# Patient Record
Sex: FEMALE | Race: BLACK OR AFRICAN AMERICAN | NOT HISPANIC OR LATINO | URBAN - METROPOLITAN AREA
[De-identification: names, ages, dates, MRNs, and addresses within clinical notes are randomized per-mention and may not be internally consistent; named-entity substitution may affect disease eponyms.]

---

## 2017-01-10 ENCOUNTER — EMERGENCY (EMERGENCY)
Facility: HOSPITAL | Age: 27
LOS: 1 days | Discharge: HOME | End: 2017-01-10
Attending: EMERGENCY MEDICINE | Admitting: EMERGENCY MEDICINE
Payer: SELF-PAY

## 2017-01-10 VITALS
DIASTOLIC BLOOD PRESSURE: 85 MMHG | RESPIRATION RATE: 18 BRPM | HEART RATE: 95 BPM | OXYGEN SATURATION: 98 % | SYSTOLIC BLOOD PRESSURE: 156 MMHG | TEMPERATURE: 98 F

## 2017-01-10 VITALS
OXYGEN SATURATION: 98 % | TEMPERATURE: 99 F | SYSTOLIC BLOOD PRESSURE: 147 MMHG | HEART RATE: 94 BPM | WEIGHT: 199.96 LBS | DIASTOLIC BLOOD PRESSURE: 80 MMHG | RESPIRATION RATE: 18 BRPM | HEIGHT: 70 IN

## 2017-01-10 PROCEDURE — 99283 EMERGENCY DEPT VISIT LOW MDM: CPT | Mod: 25

## 2017-01-10 PROCEDURE — 99284 EMERGENCY DEPT VISIT MOD MDM: CPT

## 2017-01-10 PROCEDURE — 94640 AIRWAY INHALATION TREATMENT: CPT

## 2017-01-10 RX ORDER — ALBUTEROL 90 UG/1
1 AEROSOL, METERED ORAL ONCE
Qty: 0 | Refills: 0 | Status: COMPLETED | OUTPATIENT
Start: 2017-01-10 | End: 2017-01-10

## 2017-01-10 RX ORDER — IPRATROPIUM/ALBUTEROL SULFATE 18-103MCG
3 AEROSOL WITH ADAPTER (GRAM) INHALATION ONCE
Qty: 0 | Refills: 0 | Status: COMPLETED | OUTPATIENT
Start: 2017-01-10 | End: 2017-01-10

## 2017-01-10 RX ADMIN — Medication 3 MILLILITER(S): at 11:28

## 2017-01-10 RX ADMIN — ALBUTEROL 1 PUFF(S): 90 AEROSOL, METERED ORAL at 12:08

## 2017-01-10 NOTE — ED PROVIDER NOTE - OBJECTIVE STATEMENT
Patient is a 26 year old female with PMH asthma (never previously intubated or admitted) who presents to ED c/o cough, chest congestion and wheezing x 3 days. Pt reports that she ran out of her albuterol inhaler and currently has no insurance to see her PMD. She began with nasal congestion and coughing a few days ago and it has trigged her asthma. Has not taken anything for symptoms. Denies sick contacts, recent travel, fever, chills, abdominal pain, chest pain, sob, or rash.

## 2017-01-10 NOTE — ED ADULT NURSE NOTE - OBJECTIVE STATEMENT
pt received aox3, ambulatory, in NAD, lung sounds clear bilaterally. pt states she feels tightness in her breathing for 2-3 days mixed with non-productive cough. denies fever, chills. pt states she has no insurance so no inhaler medication. pt denies pertinent medical history except asthma. vitals stable. due for medication. will continue to monitor.

## 2017-01-10 NOTE — ED PROVIDER NOTE - ENMT, MLM
Airway patent, Nasal congestion. Mouth with normal mucosa. Throat has no vesicles, no oropharyngeal exudates and uvula is midline.

## 2017-01-10 NOTE — ED PROVIDER NOTE - CARE PLAN
Principal Discharge DX:	URI (upper respiratory infection)  Secondary Diagnosis:	Asthma Principal Discharge DX:	URI (upper respiratory infection)

## 2017-01-10 NOTE — ED PROVIDER NOTE - MEDICAL DECISION MAKING DETAILS
26 year old female with URI/asthma x 3 days. Pt is well appearing, lungs clear, vitals stable. Given duoneb in ED with improvement. Will d/c home with inhaler. Advised f/u with pmd, return for worsening of symptoms.

## 2017-01-14 DIAGNOSIS — J45.909 UNSPECIFIED ASTHMA, UNCOMPLICATED: ICD-10-CM

## 2017-01-14 DIAGNOSIS — J06.9 ACUTE UPPER RESPIRATORY INFECTION, UNSPECIFIED: ICD-10-CM

## 2017-01-19 ENCOUNTER — EMERGENCY (EMERGENCY)
Facility: HOSPITAL | Age: 27
LOS: 1 days | Discharge: HOME | End: 2017-01-19
Attending: EMERGENCY MEDICINE | Admitting: EMERGENCY MEDICINE
Payer: SELF-PAY

## 2017-01-19 VITALS
DIASTOLIC BLOOD PRESSURE: 84 MMHG | OXYGEN SATURATION: 99 % | WEIGHT: 219.8 LBS | HEART RATE: 72 BPM | TEMPERATURE: 99 F | RESPIRATION RATE: 18 BRPM | SYSTOLIC BLOOD PRESSURE: 130 MMHG

## 2017-01-19 DIAGNOSIS — J02.9 ACUTE PHARYNGITIS, UNSPECIFIED: ICD-10-CM

## 2017-01-19 DIAGNOSIS — B34.9 VIRAL INFECTION, UNSPECIFIED: ICD-10-CM

## 2017-01-19 PROCEDURE — 99283 EMERGENCY DEPT VISIT LOW MDM: CPT

## 2017-01-19 RX ORDER — PSEUDOEPHEDRINE HCL 30 MG
30 TABLET ORAL ONCE
Qty: 0 | Refills: 0 | Status: COMPLETED | OUTPATIENT
Start: 2017-01-19 | End: 2017-01-19

## 2017-01-19 RX ADMIN — Medication 30 MILLIGRAM(S): at 21:15

## 2017-01-19 NOTE — ED PROVIDER NOTE - MEDICAL DECISION MAKING DETAILS
27 yo female with URI symptoms . Appears well, in NAD, nasal congestion+, no wheezing or rales on lungs exam, no signs of strep throat. most likely viral infection. supportive care recommended, stable for discharge

## 2017-01-19 NOTE — ED ADULT NURSE NOTE - OBJECTIVE STATEMENT
Pt walked into ED with c/o of cough for the past 2 weeks with clear sputum. Denies fever, chills, N+V. Pt. denies SOB, wheezing. PT. denies taking anything for the cough.

## 2017-01-19 NOTE — ED PROVIDER NOTE - ENMT, MLM
Airway patent, Nasal mucosa clear. nasal congestion+, no TTP over the sinuses, postnasal discharge+, Mouth with normal mucosa. Throat has no vesicles, no oropharyngeal exudates and uvula is midline.

## 2017-01-19 NOTE — ED PROVIDER NOTE - OBJECTIVE STATEMENT
27 yo female c/o non-productive cough, sore throat, nasal congestion, generalized weakness for almost 2 weeks. Pt states that she was seen for asthma exacerbation ion the ER recently too, but has no more wheezing or SOB. Pt denies fever, chills, night sweats, recent travel or weight loss.

## 2017-08-17 ENCOUNTER — EMERGENCY (EMERGENCY)
Facility: HOSPITAL | Age: 27
LOS: 1 days | Discharge: HOME | End: 2017-08-17
Admitting: EMERGENCY MEDICINE
Payer: COMMERCIAL

## 2017-08-17 VITALS
OXYGEN SATURATION: 98 % | SYSTOLIC BLOOD PRESSURE: 140 MMHG | WEIGHT: 199.96 LBS | RESPIRATION RATE: 18 BRPM | DIASTOLIC BLOOD PRESSURE: 81 MMHG | TEMPERATURE: 98 F | HEART RATE: 70 BPM

## 2017-08-17 DIAGNOSIS — J45.909 UNSPECIFIED ASTHMA, UNCOMPLICATED: ICD-10-CM

## 2017-08-17 DIAGNOSIS — R10.2 PELVIC AND PERINEAL PAIN: ICD-10-CM

## 2017-08-17 DIAGNOSIS — N39.0 URINARY TRACT INFECTION, SITE NOT SPECIFIED: ICD-10-CM

## 2017-08-17 LAB
APPEARANCE UR: SIGNIFICANT CHANGE UP
BILIRUB UR-MCNC: NEGATIVE — SIGNIFICANT CHANGE UP
COLOR SPEC: YELLOW — SIGNIFICANT CHANGE UP
DIFF PNL FLD: NEGATIVE — SIGNIFICANT CHANGE UP
GLUCOSE UR QL: NEGATIVE — SIGNIFICANT CHANGE UP
KETONES UR-MCNC: NEGATIVE — SIGNIFICANT CHANGE UP
LEUKOCYTE ESTERASE UR-ACNC: (no result)
NITRITE UR-MCNC: NEGATIVE — SIGNIFICANT CHANGE UP
PH UR: 6 — SIGNIFICANT CHANGE UP (ref 5–8)
PROT UR-MCNC: 30 MG/DL
SP GR SPEC: 1.02 — SIGNIFICANT CHANGE UP (ref 1–1.03)
UROBILINOGEN FLD QL: 0.2 E.U./DL — SIGNIFICANT CHANGE UP

## 2017-08-17 PROCEDURE — 99284 EMERGENCY DEPT VISIT MOD MDM: CPT

## 2017-08-17 PROCEDURE — 81001 URINALYSIS AUTO W/SCOPE: CPT

## 2017-08-17 RX ORDER — IBUPROFEN 200 MG
600 TABLET ORAL ONCE
Qty: 0 | Refills: 0 | Status: COMPLETED | OUTPATIENT
Start: 2017-08-17 | End: 2017-08-17

## 2017-08-17 RX ORDER — NITROFURANTOIN MACROCRYSTAL 50 MG
1 CAPSULE ORAL
Qty: 10 | Refills: 0
Start: 2017-08-17 | End: 2017-08-22

## 2017-08-17 NOTE — ED PROVIDER NOTE - PHYSICAL EXAMINATION
CONSTITUTIONAL: Well-appearing; well-nourished; in no apparent distress.   NECK: Supple; non-tender;   CARDIOVASCULAR: Normal S1, S2; no murmurs, rubs, or gallops. Regular rate and rhythm.   RESPIRATORY: Breathing easily; breath sounds clear and equal bilaterally; no wheezes, rhonchi, or rales.  Abd: soft, nt, nd  Gyn: no discharge, no CMT or adnexal tenderness   EXT: No cyanosis or edema; N/V intact  SKIN: Normal for age and race; warm; dry; good turgor; no apparent lesions or rash.

## 2017-08-17 NOTE — ED PROVIDER NOTE - CARE PLAN
Principal Discharge DX:	Pelvic pain in female Principal Discharge DX:	Pelvic pain in female  Secondary Diagnosis:	UTI (urinary tract infection)

## 2017-08-17 NOTE — ED PROVIDER NOTE - MEDICAL DECISION MAKING DETAILS
26 yo F ucg neg with hx of fibroids with intermittent pelvic pain worse with menstruation. Pelvic exam normal, abd soft, nt. Pain control, gyn f/u. 28 yo F ucg neg with hx of fibroids with intermittent pelvic pain worse with menstruation. Pelvic exam normal, abd soft, nt. UA+ bacteria, +leuks, >10wbc, will treat. Pain control, gyn f/u. I have discussed the discharge plan with the patient. The patient agrees with the plan, as discussed.  The patient understands Emergency Department diagnosis is a preliminary diagnosis often based on limited information and that the patient must adhere to the follow-up plan as discussed.  The patient understands that if the symptoms worsen or if prescribed medications do not have the desired/planned effect that the patient may return to the Emergency Department at any time for further evaluation and treatment.

## 2017-08-17 NOTE — ED ADULT NURSE NOTE - OBJECTIVE STATEMENT
Patient arrived to ED via walk-in stating, "I have had lower abdominal pain for months.  Last time I had this they said it was fibroids."  Patient is A&Ox3, in NAD, complaining of chronic lower abdominal pain for "a few months".  Patient denies any nausea, vomiting, diarrhea, fevers, chills, urinary symptoms or any other complaints at this time.  Will continue with plan of care.

## 2017-08-17 NOTE — ED PROVIDER NOTE - OBJECTIVE STATEMENT
28 yo F ucg neg with pmh of asthma and fibroids c/o intermittent pelvic pain for "months." Pt states she came to the ED 1 year ago and was told she had multiple fibroids. Pt wants to know if they grew. Pt states she started having cramping 4 days ago. Pt states pain is worse with her menstruation. Pt finished her period 3 days ago. Denies fever, chills, dysuria, hematuria, vaginal bleeding, discharge, back pain, abd pain. Pt has not followed up with a gyn doctor. Pt never takes anything for pain because she does not like to take pain medications.

## 2019-02-13 NOTE — ED PROVIDER NOTE - CHIEF COMPLAINT
Spoke with pt and informed them of normal lab result The patient is a 26y Female complaining of asthma attack.

## 2020-11-18 ENCOUNTER — EMERGENCY (EMERGENCY)
Facility: HOSPITAL | Age: 30
LOS: 1 days | Discharge: HOME | End: 2020-11-18
Attending: EMERGENCY MEDICINE | Admitting: EMERGENCY MEDICINE
Payer: COMMERCIAL

## 2020-11-18 VITALS
SYSTOLIC BLOOD PRESSURE: 151 MMHG | DIASTOLIC BLOOD PRESSURE: 96 MMHG | TEMPERATURE: 99 F | HEART RATE: 76 BPM | RESPIRATION RATE: 18 BRPM | HEIGHT: 70 IN | OXYGEN SATURATION: 98 % | WEIGHT: 259.93 LBS

## 2020-11-18 VITALS
DIASTOLIC BLOOD PRESSURE: 83 MMHG | SYSTOLIC BLOOD PRESSURE: 132 MMHG | OXYGEN SATURATION: 98 % | RESPIRATION RATE: 18 BRPM | HEART RATE: 70 BPM | TEMPERATURE: 98 F

## 2020-11-18 LAB
ALBUMIN SERPL ELPH-MCNC: 4.3 G/DL — SIGNIFICANT CHANGE UP (ref 3.3–5)
ALP SERPL-CCNC: 70 U/L — SIGNIFICANT CHANGE UP (ref 40–120)
ALT FLD-CCNC: 29 U/L — SIGNIFICANT CHANGE UP (ref 10–45)
ANION GAP SERPL CALC-SCNC: 10 MMOL/L — SIGNIFICANT CHANGE UP (ref 5–17)
APPEARANCE UR: CLEAR — SIGNIFICANT CHANGE UP
AST SERPL-CCNC: 21 U/L — SIGNIFICANT CHANGE UP (ref 10–40)
BACTERIA # UR AUTO: PRESENT /HPF
BASOPHILS # BLD AUTO: 0.06 K/UL — SIGNIFICANT CHANGE UP (ref 0–0.2)
BASOPHILS NFR BLD AUTO: 0.6 % — SIGNIFICANT CHANGE UP (ref 0–2)
BILIRUB SERPL-MCNC: 0.2 MG/DL — SIGNIFICANT CHANGE UP (ref 0.2–1.2)
BILIRUB UR-MCNC: NEGATIVE — SIGNIFICANT CHANGE UP
BUN SERPL-MCNC: 14 MG/DL — SIGNIFICANT CHANGE UP (ref 7–23)
CALCIUM SERPL-MCNC: 9.5 MG/DL — SIGNIFICANT CHANGE UP (ref 8.4–10.5)
CHLORIDE SERPL-SCNC: 104 MMOL/L — SIGNIFICANT CHANGE UP (ref 96–108)
CO2 SERPL-SCNC: 25 MMOL/L — SIGNIFICANT CHANGE UP (ref 22–31)
COLOR SPEC: YELLOW — SIGNIFICANT CHANGE UP
CREAT SERPL-MCNC: 0.72 MG/DL — SIGNIFICANT CHANGE UP (ref 0.5–1.3)
DIFF PNL FLD: NEGATIVE — SIGNIFICANT CHANGE UP
EOSINOPHIL # BLD AUTO: 0.22 K/UL — SIGNIFICANT CHANGE UP (ref 0–0.5)
EOSINOPHIL NFR BLD AUTO: 2.3 % — SIGNIFICANT CHANGE UP (ref 0–6)
EPI CELLS # UR: ABNORMAL /HPF (ref 0–5)
GLUCOSE SERPL-MCNC: 126 MG/DL — HIGH (ref 70–99)
GLUCOSE UR QL: NEGATIVE — SIGNIFICANT CHANGE UP
HCT VFR BLD CALC: 35.5 % — SIGNIFICANT CHANGE UP (ref 34.5–45)
HGB BLD-MCNC: 11.3 G/DL — LOW (ref 11.5–15.5)
IMM GRANULOCYTES NFR BLD AUTO: 0.3 % — SIGNIFICANT CHANGE UP (ref 0–1.5)
KETONES UR-MCNC: NEGATIVE — SIGNIFICANT CHANGE UP
LEUKOCYTE ESTERASE UR-ACNC: ABNORMAL
LIDOCAIN IGE QN: 33 U/L — SIGNIFICANT CHANGE UP (ref 7–60)
LYMPHOCYTES # BLD AUTO: 3.66 K/UL — HIGH (ref 1–3.3)
LYMPHOCYTES # BLD AUTO: 39 % — SIGNIFICANT CHANGE UP (ref 13–44)
MCHC RBC-ENTMCNC: 25.5 PG — LOW (ref 27–34)
MCHC RBC-ENTMCNC: 31.8 GM/DL — LOW (ref 32–36)
MCV RBC AUTO: 80.1 FL — SIGNIFICANT CHANGE UP (ref 80–100)
MONOCYTES # BLD AUTO: 0.53 K/UL — SIGNIFICANT CHANGE UP (ref 0–0.9)
MONOCYTES NFR BLD AUTO: 5.6 % — SIGNIFICANT CHANGE UP (ref 2–14)
NEUTROPHILS # BLD AUTO: 4.89 K/UL — SIGNIFICANT CHANGE UP (ref 1.8–7.4)
NEUTROPHILS NFR BLD AUTO: 52.2 % — SIGNIFICANT CHANGE UP (ref 43–77)
NITRITE UR-MCNC: NEGATIVE — SIGNIFICANT CHANGE UP
NRBC # BLD: 0 /100 WBCS — SIGNIFICANT CHANGE UP (ref 0–0)
PH UR: 6 — SIGNIFICANT CHANGE UP (ref 5–8)
PLATELET # BLD AUTO: 242 K/UL — SIGNIFICANT CHANGE UP (ref 150–400)
POTASSIUM SERPL-MCNC: 3.9 MMOL/L — SIGNIFICANT CHANGE UP (ref 3.5–5.3)
POTASSIUM SERPL-SCNC: 3.9 MMOL/L — SIGNIFICANT CHANGE UP (ref 3.5–5.3)
PROT SERPL-MCNC: 8 G/DL — SIGNIFICANT CHANGE UP (ref 6–8.3)
PROT UR-MCNC: NEGATIVE MG/DL — SIGNIFICANT CHANGE UP
RBC # BLD: 4.43 M/UL — SIGNIFICANT CHANGE UP (ref 3.8–5.2)
RBC # FLD: 14.6 % — HIGH (ref 10.3–14.5)
SODIUM SERPL-SCNC: 139 MMOL/L — SIGNIFICANT CHANGE UP (ref 135–145)
SP GR SPEC: >=1.03 — SIGNIFICANT CHANGE UP (ref 1–1.03)
UROBILINOGEN FLD QL: 0.2 E.U./DL — SIGNIFICANT CHANGE UP
WBC # BLD: 9.39 K/UL — SIGNIFICANT CHANGE UP (ref 3.8–10.5)
WBC # FLD AUTO: 9.39 K/UL — SIGNIFICANT CHANGE UP (ref 3.8–10.5)
WBC UR QL: < 5 /HPF — SIGNIFICANT CHANGE UP

## 2020-11-18 PROCEDURE — 80053 COMPREHEN METABOLIC PANEL: CPT

## 2020-11-18 PROCEDURE — 76830 TRANSVAGINAL US NON-OB: CPT

## 2020-11-18 PROCEDURE — 36415 COLL VENOUS BLD VENIPUNCTURE: CPT

## 2020-11-18 PROCEDURE — 99285 EMERGENCY DEPT VISIT HI MDM: CPT

## 2020-11-18 PROCEDURE — 76856 US EXAM PELVIC COMPLETE: CPT | Mod: 26

## 2020-11-18 PROCEDURE — 83690 ASSAY OF LIPASE: CPT

## 2020-11-18 PROCEDURE — 81001 URINALYSIS AUTO W/SCOPE: CPT

## 2020-11-18 PROCEDURE — 76830 TRANSVAGINAL US NON-OB: CPT | Mod: 26

## 2020-11-18 PROCEDURE — 99284 EMERGENCY DEPT VISIT MOD MDM: CPT | Mod: 25

## 2020-11-18 PROCEDURE — 76856 US EXAM PELVIC COMPLETE: CPT

## 2020-11-18 PROCEDURE — 85025 COMPLETE CBC W/AUTO DIFF WBC: CPT

## 2020-11-18 RX ORDER — IBUPROFEN 200 MG
1 TABLET ORAL
Qty: 30 | Refills: 0
Start: 2020-11-18 | End: 2020-11-27

## 2020-11-18 NOTE — ED ADULT NURSE NOTE - NSIMPLEMENTINTERV_GEN_ALL_ED
Implemented All Universal Safety Interventions:  [place] to call system. Call bell, personal items and telephone within reach. Instruct patient to call for assistance. Room bathroom lighting operational. Non-slip footwear when patient is off stretcher. Physically safe environment: no spills, clutter or unnecessary equipment. Stretcher in lowest position, wheels locked, appropriate side rails in place.

## 2020-11-18 NOTE — ED PROVIDER NOTE - NSFOLLOWUPINSTRUCTIONS_ED_ALL_ED_FT
Follow up with your GYN physician this week.               Uterine Fibroids    WHAT YOU NEED TO KNOW:    What are uterine fibroids? Uterine fibroids are growths found inside your uterus. Uterine fibroids also may be called tumors or leiomyomas. Uterine fibroids often appear in groups, or you may have only one. They can be small or large, and they can grow. They are almost always benign (not cancer) and likely will not spread to other parts of your body. They may grow when you are pregnant and shrink after you no longer have a monthly period.    What increases my risk for uterine fibroids? The cause of uterine fibroids is not clear. Ask your healthcare provider about these and other risk factors for uterine fibroids:  •A family history of uterine fibroids      •Too many female hormones, especially estrogen      •Menstrual periods starting before age 13      •Too much body weight      •Not having children      •Drinking alcohol      What are the signs and symptoms of uterine fibroids? You may have no signs or symptoms. Symptoms depend on the size, type, and number of fibroids you have. Symptoms also depend on where the fibroids are inside your uterus:  •Heavy or painful menstrual bleeding      •Pelvic pressure and pain      •Increased pelvic pain during sex      •Constipation or pain when you have a bowel movement      •Need to urinate often      How are uterine fibroids diagnosed? Your healthcare provider will examine you and ask about your symptoms. Tell the provider if any women if your family have had uterine fibroids. You may also need any of the following:   •A pelvic exam is also called an internal or vaginal exam. During a pelvic exam, your healthcare provider gently puts a speculum into your vagina. A speculum is a tool that opens your vagina. This lets your healthcare provider see your cervix (bottom part of your uterus). With gloved hands, your healthcare provider will check the size and shape of your uterus and ovaries.       •A vaginal ultrasound is used to see inside your uterus and to check your ovaries. During this test, your healthcare provider places a small wand in your vagina. Sound waves from the wand show pictures of the uterus and ovaries.      •A biopsy is a tissue sample of a fibroid that your healthcare provider takes from your uterus for testing.      How are uterine fibroids treated? You may not need treatment for your fibroids if you do not have symptoms. The following treatments may shrink your fibroids and help your pain:   •Hormones may help shrink your fibroids.      •Contraceptives help prevent pregnancy. They also may help shrink your fibroids.      •NSAIDs help decrease swelling and pain or fever. This medicine is available with or without a doctor's order. NSAIDs can cause stomach bleeding or kidney problems in certain people. If you take blood thinner medicine, always ask your healthcare provider if NSAIDs are safe for you. Always read the medicine label and follow directions.      •Surgery may be used to remove your uterine fibroids. Surgery may instead be used to block or slow the flow of blood to the fibroid. This may make your fibroids shrink or disappear. Surgery called a hysterectomy may be needed if your fibroids are severe. For this surgery, your healthcare provider removes your entire uterus. After this surgery, you will no longer be able to have children.      What can I do to prevent uterine fibroids?   •Maintain a healthy weight. Extra weight can cause fibroids to grow. Talk to your healthcare provider about a healthy weight for you. He or she can help you create a healthy weight loss plan if you are overweight.      •Eat a variety of healthy foods. Healthy foods include fruits, vegetables, lean meats, fish, low-fat dairy foods, cooked beans, and whole-grain breads and cereals. Fruits and vegetables are especially important for helping lower the risk for fibroids. Your healthcare provider or a dietitian can help you create a healthy meal plan.      •Limit or do not drink alcohol as directed. Alcohol can increase your risk for fibroids. A drink of alcohol is 12 ounces of beer, 1½ ounces of liquor, or 5 ounces of wine. Ask your healthcare provider for information if you need help to quit drinking alcohol.      When should I seek immediate care?   •Your heart begins to race, and you feel faint.      •You begin to pass large blood clots from your vagina.      When should I contact my healthcare provider?   •Your symptoms, such as heavy bleeding, pain, or pelvic pressure, worsen.      •You feel weak and are more tired than usual.      •You do not feel like your bladder is empty after you urinate. You also may urinate small amounts more often.       •You have questions or concerns about your condition or care.      CARE AGREEMENT:    You have the right to help plan your care. Learn about your health condition and how it may be treated. Discuss treatment options with your healthcare providers to decide what care you want to receive. You always have the right to refuse treatment.        © Copyright [employer] 2020           back to top                          © Copyright [employer] 2020

## 2020-11-18 NOTE — ED PROVIDER NOTE - GASTROINTESTINAL, MLM
Abdomen soft, + tenderness to LLQ and suprapubic region. remainder of abd non tender. no guarding. no rebound. no distention. no cva tenderness.

## 2020-11-18 NOTE — ED PROVIDER NOTE - OBJECTIVE STATEMENT
29 y/o female with hx of fibroids and asthma c/o LLQ pain x 4 days. pt states dull achy constant pain. no radiation of pain. no fever or chills. no n/v/d. no bloody stool or melena. no constipation. no urinary sx's. no flank or back pain. no vag d/c or bleeding. pt notes she has not taken any medication for pain. no further complaints. LMP 10/25/20.

## 2020-11-18 NOTE — ED PROVIDER NOTE - CLINICAL SUMMARY MEDICAL DECISION MAKING FREE TEXT BOX
LLQ pain ? ovarian cyst vs torsion vs fibroid pain. do not suspect diverticulitis. a febrile. pt well appearing. pt declined pain meds in ED. labs noted. uhcg negative. us done. dispo pending us report.

## 2020-11-18 NOTE — ED PROVIDER NOTE - PATIENT PORTAL LINK FT
You can access the FollowMyHealth Patient Portal offered by [facility] by registering at the following website: [email or web address]. By joining [employer]’s FollowMyHealth portal, you will also be able to view your health information using other applications (apps) compatible with our system.

## 2020-11-19 PROBLEM — D25.9 LEIOMYOMA OF UTERUS, UNSPECIFIED: Chronic | Status: ACTIVE | Noted: 2017-08-17

## 2020-11-22 DIAGNOSIS — D25.9 LEIOMYOMA OF UTERUS, UNSPECIFIED: ICD-10-CM

## 2020-11-22 DIAGNOSIS — R10.32 LEFT LOWER QUADRANT PAIN: ICD-10-CM

## 2021-04-08 NOTE — ED ADULT NURSE NOTE - ADDITIONAL PRINTED INSTRUCTIONS GIVEN
Pt called and stated that she is having trouble keeping food down. Pt stated that she is in the process of a revision but waiting for Dr's approval.  Pt stated that is having some acid reflux issues as well. I told pt that I will forward this to Dr Carlen Dakins. Pt denies any other symptoms. I told pt that if the symptoms get worse to go to the ER. Pt is aware.
Patient exited ER without prescriptions, D/C papers.

## 2021-11-03 NOTE — ED ADULT NURSE NOTE - OTHER COMPLAINTS
MAKE SURE YOU TAKE 4 PUFFS ALBUTEROL EVERY 4 HOURS FOR THE NEXT 24 TO 48 HOURS AT HOME WHEN AWAKE.     Discharge Instructions for Asthma  You have been diagnosed with an asthma attack. With the help of your healthcare provider, you can keep your asthma under control and have less emergency department visits and stays in the hospital.    Managing asthma  · Take your asthma medicines exactly as your provider tells you. Do this even if you feel that your athma is under control.  · Learn how to monitor your asthma. Some people watch for early changes of symptoms getting worse. Some use a peak flow meter. Your healthcare provider may decide to give you an asthma action plan.  · Be sure to always have a quick-relief inhaler with you. If you were given a prescription, make sure you go to a pharmacy to get it filled as soon as possible.  Controlling asthma triggers  Triggers are those things that make your asthma symptoms worse or cause asthma attacks. Many people with asthma have allergies that can be triggers. Your healthcare provider may have you get allergy testing to find out what you are allergic to. This can help you stay away from triggers.  Dust or dust mites are a common asthma trigger. To avoid a dust mites, do the following:  · Use dust-proof covers on your mattress and pillows. Wash the sheets and blankets on your bed once a week in very hot water.  · Don’t sleep or lie on cloth-covered cushions or furniture.  · Ask someone else to vacuum and dust your house.  · If you do vacuum and dust yourself, wear a dust mask. You can buy them from the hardware store.  · Use a vacuum with a double-layered bag or HEPA (high-efficiency particulate air) filter.  Pets with fur or feathers are triggers for some people. If you must have pets, take these precautions:  · Keep pets out of your bedroom and off your bed. Keep the bedroom door closed.  · Cover the air vents in your bedroom with heavy material to filter the  air.  · Don't use carpets or cloth-covered furniture in your home. If this is not possible, keep pets out of rooms with these items.  · Have someone bathe your pets every week. And brush them often.  If you smoke, do your best to quit.  · Enroll in a stop-smoking program to increase your chance of success.  · Ask your healthcare provider about medicines or other methods to help you quit.  · Ask family members to quit smoking as well.  · Don’t allow anyone to smoke in your home, in your car, or around you.  Other steps to take  · Make sure you know what to do if exercise is a trigger for you. Many people use quick-relief inhalers before exercise or physical activity.  · Get a flu shot every year and get pneumonia shots as advised by your healthcare provider.  · Try to keep your windows closed during pollen seasons and when mold counts are high.  · On cold or windy days, cover your nose and mouth with a scarf.  · Try to stay away from people who are sick with colds or the flu. Wash your hands often or use a hand . If respiratory infections like colds or flu trigger your asthma, use your quick-relief medicines as soon as you begin to notice respiratory symptoms. They may include a runny or stuffy nose, sore throat, or a cough.  Follow-up care  Make a follow-up appointment as directed. Follow your asthma action plan if you were given one.  Call 911  Call 911 right away if you have:  · Severe wheezing  · Shortness of breath that is not relieved by your quick-relief medicine  · Trouble walking or talking because of shortness of breath  · Blue lips or fingernails  · If you monitor symptoms with a peak flow meter, readings less than 50% of your personal best   [employer] last reviewed this educational content on 2/3/2017  © 2953-3879 [employer] [address] [facility], [place] [address]. All rights reserved. This information is not intended as a substitute for professional medical care. Always follow  your healthcare professional's instructions.        Your Child’s Asthma: Taking Control   Asthma is a long-term (chronic) disease of the airways in the lungs. It can’t be cured, but it can be controlled. Get to know your child’s asthma triggers and symptoms, and understand your child’s treatment plan.   The benefits of control   A child whose asthma is in control can do all of the things other children do. He or she will:   · Be able to play with other kids and take part in sports  · Be able to sleep well. This means more energy for school and play.  · Miss fewer school days--and you’ll miss fewer workdays  Asthma symptoms   Some children have symptoms often (persistent asthma). Others have symptoms once in a while (intermittent asthma). Know your child’s pattern of symptoms.   Mild asthma symptoms   Talk with your child’s healthcare provider about what to do when any of these symptoms occur:   · Coughing, especially at night  · Getting tired or out of breath easily  · Wheezing. This is a whistling noise when breathing out.  · Chest tightness  · Fast breathing when at rest  Severe asthma symptoms   You should have an asthma action plan that tells you what actions to take based on your child's symptoms. If you don't have an action plan, talk with your child’s healthcare provider about getting one. If you do have an action plan, review it with the provider once in a while to be sure it is up to date.   Call your child’s healthcare provider right away if your child has any of these symptoms:   · Very fast or hard breathing  · The skin is pulled in between the ribs and above and below the breastbone (chest retractions) when breathing  · If you or your  asthma with peak flow readings, a peak flow less than 50% of your child's personal best  Call 911  Call 911 right away if your child has any of these symptoms:   · Can't walk or talk  · Lips or fingers turning blue  · Your child seems in severe distress  Is  your child’s asthma controlled?   If you answer yes to any of the questions below, your child’s asthma may not be in control. Work with your child’s healthcare provider to make changes to your child's treatment plan. Discuss any problems that make it hard for you or your child to stick to the plan.   · Does your child need to use a quick-relief inhaler more than 2 times a week (other than before exercise)?  · Does your child wake up at night with symptoms more than 2 times a month?  · Does your child have trouble doing regular, daily activities more than twice per week?  What you can do  · Make sure your child has an asthma action plan. Review it with your child’s healthcare provider.  · Understand your child’s treatment plan.  · Know how to use each of your child’s medicines.  · Know what makes your child’s asthma or triggers worse and how to help your child control or stay away from them.  · Know your child's flare-up symptoms. Teach your child how to get help when a flare-up occurs. Be sure  providers, teachers and other school staff, and babysitters know how to treat a flare-up.    [employer] last reviewed this educational content on 8/1/2018  © 4432-1665 [employer]. All rights reserved. This information is not intended as a substitute for professional medical care. Always follow your healthcare professional's instructions.         c/o of intermittent lower pelvic pain x months

## 2022-08-21 ENCOUNTER — EMERGENCY (EMERGENCY)
Facility: HOSPITAL | Age: 32
LOS: 1 days | Discharge: HOME | End: 2022-08-21
Admitting: EMERGENCY MEDICINE
Payer: COMMERCIAL

## 2022-08-21 VITALS
OXYGEN SATURATION: 100 % | RESPIRATION RATE: 17 BRPM | DIASTOLIC BLOOD PRESSURE: 82 MMHG | HEART RATE: 79 BPM | WEIGHT: 246.92 LBS | SYSTOLIC BLOOD PRESSURE: 154 MMHG | TEMPERATURE: 98 F | HEIGHT: 70 IN

## 2022-08-21 PROCEDURE — 99283 EMERGENCY DEPT VISIT LOW MDM: CPT | Mod: 25

## 2022-08-21 PROCEDURE — 73630 X-RAY EXAM OF FOOT: CPT

## 2022-08-21 PROCEDURE — 73620 X-RAY EXAM OF FOOT: CPT | Mod: 26,LT

## 2022-08-21 PROCEDURE — 73630 X-RAY EXAM OF FOOT: CPT | Mod: 26,LT

## 2022-08-21 NOTE — ED PROVIDER NOTE - PATIENT PORTAL LINK FT
You can access the FollowMyHealth Patient Portal offered by [facility] by registering at the following website: [email or web address]. By joining [employer]’s FollowMyHealth portal, you will also be able to view your health information using other applications (apps) compatible with our system.

## 2022-08-21 NOTE — ED ADULT NURSE NOTE - OBJECTIVE STATEMENT
Pt AOX4. Pt c/o L heel/foot pain x1week. Denies injury/falls/trauma. No obvious deformity noted. Pt AOX4. Pt c/o L heel/foot pain x1week. Denies injury/falls/trauma. No obvious deformity noted. Pt ambulate steadily.

## 2022-08-21 NOTE — ED PROVIDER NOTE - OBJECTIVE STATEMENT
32 F pmh asthma p/w L foot pain x one week.  pt reports dull aching pain w/ bearing wt/walking in heel of L foot, worse in the morning and improves throughout the day.  reports was roller blading 2 weeks ago, does not recall any injuries.  pt has been taking NSAIDs with minimal relief.  denies f/c, headache, dizziness, fainting, chest pain, sob, nv, numbness/weakness, paresthesia, limited ROM ext, calf pain/swelling, trauma.

## 2022-08-21 NOTE — ED PROVIDER NOTE - NSFOLLOWUPINSTRUCTIONS_ED_ALL_ED_FT
Your xray shows no concern for fracture or dislocation     Take tylenol 650mg or motrin 400-800mg as needed every 4-6 hours for pain.   REST- Rest your hurting/injured joint or extremity to decrease pain and swelling for 24-48 hours    ICE- Apply ice to area of pain to decreased inflammation and pain, put towel/barrier between ice and skin. You can keep ice on for 20 minutes at a time 4-8 times daily   COMPRESSION- Wear ace wrap or brace for support to reduce swelling.  Make sure not to wrap too tight, loosen if skin feeling numb/tingling or skin turns blue   ELEVATION- Elevate hurting/injured area 6 or more inches about level of heart to decrease swelling/inflammation.  Use pillow under joint to elevate area    Please call to arrange follow up in podiatry clinic within one week   [address]  [phone]      Plantar Fasciitis       Plantar fasciitis is a painful foot condition that affects the heel. It occurs when the band of tissue that connects the toes to the heel bone (plantar fascia) becomes irritated. This can happen as the result of exercising too much or doing other repetitive activities (overuse injury).    Plantar fasciitis can cause mild irritation to severe pain that makes it difficult to walk or move. The pain is usually worse in the morning after sleeping, or after sitting or lying down for a period of time. Pain may also be worse after long periods of walking or standing.      What are the causes?    This condition may be caused by:  •Standing for long periods of time.      •Wearing shoes that do not have good arch support.      •Doing activities that put stress on joints (high-impact activities). This includes ballet and exercise that makes your heart beat faster (aerobic exercise), such as running.      •Being overweight.      •An abnormal way of walking (gait).      •Tight muscles in the back of your lower leg (calf).      •High arches in your feet or flat feet.      •Starting a new athletic activity.        What are the signs or symptoms?    The main symptom of this condition is heel pain. Pain may get worse after the following:  •Taking the first steps after a time of rest, especially in the morning after awakening, or after you have been sitting or lying down for a while.      •Long periods of standing still.      Pain may decrease after 30–45 minutes of activity, such as gentle walking.      How is this diagnosed?    This condition may be diagnosed based on your medical history, a physical exam, and your symptoms. Your health care provider will check for:  •A tender area on the bottom of your foot.      •A high arch in your foot or flat feet.      •Pain when you move your foot.      •Difficulty moving your foot.      You may have imaging tests to confirm the diagnosis, such as:  •X-rays.      •Ultrasound.      •MRI.        How is this treated?    Treatment for plantar fasciitis depends on how severe your condition is. Treatment may include:  •Rest, ice, pressure (compression), and raising (elevating) the affected foot. This is called RICE therapy. Your health care provider may recommend RICE therapy along with over-the-counter pain medicines to manage your pain.      •Exercises to stretch your calves and your plantar fascia.      •A splint that holds your foot in a stretched, upward position while you sleep (night splint).      •Physical therapy to relieve symptoms and prevent problems in the future.      •Injections of steroid medicine (cortisone) to relieve pain and inflammation.      •Stimulating your plantar fascia with electrical impulses (extracorporeal shock wave therapy). This is usually the last treatment option before surgery.      •Surgery, if other treatments have not worked after 12 months.        Follow these instructions at home:      Managing pain, stiffness, and swelling    •If directed, put ice on the painful area. To do this:  •Put ice in a plastic bag, or use a frozen bottle of water.      •Place a towel between your skin and the bag or bottle.      •Roll the bottom of your foot over the bag or bottle.      •Do this for 20 minutes, 2–3 times a day.        •Wear athletic shoes that have air-sole or gel-sole cushions, or try soft shoe inserts that are designed for plantar fasciitis.      •Elevate your foot above the level of your heart while you are sitting or lying down.      Activity     •Avoid activities that cause pain. Ask your health care provider what activities are safe for you.      •Do physical therapy exercises and stretches as told by your health care provider.      •Try activities and forms of exercise that are easier on your joints (low impact). Examples include swimming, water aerobics, and biking.      General instructions     •Take over-the-counter and prescription medicines only as told by your health care provider.      •Wear a night splint while sleeping, if told by your health care provider. Loosen the splint if your toes tingle, become numb, or turn cold and blue.      •Maintain a healthy weight, or work with your health care provider to lose weight as needed.      •Keep all follow-up visits. This is important.        Contact a health care provider if you have:    •Symptoms that do not go away with home treatment.      •Pain that gets worse.      •Pain that affects your ability to move or do daily activities.        Summary    •Plantar fasciitis is a painful foot condition that affects the heel. It occurs when the band of tissue that connects the toes to the heel bone (plantar fascia) becomes irritated.      •Heel pain is the main symptom of this condition. It may get worse after exercising too much or standing still for a long time.      •Treatment varies, but it usually starts with rest, ice, pressure (compression), and raising (elevating) the affected foot. This is called RICE therapy. Over-the-counter medicines can also be used to manage pain.      This information is not intended to replace advice given to you by your health care provider. Make sure you discuss any questions you have with your health care provider.

## 2022-08-21 NOTE — ED PROVIDER NOTE - CLINICAL SUMMARY MEDICAL DECISION MAKING FREE TEXT BOX
32 F pmh asthma p/w L foot pain x one week.  on exam LLE: no swelling/edema or deformity, minimal ttp over plantar fascia, FROM all joints, SILT, cap refill < 2 sec, DP/PT Pulse 2+, no calf ttp.  xray shows no e/o fracture or dislocation.  suspect plantar fascitis.  educated on supportive care and f/u with podiatry.  discussed strict return parameters
negative...

## 2022-08-21 NOTE — ED ADULT TRIAGE NOTE - CHIEF COMPLAINT QUOTE
Pt reports left heel pain radiating to foot/leg x 1 week. Denies injuries/falls. No swelling, redness, fevers.

## 2022-08-21 NOTE — ED ADULT NURSE NOTE - NSIMPLEMENTINTERV_GEN_ALL_ED
Implemented All Universal Safety Interventions:  [place] to call system. Call bell, personal items and telephone within reach. Instruct patient to call for assistance. Room bathroom lighting operational. Non-slip footwear when patient is off stretcher. Physically safe environment: no spills, clutter or unnecessary equipment. Stretcher in lowest position, wheels locked, appropriate side rails in place.

## 2022-08-21 NOTE — ED PROVIDER NOTE - PHYSICAL EXAMINATION
Gen: well appearing, no acute distress  Skin: warm/dry, no rash noted  Resp: breathing comfortably, speaking in full sentences, no dyspnea  LLE: no swelling/edema or deformity, minimal ttp over plantar fascia, FROM all joints, SILT, cap refill < 2 sec, DP/PT Pulse 2+, no calf ttp  Neuro: alert/oriented, ambulatory without assistance

## 2022-08-23 DIAGNOSIS — M79.672 PAIN IN LEFT FOOT: ICD-10-CM

## 2022-08-23 DIAGNOSIS — D25.9 LEIOMYOMA OF UTERUS, UNSPECIFIED: ICD-10-CM

## 2022-08-23 DIAGNOSIS — J45.909 UNSPECIFIED ASTHMA, UNCOMPLICATED: ICD-10-CM

## 2023-01-11 ENCOUNTER — INPATIENT (INPATIENT)
Facility: HOSPITAL | Age: 33
LOS: 8 days | Discharge: HOME | DRG: 163 | End: 2023-01-20
Attending: INTERNAL MEDICINE | Admitting: INTERNAL MEDICINE
Payer: COMMERCIAL

## 2023-01-11 VITALS
OXYGEN SATURATION: 98 % | SYSTOLIC BLOOD PRESSURE: 141 MMHG | DIASTOLIC BLOOD PRESSURE: 93 MMHG | HEART RATE: 126 BPM | TEMPERATURE: 99 F | WEIGHT: 237 LBS | RESPIRATION RATE: 22 BRPM | HEIGHT: 70 IN

## 2023-01-11 DIAGNOSIS — I27.29 OTHER SECONDARY PULMONARY HYPERTENSION: ICD-10-CM

## 2023-01-11 DIAGNOSIS — I26.99 OTHER PULMONARY EMBOLISM WITHOUT ACUTE COR PULMONALE: ICD-10-CM

## 2023-01-11 DIAGNOSIS — I26.09 OTHER PULMONARY EMBOLISM WITH ACUTE COR PULMONALE: ICD-10-CM

## 2023-01-11 DIAGNOSIS — I50.811 ACUTE RIGHT HEART FAILURE: ICD-10-CM

## 2023-01-11 LAB
ALBUMIN SERPL ELPH-MCNC: 3.8 G/DL — SIGNIFICANT CHANGE UP (ref 3.3–5)
ALP SERPL-CCNC: 68 U/L — SIGNIFICANT CHANGE UP (ref 40–120)
ALT FLD-CCNC: 58 U/L — HIGH (ref 10–45)
ANION GAP SERPL CALC-SCNC: 14 MMOL/L — SIGNIFICANT CHANGE UP (ref 5–17)
APTT BLD: 25.6 SEC — LOW (ref 27.5–35.5)
APTT BLD: 53.5 SEC — HIGH (ref 27.5–35.5)
APTT BLD: 63.2 SEC — HIGH (ref 27.5–35.5)
AST SERPL-CCNC: 47 U/L — HIGH (ref 10–40)
BASOPHILS # BLD AUTO: 0.1 K/UL — SIGNIFICANT CHANGE UP (ref 0–0.2)
BASOPHILS NFR BLD AUTO: 1 % — SIGNIFICANT CHANGE UP (ref 0–2)
BILIRUB SERPL-MCNC: 0.2 MG/DL — SIGNIFICANT CHANGE UP (ref 0.2–1.2)
BUN SERPL-MCNC: 13 MG/DL — SIGNIFICANT CHANGE UP (ref 7–23)
CALCIUM SERPL-MCNC: 9.6 MG/DL — SIGNIFICANT CHANGE UP (ref 8.4–10.5)
CHLORIDE SERPL-SCNC: 103 MMOL/L — SIGNIFICANT CHANGE UP (ref 96–108)
CO2 SERPL-SCNC: 22 MMOL/L — SIGNIFICANT CHANGE UP (ref 22–31)
CREAT SERPL-MCNC: 0.82 MG/DL — SIGNIFICANT CHANGE UP (ref 0.5–1.3)
D DIMER BLD IA.RAPID-MCNC: 1781 NG/ML DDU — HIGH
EGFR: 97 ML/MIN/1.73M2 — SIGNIFICANT CHANGE UP
EOSINOPHIL # BLD AUTO: 0.03 K/UL — SIGNIFICANT CHANGE UP (ref 0–0.5)
EOSINOPHIL NFR BLD AUTO: 0.3 % — SIGNIFICANT CHANGE UP (ref 0–6)
GLUCOSE SERPL-MCNC: 111 MG/DL — HIGH (ref 70–99)
HCG SERPL-ACNC: <0 MIU/ML — SIGNIFICANT CHANGE UP
HCT VFR BLD CALC: 33.8 % — LOW (ref 34.5–45)
HCT VFR BLD CALC: 35.7 % — SIGNIFICANT CHANGE UP (ref 34.5–45)
HGB BLD-MCNC: 10.6 G/DL — LOW (ref 11.5–15.5)
HGB BLD-MCNC: 11.2 G/DL — LOW (ref 11.5–15.5)
IMM GRANULOCYTES NFR BLD AUTO: 0.2 % — SIGNIFICANT CHANGE UP (ref 0–0.9)
INR BLD: 0.94 — SIGNIFICANT CHANGE UP (ref 0.88–1.16)
LACTATE SERPL-SCNC: 1.6 MMOL/L — SIGNIFICANT CHANGE UP (ref 0.5–2)
LYMPHOCYTES # BLD AUTO: 3.14 K/UL — SIGNIFICANT CHANGE UP (ref 1–3.3)
LYMPHOCYTES # BLD AUTO: 31.8 % — SIGNIFICANT CHANGE UP (ref 13–44)
MCHC RBC-ENTMCNC: 24.7 PG — LOW (ref 27–34)
MCHC RBC-ENTMCNC: 24.9 PG — LOW (ref 27–34)
MCHC RBC-ENTMCNC: 31.4 GM/DL — LOW (ref 32–36)
MCHC RBC-ENTMCNC: 31.4 GM/DL — LOW (ref 32–36)
MCV RBC AUTO: 78.8 FL — LOW (ref 80–100)
MCV RBC AUTO: 79.3 FL — LOW (ref 80–100)
MONOCYTES # BLD AUTO: 0.84 K/UL — SIGNIFICANT CHANGE UP (ref 0–0.9)
MONOCYTES NFR BLD AUTO: 8.5 % — SIGNIFICANT CHANGE UP (ref 2–14)
NEUTROPHILS # BLD AUTO: 5.74 K/UL — SIGNIFICANT CHANGE UP (ref 1.8–7.4)
NEUTROPHILS NFR BLD AUTO: 58.2 % — SIGNIFICANT CHANGE UP (ref 43–77)
NRBC # BLD: 0 /100 WBCS — SIGNIFICANT CHANGE UP (ref 0–0)
NRBC # BLD: 0 /100 WBCS — SIGNIFICANT CHANGE UP (ref 0–0)
PLATELET # BLD AUTO: 235 K/UL — SIGNIFICANT CHANGE UP (ref 150–400)
PLATELET # BLD AUTO: 239 K/UL — SIGNIFICANT CHANGE UP (ref 150–400)
POTASSIUM SERPL-MCNC: 3.6 MMOL/L — SIGNIFICANT CHANGE UP (ref 3.5–5.3)
POTASSIUM SERPL-SCNC: 3.6 MMOL/L — SIGNIFICANT CHANGE UP (ref 3.5–5.3)
PROT SERPL-MCNC: 8 G/DL — SIGNIFICANT CHANGE UP (ref 6–8.3)
PROTHROM AB SERPL-ACNC: 11.2 SEC — SIGNIFICANT CHANGE UP (ref 10.5–13.4)
RAPID RVP RESULT: SIGNIFICANT CHANGE UP
RBC # BLD: 4.26 M/UL — SIGNIFICANT CHANGE UP (ref 3.8–5.2)
RBC # BLD: 4.53 M/UL — SIGNIFICANT CHANGE UP (ref 3.8–5.2)
RBC # FLD: 18.9 % — HIGH (ref 10.3–14.5)
RBC # FLD: 19.1 % — HIGH (ref 10.3–14.5)
SARS-COV-2 RNA SPEC QL NAA+PROBE: SIGNIFICANT CHANGE UP
SODIUM SERPL-SCNC: 139 MMOL/L — SIGNIFICANT CHANGE UP (ref 135–145)
WBC # BLD: 10.5 K/UL — SIGNIFICANT CHANGE UP (ref 3.8–10.5)
WBC # BLD: 9.87 K/UL — SIGNIFICANT CHANGE UP (ref 3.8–10.5)
WBC # FLD AUTO: 10.5 K/UL — SIGNIFICANT CHANGE UP (ref 3.8–10.5)
WBC # FLD AUTO: 9.87 K/UL — SIGNIFICANT CHANGE UP (ref 3.8–10.5)

## 2023-01-11 PROCEDURE — 71045 X-RAY EXAM CHEST 1 VIEW: CPT | Mod: 26

## 2023-01-11 PROCEDURE — 99223 1ST HOSP IP/OBS HIGH 75: CPT | Mod: GC

## 2023-01-11 PROCEDURE — 99292 CRITICAL CARE ADDL 30 MIN: CPT

## 2023-01-11 PROCEDURE — 99291 CRITICAL CARE FIRST HOUR: CPT

## 2023-01-11 PROCEDURE — 71275 CT ANGIOGRAPHY CHEST: CPT | Mod: 26,MC

## 2023-01-11 PROCEDURE — 93306 TTE W/DOPPLER COMPLETE: CPT | Mod: 26

## 2023-01-11 RX ORDER — HEPARIN SODIUM 5000 [USP'U]/ML
INJECTION INTRAVENOUS; SUBCUTANEOUS
Qty: 25000 | Refills: 0 | Status: DISCONTINUED | OUTPATIENT
Start: 2023-01-11 | End: 2023-01-11

## 2023-01-11 RX ORDER — HEPARIN SODIUM 5000 [USP'U]/ML
2100 INJECTION INTRAVENOUS; SUBCUTANEOUS
Qty: 25000 | Refills: 0 | Status: DISCONTINUED | OUTPATIENT
Start: 2023-01-11 | End: 2023-01-12

## 2023-01-11 RX ORDER — SODIUM CHLORIDE 9 MG/ML
1000 INJECTION INTRAMUSCULAR; INTRAVENOUS; SUBCUTANEOUS ONCE
Refills: 0 | Status: COMPLETED | OUTPATIENT
Start: 2023-01-11 | End: 2023-01-11

## 2023-01-11 RX ORDER — LIDOCAINE 4 G/100G
1 CREAM TOPICAL ONCE
Refills: 0 | Status: COMPLETED | OUTPATIENT
Start: 2023-01-11 | End: 2023-01-11

## 2023-01-11 RX ORDER — HEPARIN SODIUM 5000 [USP'U]/ML
2000 INJECTION INTRAVENOUS; SUBCUTANEOUS
Qty: 25000 | Refills: 0 | Status: DISCONTINUED | OUTPATIENT
Start: 2023-01-11 | End: 2023-01-11

## 2023-01-11 RX ORDER — HEPARIN SODIUM 5000 [USP'U]/ML
9000 INJECTION INTRAVENOUS; SUBCUTANEOUS ONCE
Refills: 0 | Status: COMPLETED | OUTPATIENT
Start: 2023-01-11 | End: 2023-01-11

## 2023-01-11 RX ADMIN — HEPARIN SODIUM 9000 UNIT(S): 5000 INJECTION INTRAVENOUS; SUBCUTANEOUS at 12:10

## 2023-01-11 RX ADMIN — SODIUM CHLORIDE 1000 MILLILITER(S): 9 INJECTION INTRAMUSCULAR; INTRAVENOUS; SUBCUTANEOUS at 09:25

## 2023-01-11 RX ADMIN — SODIUM CHLORIDE 1000 MILLILITER(S): 9 INJECTION INTRAMUSCULAR; INTRAVENOUS; SUBCUTANEOUS at 10:25

## 2023-01-11 RX ADMIN — HEPARIN SODIUM 21 UNIT(S)/HR: 5000 INJECTION INTRAVENOUS; SUBCUTANEOUS at 21:15

## 2023-01-11 RX ADMIN — HEPARIN SODIUM 1900 UNIT(S)/HR: 5000 INJECTION INTRAVENOUS; SUBCUTANEOUS at 12:11

## 2023-01-11 NOTE — ED ADULT TRIAGE NOTE - CHIEF COMPLAINT QUOTE
Patient c/o asthma exacerbation x 1 week, using inhaler and nebulizer with no relief.  C/o cough, chest tightness.  Denies sick contacts.

## 2023-01-11 NOTE — H&P ADULT - ASSESSMENT
31 yo female with PMHx of asthma presents to ED i/s/o worsening chest pain and cough. CT PE showing thrombus with large burden and RV strain. Admitted to MICU for PE with RV strain and possible intervention.     NEURO:   No active issues.     CARDIOVASCULAR:   #RV strain  Pt w/ large PE involving all lobar and multiple segmental branches bilaterally w/ RV strain and cardiomegaly.   TTE with severely dilated RV size, mod-severely reduced RV function, mod TR, pHTN (PASP 57), nml LV size/function. Troponin 0.10, Pro-BNP 1395.   - Obtain repeat TTE tomorrow 1/12 to assess R heart strain     PULMONARY:   #PE  CTA PE protocol indicating large thrombus burden involving all lobar and multiple segmental branches bilaterally w/ probable evolving infarcts in lateral basal segment of LLL and mild pulmonary A dilation w/ RV strain and cardiomegaly. TTE with dilated RV size, mod-severely reduced RV function, pTN (PASP 57). Heparin gtt running @ 19 mL/hr. Etiology likely Unprovoked PE in nature -- risk factors: Nuvaring contraceptive use and obesity. Denies recent travel and family hx.   - c/w heparin gtt @19ml/hr with PTT q6h  - pulm following, appreciate recs    GI:   No active issues    /Renal:   No active issues    Heme/Onc:   On full AC i/s/o PE   - f/u aPTT q6hr   - type and screen    Endo: No active issues    ID: No active issues    F: s/p 1L NS in ED  E: Replete K <4, Mg <2  N: Regular diet  VTE: heparin gtt  GI: not needed  C: Full code  D: DMICU       33 yo female with PMHx of asthma presents to ED i/s/o worsening chest pain and cough. CT PE showing thrombus with large burden and RV strain. Admitted to MICU for PE with RV strain and possible intervention.     NEURO:   No active issues.     CARDIOVASCULAR:   #RV strain  Pt w/ large PE involving all lobar and multiple segmental branches bilaterally w/ RV strain and cardiomegaly.   TTE with severely dilated RV size, mod-severely reduced RV function, mod TR, pHTN (PASP 57), nml LV size/function. Troponin 0.10, Pro-BNP 1395.  - Repeat trop, BNP, lactate 1/12 AM    - Obtain repeat TTE tomorrow 1/12 to assess R heart strain and determine if thrombectomy is needed     PULMONARY:   #PE  CTA PE protocol indicating large thrombus burden involving all lobar and multiple segmental branches bilaterally w/ probable evolving infarcts in lateral basal segment of LLL and mild pulmonary A dilation w/ RV strain and cardiomegaly. TTE with dilated RV size, mod-severely reduced RV function, pTN (PASP 57). Heparin gtt running @ 19 mL/hr. Etiology likely Unprovoked PE in nature -- risk factors: Nuvaring contraceptive use and obesity. Denies recent travel and family hx.   - c/w heparin gtt  with PTT q6h  - PERT following, appreciate recs    GI:   No active issues    /Renal:   No active issues    Heme/Onc:   On full AC i/s/o PE   - f/u aPTT q6hr   - type and screen    Endo: No active issues    ID: No active issues    F: s/p 1L NS in ED  E: Replete K <4, Mg <2  N: Regular diet  VTE: heparin gtt  GI: not needed  C: Full code  D: DMICU

## 2023-01-11 NOTE — CONSULT NOTE ADULT - ASSESSMENT
32F with obesity. Who presents after episode of syncope, found to have high-risk submassive PE for which PERT team has been activated.     Hemodyanmic: [ ] Cardiac arrest, [ ] SBP < 90 mmHg, [ ] SBP < 90 mmHg > 15 min   O2 Required: [x] None, [ ] NC, [ ] HFNC, [ ] NIV, [ ] Mechcanical Ventilation   RV strain: [x] Yes, [ ] No  [x] Unprovoked, [ ] Provoked  Risk category: [ ] Low Risk, [ ] Low risk submassive, [x] High risk submassive, [ ] Massive   Anticoagulation: [ ] tPA, [x] heparin gtt, [ ] enoxaparin, [ ] apixaban   Catheter directed therapies: [ ] Catheter directed aspiration thrombectomy, [ ] Catheter directed thrombolytics  NPO: [ ] Yes, [x] No

## 2023-01-11 NOTE — H&P ADULT - NSHPLABSRESULTS_GEN_ALL_CORE
.  LABS:                         10.6   10.50 )-----------( 239      ( 11 Jan 2023 18:29 )             33.8     01-11    139  |  103  |  13  ----------------------------<  111<H>  3.6   |  22  |  0.82    Ca    9.6      11 Jan 2023 09:33    TPro  8.0  /  Alb  3.8  /  TBili  0.2  /  DBili  x   /  AST  47<H>  /  ALT  58<H>  /  AlkPhos  68  01-11    PT/INR - ( 11 Jan 2023 09:33 )   PT: 11.2 sec;   INR: 0.94          PTT - ( 11 Jan 2023 18:29 )  PTT:63.2 sec    CARDIAC MARKERS ( 11 Jan 2023 09:33 )  x     / 0.10 ng/mL / x     / x     / x          Serum Pro-Brain Natriuretic Peptide: 1359 pg/mL (01-11 @ 09:33)    Lactate, Blood: 1.6 mmol/L (01-11 @ 14:49)      RADIOLOGY, EKG & ADDITIONAL TESTS: Reviewed.

## 2023-01-11 NOTE — ED ADULT NURSE NOTE - OBJECTIVE STATEMENT
Patient arrived to the ER c/o asthma exacerbation for the past x1 week. Pt states using inhaler and nebulizer with no relief this AM. Pt at this moment states c/o cough, chest tightness.  Pt denies any other medical complaints at this moment. Pt is noted to be aox4, in no acute distress, able to maintain airway, having non labored breathing, no retractions noted, non diaphoretic and able to talk in clear full sentences. IV access established and labs drawn.

## 2023-01-11 NOTE — ED ADULT NURSE NOTE - SUICIDE SCREENING QUESTION 2
Chief Complaint   Patient presents with    Other     Following up on status of medical necessity forms and prescriptions for hospital bed battery powered wheelchair. Patient states that prescriptions and paperwork would need to be sent to [facility]. Patient has given contact number to [facility] as [phone].   Kayla Curiel LPN No

## 2023-01-11 NOTE — H&P ADULT - ATTENDING COMMENTS
Asthma, anemia and now with intermidiate risk PE  physical as above  continue heparin; sPESI 1-2  still tachycardic, follow for deterioration  rest as above

## 2023-01-11 NOTE — H&P ADULT - HISTORY OF PRESENT ILLNESS
31 yo female with PMHx of asthma and anemia 2/2 myomectomy, on non-hormonal birth control (Nuvaring) presented with 1 week of productive cough. Pt stating she began feeling symptoms of mild cough and chest tightness 3 weeks prior. She went to  where workup was negative for COVID and Influenza. Pt prescribed Azithromycin and Prednisone 20mg -- currently on day 5. Pt stating she awoke this AM w/ progressively worse chest tightness. She used her inhaler and nebulizer tx at home with minimal relief and had syncopal event at home. Pt with no hx of DVT, PE and also denies family history of clots, blood disorders. CTA PE done in ED showing large thrombus burden involving all lobar and multiple segmental branches bilaterally w/ probable evolving infarcts in lateral basal segment of LLL and mild pulmonary A dilation w/ RV strain and cardiomegaly. ICU consulted for further evaluation of patient i/s/o large PE with cardiac involvement. TTE pending.  Pt was placed on heparin gtt in ED. Vitals upon arrival to ED: 141/93, HR 120s, on room air SpO2 %. Labs indicating Troponin of 0.10, Pro-BNP 1359.    ED Vitals:   ED Labs: WBC 9.87, Hgb 11.2, Plt 235, Na 139, K 3.6, Cl 103, Cr 0.82, AST 47, ALT 58, Troponin T 0.10, Pro-BNP 1359, D-dimer 1781  CT PE:  large thrombus burden involving all lobar and multiple segmental branches bilaterally w/ probable evolving infarcts in lateral basal segment of LLL and mild pulmonary A dilation w/ RV strain and cardiomegaly.  ED Interventions: Heparin 9000 IVP x1, NS 1000mL x1, Heparin gtt @ 19 mL/hr running      31 yo female with PMHx of asthma and anemia 2/2 myomectomy, on non-hormonal birth control (Nuvaring) presented with 1 week of productive cough. Pt stating she began feeling symptoms of mild cough and chest tightness 3 weeks prior. She went to  where workup was negative for COVID and Influenza. Pt prescribed Azithromycin and Prednisone 20mg -- currently on day 5. Pt stating she awoke this AM w/ progressively worse chest tightness. She used her inhaler and nebulizer tx at home with minimal relief and had syncopal event at home. Pt with no hx of DVT, PE and also denies family history of clots, blood disorders.     ED Vitals:   ED Labs: WBC 9.87, Hgb 11.2, Plt 235, Na 139, K 3.6, Cl 103, Cr 0.82, AST 47, ALT 58, Troponin T 0.10, Pro-BNP 1359, D-dimer 1781  CT PE:  large thrombus burden involving all lobar and multiple segmental branches bilaterally w/ probable evolving infarcts in lateral basal segment of LLL and mild pulmonary A dilation w/ RV strain and cardiomegaly.  ED Interventions: Heparin 9000 IVP x1, NS 1000mL x1, Heparin gtt @ 19 mL/hr running      31 yo female with PMHx of asthma and anemia 2/2 myomectomy, on birth control (Nuvaring) presented with 1 week of productive cough. Pt stating she began feeling symptoms of mild cough and chest tightness 3 weeks prior. She went to  where workup was negative for COVID and Influenza. Pt prescribed Azithromycin and Prednisone 20mg -- currently on day 5. Pt stating she awoke this AM w/ progressively worse chest tightness. She used her inhaler and nebulizer tx at home with minimal relief and had syncopal event at home. Pt with no hx of DVT, PE and also denies family history of clots, blood disorders.     ED Vitals:   ED Labs: WBC 9.87, Hgb 11.2, Plt 235, Na 139, K 3.6, Cl 103, Cr 0.82, AST 47, ALT 58, Troponin T 0.10, Pro-BNP 1359, D-dimer 1781  CT PE:  large thrombus burden involving all lobar and multiple segmental branches bilaterally w/ probable evolving infarcts in lateral basal segment of LLL and mild pulmonary A dilation w/ RV strain and cardiomegaly.  ED Interventions: Heparin 9000 IVP x1, NS 1000mL x1, Heparin gtt @ 19 mL/hr running

## 2023-01-11 NOTE — ED PROVIDER NOTE - MUSCULOSKELETAL, MLM
Spine appears normal, range of motion is not limited, no muscle or joint tenderness. No LE swelling or calf tenderness.

## 2023-01-11 NOTE — H&P ADULT - NSHPPHYSICALEXAM_GEN_ALL_CORE
T(C): 37.2 (01-11-23 @ 15:33), Max: 37.2 (01-11-23 @ 08:46)  HR: 112 (01-11-23 @ 15:33) (112 - 126)  BP: 145/101 (01-11-23 @ 15:33) (141/93 - 161/104)  RR: 18 (01-11-23 @ 15:33) (18 - 22)  SpO2: 98% (01-11-23 @ 15:33) (98% - 98%)    PHYSICAL EXAM:  General: AAOx3, no acute distress, laying in bed comfortably  HEENT: head NCAT, eyes EOMI, PERRLA, moist mucous membranes  Neck: supple, +JVD  Pulmonary: lung fields CTAB, no wheezing, rales, or rhonchi  Cardiovascular: RRR, normal S1/S2  Abdominal: soft, NTND, +BS  Extremities: no peripheral edema or cyanosisl, capillary refill < 2 sec.  Neuro: AAOx3, no focal deficits  Skin: warm, dry, no visible jaundice, no new rashes T(C): 37.2 (01-11-23 @ 15:33), Max: 37.2 (01-11-23 @ 08:46)  HR: 112 (01-11-23 @ 15:33) (112 - 126)  BP: 145/101 (01-11-23 @ 15:33) (141/93 - 161/104)  RR: 18 (01-11-23 @ 15:33) (18 - 22)  SpO2: 98% (01-11-23 @ 15:33) (98% - 98%)    PHYSICAL EXAM:  General: AAOx3, no acute distress, laying in bed comfortably  HEENT: head NCAT, eyes EOMI, PERRLA, moist mucous membranes  Neck: supple, +JVD  Pulmonary: lung fields CTAB, no wheezing, rales, or rhonchi  Cardiovascular: RRR, normal S1/S2  Abdominal: soft, NTND, +BS  Extremities: warm to touch; no peripheral edema or cyanosis  Neuro: AAOx3, no focal deficits  Skin: warm, dry, no visible jaundice, no new rashes

## 2023-01-11 NOTE — ED PROVIDER NOTE - WR INTERPRETATION 1
CXR - No focal consolidation or pleural effusion. Some bronchial inflammation, likely consistent with asthma.

## 2023-01-11 NOTE — ED PROVIDER NOTE - OBJECTIVE STATEMENT
31 y/o F with PMHx asthma and anemia (scheduled for myomectomy 2/2 fibroids causing anemia) presents to ED with 1 week of productive cough. Cough was initially productive of yellow sputum which is now improved. Pt went to urgent care 3 days ago and had negative flu/covid tests at that time. She was prescribed azithromycin (on day 5) and prednisone 20mg (on day 5). Also c/o chest tightness when she woke this morning. Pt used her albuterol inhaler and nebulizer with minimal relief and then syncopized. Denies prior h/o syncope but reports feeling dizzy and weak prior to LOC. Denies injury, head trauma, or prior h/o blood clots, DVT, or PE. LMP was several months ago.

## 2023-01-11 NOTE — CONSULT NOTE ADULT - PROBLEM SELECTOR RECOMMENDATION 9
Patient presenting with acute unprovoked high risk-submassive PE. Patient presenting with syncope concerning for an absence of hemodynamic reserve, additionally found to be tachycardic > 110 indicating high risk based on SPESI score, with NEWS score of 2 for -130. Exam patient is warm to touch, mentating, and +HJR Labs notable for elevated troponin and BNP concerning for right heart strain with echo showing severely dilated right ventricular size,  Moderately-to-severely reduced right ventricular systolic function and abnormal septal wall motion concerning for acute pressure overload although border line normal TAPSE. CT angio showing extensive clot burden with clots in left main pulmonary artery, and right main pulm artery and poor collateral flow to distal vessels with additional mosaicism seen on CT scan.    - PERT team activated  - Heparin bolus and heparin gtt stat, trend ptts. --> if patient presistnly tachycardic will consider crossover into escalation of care and will consider for intervention  - Repeat echo 1/12  - check lactate 1/11. Will need repeat troponin, bnp, and lactate morning of 1/12  - monitor i's and o's given tachycardia, patient mentating, warm to touch, cr wnl and reports making urine, although elevation of LFTs noted, does not appear to be in acute decompensated Right heart failure, if patient decompensates (increasing tachycardia, increasing oxygen needs or change in hemodynamics notify PERT team asap  as qualifies for further intervention). Patient presenting with acute unprovoked high risk-submassive PE. Unprovoked given no risk factors outside of nuva ring, no family history no recent immobility etc. Episode for syncope concerning for an absence of hemodynamic reserve, additionally found to be tachycardic > 110 indicating high risk based on SPESI score, with NEWS score of 2 for -130. Exam patient is warm to touch, mentating, and +HJR Labs notable for elevated troponin and BNP concerning for right heart strain with echo showing severely dilated right ventricular size,  Moderately-to-severely reduced right ventricular systolic function and abnormal septal wall motion concerning for acute pressure overload although border line normal TAPSE. CT angio showing extensive clot burden with clots in left main pulmonary artery, and right main pulm artery and poor collateral flow to distal vessels with additional mosaicism seen on CT scan.    - PERT team activated  - Heparin bolus and heparin gtt stat, trend ptts. --> if patient presistnly tachycardic will consider crossover into escalation of care and will consider for intervention  - Repeat echo 1/12  - check lactate 1/11. Will need repeat troponin, bnp, and lactate morning of 1/12  - monitor i's and o's given tachycardia, patient mentating, warm to touch, cr wnl and reports making urine, although elevation of LFTs noted, does not appear to be in acute decompensated Right heart failure, if patient decompensates (increasing tachycardia, increasing oxygen needs or change in hemodynamics notify PERT team asap  as qualifies for further intervention). Patient presenting with acute unprovoked high risk-submassive PE. Unprovoked given no risk factors outside of nuva ring, no family history no recent immobility etc. Episode for syncope concerning for an absence of hemodynamic reserve, additionally found to be tachycardic > 110 indicating high risk based on SPESI score, with NEWS score of 2 for -130. Exam patient is warm to touch, mentating, and +HJR Labs notable for elevated troponin (0.1) and BNP (1359) concerning for right heart strain with echo showing severely dilated right ventricular size,  Moderately-to-severely reduced right ventricular systolic function and abnormal septal wall motion concerning for acute pressure overload although border line normal TAPSE. CT angio showing extensive clot burden with clots in left main pulmonary artery, and right main pulm artery and poor collateral flow to distal vessels with additional mosaicism seen on CT scan.    - PERT team activated  - Heparin bolus and heparin gtt stat, trend ptts. --> if patient presistnly tachycardic will consider crossover into escalation of care and will consider for intervention  - Repeat echo 1/12  - check lactate 1/11. Will need repeat troponin, bnp, and lactate morning of 1/12  - monitor i's and o's given tachycardia, patient mentating, warm to touch, cr wnl and reports making urine, although elevation of LFTs noted, does not appear to be in acute decompensated Right heart failure, if patient decompensates (increasing tachycardia, increasing oxygen needs or change in hemodynamics notify PERT team asap  as qualifies for further intervention).

## 2023-01-11 NOTE — CONSULT NOTE ADULT - TIME BILLING
Patient seen and examined with house-staff during bedside rounds.  Resident note read, including vitals, physical findings, laboratory data, and radiological reports.   Revisions included below.  Direct personal management at bed side and extensive interpretation of the data.  Plan was outlined and discussed in details with the housestaff.  Decision making of high complexity  Action taken for acute disease activity to reflect the level of care provided:  - medication reconciliation  - review laboratory data  seen multilple times.  Discussed the HiPETIO trial and standard of care for high risk intermediate.  Patient and fiance consented   On heparin

## 2023-01-11 NOTE — ED ADULT NURSE REASSESSMENT NOTE - NS ED NURSE REASSESS COMMENT FT1
Heparin was administered see work list manager. Mac @bedside. Pt is noted to be in no acute distress and able to maintain airway.
Patient safely transported to ICU floor with ICU provider. Pt safely transported to bed that was assigned. Pt is noted to be aox4, in no acute distress and able to maintain airway.
Pt endorsed to [facility] from ICU. As per RN manager lyn, pt is to be held in ER until ICU calls ER for transport.

## 2023-01-11 NOTE — ED PROVIDER NOTE - CLINICAL SUMMARY MEDICAL DECISION MAKING FREE TEXT BOX
33 y/o F presents to ED with chest tightness. Pt is tachycardic on exam. No calf tenderness. Lungs are clear. Labs reviewed and significant for d-dimer of 1781. Will obtain troponin, BNP, and CTA to r/o PE. 31 y/o F presents to ED with chest tightness. Pt is tachycardic on exam. No calf tenderness. Lungs are clear. Labs reviewed and significant for d-dimer of 1781. Will obtain troponin, BNP, and CTA to r/o PE.    CTA with submassive PE and elevated trop and BNP with right heart strain . PERT team called.  Heparin immediately started.  Pt monitored and to be admitted to ICU for further mgmt.

## 2023-01-11 NOTE — CONSULT NOTE ADULT - SUBJECTIVE AND OBJECTIVE BOX
ADAM BEACH, 32y, Female  MRN-[id number]  Patient is a 32y old  Female who presents with a chief complaint of     HPI: 31 yo female with PMHx of asthma and anemia 2/2 myomectomy presents to ED w/ 1 week of productive cough. Pt stating she began feeling symmptoms of mild cough and chest tightness 3 weeks prior. She went to  where workup was negative for COVID and Influenza. Pt prescribed Azithromycin and Prednisone 20mg -- currently on day 5. Pt stating she awoke this AM w/ progressively worse chest tightness. She used her inhaler and nebulizer tx at home with minimal relief and had syncopal event at home. Pt with no hx of DVT, PE and also denies family history of clots, blood disorders. CTA PE done in ED showing large thrombus burden involving all lobar and multiple segmental branches bilaterally w/ probable evolving infarcts in lateral basal segment of LLL and mild pulmonary A dilation w/ RV strain and cardiomegaly. ICU consulted for further evaluation of patient i/s/o large PE with cardiac involvement. TTE pending.  Pt was placed on heparin gtt in ED. Vitals upon arrival to ED: 141/93, HR 120s, on room air SpO2 %. Labs indicating Troponin of 0.10, Pro-BNP 1359.    ED Vitals:   ED Labs: WBC 9.87, Hgb 11.2, Plt 235, Na 139, K 3.6, Cl 103, Cr 0.82, AST 47, ALT 58, Troponin T 0.10, Pro-BNP 1359, D-dimer 1781  CT PE:  large thrombus burden involving all lobar and multiple segmental branches bilaterally w/ probable evolving infarcts in lateral basal segment of LLL and mild pulmonary A dilation w/ RV strain and cardiomegaly.  ED Interventions: Heparin 9000 IVP x1, NS 1000mL x1, Heparin gtt @ 19 mL/hr running     12 Point ROS Negative unless noted otherwise above.  -------------------------------------------------------------------------------  VITAL SIGNS:  Vital Signs Last 24 Hrs  TLINDA): 37.1 (11 Jan 2023 13:21), Max: 37.2 (11 Jan 2023 08:46)  T(F): 98.8 (11 Jan 2023 13:21), Max: 98.9 (11 Jan 2023 08:46)  HR: 115 (11 Jan 2023 13:21) (115 - 126)  BP: 161/104 (11 Jan 2023 13:21) (141/93 - 161/104)  BP(mean): --  RR: 19 (11 Jan 2023 13:21) (19 - 22)  SpO2: 98% (11 Jan 2023 13:21) (98% - 98%)    Parameters below as of 11 Jan 2023 13:21  Patient On (Oxygen Delivery Method): room air      I&O's Summary      PHYSICAL EXAM:    General: NAD, well developed  HEENT: NC/AT; EOMI, PERRLA, anicteric sclera; moist mucosal membranes.  Neck: supple, trachea midline  Cardiovascular: RRR, +S1/S2; NO M/R/G  Respiratory: CTA B/L; no W/R/R  Gastrointestinal: soft, NT/ND; +BSx4  Extremities: WWP; no edema or cyanosis  Vascular: 2+ radial, DP/PT pulses B/L  Neurological: AAOx3; no focal deficits    ALLERGIES:  Allergies    No Known Allergies    Intolerances      MEDICATIONS:  MEDICATIONS  (STANDING):  heparin  Infusion.  Unit(s)/Hr (19 mL/Hr) IV Continuous <Continuous>    MEDICATIONS  (PRN):  -------------------------------------------------------------------------------  LABS:                        11.2   9.87  )-----------( 235      ( 11 Jan 2023 09:33 )             35.7     01-11    139  |  103  |  13  ----------------------------<  111<H>  3.6   |  22  |  0.82    Ca    9.6      11 Jan 2023 09:33    TPro  8.0  /  Alb  3.8  /  TBili  0.2  /  DBili  x   /  AST  47<H>  /  ALT  58<H>  /  AlkPhos  68  01-11    LIVER FUNCTIONS - ( 11 Jan 2023 09:33 )  Alb: 3.8 g/dL / Pro: 8.0 g/dL / ALK PHOS: 68 U/L / ALT: 58 U/L / AST: 47 U/L / GGT: x           PT/INR - ( 11 Jan 2023 09:33 )   PT: 11.2 sec;   INR: 0.94          PTT - ( 11 Jan 2023 09:33 )  PTT:25.6 sec    CAPILLARY BLOOD GLUCOSE      SARS-CoV-2: NotDetec (11 Jan 2023 09:33)      RADIOLOGY & ADDITIONAL TESTS: Reviewed.

## 2023-01-11 NOTE — CONSULT NOTE ADULT - SUBJECTIVE AND OBJECTIVE BOX
PERT SERVICE INITIAL CONSULT NOTE    HPI:   Patient is a 32y old  Female who presents with a chief complaint of syncope. 32F c asthma on birth control (nuva ring), fibroids leading to anemia, who presents after episode of syncope at home. Patient endorses that 11 days prior to arrival began to feel malaise and felt like she was getting a cold with increased productive cough with associated yellow sputum went to urgent care received z-pack and prednisone. During this time she endorses spending time in bed as she was felling unwell. She continued to experience shortness of breath with chest discomfort which did not improve with rescue inhaler until this morning when patient syncopized which lead her to present to the ED this morning. No family history of clots, no recent travel, no recent surgeries, no malignancies, no recent prolonged immobility, no casting.      Time Presented:   Patient location at presentation: [x] ED, [ ] Med/Surg regional, [ ] Med/Surg tele, [ ] ICU, [ ] Outside hospital transfer   Time of CT angiogram: 11:24  Time PERT notified: 11:30      REVIEW OF SYSTEMS:  Constitutional: No fever, weight loss or fatigue  Eyes: No eye pain, visual disturbances, or discharge  ENMT:  No difficulty hearing, tinnitus, vertigo; No sinus or throat pain  Neck: No pain, stiffness or neck swelling  Respiratory: see HPI  Cardiovascular: No chest pain, palpitations, dizziness or leg swelling  Gastrointestinal: No abdominal or epigastric pain. No nausea, vomiting or hematemesis; No diarrhea or constipation. No melena or hematochezia.  Genitourinary: No dysuria, frequency, hematuria or incontinence  Neurological: No headaches, memory loss, loss of strength, numbness or tremors  Skin: No itching, burning, rashes or lesions   Lymph Nodes: No enlarged glands  Endocrine: No heat or cold intolerance; No hair loss  Musculoskeletal: No joint pain or swelling; No muscle, back or extremity pain  Psychiatric: No depression, anxiety, mood swings or difficulty sleeping  Heme/Lymph: No easy bruising or bleeding gums  Allergy and Immunologic: No hives or eczema    PAST MEDICAL & SURGICAL HISTORY:  Asthma      Fibroids      No significant past surgical history          FAMILY HISTORY:      MEDICATIONS  (STANDING):  heparin  Infusion.  Unit(s)/Hr (19 mL/Hr) IV Continuous <Continuous>    MEDICATIONS  (PRN):      Allergies    No Known Allergies    Intolerances        MEDICATIONS  (STANDING):  heparin  Infusion.  Unit(s)/Hr (19 mL/Hr) IV Continuous <Continuous>      MEDICATIONS  (STANDING):  heparin  Infusion.  Unit(s)/Hr (19 mL/Hr) IV Continuous <Continuous>    MEDICATIONS  (PRN):      ICU Vital Signs Last 24 Hrs  T(C): 37.1 (2023 13:21), Max: 37.2 (2023 08:46)  T(F): 98.8 (2023 13:21), Max: 98.9 (2023 08:46)  HR: 115 (2023 13:21) (115 - 126)  BP: 161/104 (2023 13:21) (141/93 - 161/104)  BP(mean): --  ABP: --  ABP(mean): --  RR: 19 (2023 13:21) (19 - 22)  SpO2: 98% (2023 13:21) (98% - 98%)    O2 Parameters below as of 2023 13:21  Patient On (Oxygen Delivery Method): room air                PHYSICAL EXAM:  Constitutional: WD  Head: NC/AT  EENT: PERRL, anicteric sclera; oropharynx clear, MMM  Neck: supple, no appreciable JVD  Respiratory: CTA B/L; no W/R/R  Cardiovascular: +S1/S2, RRR  Gastrointestinal: soft, NT/ND  Extremities: WWP; no edema, clubbing or cyanosis  Vascular: 2+ radial pulses B/L  Neurological: awake and alert; ALARCON  sPESI score:   Age >80 [ ], History of Cancer [ ], Hx of chronic cardiopulmonary disease [ ], Heart rate > 110 [ ], Systolic BP <100 [ ], SpO2 <90% [ ]  mMRC score:  Sherrie scale:    LABS:    CBC Full  -  ( 2023 09:33 )  WBC Count : 9.87 K/uL  RBC Count : 4.53 M/uL  Hemoglobin : 11.2 g/dL  Hematocrit : 35.7 %  Platelet Count - Automated : 235 K/uL  Mean Cell Volume : 78.8 fl  Mean Cell Hemoglobin : 24.7 pg  Mean Cell Hemoglobin Concentration : 31.4 gm/dL  Auto Neutrophil # : 5.74 K/uL  Auto Lymphocyte # : 3.14 K/uL  Auto Monocyte # : 0.84 K/uL  Auto Eosinophil # : 0.03 K/uL  Auto Basophil # : 0.10 K/uL  Auto Neutrophil % : 58.2 %  Auto Lymphocyte % : 31.8 %  Auto Monocyte % : 8.5 %  Auto Eosinophil % : 0.3 %  Auto Basophil % : 1.0 %        139  |  103  |  13  ----------------------------<  111<H>  3.6   |  22  |  0.82    Ca    9.6      2023 09:33    TPro  8.0  /  Alb  3.8  /  TBili  0.2  /  DBili  x   /  AST  47<H>  /  ALT  58<H>  /  AlkPhos  68      PT/INR - ( 2023 09:33 )   PT: 11.2 sec;   INR: 0.94          PTT - ( 2023 09:33 )  PTT:25.6 sec    Troponin T, Serum: 0.10 ng/mL (23 @ 09:33)    Serum Pro-Brain Natriuretic Peptide: 1359 pg/mL (23 @ 09:33)              RADIOLOGY & ADDITIONAL STUDIES:  CT Angio Chest PE Protocol w/ IV Cont:   ACC: [id number] EXAM:  CT ANGIO CHEST PULM [facility]                          *** ADDENDUM # 1 ***    Addendum:    There is right ventricular strain with an RV/LV Ratio: 1.8 (series 5:   Image 81)    --- End of Report ---    *** END OF ADDENDUM # 1 ***      PROCEDURE DATE:  2023          INTERPRETATION:  CLINICAL INFORMATION: 32-year-old female with syncope   and tachycardia.    COMPARISON: None.    CONTRAST/COMPLICATIONS:  IV Contrast: 90 cc of Isovue-370. 10 cc were discarded.  90 cc   administered   10 cc discarded  Oral Contrast: None  Complications: None known    PROCEDURE:  CT Angiogram of the chest was obtained with intravenous contrast. Three   dimensional maximum intensity projection (MIP) images were generated.    FINDINGS:    PULMONARY ANGIOGRAM: Main pulmonary artery is mildly dilated measuring   3.1 cm. There are multiple large acute-appearing pulmonary artery emboli   involving the right upper, right middle, right lower, left upper,   lingular and left lower lobe lobarand segmental branches of the   pulmonary arteries. There is evidence of right ventricular strain.    LYMPH NODES: None    HEART/VASCULATURE: Cardiomegaly with right ventricular strain.    AIRWAYS/LUNGS/PLEURA: Bilateral mosaic perfusion pattern compatible with   underlying pulmonary artery emboli most pronounced within the mid and   lower lung zones. Multiple peripheral wedge-shaped and rounded subpleural   nodular opacities the largest measuring 1.9 x 1.4 cm in the subpleural   region of the lateral basal segment left lower lobe (6:187-205, and   6:171).    UPPER ABDOMEN: Small type I hiatal hernia.    BONES/SOFT TISSUES: No significant abnormality.    IMPRESSION:      1. Large thrombus burden involving all lobar and multiple segmental   branches bilaterally.  2. Probable evolving infarcts involving the lateral basal segment of the   left lower lobe.  3. Mosaic perfusion pattern compatible with underlying pulmonary artery   emboli.  4. Mild pulmonary artery dilatation and right ventricular strain.  5. Cardiomegaly.    The results of this examination were verbally communicated with read back   to the physician, SLIM GALLARDO [id number], on 2023 at 11:39    a.m.          --- End of Report ---    ***Please see the addendumat the top of this report. It may contain   additional important information or changes.****        GENO SANTO MD; Attending Radiologist  This document has been electronically signed. 2023 11:52AM  1st Addendum: GENO SANTO MD; Attending Radiologist  The first addendum was electronically signed on: 2023 12:55PM. (23 @ 11:24)            ACC: [id number] EXAM:  ECHO TTE WO CON COMP W DOPP                          PROCEDURE DATE:  2023          INTERPRETATION:  -----------------------------------  TRANSTHORACIC ECHOCARDIOGRAM REPORT      ---------------------------------------------------------------------------  -----  Patient Name:   ADAM BEACH Date of Exam:        2023  Medical Rec #:  [id number]            Height/Weight:       70.0 in / 235.88   lb  Account #:      [id number]            BSA:                 2.24 m²  YOB: 1990           BP:                  147/102 mmHg  Patient Age:    32 years           HR:                  129 bpm  Patient Gender: F                  Sonographer:         TRUNG COPE                                     Referring Physician: SLIM GALLARDO      ---------------------------------------------------------------------------  -----  CPT:               ECHO TTE WO CON COMP W DOPP - 04590; - [id number].m  Indication(s):     R55 - Syncope and collapse  Procedure:     A complete two-dimensional transthoracic   echocardiogram was                     performed: 2D, M-mode, spectral and color flow Doppler.  Ordering Location: [facility]    Study Quality:     Study quality was good.      ---------------------------------------------------------------------------  -----  CONCLUSIONS:     1. Patient was tachycardic during the study.   2. Normal left ventricular size and systolic function.   3. Small left ventricular size.   4. Severely dilated right ventricular size.   5. Moderately-to-severely reduced right ventricular systolic function.   6. Dilated right atrium.   7. At-least moderate tricuspid regurgitation.   8. Pulmonary hypertension present, pulmonary artery systolic pressure is   57 mmHg.   9. No pericardial effusion.  10. No prior echo is available for comparison.    ---------------------------------------------------------------------------  -----  2D AND M-MODE MEASUREMENTS (normal ranges within parentheses):    Left Ventricle:         Normal - MenNormal - Women  IVSd (2D):      1.09 cm  (0.6-1.0)     (0.6-0.9)  LVPWd (2D):     0.80 cm  (0.6-1.0)     (0.6-0.9)  LVIDd (2D):     3.49 cm  (4.2-5.8)     (3.8-5.2)  LVIDs (2D):     2.65 cm  LV FS (2D):     24.1 %     (>25%)  LV EF (MOD BP):  61 %  (>55%)  Aorta/Left Atrium:         Normal - Men Normal - Women  Aortic Root (2D):  3.10 cm  (2.4-3.7)    LA Volume A/L:    Right Ventricle:    LV DIASTOLIC FUNCTION:    MV Peak E: 68.60 cm/s MV e' lat:  19.4 cm/s MV E/e' lat ratio: 3.5  MV Peak A: 52.30 cm/s MV e' med:  17.2 cm/s MV E/e' med ratio: 4.0  E/A Ratio: 1.31       Decel Time: 99 msec   MV E/e' avg:       3.8    SPECTRAL DOPPLER ANALYSIS:    Mitral Valve:  MV Max Dann:   MV P1/2 Time: 28.71 msec  MV Mean Grad: MV Area, PHT: 7.66 cm²      Aortic Valve: AoV Max Dann:    0.94 m/s AoV Peak P mmHg   AoV Mean   P mmHg  LVOT Vmax:      0.74 m/s LVOT VTI:      0.117 m  LVOT Diameter: 2.20 cm  AoV Area, VMax: 3.02 cm² AoV Area, VTI: 2.93 cm² AoV Area, Vmn: 3.12 cm²      TricuspidValve and PA/RV Systolic Pressure: TR Max Velocity: 3.49 m/s RA   Pressure: 8 mmHg  RVSP/PASP: 57 mmHg  TAPSE:           18 mm    RV S':       10 cm/s      Pulmonic Valve:  PV Max Velocity: 0.72 m/s PV Max P mmHg PV Mean P mmHg      ---------------------------------------------------------------------------  -----  FINDINGS:    Left Ventricle:  The left ventricle cavity size is small. The left ventricle is normal in   size, wall thickness, and systolic function with a calculated ejection   fraction of 60-65%. Abnormal septal motion seen due to right ventricular   pressure overload. The other walls exhibit normal wall motion. Analysis   of left ventricular diastolic function and filling pressure is made   challenging by the presence oftachycardia.    Right Ventricle:  The right ventricle is severely dilated. Right ventricular systolic   function is moderately-to-severely reduced. The tricuspid annular plane   systolic excursion (TAPSE) is 17.80 mm (normal >=17 mm). RV tissue   Doppler S' is 10.40 cm/s (normal >10 cm/s).    Left Atrium:  The left atrium is normal in size. Left atrial volume index (JULIO C) is   13.2 ml/m².    Right Atrium:  The right atrium is dilated. Right atrial volume index (JAYDEN) is 36.00   ml/m².    Aortic Valve:  The aortic valve is tricuspid with normal structure and function without   stenosis. The peak transvalvular velocity is 0.94 m/s, the mean   transvalvular gradient is 2.00 mmHg, and the LVOT/AV velocity ratio is   0.77. The peak transaortic gradient is 3.52 mmHg. There is no evidence of   aortic regurgitation.    Mitral Valve:  The mitral valve is minimally thickened. There is trace mitral   regurgitation.    Tricuspid Valve:  The tricuspid valve is mildly thickened. There is at-least moderate   tricuspid regurgitation. Pulmonary artery systolic pressure (estimated   using the tricuspid regurgitant gradient and an estimate of right atrial   pressure) is 57 mmHg.    Inferior Vena Cava:  The inferior vena cava is normal in size (<2.1cm) with abnormal   inspiratory collapse (<50%) consistent with mildly elevated right atrial   pressure (  8, range 5-10mmHg).    Pulmonic Valve:  Structurally normal pulmonic valve with normal leaflet excursion. There   is trace pulmonic regurgitation.    Aorta:  The aortic root and proximal ascending aorta are normal in size. The   aortic arch is normal without evidence of aortic coarctation.    Pulmonary Artery:  The pulmonary artery is normal size.    Pericardium:  No pericardial effusion is seen.    ---------------------------------------------------------------------------  -----    Dolores Acosta MD    Electronically signed by Dolores Acosta MD  Signature Date/Time: 2023/1:16:32 PM        *** Final ***    REITE Bleeding Risk:   [ ] Recent Major Bleeding (2pt)  [ ] Creatinine > 1.2 mg/dL ( 1.5pt)  [ ] Anemia (<13 g/dL M, < 12 g/dL F) (1.5 pt)  [ ] Malignancy History (1pt)  [ ] Clinically - overt PE (1pt)  [ ] Age > 75 (1pt)  Total:   BACS Bleeding Risk:   [ ] Recent Major Bleeding (3pt)  [ ] Age > 75 (1pt)  [ ] Active Cancer (1pt)  [ ] Syncope (1pt) PERT SERVICE INITIAL CONSULT NOTE    HPI:   Patient is a 32y old  Female who presents with a chief complaint of syncope. 32F c asthma on birth control (nuva ring), fibroids leading to anemia, who presents after episode of syncope at home. Patient endorses that 11 days prior to arrival began to feel malaise and felt like she was getting a cold with increased productive cough with associated yellow sputum went to urgent care received z-pack and prednisone. During this time she endorses spending time in bed as she was felling unwell. She continued to experience shortness of breath with chest discomfort which did not improve with rescue inhaler until this morning when patient syncopized which lead her to present to the ED this morning. No family history of clots, no recent travel, no recent surgeries, no malignancies, no recent prolonged immobility, no casting.      Time Presented:   Patient location at presentation: [x] ED, [ ] Med/Surg regional, [ ] Med/Surg tele, [ ] ICU, [ ] Outside hospital transfer   Time of CT angiogram: 11:24  Time PERT notified: 11:30      REVIEW OF SYSTEMS:  Constitutional: No fever, weight loss or fatigue  Eyes: No eye pain, visual disturbances, or discharge  ENMT:  No difficulty hearing, tinnitus, vertigo; No sinus or throat pain  Neck: No pain, stiffness or neck swelling  Respiratory: see HPI  Cardiovascular: No chest pain, palpitations, dizziness or leg swelling  Gastrointestinal: No abdominal or epigastric pain. No nausea, vomiting or hematemesis; No diarrhea or constipation. No melena or hematochezia.  Genitourinary: No dysuria, frequency, hematuria or incontinence  Neurological: No headaches, memory loss, loss of strength, numbness or tremors  Skin: No itching, burning, rashes or lesions   Lymph Nodes: No enlarged glands  Endocrine: No heat or cold intolerance; No hair loss  Musculoskeletal: No joint pain or swelling; No muscle, back or extremity pain  Psychiatric: No depression, anxiety, mood swings or difficulty sleeping  Heme/Lymph: No easy bruising or bleeding gums  Allergy and Immunologic: No hives or eczema    PAST MEDICAL & SURGICAL HISTORY:  Asthma      Fibroids      No significant past surgical history          FAMILY HISTORY:      MEDICATIONS  (STANDING):  heparin  Infusion.  Unit(s)/Hr (19 mL/Hr) IV Continuous <Continuous>    MEDICATIONS  (PRN):      Allergies    No Known Allergies    Intolerances        MEDICATIONS  (STANDING):  heparin  Infusion.  Unit(s)/Hr (19 mL/Hr) IV Continuous <Continuous>      MEDICATIONS  (STANDING):  heparin  Infusion.  Unit(s)/Hr (19 mL/Hr) IV Continuous <Continuous>    MEDICATIONS  (PRN):      ICU Vital Signs Last 24 Hrs  T(C): 37.1 (2023 13:21), Max: 37.2 (2023 08:46)  T(F): 98.8 (2023 13:21), Max: 98.9 (2023 08:46)  HR: 115 (2023 13:21) (115 - 126)  BP: 161/104 (2023 13:21) (141/93 - 161/104)  BP(mean): --  ABP: --  ABP(mean): --  RR: 19 (2023 13:21) (19 - 22)  SpO2: 98% (2023 13:21) (98% - 98%)    O2 Parameters below as of 2023 13:21  Patient On (Oxygen Delivery Method): room air                PHYSICAL EXAM:  Constitutional: Pleasant. NAD  Head: NC/AT  EENT: PERRL, MMM  Neck: +JVD  Respiratory: CTA. No accessory muscle use  Cardiovascular: +S1/S2, Tachycardic  Gastrointestinal: NTND  Extremities: WWP; no edema  Vascular: 2+ radial pulses B/L  Neurological: awake and alert; ALARCON  sPESI score: +1, high risk, 8.9% risk  Age >80 [ ], History of Cancer [ ], Hx of chronic cardiopulmonary disease [ ], Heart rate > 110 [x], Systolic BP <100 [ ], SpO2 <90% [ ]  mMRC score: +3  Sherrie scale: 4    LABS:    CBC Full  -  ( 2023 09:33 )  WBC Count : 9.87 K/uL  RBC Count : 4.53 M/uL  Hemoglobin : 11.2 g/dL  Hematocrit : 35.7 %  Platelet Count - Automated : 235 K/uL  Mean Cell Volume : 78.8 fl  Mean Cell Hemoglobin : 24.7 pg  Mean Cell Hemoglobin Concentration : 31.4 gm/dL  Auto Neutrophil # : 5.74 K/uL  Auto Lymphocyte # : 3.14 K/uL  Auto Monocyte # : 0.84 K/uL  Auto Eosinophil # : 0.03 K/uL  Auto Basophil # : 0.10 K/uL  Auto Neutrophil % : 58.2 %  Auto Lymphocyte % : 31.8 %  Auto Monocyte % : 8.5 %  Auto Eosinophil % : 0.3 %  Auto Basophil % : 1.0 %        139  |  103  |  13  ----------------------------<  111<H>  3.6   |  22  |  0.82    Ca    9.6      2023 09:33    TPro  8.0  /  Alb  3.8  /  TBili  0.2  /  DBili  x   /  AST  47<H>  /  ALT  58<H>  /  AlkPhos  68      PT/INR - ( 2023 09:33 )   PT: 11.2 sec;   INR: 0.94          PTT - ( 2023 09:33 )  PTT:25.6 sec    Troponin T, Serum: 0.10 ng/mL (23 @ 09:33)    Serum Pro-Brain Natriuretic Peptide: 1359 pg/mL (23 @ 09:33)              RADIOLOGY & ADDITIONAL STUDIES:  CT Angio Chest PE Protocol w/ IV Cont:   ACC: [id number] EXAM:  CT ANGIO CHEST PULM [facility]                          *** ADDENDUM # 1 ***    Addendum:    There is right ventricular strain with an RV/LV Ratio: 1.8 (series 5:   Image 81)    --- End of Report ---    *** END OF ADDENDUM # 1 ***      PROCEDURE DATE:  2023          INTERPRETATION:  CLINICAL INFORMATION: 32-year-old female with syncope   and tachycardia.    COMPARISON: None.    CONTRAST/COMPLICATIONS:  IV Contrast: 90 cc of Isovue-370. 10 cc were discarded.  90 cc   administered   10 cc discarded  Oral Contrast: None  Complications: None known    PROCEDURE:  CT Angiogram of the chest was obtained with intravenous contrast. Three   dimensional maximum intensity projection (MIP) images were generated.    FINDINGS:    PULMONARY ANGIOGRAM: Main pulmonary artery is mildly dilated measuring   3.1 cm. There are multiple large acute-appearing pulmonary artery emboli   involving the right upper, right middle, right lower, left upper,   lingular and left lower lobe lobarand segmental branches of the   pulmonary arteries. There is evidence of right ventricular strain.    LYMPH NODES: None    HEART/VASCULATURE: Cardiomegaly with right ventricular strain.    AIRWAYS/LUNGS/PLEURA: Bilateral mosaic perfusion pattern compatible with   underlying pulmonary artery emboli most pronounced within the mid and   lower lung zones. Multiple peripheral wedge-shaped and rounded subpleural   nodular opacities the largest measuring 1.9 x 1.4 cm in the subpleural   region of the lateral basal segment left lower lobe (6:187-205, and   6:171).    UPPER ABDOMEN: Small type I hiatal hernia.    BONES/SOFT TISSUES: No significant abnormality.    IMPRESSION:      1. Large thrombus burden involving all lobar and multiple segmental   branches bilaterally.  2. Probable evolving infarcts involving the lateral basal segment of the   left lower lobe.  3. Mosaic perfusion pattern compatible with underlying pulmonary artery   emboli.  4. Mild pulmonary artery dilatation and right ventricular strain.  5. Cardiomegaly.    The results of this examination were verbally communicated with read back   to the physician, SLIM GALLARDO [id number], on 2023 at 11:39    a.m.          --- End of Report ---    ***Please see the addendumat the top of this report. It may contain   additional important information or changes.****        GENO SANTO MD; Attending Radiologist  This document has been electronically signed. 2023 11:52AM  1st Addendum: GENO SANTO MD; Attending Radiologist  The first addendum was electronically signed on: 2023 12:55PM. (23 @ 11:24)            ACC: [id number] EXAM:  ECHO TTE WO CON COMP W DOPP                          PROCEDURE DATE:  2023          INTERPRETATION:  -----------------------------------  TRANSTHORACIC ECHOCARDIOGRAM REPORT      ---------------------------------------------------------------------------  -----  Patient Name:   ADAM BEACH Date of Exam:        2023  Medical Rec #:  [id number]            Height/Weight:       70.0 in / 235.88   lb  Account #:      [id number]            BSA:                 2.24 m²  YOB: 1990           BP:                  147/102 mmHg  Patient Age:    32 years           HR:                  129 bpm  Patient Gender: F                  Sonographer:         TRUNG COPE                                     Referring Physician: SLIM GALLARDO      ---------------------------------------------------------------------------  -----  CPT:               ECHO TTE WO CON COMP W DOPP - 19451; - 9291496.m  Indication(s):     R55 - Syncope and collapse  Procedure:     A complete two-dimensional transthoracic   echocardiogram was                     performed: 2D, M-mode, spectral and color flow Doppler.  Ordering Location: [facility]    Study Quality:     Study quality was good.      ---------------------------------------------------------------------------  -----  CONCLUSIONS:     1. Patient was tachycardic during the study.   2. Normal left ventricular size and systolic function.   3. Small left ventricular size.   4. Severely dilated right ventricular size.   5. Moderately-to-severely reduced right ventricular systolic function.   6. Dilated right atrium.   7. At-least moderate tricuspid regurgitation.   8. Pulmonary hypertension present, pulmonary artery systolic pressure is   57 mmHg.   9. No pericardial effusion.  10. No prior echo is available for comparison.    ---------------------------------------------------------------------------  -----  2D AND M-MODE MEASUREMENTS (normal ranges within parentheses):    Left Ventricle:         Normal - MenNormal - Women  IVSd (2D):      1.09 cm  (0.6-1.0)     (0.6-0.9)  LVPWd (2D):     0.80 cm  (0.6-1.0)     (0.6-0.9)  LVIDd (2D):     3.49 cm  (4.2-5.8)     (3.8-5.2)  LVIDs (2D):     2.65 cm  LV FS (2D):     24.1 %     (>25%)  LV EF (MOD BP):  61 %  (>55%)  Aorta/Left Atrium:         Normal - Men Normal - Women  Aortic Root (2D):  3.10 cm  (2.4-3.7)    LA Volume A/L:    Right Ventricle:    LV DIASTOLIC FUNCTION:    MV Peak E: 68.60 cm/s MV e' lat:  19.4 cm/s MV E/e' lat ratio: 3.5  MV Peak A: 52.30 cm/s MV e' med:  17.2 cm/s MV E/e' med ratio: 4.0  E/A Ratio: 1.31       Decel Time: 99 msec   MV E/e' avg:       3.8    SPECTRAL DOPPLER ANALYSIS:    Mitral Valve:  MV Max Dann:   MV P1/2 Time: 28.71 msec  MV Mean Grad: MV Area, PHT: 7.66 cm²      Aortic Valve: AoV Max Dann:    0.94 m/s AoV Peak P mmHg   AoV Mean   P mmHg  LVOT Vmax:      0.74 m/s LVOT VTI:      0.117 m  LVOT Diameter: 2.20 cm  AoV Area, VMax: 3.02 cm² AoV Area, VTI: 2.93 cm² AoV Area, Vmn: 3.12 cm²      TricuspidValve and PA/RV Systolic Pressure: TR Max Velocity: 3.49 m/s RA   Pressure: 8 mmHg  RVSP/PASP: 57 mmHg  TAPSE:           18 mm    RV S':       10 cm/s      Pulmonic Valve:  PV Max Velocity: 0.72 m/s PV Max P mmHg PV Mean P mmHg      ---------------------------------------------------------------------------  -----  FINDINGS:    Left Ventricle:  The left ventricle cavity size is small. The left ventricle is normal in   size, wall thickness, and systolic function with a calculated ejection   fraction of 60-65%. Abnormal septal motion seen due to right ventricular   pressure overload. The other walls exhibit normal wall motion. Analysis   of left ventricular diastolic function and filling pressure is made   challenging by the presence oftachycardia.    Right Ventricle:  The right ventricle is severely dilated. Right ventricular systolic   function is moderately-to-severely reduced. The tricuspid annular plane   systolic excursion (TAPSE) is 17.80 mm (normal >=17 mm). RV tissue   Doppler S' is 10.40 cm/s (normal >10 cm/s).    Left Atrium:  The left atrium is normal in size. Left atrial volume index (JULIO C) is   13.2 ml/m².    Right Atrium:  The right atrium is dilated. Right atrial volume index (JAYDEN) is 36.00   ml/m².    Aortic Valve:  The aortic valve is tricuspid with normal structure and function without   stenosis. The peak transvalvular velocity is 0.94 m/s, the mean   transvalvular gradient is 2.00 mmHg, and the LVOT/AV velocity ratio is   0.77. The peak transaortic gradient is 3.52 mmHg. There is no evidence of   aortic regurgitation.    Mitral Valve:  The mitral valve is minimally thickened. There is trace mitral   regurgitation.    Tricuspid Valve:  The tricuspid valve is mildly thickened. There is at-least moderate   tricuspid regurgitation. Pulmonary artery systolic pressure (estimated   using the tricuspid regurgitant gradient and an estimate of right atrial   pressure) is 57 mmHg.    Inferior Vena Cava:  The inferior vena cava is normal in size (<2.1cm) with abnormal   inspiratory collapse (<50%) consistent with mildly elevated right atrial   pressure (  8, range 5-10mmHg).    Pulmonic Valve:  Structurally normal pulmonic valve with normal leaflet excursion. There   is trace pulmonic regurgitation.    Aorta:  The aortic root and proximal ascending aorta are normal in size. The   aortic arch is normal without evidence of aortic coarctation.    Pulmonary Artery:  The pulmonary artery is normal size.    Pericardium:  No pericardial effusion is seen.    ---------------------------------------------------------------------------  -----    Dolores Acosta MD    Electronically signed by Dolores Acosta MD  Signature Date/Time: 2023/1:16:32 PM        *** Final ***    REITE Bleeding Risk:   [ ] Recent Major Bleeding (2pt)  [ ] Creatinine > 1.2 mg/dL ( 1.5pt)  [x] Anemia (<13 g/dL M, < 12 g/dL F) (1.5 pt)  [ ] Malignancy History (1pt)  [ ] Clinically - overt PE (1pt)  [ ] Age > 75 (1pt)  Total: 1.5   BACS Bleeding Risk: 1  [ ] Recent Major Bleeding (3pt)  [ ] Age > 75 (1pt)  [ ] Active Cancer (1pt)  [x] Syncope (1pt)

## 2023-01-11 NOTE — ED ADULT NURSE NOTE - CHPI ED NUR RELIEVING FX
oxygen Detail Level: Detailed Biopsy Type: H and E Bill As?: Biopsy by Shave Method Size Of Lesion In Cm (Optional): 0.7 Size Of Lesion After Curettage: 0.8 Anesthesia Type: 1% lidocaine with epinephrine Anesthesia Volume In Cc: 0.5 Hemostasis: Electrodesiccation Number Of Curettages: 3 Wound Care: Petrolatum Lab: [id number] Lab Facility: [id number] [address] Notes In Note?: No Consent: Verbal consent was obtained and risks were reviewed including but not limited to scarring, infection, bleeding, scabbing, incomplete removal, nerve damage and allergy to anesthesia. Render Post-Care Instructions In Note?: yes Post-Care Instructions: Patient will be notified of biopsy results. However, patient instructed to call the office if not contacted within 2 weeks. Written post-care instruction given.   If the biopsy results does reveal that the lesion is a non-melanoma skin cancer, the lesion has been treated at the time of the biopsy with EDC.  This is usually adequate treatment for most NMSC but the site will need to be recheck in 3-6 months to ensure adequacy of treatment as there is a 7 to 10% chance of recurrence.  If the biopsy shows a more aggressive pathology then expected then additional treatment beyond EDC may be needed. Notification Instructions: Patient will be notified of biopsy results. However, patient instructed to call the office if not contacted within 2 weeks. Billing Type: Third-Party Bill

## 2023-01-12 DIAGNOSIS — J45.909 UNSPECIFIED ASTHMA, UNCOMPLICATED: ICD-10-CM

## 2023-01-12 DIAGNOSIS — Z29.9 ENCOUNTER FOR PROPHYLACTIC MEASURES, UNSPECIFIED: ICD-10-CM

## 2023-01-12 LAB
ALBUMIN SERPL ELPH-MCNC: 3.9 G/DL — SIGNIFICANT CHANGE UP (ref 3.3–5)
ALP SERPL-CCNC: 68 U/L — SIGNIFICANT CHANGE UP (ref 40–120)
ALT FLD-CCNC: 57 U/L — HIGH (ref 10–45)
ANION GAP SERPL CALC-SCNC: 14 MMOL/L — SIGNIFICANT CHANGE UP (ref 5–17)
APTT BLD: 51 SEC — HIGH (ref 27.5–35.5)
APTT BLD: 58.4 SEC — HIGH (ref 27.5–35.5)
APTT BLD: 62.4 SEC — HIGH (ref 27.5–35.5)
APTT BLD: 72.4 SEC — HIGH (ref 27.5–35.5)
AST SERPL-CCNC: 42 U/L — HIGH (ref 10–40)
BASOPHILS # BLD AUTO: 0.08 K/UL — SIGNIFICANT CHANGE UP (ref 0–0.2)
BASOPHILS NFR BLD AUTO: 0.9 % — SIGNIFICANT CHANGE UP (ref 0–2)
BILIRUB SERPL-MCNC: 0.3 MG/DL — SIGNIFICANT CHANGE UP (ref 0.2–1.2)
BLD GP AB SCN SERPL QL: NEGATIVE — SIGNIFICANT CHANGE UP
BUN SERPL-MCNC: 11 MG/DL — SIGNIFICANT CHANGE UP (ref 7–23)
CALCIUM SERPL-MCNC: 9.6 MG/DL — SIGNIFICANT CHANGE UP (ref 8.4–10.5)
CHLORIDE SERPL-SCNC: 104 MMOL/L — SIGNIFICANT CHANGE UP (ref 96–108)
CK MB CFR SERPL CALC: 2.3 NG/ML — SIGNIFICANT CHANGE UP (ref 0–6.7)
CK SERPL-CCNC: 117 U/L — SIGNIFICANT CHANGE UP (ref 25–170)
CO2 SERPL-SCNC: 20 MMOL/L — LOW (ref 22–31)
CREAT SERPL-MCNC: 0.75 MG/DL — SIGNIFICANT CHANGE UP (ref 0.5–1.3)
D DIMER BLD IA.RAPID-MCNC: 1723 NG/ML DDU — HIGH
EGFR: 108 ML/MIN/1.73M2 — SIGNIFICANT CHANGE UP
EOSINOPHIL # BLD AUTO: 0.06 K/UL — SIGNIFICANT CHANGE UP (ref 0–0.5)
EOSINOPHIL NFR BLD AUTO: 0.7 % — SIGNIFICANT CHANGE UP (ref 0–6)
GLUCOSE SERPL-MCNC: 98 MG/DL — SIGNIFICANT CHANGE UP (ref 70–99)
HCT VFR BLD CALC: 36.1 % — SIGNIFICANT CHANGE UP (ref 34.5–45)
HGB BLD-MCNC: 11.2 G/DL — LOW (ref 11.5–15.5)
IMM GRANULOCYTES NFR BLD AUTO: 0.3 % — SIGNIFICANT CHANGE UP (ref 0–0.9)
INR BLD: 1.02 — SIGNIFICANT CHANGE UP (ref 0.88–1.16)
LACTATE SERPL-SCNC: 0.9 MMOL/L — SIGNIFICANT CHANGE UP (ref 0.5–2)
LYMPHOCYTES # BLD AUTO: 3.88 K/UL — HIGH (ref 1–3.3)
LYMPHOCYTES # BLD AUTO: 45.2 % — HIGH (ref 13–44)
MAGNESIUM SERPL-MCNC: 2 MG/DL — SIGNIFICANT CHANGE UP (ref 1.6–2.6)
MCHC RBC-ENTMCNC: 24.3 PG — LOW (ref 27–34)
MCHC RBC-ENTMCNC: 31 GM/DL — LOW (ref 32–36)
MCV RBC AUTO: 78.5 FL — LOW (ref 80–100)
MONOCYTES # BLD AUTO: 0.65 K/UL — SIGNIFICANT CHANGE UP (ref 0–0.9)
MONOCYTES NFR BLD AUTO: 7.6 % — SIGNIFICANT CHANGE UP (ref 2–14)
NEUTROPHILS # BLD AUTO: 3.89 K/UL — SIGNIFICANT CHANGE UP (ref 1.8–7.4)
NEUTROPHILS NFR BLD AUTO: 45.3 % — SIGNIFICANT CHANGE UP (ref 43–77)
NRBC # BLD: 0 /100 WBCS — SIGNIFICANT CHANGE UP (ref 0–0)
NT-PROBNP SERPL-SCNC: 2226 PG/ML — HIGH (ref 0–300)
PHOSPHATE SERPL-MCNC: 3.7 MG/DL — SIGNIFICANT CHANGE UP (ref 2.5–4.5)
PLATELET # BLD AUTO: 239 K/UL — SIGNIFICANT CHANGE UP (ref 150–400)
POTASSIUM SERPL-MCNC: 3.9 MMOL/L — SIGNIFICANT CHANGE UP (ref 3.5–5.3)
POTASSIUM SERPL-SCNC: 3.9 MMOL/L — SIGNIFICANT CHANGE UP (ref 3.5–5.3)
PROT SERPL-MCNC: 8.4 G/DL — HIGH (ref 6–8.3)
PROTHROM AB SERPL-ACNC: 12.1 SEC — SIGNIFICANT CHANGE UP (ref 10.5–13.4)
RBC # BLD: 4.6 M/UL — SIGNIFICANT CHANGE UP (ref 3.8–5.2)
RBC # FLD: 19.1 % — HIGH (ref 10.3–14.5)
RH IG SCN BLD-IMP: POSITIVE — SIGNIFICANT CHANGE UP
SODIUM SERPL-SCNC: 138 MMOL/L — SIGNIFICANT CHANGE UP (ref 135–145)
TROPONIN T SERPL-MCNC: 0.01 NG/ML — SIGNIFICANT CHANGE UP (ref 0–0.01)
WBC # BLD: 8.59 K/UL — SIGNIFICANT CHANGE UP (ref 3.8–10.5)
WBC # FLD AUTO: 8.59 K/UL — SIGNIFICANT CHANGE UP (ref 3.8–10.5)

## 2023-01-12 PROCEDURE — 93306 TTE W/DOPPLER COMPLETE: CPT | Mod: 26

## 2023-01-12 PROCEDURE — 99233 SBSQ HOSP IP/OBS HIGH 50: CPT | Mod: GC

## 2023-01-12 PROCEDURE — 99232 SBSQ HOSP IP/OBS MODERATE 35: CPT | Mod: GC

## 2023-01-12 PROCEDURE — 93356 MYOCRD STRAIN IMG SPCKL TRCK: CPT

## 2023-01-12 RX ORDER — HEPARIN SODIUM 5000 [USP'U]/ML
2600 INJECTION INTRAVENOUS; SUBCUTANEOUS
Qty: 25000 | Refills: 0 | Status: DISCONTINUED | OUTPATIENT
Start: 2023-01-12 | End: 2023-01-13

## 2023-01-12 RX ORDER — INFLUENZA VIRUS VACCINE 15; 15; 15; 15 UG/.5ML; UG/.5ML; UG/.5ML; UG/.5ML
0.5 SUSPENSION INTRAMUSCULAR ONCE
Refills: 0 | Status: DISCONTINUED | OUTPATIENT
Start: 2023-01-12 | End: 2023-01-20

## 2023-01-12 RX ORDER — BNT162B2 ORIGINAL AND OMICRON BA.4/BA.5 .1125; .1125 MG/2.25ML; MG/2.25ML
0.3 INJECTION, SUSPENSION INTRAMUSCULAR ONCE
Refills: 0 | Status: DISCONTINUED | OUTPATIENT
Start: 2023-01-12 | End: 2023-01-12

## 2023-01-12 RX ORDER — CHLORHEXIDINE GLUCONATE 213 G/1000ML
1 SOLUTION TOPICAL
Refills: 0 | Status: DISCONTINUED | OUTPATIENT
Start: 2023-01-12 | End: 2023-01-20

## 2023-01-12 RX ADMIN — HEPARIN SODIUM 24 UNIT(S)/HR: 5000 INJECTION INTRAVENOUS; SUBCUTANEOUS at 11:51

## 2023-01-12 RX ADMIN — LIDOCAINE 1 PATCH: 4 CREAM TOPICAL at 08:44

## 2023-01-12 RX ADMIN — HEPARIN SODIUM 26 UNIT(S)/HR: 5000 INJECTION INTRAVENOUS; SUBCUTANEOUS at 17:53

## 2023-01-12 RX ADMIN — LIDOCAINE 1 PATCH: 4 CREAM TOPICAL at 16:45

## 2023-01-12 RX ADMIN — LIDOCAINE 1 PATCH: 4 CREAM TOPICAL at 00:51

## 2023-01-12 NOTE — PROGRESS NOTE ADULT - SUBJECTIVE AND OBJECTIVE BOX
PULMONARY CONSULT SERVICE FOLLOW-UP NOTE    INTERVAL HPI:  Reviewed chart and overnight events; patient seen and examined at bedside.    MEDICATIONS:  Pulmonary:    Antimicrobials:    Anticoagulants:  heparin  Infusion 2600 Unit(s)/Hr IV Continuous <Continuous>    Cardiac:      Allergies    No Known Allergies    Intolerances        Vital Signs Last 24 Hrs  T(C): 36.9 (12 Jan 2023 17:00), Max: 37 (11 Jan 2023 22:15)  T(F): 98.4 (12 Jan 2023 17:00), Max: 98.6 (11 Jan 2023 22:15)  HR: 118 (12 Jan 2023 16:00) (97 - 132)  BP: 137/92 (12 Jan 2023 16:00) (120/99 - 180/100)  BP(mean): 108 (12 Jan 2023 16:00) (97 - 132)  RR: 20 (12 Jan 2023 16:00) (18 - 26)  SpO2: 99% (12 Jan 2023 16:00) (97% - 100%)    Parameters below as of 12 Jan 2023 16:00  Patient On (Oxygen Delivery Method): room air        01-11 @ 07:01 - 01-12 @ 07:00  --------------------------------------------------------  IN: 262 mL / OUT: 600 mL / NET: -338 mL    01-12 @ 07:01 - 01-12 @ 18:53  --------------------------------------------------------  IN: 119 mL / OUT: 0 mL / NET: 119 mL          PHYSICAL EXAM:  Constitutional: WDWN  HEENT: NC/AT; PERRL, anicteric sclera; MMM  Neck: supple  Cardiovascular: +S1/S2, RRR  Respiratory: CTA B/L; no W/R/R  Gastrointestinal: soft, NT/ND  Extremities: WWP; no edema, clubbing or cyanosis  Vascular: 2+ radial pulses B/L  Neurological: awake and alert; ALARCON    LABS:      CBC Full  -  ( 12 Jan 2023 06:25 )  WBC Count : 8.59 K/uL  RBC Count : 4.60 M/uL  Hemoglobin : 11.2 g/dL  Hematocrit : 36.1 %  Platelet Count - Automated : 239 K/uL  Mean Cell Volume : 78.5 fl  Mean Cell Hemoglobin : 24.3 pg  Mean Cell Hemoglobin Concentration : 31.0 gm/dL  Auto Neutrophil # : 3.89 K/uL  Auto Lymphocyte # : 3.88 K/uL  Auto Monocyte # : 0.65 K/uL  Auto Eosinophil # : 0.06 K/uL  Auto Basophil # : 0.08 K/uL  Auto Neutrophil % : 45.3 %  Auto Lymphocyte % : 45.2 %  Auto Monocyte % : 7.6 %  Auto Eosinophil % : 0.7 %  Auto Basophil % : 0.9 %    01-12    138  |  104  |  11  ----------------------------<  98  3.9   |  20<L>  |  0.75    Ca    9.6      12 Jan 2023 06:25  Phos  3.7     01-12  Mg     2.0     01-12    TPro  8.4<H>  /  Alb  3.9  /  TBili  0.3  /  DBili  x   /  AST  42<H>  /  ALT  57<H>  /  AlkPhos  68  01-12    PT/INR - ( 12 Jan 2023 06:25 )   PT: 12.1 sec;   INR: 1.02          PTT - ( 12 Jan 2023 16:07 )  PTT:58.4 sec                  RADIOLOGY & ADDITIONAL STUDIES:

## 2023-01-12 NOTE — PROGRESS NOTE ADULT - ASSESSMENT
31 yo female with PMHx of asthma presents to ED i/s/o worsening chest pain and cough. CT PE showing thrombus with large burden and RV strain. Admitted to MICU for PE with RV strain and possible intervention. Started on heparin gtt with improvement in symptoms, now stable for stepdown to telemetry.

## 2023-01-12 NOTE — PROGRESS NOTE ADULT - PROBLEM SELECTOR PLAN 3
- Holding steroids and albuterol as patient is asymptomatic  - No home asthma meds, as patient states she did not need any before onset of symptoms from Urgent Care

## 2023-01-12 NOTE — PATIENT PROFILE ADULT - FALL HARM RISK - HARM RISK INTERVENTIONS

## 2023-01-12 NOTE — PROGRESS NOTE ADULT - SUBJECTIVE AND OBJECTIVE BOX
*******************************STEPDOWN MICU TO TELEMETRY************************************************     Patient is a 32y old  Female admitted for HPI:  31 yo female with PMHx of asthma and anemia 2/2 myomectomy, on birth control (Nuvaring) presented with 1 week of productive cough. Pt stating she began feeling symptoms of mild cough and chest tightness 3 weeks prior. She went to  where workup was negative for COVID and Influenza. Pt prescribed Azithromycin and Prednisone. Pt presented to ED after she awoke in AM w/ progressively worse chest tightness. She used her inhaler and nebulizer tx at home with minimal relief and had syncopal event at home. Pt with no hx of DVT, PE and also denies family history of clots, blood disorders. CT PE:  large thrombus burden involving all lobar and multiple segmental branches bilaterally w/ probable evolving infarcts in lateral basal segment of LLL and mild pulmonary A dilation w/ RV strain and cardiomegaly. Initial work up, TTE with severely dilated RV size, mod-severely reduced RV function, mod TR, pHTN (PASP 57), nml LV size/function. Troponin 0.10, Pro-BNP 1395, lactate normal. Pulmonology consulted. Patient admitted to MICU for Acute Hypoxic Respiratory Failure secondary to high risk submassive PE. Patient started on Heparin drip, tolerated well. Patient tachycardia and oxygen requirement improved. Patient downgraded to Telemetry.     Interval: Patient seen and examined at bedside. Patient calm, comfortable , cooperative. Patient denies chest pain, SOB.     Allergies    No Known Allergies    Intolerances        PAST MEDICAL & SURGICAL HISTORY:  Asthma      Fibroids      Anemia      No significant past surgical history          REVIEW OF SYSTEMS:   General: no fevers no malaise   Chest: non-productivecough no sob no CP  GI: no nvd no abdominal pain  : no urinary symptoms   Skin: no rashes no cyanosis  Musculoskeletal: no trauma no LBP  Neuro: no ha's no dizziness         Vital Signs Last 24 Hrs  T(C): 36.9 (2023 10:50), Max: 37.2 (2023 15:33)  T(F): 98.4 (2023 10:50), Max: 99 (2023 15:33)  HR: 112 (2023 12:00) (97 - 132)  BP: 130/89 (2023 12:00) (122/88 - 180/100)  BP(mean): 105 (2023 12:00) (97 - 132)  RR: 26 (2023 12:00) (18 - 26)  SpO2: 97% (2023 12:00) (97% - 100%)    Parameters below as of 2023 12:00  Patient On (Oxygen Delivery Method): room air         @ : @ 07:00  --------------------------------------------------------  IN: 262 mL / OUT: 600 mL / NET: -338 mL     @ 07: @ 12:52  --------------------------------------------------------  IN: 119 mL / OUT: 0 mL / NET: 119 mL    PHYSICAL EXAM:    General: NAD, well developed  HEENT: NC/AT; EOMI, PERRLA, anicteric sclera; moist mucosal membranes.  Neck: supple, trachea midline  Cardiovascular: RRR, +S1/S2; NO M/R/G  Respiratory: CTA B/L; no W/R/R  Gastrointestinal: soft, NT/ND; +BSx4  Extremities: WWP; no edema or cyanosis  Vascular: 2+ radial, DP/PT pulses B/L  Neurological: AAOx3; no focal deficits    LABS:                        11.2   8.59  )-----------( 239      ( 2023 06:25 )             36.1            138  |  104  |  11  ----------------------------<  98  3.9   |  20<L>  |  0.75    Ca    9.6      2023 06:25  Phos  3.7     -12  Mg     2.0         TPro  8.4<H>  /  Alb  3.9  /  TBili  0.3  /  DBili  x   /  AST  42<H>  /  ALT  57<H>  /  AlkPhos  68  -12            LIVER FUNCTIONS - ( 2023 06:25 )  Alb: 3.9 g/dL / Pro: 8.4 g/dL / ALK PHOS: 68 U/L / ALT: 57 U/L / AST: 42 U/L / GGT: x             PT/INR - ( 2023 06:25 )   PT: 12.1 sec;   INR: 1.02          PTT - ( 2023 06:25 )  PTT:62.4 sec      MEDICATIONS  MEDICATIONS  (STANDING):  chlorhexidine 2% Cloths 1 Application(s) Topical <User Schedule>  heparin  Infusion 2100 Unit(s)/Hr (24 mL/Hr) IV Continuous <Continuous>  influenza   Vaccine 0.5 milliLiter(s) IntraMuscular once    MEDICATIONS  (PRN):      Images:  < from: CT Angio Chest PE Protocol w/ IV Cont (23 @ 11:24) >    ACC: [id number] EXAM:  CT ANGIO CHEST PULM ART [facility]                          *** ADDENDUM # 1 ***    Addendum:    There is right ventricular strain with an RV/LV Ratio: 1.8 (series 5:   Image 81)    --- End of Report ---    *** END OF ADDENDUM # 1 ***      PROCEDURE DATE:  2023          INTERPRETATION:  CLINICAL INFORMATION: 32-year-old female with syncope   and tachycardia.    COMPARISON: None.    CONTRAST/COMPLICATIONS:  IV Contrast: 90 cc of Isovue-370. 10 cc were discarded.  90 cc   administered   10 cc discarded  Oral Contrast: None  Complications: None known    PROCEDURE:  CT Angiogram of the chest was obtained with intravenous contrast. Three   dimensional maximum intensity projection (MIP) images were generated.    FINDINGS:    PULMONARY ANGIOGRAM: Main pulmonary artery is mildly dilated measuring   3.1 cm. There are multiple large acute-appearing pulmonary artery emboli   involving the right upper, right middle, right lower, left upper,   lingular and left lower lobe lobarand segmental branches of the   pulmonary arteries. There is evidence of right ventricular strain.    LYMPH NODES: None    HEART/VASCULATURE: Cardiomegaly with right ventricular strain.    AIRWAYS/LUNGS/PLEURA: Bilateral mosaic perfusion pattern compatible with   underlying pulmonary artery emboli most pronounced within the mid and   lower lung zones. Multiple peripheral wedge-shaped and rounded subpleural   nodular opacities the largest measuring 1.9 x 1.4 cm in the subpleural   region of the lateral basal segment left lower lobe (6:187-205, and   6:171).    UPPER ABDOMEN: Small type I hiatal hernia.    BONES/SOFT TISSUES: No significant abnormality.    IMPRESSION:      1. Large thrombus burden involving all lobar and multiple segmental   branches bilaterally.  2. Probable evolving infarcts involving the lateral basal segment of the   left lower lobe.  3. Mosaic perfusion pattern compatible with underlying pulmonary artery   emboli.  4. Mild pulmonary artery dilatation and right ventricular strain.  5. Cardiomegaly.    The results of this examination were verbally communicated with read back   to the physician, SLIM GALLARDO [id number], on 2023 at 11:39    a.m.          --- End of Report ---    ***Please see the addendumat the top of this report. It may contain   additional important information or changes.****        GENO SANTO MD; Attending Radiologist  This document has been electronically signed. 2023 11:52AM  1st Addendum: GENO SANTO MD; Attending Radiologist  The first addendum was electronically signed on: 2023 12:55PM.    < end of copied text >  < from: TTE Echo Complete w/o Contrast w/ Doppler (23 @ 12:15) >    INTERPRETATION:  -----------------------------------  TRANSTHORACIC ECHOCARDIOGRAM REPORT      ---------------------------------------------------------------------------  -----  Patient Name:   ADAM BEACH Date of Exam:        2023  Medical Rec #:  [id number]            Height/Weight:       70.0 in / 235.88   lb  Account #:      [id number]            BSA:                 2.24 m²  YOB: 1990           BP:                  147/102 mmHg  Patient Age:    32 years           HR:                  129 bpm  Patient Gender: F                  Sonographer:         TRUNG COPE                                     Referring Physician: SLIM GALLARDO      ---------------------------------------------------------------------------  -----  CPT:               ECHO TTE WO CON COMP W DOPP - 70280; - [id number].m  Indication(s):     R55 - Syncope and collapse  Procedure:     A complete two-dimensional transthoracic   echocardiogram was                     performed: 2D, M-mode, spectral and color flow Doppler.  Ordering Location: [facility]    Study Quality:     Study quality was good.      ---------------------------------------------------------------------------  -----  CONCLUSIONS:     1. Patient was tachycardic during the study.   2. Normal left ventricular size and systolic function.   3. Small left ventricular size.   4. Severely dilated right ventricular size.   5. Moderately-to-severely reduced right ventricular systolic function.   6. Dilated right atrium.   7. At-least moderate tricuspid regurgitation.   8. Pulmonary hypertension present, pulmonary artery systolic pressure is   57 mmHg.   9. No pericardial effusion.  10. No prior echo is available for comparison.    ---------------------------------------------------------------------------  -----  2D AND M-MODE MEASUREMENTS (normal ranges within parentheses):    Left Ventricle:         Normal - MenNormal - Women  IVSd (2D):      1.09 cm  (0.6-1.0)     (0.6-0.9)  LVPWd (2D):     0.80 cm  (0.6-1.0)     (0.6-0.9)  LVIDd (2D):     3.49 cm  (4.2-5.8)     (3.8-5.2)  LVIDs (2D):     2.65 cm  LV FS (2D):     24.1 %     (>25%)  LV EF (MOD BP):  61 %  (>55%)  Aorta/Left Atrium:         Normal - Men Normal - Women  Aortic Root (2D):  3.10 cm  (2.4-3.7)    LA Volume A/L:    Right Ventricle:    LV DIASTOLIC FUNCTION:    MV Peak E: 68.60 cm/s MV e' lat:  19.4 cm/s MV E/e' lat ratio: 3.5  MV Peak A: 52.30 cm/s MV e' med:  17.2 cm/s MV E/e' med ratio: 4.0  E/A Ratio: 1.31       Decel Time: 99 msec   MV E/e' avg:       3.8    SPECTRAL DOPPLER ANALYSIS:    Mitral Valve:  MV Max Dann:   MV P1/2 Time: 28.71 msec  MV Mean Grad: MV Area, PHT: 7.66 cm²      Aortic Valve: AoV Max Dann:    0.94 m/s AoV Peak P mmHg   AoV Mean   P mmHg  LVOT Vmax:      0.74 m/s LVOT VTI:      0.117 m  LVOT Diameter: 2.20 cm  AoV Area, VMax: 3.02 cm² AoV Area, VTI: 2.93 cm² AoV Area, Vmn: 3.12 cm²      TricuspidValve and PA/RV Systolic Pressure: TR Max Velocity: 3.49 m/s RA   Pressure: 8 mmHg  RVSP/PASP: 57 mmHg  TAPSE:           18 mm    RV S':       10 cm/s      Pulmonic Valve:  PV Max Velocity: 0.72 m/s PV Max P mmHg PV Mean P mmHg      ---------------------------------------------------------------------------  -----  FINDINGS:    Left Ventricle:  The left ventricle cavity size is small. The left ventricle is normal in   size, wall thickness, and systolic function with a calculated ejection   fraction of 60-65%. Abnormal septal motion seen due to right ventricular   pressure overload. The other walls exhibit normal wall motion. Analysis   of left ventricular diastolic function and filling pressure is made   challenging by the presence oftachycardia.    Right Ventricle:  The right ventricle is severely dilated. Right ventricular systolic   function is moderately-to-severely reduced. The tricuspid annular plane   systolic excursion (TAPSE) is 17.80 mm (normal >=17 mm). RV tissue   Doppler S' is 10.40 cm/s (normal >10 cm/s).    Left Atrium:  The left atrium is normal in size. Left atrial volume index (JULIO C) is   13.2 ml/m².    Right Atrium:  The right atrium is dilated. Right atrial volume index (JAYDEN) is 36.00   ml/m².    Aortic Valve:  The aortic valve is tricuspid with normal structure and function without   stenosis. The peak transvalvular velocity is 0.94 m/s, the mean   transvalvular gradient is 2.00 mmHg, and the LVOT/AV velocity ratio is   0.77. The peak transaortic gradient is 3.52 mmHg. There is no evidence of   aortic regurgitation.    Mitral Valve:  The mitral valve is minimally thickened. There is trace mitral   regurgitation.    Tricuspid Valve:  The tricuspid valve is mildly thickened. There is at-least moderate   tricuspid regurgitation. Pulmonary artery systolic pressure (estimated   using the tricuspid regurgitant gradient and an estimate of right atrial   pressure) is 57 mmHg.    Inferior Vena Cava:  The inferior vena cava is normal in size (<2.1cm) with abnormal   inspiratory collapse (<50%) consistent with mildly elevated right atrial   pressure (  8, range 5-10mmHg).    Pulmonic Valve:  Structurally normal pulmonic valve with normal leaflet excursion. There   is trace pulmonic regurgitation.    Aorta:  The aortic root and proximal ascending aorta are normal in size. The   aortic arch is normal without evidence of aortic coarctation.    Pulmonary Artery:  The pulmonary artery is normal size.    Pericardium:  No pericardial effusion is seen.    ---------------------------------------------------------------------------  -----    Dolores Acosta MD    Electronically signed by Dolores Acosta MD  Signature Date/Time: 2023/1:16:32 PM        *** Final ***    < end of copied text >   *******************************STEPDOWN MICU TO TELEMETRY************************************************     HOSPITAL COURSE:   31 yo female with PMHx of asthma and anemia 2/2 myomectomy, on hormonal birth control (Nuvaring) for 4 months, presented with 1 week of productive cough. Pt stating she began feeling symptoms of mild cough and chest tightness 3 weeks prior. She went to  where workup was negative for COVID and Influenza. Pt prescribed Azithromycin and Prednisone. Pt presented to ED after she awoke in AM w/ progressively worse chest tightness. She used her inhaler and nebulizer tx at home with minimal relief and had syncopal event at home. Pt with no hx of DVT, PE and also denies family history of clots, blood disorders. In the ED, CT PE with large thrombus burden involving all lobar and multiple segmental branches bilaterally w/ probable evolving infarcts in lateral basal segment of LLL and mild pulmonary A dilation w/ RV strain and cardiomegaly. 1/11 TTE with severely dilated RV size, mod-severely reduced RV function, mod TR, pHTN (PASP 57), nml LV size/function. Troponin 0.10, Pro-BNP 1395, lactate normal. Pulmonology consulted. Patient admitted to MICU for intermediate risk PE. Patient started on Heparin drip, currently at 24cc/hr with improvement in tachycardia (HR 120s > 100s). Per PERT, no plan for thrombectomy. Pt is medically stable and transferred to telemetry for further management.      INTERVAL HPI/OVERNIGHT EVENTS:  Overnight, PTT subtherapeutic, heparin gtt rate increased from 21 >23cc/hr.     SUBJECTIVE: Patient seen and examined at bedside. Patient calm, comfortable , cooperative. No chest pain, shortness of breath, abd pain, N/V/D.    VITAL SIGNS:  ICU Vital Signs Last 24 Hrs  T(C): 36.9 (12 Jan 2023 10:50), Max: 37.2 (11 Jan 2023 15:33)  T(F): 98.4 (12 Jan 2023 10:50), Max: 99 (11 Jan 2023 15:33)  HR: 114 (12 Jan 2023 13:08) (97 - 132)  BP: 120/99 (12 Jan 2023 13:08) (120/99 - 180/100)  BP(mean): 107 (12 Jan 2023 13:08) (97 - 132)  ABP: --  ABP(mean): --  RR: 24 (12 Jan 2023 13:08) (18 - 26)  SpO2: 97% (12 Jan 2023 13:08) (97% - 100%)    O2 Parameters below as of 12 Jan 2023 13:08  Patient On (Oxygen Delivery Method): room air              01-11 @ 07:01 - 01-12 @ 07:00  --------------------------------------------------------  IN: 262 mL / OUT: 600 mL / NET: -338 mL    01-12 @ 07:01 - 01-12 @ 14:28  --------------------------------------------------------  IN: 119 mL / OUT: 0 mL / NET: 119 mL      CAPILLARY BLOOD GLUCOSE          PHYSICAL EXAM:    Constitutional: NAD  HEENT: NC/AT; PERRL, anicteric sclera; MMM  Neck: supple  Cardiovascular: +S1/S2, no S3 gallop (which was present on exam yesterday), tachycardic, regular rhythm  Respiratory: CTA B/L, no W/R/R  Gastrointestinal: abdomen soft, NT/ND; no rebound or guarding; +BSx4  Genitourinary: no suprapubic tenderness or fullness  Extremities: warm extremities; no LE edema; no clubbing or cyanosis  Vascular: 2+ radial pulses B/L  Dermatologic: normal color and turgor; no visible rashes  Neurological: AAOx4, strength 5/5 to all 4 extremities, no focal deficits    MEDICATIONS:  MEDICATIONS  (STANDING):  chlorhexidine 2% Cloths 1 Application(s) Topical <User Schedule>  heparin  Infusion 2100 Unit(s)/Hr (24 mL/Hr) IV Continuous <Continuous>  influenza   Vaccine 0.5 milliLiter(s) IntraMuscular once    MEDICATIONS  (PRN):      ALLERGIES:  Allergies    No Known Allergies    Intolerances        LABS:                        11.2   8.59  )-----------( 239      ( 12 Jan 2023 06:25 )             36.1     01-12    138  |  104  |  11  ----------------------------<  98  3.9   |  20<L>  |  0.75    Ca    9.6      12 Jan 2023 06:25  Phos  3.7     01-12  Mg     2.0     01-12    TPro  8.4<H>  /  Alb  3.9  /  TBili  0.3  /  DBili  x   /  AST  42<H>  /  ALT  57<H>  /  AlkPhos  68  01-12    PT/INR - ( 12 Jan 2023 06:25 )   PT: 12.1 sec;   INR: 1.02          PTT - ( 12 Jan 2023 06:25 )  PTT:62.4 sec      RADIOLOGY & ADDITIONAL TESTS: Reviewed.

## 2023-01-12 NOTE — PATIENT PROFILE ADULT - HOME ACCESSIBILITY CONCERNS
SURGICAL INTENSIVE CARE UNIT CONSULT NOTE    Consulting Attending: Dr. LEONARD Demarco    HPI:  Betty Rowe is a 76 year old F with no PMHx, PSHx of R TKR 15 years ago and left TKR this past Friday at [facility] recently admitted after presenting with 4 days of abdominal pain not associated with nausea, vomiting, fevers or chills. The patient had worsening abdominal pain and distension that failed to improved over the course of the week prompting her to go see her PMD. A CT abdomen pelvis was obtained at an outside facility which revealed findings concerning for free air. The patient was advised to present to the ED. Upon arrival, she was found to be hypotensive to the 90's, and tachycardic to the low 100's. The patient was resuscitated and started on antibiotics due to concerns for a colonic perforation and was taken to the operating room emergently. In the OR, the patient was found to have purulent ascites with several areas of organizing abscess cavities. She was also found to have a severely thickened sigmoid colon with surrounding inflammatory changes suggestive of perforated diverticulitis. A Luisana's procedure was performed and the patient was transferred to the SICU for post-operative hemodynamic monitoring and further resuscitation.     In the OR, the patient received 2 units pRBC, 1.5 L of IV fluids,  cc,  cc.     PAST MEDICAL HISTORY: No pertinent past medical history  Hard of hearing  SPL (spondylolisthesis)  H/O scoliosis  Spinal stenosis  Anxiety  Osteopenia    PAST SURGICAL HISTORY: S/P total knee replacement, left  S/P total knee arthroplasty, right  S/P cataract extraction and insertion of intraocular lens  S/P hammer toe correction  S/P shoulder surgery  H/O carpal tunnel repair  H/O total knee replacement, right    CODE STATUS: Presumed full code    ALLERGIES: No Known Allergies    VITAL SIGNS:  ICU Vital Signs Last 24 Hrs  T(C): 36.7 (31 Jan 2020 01:10), Max: 36.7 (31 Jan 2020 01:10)  T(F): 98.1 (31 Jan 2020 01:10), Max: 98.1 (31 Jan 2020 01:10)  HR: 101 (31 Jan 2020 01:10) (90 - 108)  BP: 127/71 (31 Jan 2020 01:10) (107/79 - 127/71)  BP(mean): 92 (31 Jan 2020 01:10) (92 - 92)  ABP: 127/75 (31 Jan 2020 01:10) (127/75 - 127/75)  ABP(mean): 87 (31 Jan 2020 01:10) (87 - 87)  RR: 19 (31 Jan 2020 01:10) (16 - 20)  SpO2: 100% (31 Jan 2020 01:10) (98% - 100%)    NEURO  RASS:     GCS:     CAM ICU:  Exam:   Meds:   [x] Adequacy of sedation and pain control has been assessed and adjusted      RESPIRATORY  RR: 19 (01-31-20 @ 01:10) (16 - 20)  SpO2: 100% (01-31-20 @ 01:10) (98% - 100%)  Wt(kg): --  Exam: unlabored, clear to auscultation bilaterally  Mechanical Ventilation:   ABG - ( 30 Jan 2020 23:59 )  pH: 7.24  /  pCO2: 37    /  pO2: 224   / HCO3: 15    / Base Excess: -11.1 /  SaO2: 99      Lactate: x      [ ] Extubation Readiness Assessed  Meds:       CARDIOVASCULAR  HR: 101 (01-31-20 @ 01:10) (90 - 108)  BP: 127/71 (01-31-20 @ 01:10) (107/79 - 127/71)  BP(mean): 92 (01-31-20 @ 01:10) (92 - 92)  ABP: 127/75 (01-31-20 @ 01:10) (127/75 - 127/75)  ABP(mean): 87 (01-31-20 @ 01:10) (87 - 87)  Wt(kg): --  CVP(cm H2O): --  VBG - ( 30 Jan 2020 16:32 )  pH: x     /  pCO2: x     /  pO2: x     / HCO3: x     / Base Excess: x     /  SaO2: x      Lactate: 2.1    Exam:  Cardiac Rhythm:  Perfusion     [ ]Adequate   [ ]Inadequate  Mentation   [ ]Normal       [ ]Reduced  Extremities  [ ]Warm         [ ]Cool  Volume Status [ ]Hypervolemic [ ]Euvolemic [ ]Hypovolemic  Meds:       GI/NUTRITION  Exam:  Diet:  Meds:     GENITOURINARY  I&O's Detail    Weight (kg): 59 (01-30 @ 22:55)  01-30    132<L>  |  98  |  42<H>  ----------------------------<  102<H>  3.6   |  17<L>  |  2.22<H>    Ca    10.0      30 Jan 2020 16:32    TPro  6.2  /  Alb  3.0<L>  /  TBili  1.3<H>  /  DBili  x   /  AST  28  /  ALT  24  /  AlkPhos  129<H>  01-30    [ ] Akins catheter, indication: N/A  Meds:       HEMATOLOGIC  Meds:   [x] VTE Prophylaxis                        8.4    11.10 )-----------( 239      ( 30 Jan 2020 16:32 )             26.3     PT/INR - ( 30 Jan 2020 16:32 )   PT: 24.7 sec;   INR: 2.10 ratio         PTT - ( 30 Jan 2020 16:32 )  PTT:37.9 sec  Transfusion     [ ] PRBC   [ ] Platelets   [ ] FFP   [ ] Cryoprecipitate      INFECTIOUS DISEASES  T(C): 36.7 (01-31-20 @ 01:10), Max: 36.7 (01-31-20 @ 01:10)  Wt(kg): --  WBC Count: 11.10 K/uL (01-30 @ 16:32)    Recent Cultures:    Meds:       ENDOCRINE  Capillary Blood Glucose    Meds:       ACCESS DEVICES:  [ ] Peripheral IV  [ ] Central Venous Line	[ ] R	[ ] L	[ ] IJ	[ ] Fem	[ ] SC	Placed:   [ ] Arterial Line		[ ] R	[ ] L	[ ] Fem	[ ] Rad	[ ] Ax	Placed:   [ ] PICC:					[ ] Mediport  [ ] Urinary Catheter, Date Placed:   [ ] Necessity of urinary, arterial, and venous catheters discussed    OTHER MEDICATIONS:      CODE STATUS:     IMAGING: SURGICAL INTENSIVE CARE UNIT CONSULT NOTE    Consulting Attending: Dr. LEONARD Demarco    HPI:  Betty Rowe is a 76 year old F with no PMHx, PSHx of R TKR 15 years ago and left TKR this past Friday at [facility] recently admitted after presenting with 4 days of abdominal pain not associated with nausea, vomiting, fevers or chills. The patient had worsening abdominal pain and distension that failed to improved over the course of the week prompting her to go see her PMD. A CT abdomen pelvis was obtained at an outside facility which revealed findings concerning for free air. The patient was advised to present to the ED. Upon arrival, she was found to be hypotensive to the 90's, and tachycardic to the low 100's. The patient was resuscitated and started on antibiotics due to concerns for a colonic perforation and was taken to the operating room emergently. In the OR, the patient was found to have purulent ascites with several areas of organizing abscess cavities. She was also found to have a severely thickened sigmoid colon with surrounding inflammatory changes suggestive of perforated diverticulitis. A Luisana's procedure was performed and the patient was transferred to the SICU for post-operative hemodynamic monitoring and further resuscitation.     In the OR, the patient received 2 units pRBC, 1.5 L of IV fluids,  cc,  cc.     PAST MEDICAL HISTORY: No pertinent past medical history  Hard of hearing  SPL (spondylolisthesis)  H/O scoliosis  Spinal stenosis  Anxiety  Osteopenia    PAST SURGICAL HISTORY: S/P total knee replacement, left  S/P total knee arthroplasty, right  S/P cataract extraction and insertion of intraocular lens  S/P hammer toe correction  S/P shoulder surgery  H/O carpal tunnel repair  H/O total knee replacement, right    CODE STATUS: Presumed full code    ALLERGIES: No Known Allergies    VITAL SIGNS:  ICU Vital Signs Last 24 Hrs  T(C): 36.7 (31 Jan 2020 01:10), Max: 36.7 (31 Jan 2020 01:10)  T(F): 98.1 (31 Jan 2020 01:10), Max: 98.1 (31 Jan 2020 01:10)  HR: 101 (31 Jan 2020 01:10) (90 - 108)  BP: 127/71 (31 Jan 2020 01:10) (107/79 - 127/71)  BP(mean): 92 (31 Jan 2020 01:10) (92 - 92)  ABP: 127/75 (31 Jan 2020 01:10) (127/75 - 127/75)  ABP(mean): 87 (31 Jan 2020 01:10) (87 - 87)  RR: 19 (31 Jan 2020 01:10) (16 - 20)  SpO2: 100% (31 Jan 2020 01:10) (98% - 100%)    NEURO  Exam: Sedated on Propofol  [x] Adequacy of sedation and pain control has been assessed and adjusted    RESPIRATORY  RR: 19 (01-31-20 @ 01:10) (16 - 20)  SpO2: 100% (01-31-20 @ 01:10) (98% - 100%)  Wt(kg): --  Exam: unlabored, clear to auscultation bilaterally  Mechanical Ventilation:   ABG - ( 30 Jan 2020 23:59 )  pH: 7.24  /  pCO2: 37    /  pO2: 224   / HCO3: 15    / Base Excess: -11.1 /  SaO2: 99      Lactate: x      [ ] Extubation Readiness Assessed  Meds:       CARDIOVASCULAR  HR: 101 (01-31-20 @ 01:10) (90 - 108)  BP: 127/71 (01-31-20 @ 01:10) (107/79 - 127/71)  BP(mean): 92 (01-31-20 @ 01:10) (92 - 92)  ABP: 127/75 (01-31-20 @ 01:10) (127/75 - 127/75)  ABP(mean): 87 (01-31-20 @ 01:10) (87 - 87)  Wt(kg): --  CVP(cm H2O): --  VBG - ( 30 Jan 2020 16:32 )  pH: x     /  pCO2: x     /  pO2: x     / HCO3: x     / Base Excess: x     /  SaO2: x      Lactate: 2.1    Exam: NSR  Cardiac Rhythm:  Perfusion     [ ]Adequate   [ ]Inadequate  Mentation   [ ]Normal       [ ]Reduced  Extremities  [ ]Warm         [ ]Cool  Volume Status [ ]Hypervolemic [ ]Euvolemic [ ]Hypovolemic  Meds:       GI/NUTRITION  Exam: Soft, NT, ND. Midline incision with wound-VAC in place. LLQ ostomy with device in place.  Diet: NPO  Meds: NA    GENITOURINARY  I&O's Detail    Weight (kg): 59 (01-30 @ 22:55)  01-30    132<L>  |  98  |  42<H>  ----------------------------<  102<H>  3.6   |  17<L>  |  2.22<H>    Ca    10.0      30 Jan 2020 16:32    TPro  6.2  /  Alb  3.0<L>  /  TBili  1.3<H>  /  DBili  x   /  AST  28  /  ALT  24  /  AlkPhos  129<H>  01-30  [X] Akins catheter, indication: N/A    HEMATOLOGIC  Meds:   [x] VTE Prophylaxis: Lovenox 40 mg                         8.4    11.10 )-----------( 239      ( 30 Jan 2020 16:32 )             26.3     PT/INR - ( 30 Jan 2020 16:32 )   PT: 24.7 sec;   INR: 2.10 ratio    PTT - ( 30 Jan 2020 16:32 )  PTT:37.9 sec    Transfusion     [x] PRBC x2  [ ] Platelets   [ ] FFP   [ ] Cryoprecipitate      INFECTIOUS DISEASES  T(C): 36.7 (01-31-20 @ 01:10), Max: 36.7 (01-31-20 @ 01:10)  Wt(kg): --  WBC Count: 11.10 K/uL (01-30 @ 16:32)  Recent Cultures: Blood cultures x2 pending.  Meds: Zosyn       ENDOCRINE  Capillary Blood Glucose    ACCESS DEVICES:  [x] Peripheral IV  [ ] Central Venous Line	[ ] R	[ ] L	[ ] IJ	[ ] Fem	[ ] SC	Placed:   [x] Arterial Line		[ ] R	[x] L	[ ] Fem	[x] Rad	[ ] Ax	Placed:   [ ] PICC:					[ ] Mediport  [x] Urinary Catheter, Date Placed: 1/31/2020  [ ] Necessity of urinary, arterial, and venous catheters discussed    OTHER MEDICATIONS:  CODE STATUS:  Full code none

## 2023-01-12 NOTE — PROGRESS NOTE ADULT - ASSESSMENT
33 yo female with PMHx of asthma presents to ED i/s/o worsening chest pain and cough. CT PE showing thrombus with large burden and RV strain. Admitted to MICU for PE with RV strain and possible intervention.

## 2023-01-12 NOTE — PROGRESS NOTE ADULT - SUBJECTIVE AND OBJECTIVE BOX
*****TRANSFER ACCEPTANCE FROM MICU TO [facility]*****    Patient is a 32y old  Female who presents with a chief complaint of SOB     HOSPITAL COURSE:   33 yo female with PMHx of asthma and anemia 2/2 myomectomy, on hormonal birth control (Nuvaring) for 4 months, presented with 1 week of productive cough. Pt stating she began feeling symptoms of mild cough and chest tightness 3 weeks prior. She went to  where workup was negative for COVID and Influenza. Pt prescribed Azithromycin and Prednisone. Pt presented to ED after she awoke in AM w/ progressively worse chest tightness. She used her inhaler and nebulizer tx at home with minimal relief and had syncopal event at home. Pt with no hx of DVT, PE and also denies family history of clots, blood disorders. In the ED, CT PE with large thrombus burden involving all lobar and multiple segmental branches bilaterally w/ probable evolving infarcts in lateral basal segment of LLL and mild pulmonary artery dilation w/ RV strain and cardiomegaly. 1/11 TTE with severely dilated RV size, mod-severely reduced RV function, mod TR, pHTN (PASP 57), nml LV size/function. Troponin 0.10, Pro-BNP 1395, lactate normal. Pulmonology consulted. Patient admitted to MICU for intermediate risk PE. Patient started on Heparin drip, currently at 24cc/hr with improvement in tachycardia (HR 120s > 100s). Per PERT, no plan for thrombectomy. Pt improved symptomatically, is medically stable, and was transferred to telemetry for further management.    INTERVAL HPI/OVERNIGHT EVENTS:  Patient seen and examined at bedside. Pt sitting up in bed, in NAD, comfortable-appearing. Saturating well on RA. Reports mild chest tightness which is significantly improved from admission. Able to get out of bed and ambulate with mild SOB. Denies CP or palpitations. Also denies fever, chills, HA, dizziness, abdominal pain, n/v, changes in bowel/urinary habits, leg swelling, leg pain, numbness, or tingling. 12pt ROS otherwise negative.    FAMILY HISTORY:      VITAL SIGNS:  T(C): 36.9 (01-12-23 @ 10:50), Max: 37.2 (01-11-23 @ 15:33)  HR: 114 (01-12-23 @ 13:08) (97 - 132)  BP: 120/99 (01-12-23 @ 13:08) (120/99 - 180/100)  RR: 24 (01-12-23 @ 13:08) (18 - 26)  SpO2: 97% (01-12-23 @ 13:08) (97% - 100%)  Wt(kg): --Vital Signs Last 24 Hrs  T(C): 36.9 (12 Jan 2023 10:50), Max: 37.2 (11 Jan 2023 15:33)  T(F): 98.4 (12 Jan 2023 10:50), Max: 99 (11 Jan 2023 15:33)  HR: 114 (12 Jan 2023 13:08) (97 - 132)  BP: 120/99 (12 Jan 2023 13:08) (120/99 - 180/100)  BP(mean): 107 (12 Jan 2023 13:08) (97 - 132)  RR: 24 (12 Jan 2023 13:08) (18 - 26)  SpO2: 97% (12 Jan 2023 13:08) (97% - 100%)    Parameters below as of 12 Jan 2023 13:08  Patient On (Oxygen Delivery Method): room air      No Known Allergies      PHYSICAL EXAM:  Constitutional: NAD  HEENT: NC/AT; PERRL, anicteric sclera; MMM  Neck: supple  Cardiovascular: +S1/S2, tachycardic, regular rhythm  Respiratory: CTA B/L, no W/R/R  Gastrointestinal: abdomen soft, NT/ND; no rebound or guarding; +BSx4  Genitourinary: no suprapubic tenderness or fullness  Extremities: warm extremities; no LE edema; no clubbing or cyanosis  Vascular: 2+ radial pulses B/L  Dermatologic: normal color and turgor; no visible rashes  Neurological: AAOx4, strength 5/5 to all 4 extremities, no focal deficits    Consultant(s) Notes Reviewed:  [x ] YES  [ ] NO  Care Discussed with Consultants/Other Providers [ x] YES  [ ] NO    LABS:                        11.2   8.59  )-----------( 239      ( 12 Jan 2023 06:25 )             36.1     01-12    138  |  104  |  11  ----------------------------<  98  3.9   |  20<L>  |  0.75    Ca    9.6      12 Jan 2023 06:25  Phos  3.7     01-12  Mg     2.0     01-12    TPro  8.4<H>  /  Alb  3.9  /  TBili  0.3  /  DBili  x   /  AST  42<H>  /  ALT  57<H>  /  AlkPhos  68  01-12      PT/INR - ( 12 Jan 2023 06:25 )   PT: 12.1 sec;   INR: 1.02          PTT - ( 12 Jan 2023 06:25 )  PTT:62.4 sec  CAPILLARY BLOOD GLUCOSE          CARDIAC MARKERS ( 12 Jan 2023 06:25 )  x     / 0.01 ng/mL / 117 U/L / x     / 2.3 ng/mL  CARDIAC MARKERS ( 11 Jan 2023 09:33 )  x     / 0.10 ng/mL / x     / x     / x                          I & O Summary:    01-11-23 @ 07:01 - 01-12-23 @ 07:00  --------------------------------------------------------  IN: 262 mL / OUT: 600 mL / NET: -338 mL    01-12-23 @ 07:01 - 01-12-23 @ 14:56  --------------------------------------------------------  IN: 119 mL / OUT: 0 mL / NET: 119 mL        Microbiology:        RADIOLOGY, EKG AND ADDITIONAL TESTS: Reviewed.      RADIOLOGY & ADDITIONAL TESTS:    Imaging Personally Reviewed:  [ ] YES  [ ] NO  chlorhexidine 2% Cloths 1 Application(s) Topical <User Schedule>  heparin  Infusion 2100 Unit(s)/Hr IV Continuous <Continuous>  influenza   Vaccine 0.5 milliLiter(s) IntraMuscular once      HEALTH ISSUES - PROBLEM Dx:  Pulmonary embolus    Pulmonary thromboembolism    Pulmonary embolism with acute cor pulmonale    Acute right heart failure    Pulmonary hypertension due to thromboembolism

## 2023-01-12 NOTE — PROGRESS NOTE ADULT - ASSESSMENT
33 yo female with PMHx of asthma presents to ED i/s/o worsening chest pain and cough. CT PE showing thrombus with large burden and RV strain. Admitted to MICU for PE with RV strain and possible intervention.     NEURO:   No active issues.     CARDIOVASCULAR:   #RV strain  Pt w/ large PE involving all lobar and multiple segmental branches bilaterally w/ RV strain and cardiomegaly.   TTE with severely dilated RV size, mod-severely reduced RV function, mod TR, pHTN (PASP 57), nml LV size/function. Troponin 0.10, Pro-BNP 1395.  - Repeat trop, BNP, lactate 1/12 AM    - Obtain repeat TTE tomorrow 1/12 to assess R heart strain and determine if thrombectomy is needed     PULMONARY:   #PE  CTA PE protocol indicating large thrombus burden involving all lobar and multiple segmental branches bilaterally w/ probable evolving infarcts in lateral basal segment of LLL and mild pulmonary A dilation w/ RV strain and cardiomegaly. TTE with dilated RV size, mod-severely reduced RV function, pTN (PASP 57). Heparin gtt running @ 19 mL/hr. Etiology likely Unprovoked PE in nature -- risk factors: Nuvaring contraceptive use and obesity. Denies recent travel and family hx.   - c/w heparin gtt  with PTT q6h  - PERT following, appreciate recs    GI:   No active issues    /Renal:   No active issues    Heme/Onc:   On full AC i/s/o PE   - f/u aPTT q6hr   - type and screen    Endo: No active issues    ID: No active issues    F: s/p 1L NS in ED  E: Replete K <4, Mg <2  N: Regular diet  VTE: heparin gtt  GI: not needed  C: Full code  D: DMICU       31 yo female with PMHx of asthma presents to ED i/s/o worsening chest pain and cough. CT PE showing thrombus with large burden and RV strain. Admitted to MICU for PE with RV strain and possible intervention.     NEURO:   No active issues.     CARDIOVASCULAR:   #RV strain  Pt w/ large PE involving all lobar and multiple segmental branches bilaterally w/ RV strain and cardiomegaly.   TTE with severely dilated RV size, mod-severely reduced RV function, mod TR, pHTN (PASP 57), nml LV size/function. Troponin 0.10, Pro-BNP 1395.  - Troponin 0.01, , lactate 0.9  - f/u repeat TTE     PULMONARY:   #PE  CTA PE protocol indicating large thrombus burden involving all lobar and multiple segmental branches bilaterally w/ probable evolving infarcts in lateral basal segment of LLL and mild pulmonary A dilation w/ RV strain and cardiomegaly. TTE with dilated RV size, mod-severely reduced RV function, pTN (PASP 57). Heparin gtt running @ 19 mL/hr. Etiology likely Unprovoked PE in nature -- risk factors: Nuvaring contraceptive use and obesity. Denies recent travel and family hx.   - c/w heparin gtt  with PTT q6h- target PTT 80-99  - holding off Thrombectomy, pending final reccs from PERT  - f/u repeat TTE  - PERT following    #Asthma  -holding steroids and albuterol as patient is asymptomatic  - no home asthma meds, as patient states she did not need any before onset of symptoms from Urgent Care     GI:   No active issues    /Renal:   No active issues    Heme/Onc:   On full AC i/s/o PE   - f/u aPTT q6hr   - type and screen    Endo: No active issues    ID: No active issues    F: none  E: Replete K <4, Mg <2  N: Regular diet  VTE: heparin gtt  GI: not needed  Code: Full code  Dispo: Telemetry       33 yo female with PMHx of asthma presents to ED i/s/o worsening chest pain and cough. CT PE showing thrombus with large burden and RV strain. Admitted to MICU for PE with RV strain and possible intervention.     NEURO:   No active issues.     CARDIOVASCULAR:   #RV strain  Pt w/ large PE involving all lobar and multiple segmental branches bilaterally w/ RV strain and cardiomegaly.   1/11 TTE with severely dilated RV size, mod-severely reduced RV function, mod TR, pHTN (PASP 57), nml LV size/function. Troponin 0.10, Pro-BNP 1395.  - AM troponin 0.01, , lactate 0.9  - f/u repeat TTE     PULMONARY:   #PE  CTA PE protocol indicating large thrombus burden involving all lobar and multiple segmental branches bilaterally w/ probable evolving infarcts in lateral basal segment of LLL and mild pulmonary A dilation w/ RV strain and cardiomegaly. TTE with dilated RV size, mod-severely reduced RV function, pTN (PASP 57). Heparin gtt running @ 19 mL/hr. Etiology likely Unprovoked PE in nature -- risk factors: Nuvaring contraceptive use and obesity. Denies recent travel and family hx.   - c/w heparin gtt  with PTT q6h- target PTT 80-99  - No plan for thrombectomy for now, f/u PERT recs  - f/u repeat TTE  - PERT following    #Asthma  -holding steroids and albuterol as patient is asymptomatic  - no home asthma meds, as patient states she did not need any before onset of symptoms from Urgent Care     GI:   No active issues    /Renal:   No active issues    Heme/Onc:   On full AC i/s/o PE   - f/u aPTT q6hr   - type and screen    Endo: No active issues    ID: No active issues    F: none  E: Replete K <4, Mg <2  N: Regular diet  VTE: heparin gtt  GI: not needed  Code: Full code  Dispo: Telemetry

## 2023-01-12 NOTE — PROGRESS NOTE ADULT - PROBLEM SELECTOR PLAN 4
F: PO  E: Replete K <4, Mg <2  N: Regular diet  DVT ppx: heparin gtt  GI ppx: not needed  Code: Full code  Dispo: [id number]

## 2023-01-12 NOTE — PROGRESS NOTE ADULT - PROBLEM SELECTOR PLAN 1
CTA PE protocol indicating large thrombus burden involving all lobar and multiple segmental branches bilaterally w/ probable evolving infarcts in lateral basal segment of LLL and mild pulmonary artery dilation w/ RV strain and cardiomegaly. TTE with dilated RV size, mod-severely reduced RV function, pHTN (PASP 57). Heparin gtt running @ 24 mL/hr. Etiology likely unprovoked PE in nature -- risk factors: Nuvaring contraceptive use and obesity. Denies recent travel and family hx.   - c/w heparin gtt with PTT q6h- target PTT 80-99  - No plan for thrombectomy for now, f/u PERT recs  - f/u repeat TTE  - PERT following  - Maintain active T&S

## 2023-01-12 NOTE — PROGRESS NOTE ADULT - PROBLEM SELECTOR PLAN 2
Pt w/ large PE involving all lobar and multiple segmental branches bilaterally w/ RV strain and cardiomegaly. TTE 1/11 with severely dilated RV size, mod-severely reduced RV function, mod TR, pHTN (PASP 57), normal LV size/function. Troponin 0.10, Pro-BNP 1395.  - AM troponin 0.01, , lactate 0.9  - f/u repeat TTE

## 2023-01-12 NOTE — PROGRESS NOTE ADULT - PROBLEM SELECTOR PLAN 1
Tachycardia improved while on AC with improvement in blood pressure and pulse pressure. Echo noted today unchanged. No need for intervention.  - C/w with full dose heparin gtt for now

## 2023-01-13 ENCOUNTER — TRANSCRIPTION ENCOUNTER (OUTPATIENT)
Age: 33
End: 2023-01-13

## 2023-01-13 ENCOUNTER — RESULT REVIEW (OUTPATIENT)
Age: 33
End: 2023-01-13

## 2023-01-13 LAB
ANION GAP SERPL CALC-SCNC: 11 MMOL/L — SIGNIFICANT CHANGE UP (ref 5–17)
ANION GAP SERPL CALC-SCNC: 15 MMOL/L — SIGNIFICANT CHANGE UP (ref 5–17)
ANISOCYTOSIS BLD QL: SLIGHT — SIGNIFICANT CHANGE UP
APTT BLD: 101.3 SEC — HIGH (ref 27.5–35.5)
APTT BLD: 127.4 SEC — CRITICAL HIGH (ref 27.5–35.5)
APTT BLD: 95.9 SEC — HIGH (ref 27.5–35.5)
BASOPHILS # BLD AUTO: 0 K/UL — SIGNIFICANT CHANGE UP (ref 0–0.2)
BASOPHILS NFR BLD AUTO: 0 % — SIGNIFICANT CHANGE UP (ref 0–2)
BLD GP AB SCN SERPL QL: NEGATIVE — SIGNIFICANT CHANGE UP
BUN SERPL-MCNC: 12 MG/DL — SIGNIFICANT CHANGE UP (ref 7–23)
BUN SERPL-MCNC: 13 MG/DL — SIGNIFICANT CHANGE UP (ref 7–23)
CALCIUM SERPL-MCNC: 8.5 MG/DL — SIGNIFICANT CHANGE UP (ref 8.4–10.5)
CALCIUM SERPL-MCNC: 9.5 MG/DL — SIGNIFICANT CHANGE UP (ref 8.4–10.5)
CHLORIDE SERPL-SCNC: 104 MMOL/L — SIGNIFICANT CHANGE UP (ref 96–108)
CHLORIDE SERPL-SCNC: 106 MMOL/L — SIGNIFICANT CHANGE UP (ref 96–108)
CO2 SERPL-SCNC: 19 MMOL/L — LOW (ref 22–31)
CO2 SERPL-SCNC: 21 MMOL/L — LOW (ref 22–31)
CREAT SERPL-MCNC: 0.81 MG/DL — SIGNIFICANT CHANGE UP (ref 0.5–1.3)
CREAT SERPL-MCNC: 0.84 MG/DL — SIGNIFICANT CHANGE UP (ref 0.5–1.3)
EGFR: 95 ML/MIN/1.73M2 — SIGNIFICANT CHANGE UP
EGFR: 99 ML/MIN/1.73M2 — SIGNIFICANT CHANGE UP
EOSINOPHIL # BLD AUTO: 0 K/UL — SIGNIFICANT CHANGE UP (ref 0–0.5)
EOSINOPHIL NFR BLD AUTO: 0 % — SIGNIFICANT CHANGE UP (ref 0–6)
GLUCOSE BLDC GLUCOMTR-MCNC: 122 MG/DL — HIGH (ref 70–99)
GLUCOSE SERPL-MCNC: 117 MG/DL — HIGH (ref 70–99)
GLUCOSE SERPL-MCNC: 157 MG/DL — HIGH (ref 70–99)
HCG UR QL: NEGATIVE — SIGNIFICANT CHANGE UP
HCT VFR BLD CALC: 30.2 % — LOW (ref 34.5–45)
HCT VFR BLD CALC: 37 % — SIGNIFICANT CHANGE UP (ref 34.5–45)
HGB BLD-MCNC: 11.6 G/DL — SIGNIFICANT CHANGE UP (ref 11.5–15.5)
HGB BLD-MCNC: 9.7 G/DL — LOW (ref 11.5–15.5)
HYPOCHROMIA BLD QL: SLIGHT — SIGNIFICANT CHANGE UP
INR BLD: 1.17 — HIGH (ref 0.88–1.16)
LYMPHOCYTES # BLD AUTO: 13 % — SIGNIFICANT CHANGE UP (ref 13–44)
LYMPHOCYTES # BLD AUTO: 2.36 K/UL — SIGNIFICANT CHANGE UP (ref 1–3.3)
MAGNESIUM SERPL-MCNC: 1.8 MG/DL — SIGNIFICANT CHANGE UP (ref 1.6–2.6)
MANUAL SMEAR VERIFICATION: SIGNIFICANT CHANGE UP
MCHC RBC-ENTMCNC: 24.7 PG — LOW (ref 27–34)
MCHC RBC-ENTMCNC: 24.7 PG — LOW (ref 27–34)
MCHC RBC-ENTMCNC: 31.4 GM/DL — LOW (ref 32–36)
MCHC RBC-ENTMCNC: 32.1 GM/DL — SIGNIFICANT CHANGE UP (ref 32–36)
MCV RBC AUTO: 77 FL — LOW (ref 80–100)
MCV RBC AUTO: 78.7 FL — LOW (ref 80–100)
MICROCYTES BLD QL: SLIGHT — SIGNIFICANT CHANGE UP
MONOCYTES # BLD AUTO: 0.31 K/UL — SIGNIFICANT CHANGE UP (ref 0–0.9)
MONOCYTES NFR BLD AUTO: 1.7 % — LOW (ref 2–14)
NEUTROPHILS # BLD AUTO: 14.68 K/UL — HIGH (ref 1.8–7.4)
NEUTROPHILS NFR BLD AUTO: 80.9 % — HIGH (ref 43–77)
NRBC # BLD: 0 /100 WBCS — SIGNIFICANT CHANGE UP (ref 0–0)
NRBC # BLD: 1 /100 — HIGH (ref 0–0)
NRBC # BLD: SIGNIFICANT CHANGE UP /100 WBCS (ref 0–0)
NT-PROBNP SERPL-SCNC: 2622 PG/ML — HIGH (ref 0–300)
OVALOCYTES BLD QL SMEAR: SLIGHT — SIGNIFICANT CHANGE UP
PHOSPHATE SERPL-MCNC: 3.6 MG/DL — SIGNIFICANT CHANGE UP (ref 2.5–4.5)
PLAT MORPH BLD: NORMAL — SIGNIFICANT CHANGE UP
PLATELET # BLD AUTO: 210 K/UL — SIGNIFICANT CHANGE UP (ref 150–400)
PLATELET # BLD AUTO: 235 K/UL — SIGNIFICANT CHANGE UP (ref 150–400)
POIKILOCYTOSIS BLD QL AUTO: SLIGHT — SIGNIFICANT CHANGE UP
POLYCHROMASIA BLD QL SMEAR: SLIGHT — SIGNIFICANT CHANGE UP
POTASSIUM SERPL-MCNC: 4 MMOL/L — SIGNIFICANT CHANGE UP (ref 3.5–5.3)
POTASSIUM SERPL-MCNC: 4.5 MMOL/L — SIGNIFICANT CHANGE UP (ref 3.5–5.3)
POTASSIUM SERPL-SCNC: 4 MMOL/L — SIGNIFICANT CHANGE UP (ref 3.5–5.3)
POTASSIUM SERPL-SCNC: 4.5 MMOL/L — SIGNIFICANT CHANGE UP (ref 3.5–5.3)
PROTHROM AB SERPL-ACNC: 13.9 SEC — HIGH (ref 10.5–13.4)
RBC # BLD: 3.92 M/UL — SIGNIFICANT CHANGE UP (ref 3.8–5.2)
RBC # BLD: 4.7 M/UL — SIGNIFICANT CHANGE UP (ref 3.8–5.2)
RBC # FLD: 19.2 % — HIGH (ref 10.3–14.5)
RBC # FLD: 19.2 % — HIGH (ref 10.3–14.5)
RBC BLD AUTO: ABNORMAL
RH IG SCN BLD-IMP: POSITIVE — SIGNIFICANT CHANGE UP
SODIUM SERPL-SCNC: 136 MMOL/L — SIGNIFICANT CHANGE UP (ref 135–145)
SODIUM SERPL-SCNC: 140 MMOL/L — SIGNIFICANT CHANGE UP (ref 135–145)
VARIANT LYMPHS # BLD: 4.4 % — SIGNIFICANT CHANGE UP (ref 0–6)
WBC # BLD: 11.12 K/UL — HIGH (ref 3.8–10.5)
WBC # BLD: 18.15 K/UL — HIGH (ref 3.8–10.5)
WBC # FLD AUTO: 11.12 K/UL — HIGH (ref 3.8–10.5)
WBC # FLD AUTO: 18.15 K/UL — HIGH (ref 3.8–10.5)

## 2023-01-13 PROCEDURE — 36014 PLACE CATHETER IN ARTERY: CPT | Mod: 59

## 2023-01-13 PROCEDURE — 99291 CRITICAL CARE FIRST HOUR: CPT | Mod: GC

## 2023-01-13 PROCEDURE — 93321 DOPPLER ECHO F-UP/LMTD STD: CPT | Mod: 26

## 2023-01-13 PROCEDURE — 88304 TISSUE EXAM BY PATHOLOGIST: CPT | Mod: 26

## 2023-01-13 PROCEDURE — 71045 X-RAY EXAM CHEST 1 VIEW: CPT | Mod: 26

## 2023-01-13 PROCEDURE — 99252 IP/OBS CONSLTJ NEW/EST SF 35: CPT | Mod: GC

## 2023-01-13 PROCEDURE — 93308 TTE F-UP OR LMTD: CPT | Mod: 26

## 2023-01-13 PROCEDURE — 37184 PRIM ART M-THRMBC 1ST VSL: CPT | Mod: 50

## 2023-01-13 DEVICE — CATH SE ASPIRATION GUIDE 16F 113CM: Type: IMPLANTABLE DEVICE | Status: FUNCTIONAL

## 2023-01-13 DEVICE — CATH TL .038 JR4 125CM 5FR: Type: IMPLANTABLE DEVICE | Status: FUNCTIONAL

## 2023-01-13 DEVICE — CATH DX PIG 145 INFIN 5FRX110CM: Type: IMPLANTABLE DEVICE | Status: FUNCTIONAL

## 2023-01-13 DEVICE — SHEATH INTRODUCER TERUMO PINNACLE CORONARY 8FR X 10CM X 0.038" MINI WIRE: Type: IMPLANTABLE DEVICE | Status: FUNCTIONAL

## 2023-01-13 DEVICE — INTRO MICROPUNC 4FRX10CM SS: Type: IMPLANTABLE DEVICE | Status: FUNCTIONAL

## 2023-01-13 DEVICE — GUIDEWIRE AMPLATZ SUPER-STIFF SHORT TAPER .035" X 260CM: Type: IMPLANTABLE DEVICE | Status: FUNCTIONAL

## 2023-01-13 DEVICE — GWIRE SUPRACORE .035X370: Type: IMPLANTABLE DEVICE | Status: FUNCTIONAL

## 2023-01-13 DEVICE — GUIDE LAUNCHER 6F JR4: Type: IMPLANTABLE DEVICE | Status: FUNCTIONAL

## 2023-01-13 DEVICE — SHEATH INTRO 24FR: Type: IMPLANTABLE DEVICE | Status: FUNCTIONAL

## 2023-01-13 DEVICE — CATH TRIEVER ASPIRATION CURV 20FR 105CM: Type: IMPLANTABLE DEVICE | Status: FUNCTIONAL

## 2023-01-13 DEVICE — GWIRE GUID  0.035INX150CM: Type: IMPLANTABLE DEVICE | Status: FUNCTIONAL

## 2023-01-13 DEVICE — CATH SE ASPIRATION GUIDE 24F 95CM: Type: IMPLANTABLE DEVICE | Status: FUNCTIONAL

## 2023-01-13 DEVICE — CATH DX MPA2 INFIN 5FRX125CM: Type: IMPLANTABLE DEVICE | Status: FUNCTIONAL

## 2023-01-13 RX ORDER — ACETAMINOPHEN 500 MG
1000 TABLET ORAL ONCE
Refills: 0 | Status: COMPLETED | OUTPATIENT
Start: 2023-01-13 | End: 2023-01-13

## 2023-01-13 RX ORDER — SODIUM CHLORIDE 9 MG/ML
1000 INJECTION, SOLUTION INTRAVENOUS
Refills: 0 | Status: DISCONTINUED | OUTPATIENT
Start: 2023-01-13 | End: 2023-01-20

## 2023-01-13 RX ORDER — HEPARIN SODIUM 5000 [USP'U]/ML
2700 INJECTION INTRAVENOUS; SUBCUTANEOUS
Qty: 25000 | Refills: 0 | Status: DISCONTINUED | OUTPATIENT
Start: 2023-01-13 | End: 2023-01-13

## 2023-01-13 RX ORDER — HEPARIN SODIUM 5000 [USP'U]/ML
9000 INJECTION INTRAVENOUS; SUBCUTANEOUS ONCE
Refills: 0 | Status: COMPLETED | OUTPATIENT
Start: 2023-01-13 | End: 2023-01-14

## 2023-01-13 RX ORDER — CEFAZOLIN SODIUM 1 G
2000 VIAL (EA) INJECTION EVERY 8 HOURS
Refills: 0 | Status: COMPLETED | OUTPATIENT
Start: 2023-01-13 | End: 2023-01-14

## 2023-01-13 RX ORDER — GLUCAGON INJECTION, SOLUTION 0.5 MG/.1ML
1 INJECTION, SOLUTION SUBCUTANEOUS ONCE
Refills: 0 | Status: DISCONTINUED | OUTPATIENT
Start: 2023-01-13 | End: 2023-01-20

## 2023-01-13 RX ORDER — HEPARIN SODIUM 5000 [USP'U]/ML
800 INJECTION INTRAVENOUS; SUBCUTANEOUS
Qty: 25000 | Refills: 0 | Status: DISCONTINUED | OUTPATIENT
Start: 2023-01-13 | End: 2023-01-13

## 2023-01-13 RX ORDER — FAMOTIDINE 10 MG/ML
20 INJECTION INTRAVENOUS EVERY 12 HOURS
Refills: 0 | Status: DISCONTINUED | OUTPATIENT
Start: 2023-01-13 | End: 2023-01-13

## 2023-01-13 RX ORDER — DEXTROSE 50 % IN WATER 50 %
15 SYRINGE (ML) INTRAVENOUS ONCE
Refills: 0 | Status: DISCONTINUED | OUTPATIENT
Start: 2023-01-13 | End: 2023-01-20

## 2023-01-13 RX ORDER — DEXTROSE 50 % IN WATER 50 %
25 SYRINGE (ML) INTRAVENOUS ONCE
Refills: 0 | Status: DISCONTINUED | OUTPATIENT
Start: 2023-01-13 | End: 2023-01-20

## 2023-01-13 RX ORDER — SODIUM CHLORIDE 9 MG/ML
1000 INJECTION, SOLUTION INTRAVENOUS
Refills: 0 | Status: DISCONTINUED | OUTPATIENT
Start: 2023-01-13 | End: 2023-01-14

## 2023-01-13 RX ORDER — HEPARIN SODIUM 5000 [USP'U]/ML
INJECTION INTRAVENOUS; SUBCUTANEOUS
Qty: 25000 | Refills: 0 | Status: DISCONTINUED | OUTPATIENT
Start: 2023-01-13 | End: 2023-01-14

## 2023-01-13 RX ORDER — INSULIN LISPRO 100/ML
VIAL (ML) SUBCUTANEOUS
Refills: 0 | Status: DISCONTINUED | OUTPATIENT
Start: 2023-01-13 | End: 2023-01-20

## 2023-01-13 RX ORDER — DEXTROSE 50 % IN WATER 50 %
12.5 SYRINGE (ML) INTRAVENOUS ONCE
Refills: 0 | Status: DISCONTINUED | OUTPATIENT
Start: 2023-01-13 | End: 2023-01-20

## 2023-01-13 RX ADMIN — Medication 400 MILLIGRAM(S): at 10:10

## 2023-01-13 RX ADMIN — Medication 1000 MILLIGRAM(S): at 23:30

## 2023-01-13 RX ADMIN — Medication 400 MILLIGRAM(S): at 22:21

## 2023-01-13 RX ADMIN — CHLORHEXIDINE GLUCONATE 1 APPLICATION(S): 213 SOLUTION TOPICAL at 06:01

## 2023-01-13 NOTE — CHART NOTE - NSCHARTNOTEFT_GEN_A_CORE
Signed out to [facility] medical team. ECHO tomorrow. Heparin gtt to start 4 hours after procedure tonight. Has mattress suture in left groin, this will be removed by CTS team tomorrow. Tachycardia improved post procedure (-->90s). SBP is now in 90s but clinically stable, per anesthesia likely this is where he normal BP is. CTS will continue to follow. Of note, ?fibroid noted on fluoroscopy. Spoke with primary team about finding.

## 2023-01-13 NOTE — PROGRESS NOTE ADULT - TIME BILLING
Patient seen and examined with house-staff during bedside rounds.  Resident note read, including vitals, physical findings, laboratory data, and radiological reports.   Revisions included below.  Direct personal management at bed side and extensive interpretation of the data.  Plan was outlined and discussed in details with the housestaff.  Decision making of high complexity  Action taken for acute disease activity to reflect the level of care provided:  - medication reconciliation  - review laboratory data  Patient was seen multiple times.  I discussed the case with echocardiography chief multiple times and interventional cardiology from the [facility] team.  Patient is still tachycardic at rest.  The heart rate goes up to 133 and increases with slight exertion.  Although the tachycardia which is sinus the oxygen saturation normal and the blood pressure normal.  The probe  increased over the last 2 days from 13 100-20 200 and today 2600.  The echocardiogram was reviewed and the cardiac index the increase is decreased from 2.239-2.2.  The right heart is still severely dilated and severely dysfunction.  The blood pressure dropped from the baseline for more than 15 minutes x 40 mmHg.    In my opinion that this cardio decompensation for her pulmonary emboli as stated the patient is an IV heparin with diet repeat aortic PTT the latest is in the 90.    The discussion was to escalate therapy as the patient is demonstrating cardiac decompensation.  Mechanical thrombectomy was discussed with the patient.  The patient will be going for pulmonary angiogram in the hybrid room and if there is no resolution of the clot then we will proceed with mechanical thrombectomy.  The patient consented to the procedure Patient seen and examined with house-staff during bedside rounds.  Resident note read, including vitals, physical findings, laboratory data, and radiological reports.   Revisions included below.  Direct personal management at bed side and extensive interpretation of the data.  Plan was outlined and discussed in details with the housestaff.  Decision making of high complexity  Action taken for acute disease activity to reflect the level of care provided:  - medication reconciliation  - review laboratory data  Patient was seen multiple times.  I discussed the case with echocardiography chief multiple times and interventional cardiology from the [facility] team.  Patient is still tachycardic at rest.  The heart rate goes up to 133 and increases with slight exertion.  Although the tachycardia which is sinus the oxygen saturation remained normal as the blood pressure.  The pro BMP increased over the last 2 days from 7072-1362 and today 2600.  The echocardiogram was reviewed and the cardiac index decreased from 2.239-2.2.  The right heart is still severely dilated and severely dysfunctional.  The blood pressure dropped from the baseline for more than  40 mmHg.  Unable to ambulate or exert the patient to demonstarte desaturation with exertion due to the criticl condition    In my opinion there is cardiovascular decompensation for her pulmonary emboli with worsening of the acute right heart failure with worsening of the cardiac index and decreased in the SBP > 40 mmHg for more than 15 minutes.  The patient is on IV heparin with therapeutic  PTT the latest is in the 90.    The patient met the end points for the trial.  We communicated with the trial center.  We had a phone conversation with [employer] and emails with the trial center.  In my opinion the patient met the Primary end points.  I reviewed the protocol with the research coordinator including the Primary end points and indication for escalation.      The discussion with the [facility] team and decision was to escalate therapy as the patient is demonstrating cardiac wise.  Mechanical thrombectomy was discussed with the patient.  The patient will be going for pulmonary angiogram in the hybrid room and if there is no resolution of the clot then we will proceed with mechanical thrombectomy.  The patient consented to the procedure

## 2023-01-13 NOTE — PROGRESS NOTE ADULT - PROBLEM SELECTOR PLAN 1
CTA PE protocol indicating large thrombus burden involving all lobar and multiple segmental branches bilaterally w/ probable evolving infarcts in lateral basal segment of LLL and mild pulmonary artery dilation w/ RV strain and cardiomegaly. TTE with dilated RV size, mod-severely reduced RV function, pHTN (PASP 57). Heparin gtt running @ 24 mL/hr. Etiology likely unprovoked PE in nature -- risk factors: Nuvaring contraceptive use and obesity. Denies recent travel and family hx.   - c/w heparin gtt with PTT q6h- target PTT 80-99  - f/u Pulmonary for possible thrombectomy   - Repeat TTE continues to show moderately-to-severely reduced RVSF, moderately dilated RA, mild-moderate TR, and PHTN, unchanged from previous TTE  - Maintain active T&S

## 2023-01-13 NOTE — PROGRESS NOTE ADULT - PROBLEM SELECTOR PLAN 2
Pt w/ large PE involving all lobar and multiple segmental branches bilaterally w/ RV strain and cardiomegaly. TTE 1/11 with severely dilated RV size, mod-severely reduced RV function, mod TR, pHTN (PASP 57), normal LV size/function. Troponin 0.10, Pro-BNP 1395.  - AM troponin 0.01, , lactate 0.9  - Repeat TTE continues to show moderately-to-severely reduced RVSF, moderately dilated RA, mild-moderate TR, and PHTN, unchanged from previous TTE

## 2023-01-13 NOTE — CONSULT NOTE ADULT - NS ATTEND AMEND GEN_ALL_CORE FT
Agree with above with the following additional comments    Pt was seen and evaluated at bedside. Given her high HR, elevated cardiac markers, rising BNP and evidence of RV strain on echo which had not improved after 48 hours of Heparin gtt decision made to take pt for mechanical thrombectomy. Procedure was explained to pt and family. Now pt s/p successful mechanical thrombectomy with the INARI system with improvement of HR from 151 to 88. PA pressure improved from 56/26 to 34/16; RA pressure was 11 at start of procedure. Pt will be resumed on heparin gtt with transition to PO AC if no issues. Rest as above

## 2023-01-13 NOTE — CONSULT NOTE ADULT - SUBJECTIVE AND OBJECTIVE BOX
Surgeon: Yvette    Requesting Physician: Tahir    HISTORY OF PRESENT ILLNESS (Need 4):  This is a 31 yo female with asthma and anemia 2/2 myomectomy, [facility] ER on 1/11/23 with 1 week of productive cough. Patient stating she began feeling symptoms of mild cough and chest tightness 3 weeks prior. She went to  where workup was negative for COVID and Influenza. Patient prescribed Azithromycin and Prednisone 20mg -- currently on day 5. Patient stating she awoke in the morning w/ progressively worse chest tightness. She used her inhaler and nebulizer tx at home with minimal relief and had syncopal event at home.  Patient with no history of DVT, PE and also denies family history of clots, blood disorders. Patient found to have Large thrombus burden involving all lobar and multiple segmental branches bilaterally.  Patient was started on heparin drip.  ECHO showed moderate-severe dysfunction of the RV.  Structural heart consulted for evaluation.    PAST MEDICAL & SURGICAL HISTORY:  Asthma      Fibroids      Anemia      No significant past surgical history          MEDICATIONS  (STANDING):  chlorhexidine 2% Cloths 1 Application(s) Topical <User Schedule>  heparin  Infusion 2700 Unit(s)/Hr (26 mL/Hr) IV Continuous <Continuous>  influenza   Vaccine 0.5 milliLiter(s) IntraMuscular once    MEDICATIONS  (PRN):      Allergies    No Known Allergies    Intolerances        SOCIAL HISTORY:  Smoker:  NO   ETOH use: NO              Ilicit Drug use: NO      FAMILY HISTORY:      Review of Systems (Need 10):  CONSTITUTIONAL: Denies fevers / chills, sweats, fatigue, weight loss, weight gain                                       NEURO:  Denies parathesias, seizures, syncope, confusion                                                                                  EYES:  Denies blurry vision, discharge, pain, loss of vision                                                                                    ENMT:  Denies difficulty hearing, vertigo, dysphagia, epistaxis, recent dental work                                       CV:  Denies chest pain, palpitations, VIEIRA, orthopnea                                                                                           RESPIRATORY:  Denies wWheezing, SOB, cough / sputum, hemoptysis                                                               GI:  Denies nausea, vomiting, diarrhea, constipation, melena                                                                          : Denies hematuria, dysuria, urgency, incontinence                                                                                          MUSKULOSKELETAL:  Denies arthritis, joint swelling, muscle weakness                                                             SKIN/BREAST:  Denies rash, itching, hair loss, masses                                                                                              PSYCH:  Denies depression, anxiety, suicidal ideation                                                                                                HEME/LYMPH:  Denies bruises easily, enlarged lymph nodes, tender lymph nodes                                          ENDOCRINE:  Denies cold intolerance, heat intolerance, polydipsia                                                                      Vital Signs Last 24 Hrs  T(C): 36.6 (13 Jan 2023 14:00), Max: 37.3 (13 Jan 2023 10:00)  T(F): 97.8 (13 Jan 2023 14:00), Max: 99.1 (13 Jan 2023 10:00)  HR: 114 (13 Jan 2023 12:00) (114 - 126)  BP: 127/92 (13 Jan 2023 12:00) (127/92 - 145/90)  BP(mean): 105 (13 Jan 2023 12:00) (92 - 112)  RR: 18 (13 Jan 2023 12:00) (18 - 20)  SpO2: 96% (13 Jan 2023 12:00) (95% - 97%)    Parameters below as of 13 Jan 2023 12:00  Patient On (Oxygen Delivery Method): room air        Physical Exam (Need 8)  General: AAOx3, no acute distress, laying in bed comfortably  HEENT: head NCAT, eyes EOMI, PERRLA, moist mucous membranes  Neck: supple, +JVD  Pulmonary: lung fields CTAB, no wheezing, rales, or rhonchi  Cardiovascular: RRR, normal S1/S2  Abdominal: soft, NTND, +BS  Extremities: warm to touch; no peripheral edema or cyanosis  Neuro: AAOx3, no focal deficits  Skin: warm, dry, no visible jaundice, no new rashes                                                          LABS:                        11.6   11.12 )-----------( 235      ( 13 Jan 2023 06:48 )             37.0     01-13    140  |  104  |  12  ----------------------------<  117<H>  4.0   |  21<L>  |  0.84    Ca    9.5      13 Jan 2023 06:48  Phos  3.6     01-13  Mg     1.8     01-13    TPro  8.4<H>  /  Alb  3.9  /  TBili  0.3  /  DBili  x   /  AST  42<H>  /  ALT  57<H>  /  AlkPhos  68  01-12    PT/INR - ( 12 Jan 2023 06:25 )   PT: 12.1 sec;   INR: 1.02          PTT - ( 13 Jan 2023 12:53 )  PTT:101.3 sec    CARDIAC MARKERS ( 12 Jan 2023 06:25 )  x     / 0.01 ng/mL / 117 U/L / x     / 2.3 ng/mL          RADIOLOGY & ADDITIONAL STUDIES:  CT Scan: < from: CT Angio Chest PE Protocol w/ IV Cont (01.11.23 @ 11:24) >  1. Large thrombus burden involving all lobar and multiple segmental   branches bilaterally.  2. Probable evolving infarcts involving the lateral basal segment of the   left lower lobe.  3. Mosaic perfusion pattern compatible with underlying pulmonary artery   emboli.  4. Mild pulmonary artery dilatation and right ventricular strain.  5. Cardiomegaly.    < end of copied text >    TTE / LUDWIG: e< from: TTE Echo Complete w/o Contrast w/ Doppler (01.13.23 @ 12:52) >  1. Limited study obtained for evaluation of right ventricular function.   2. Normal left ventricular size and systolic function.   3. The stroke volume is 42.5 ml. The cardiac output is 4.94 L      The cardiac index is 2.2 L/sqm.   4. Dilated right ventricular size.   5. Severely reduced right ventricular systolic function.   6. Moderate-to-severe tricuspid regurgitation.   7. Pulmonary hypertension present, pulmonary artery systolic pressure is   73 mmHg.   8. No pericardial effusion.    < end of copied text >

## 2023-01-13 NOTE — CONSULT NOTE ADULT - ASSESSMENT
Assesment:  This is a 33 yo female with asthma and anemia 2/2 myomectomy, on birth control (Nuvaring) presented to [facility] ER on 1/11/23 with 1 week of productive cough. Patient stating she began feeling symptoms of mild cough and chest tightness 3 weeks prior. She went to  where workup was negative for COVID and Influenza. Patient prescribed Azithromycin and Prednisone 20mg -- currently on day 5. Patient stating she awoke in the morning w/ progressively worse chest tightness. She used her inhaler and nebulizer tx at home with minimal relief and had syncopal event at home.  Patient with no history of DVT, PE and also denies family history of clots, blood disorders. Patient found to have Large thrombus burden involving all lobar and multiple segmental branches bilaterally.  Patient was started on heparin drip.  ECHO showed moderate-severe dysfunction of the RV.  Structural heart consulted for evaluation.    Plan:  Problem 1: Bilateral pulmonary embolism  Patient consented for OR for Inari thrombectomy.  Blood placed on hold.    Problem 2: Asthma  Continue inhalers    Problem 3: Anemia  Hemoglobin 11 today  Monitor and transfuse as needed    I have reviewed clinical labs tests and reports, radiology tests and reports, as well as old patient medical records, and discussed with the referring physician.

## 2023-01-13 NOTE — PROGRESS NOTE ADULT - SUBJECTIVE AND OBJECTIVE BOX
Patient is a 32y old  Female who presents with a chief complaint of PE (12 Jan 2023 16:31)      INTERVAL HPI/OVERNIGHT EVENTS:  Patient seen and examined at bedside. No acute events overnight. This morning, pt sitting up in bed, in NAD, with no acute medical complaints. 6 AM PTT therapeutic. Pt remains tachycardic and continues to report mild chest tightness unchanged from yesterday, however BP remains stable and pt denies significant tachypnea, chest pain, or palpitations. Also denies fever, chills, HA, dizziness, abdominal pain, n/v, changes in bowel/urinary habits, leg swelling, numbness, or tingling. 12pt ROS otherwise negative.      FAMILY HISTORY:      VITAL SIGNS:  T(C): 37.3 (01-13-23 @ 10:00), Max: 37.3 (01-13-23 @ 10:00)  HR: 126 (01-13-23 @ 08:15) (112 - 126)  BP: 143/86 (01-13-23 @ 08:15) (120/99 - 145/90)  RR: 18 (01-13-23 @ 08:15) (18 - 26)  SpO2: 95% (01-13-23 @ 08:15) (95% - 99%)  Wt(kg): --Vital Signs Last 24 Hrs  T(C): 37.3 (13 Jan 2023 10:00), Max: 37.3 (13 Jan 2023 10:00)  T(F): 99.1 (13 Jan 2023 10:00), Max: 99.1 (13 Jan 2023 10:00)  HR: 126 (13 Jan 2023 08:15) (112 - 126)  BP: 143/86 (13 Jan 2023 08:15) (120/99 - 145/90)  BP(mean): 111 (13 Jan 2023 08:15) (92 - 112)  RR: 18 (13 Jan 2023 08:15) (18 - 26)  SpO2: 95% (13 Jan 2023 08:15) (95% - 99%)    Parameters below as of 13 Jan 2023 08:15  Patient On (Oxygen Delivery Method): room air      No Known Allergies      PHYSICAL EXAM:  Constitutional: NAD  HEENT: NC/AT; PERRL, anicteric sclera; MMM  Neck: supple  Cardiovascular: +S1/S2, tachycardic, regular rhythm  Respiratory: CTA B/L, no W/R/R  Gastrointestinal: abdomen soft, NT/ND; no rebound or guarding; +BSx4  Genitourinary: no suprapubic tenderness or fullness  Extremities: warm extremities; no LE edema; no clubbing or cyanosis  Vascular: 2+ radial pulses B/L  Dermatologic: normal color and turgor; no visible rashes  Neurological: AAOx4, strength 5/5 to all 4 extremities, no focal deficits    Consultant(s) Notes Reviewed:  [x ] YES  [ ] NO  Care Discussed with Consultants/Other Providers [ x] YES  [ ] NO    LABS:                        11.6   11.12 )-----------( 235      ( 13 Jan 2023 06:48 )             37.0     01-13    140  |  104  |  12  ----------------------------<  117<H>  4.0   |  21<L>  |  0.84    Ca    9.5      13 Jan 2023 06:48  Phos  3.6     01-13  Mg     1.8     01-13    TPro  8.4<H>  /  Alb  3.9  /  TBili  0.3  /  DBili  x   /  AST  42<H>  /  ALT  57<H>  /  AlkPhos  68  01-12      PT/INR - ( 12 Jan 2023 06:25 )   PT: 12.1 sec;   INR: 1.02          PTT - ( 13 Jan 2023 06:48 )  PTT:95.9 sec  CAPILLARY BLOOD GLUCOSE          CARDIAC MARKERS ( 12 Jan 2023 06:25 )  x     / 0.01 ng/mL / 117 U/L / x     / 2.3 ng/mL                      I & O Summary:    01-12-23 @ 07:01  -  01-13-23 @ 07:00  --------------------------------------------------------  IN: 588 mL / OUT: 0 mL / NET: 588 mL        Microbiology:        RADIOLOGY, EKG AND ADDITIONAL TESTS: Reviewed.      RADIOLOGY & ADDITIONAL TESTS:    Imaging Personally Reviewed:  [ ] YES  [ ] NO  chlorhexidine 2% Cloths 1 Application(s) Topical <User Schedule>  heparin  Infusion 2700 Unit(s)/Hr IV Continuous <Continuous>  influenza   Vaccine 0.5 milliLiter(s) IntraMuscular once      HEALTH ISSUES - PROBLEM Dx:  Pulmonary embolus    Acute right heart failure    Pulmonary thromboembolism    Pulmonary embolism with acute cor pulmonale    Pulmonary hypertension due to thromboembolism    Asthma    Prophylactic measure

## 2023-01-14 DIAGNOSIS — R55 SYNCOPE AND COLLAPSE: ICD-10-CM

## 2023-01-14 LAB
A1C WITH ESTIMATED AVERAGE GLUCOSE RESULT: 5.3 % — SIGNIFICANT CHANGE UP (ref 4–5.6)
ALBUMIN SERPL ELPH-MCNC: 3.5 G/DL — SIGNIFICANT CHANGE UP (ref 3.3–5)
ALP SERPL-CCNC: 74 U/L — SIGNIFICANT CHANGE UP (ref 40–120)
ALT FLD-CCNC: 50 U/L — HIGH (ref 10–45)
ANION GAP SERPL CALC-SCNC: 11 MMOL/L — SIGNIFICANT CHANGE UP (ref 5–17)
APTT BLD: 26.5 SEC — LOW (ref 27.5–35.5)
AST SERPL-CCNC: 23 U/L — SIGNIFICANT CHANGE UP (ref 10–40)
B2 GLYCOPROT1 AB SER QL: NEGATIVE — SIGNIFICANT CHANGE UP
BASOPHILS # BLD AUTO: 0.04 K/UL — SIGNIFICANT CHANGE UP (ref 0–0.2)
BASOPHILS # BLD AUTO: 0.04 K/UL — SIGNIFICANT CHANGE UP (ref 0–0.2)
BASOPHILS NFR BLD AUTO: 0.3 % — SIGNIFICANT CHANGE UP (ref 0–2)
BASOPHILS NFR BLD AUTO: 0.3 % — SIGNIFICANT CHANGE UP (ref 0–2)
BILIRUB SERPL-MCNC: 0.4 MG/DL — SIGNIFICANT CHANGE UP (ref 0.2–1.2)
BUN SERPL-MCNC: 10 MG/DL — SIGNIFICANT CHANGE UP (ref 7–23)
CALCIUM SERPL-MCNC: 8.8 MG/DL — SIGNIFICANT CHANGE UP (ref 8.4–10.5)
CHLORIDE SERPL-SCNC: 102 MMOL/L — SIGNIFICANT CHANGE UP (ref 96–108)
CO2 SERPL-SCNC: 24 MMOL/L — SIGNIFICANT CHANGE UP (ref 22–31)
CREAT SERPL-MCNC: 0.89 MG/DL — SIGNIFICANT CHANGE UP (ref 0.5–1.3)
EGFR: 88 ML/MIN/1.73M2 — SIGNIFICANT CHANGE UP
EOSINOPHIL # BLD AUTO: 0 K/UL — SIGNIFICANT CHANGE UP (ref 0–0.5)
EOSINOPHIL # BLD AUTO: 0.01 K/UL — SIGNIFICANT CHANGE UP (ref 0–0.5)
EOSINOPHIL NFR BLD AUTO: 0 % — SIGNIFICANT CHANGE UP (ref 0–6)
EOSINOPHIL NFR BLD AUTO: 0.1 % — SIGNIFICANT CHANGE UP (ref 0–6)
ESTIMATED AVERAGE GLUCOSE: 105 MG/DL — SIGNIFICANT CHANGE UP (ref 68–114)
GLUCOSE BLDC GLUCOMTR-MCNC: 107 MG/DL — HIGH (ref 70–99)
GLUCOSE BLDC GLUCOMTR-MCNC: 117 MG/DL — HIGH (ref 70–99)
GLUCOSE BLDC GLUCOMTR-MCNC: 130 MG/DL — HIGH (ref 70–99)
GLUCOSE BLDC GLUCOMTR-MCNC: 130 MG/DL — HIGH (ref 70–99)
GLUCOSE SERPL-MCNC: 139 MG/DL — HIGH (ref 70–99)
HCT VFR BLD CALC: 32.6 % — LOW (ref 34.5–45)
HCT VFR BLD CALC: 33.7 % — LOW (ref 34.5–45)
HGB BLD-MCNC: 10 G/DL — LOW (ref 11.5–15.5)
HGB BLD-MCNC: 10.6 G/DL — LOW (ref 11.5–15.5)
IMM GRANULOCYTES NFR BLD AUTO: 0.5 % — SIGNIFICANT CHANGE UP (ref 0–0.9)
IMM GRANULOCYTES NFR BLD AUTO: 0.5 % — SIGNIFICANT CHANGE UP (ref 0–0.9)
LYMPHOCYTES # BLD AUTO: 19.4 % — SIGNIFICANT CHANGE UP (ref 13–44)
LYMPHOCYTES # BLD AUTO: 2.47 K/UL — SIGNIFICANT CHANGE UP (ref 1–3.3)
LYMPHOCYTES # BLD AUTO: 26.5 % — SIGNIFICANT CHANGE UP (ref 13–44)
LYMPHOCYTES # BLD AUTO: 3.33 K/UL — HIGH (ref 1–3.3)
MAGNESIUM SERPL-MCNC: 2.1 MG/DL — SIGNIFICANT CHANGE UP (ref 1.6–2.6)
MCHC RBC-ENTMCNC: 24.7 PG — LOW (ref 27–34)
MCHC RBC-ENTMCNC: 25.1 PG — LOW (ref 27–34)
MCHC RBC-ENTMCNC: 30.7 GM/DL — LOW (ref 32–36)
MCHC RBC-ENTMCNC: 31.5 GM/DL — LOW (ref 32–36)
MCV RBC AUTO: 79.7 FL — LOW (ref 80–100)
MCV RBC AUTO: 80.5 FL — SIGNIFICANT CHANGE UP (ref 80–100)
MONOCYTES # BLD AUTO: 0.67 K/UL — SIGNIFICANT CHANGE UP (ref 0–0.9)
MONOCYTES # BLD AUTO: 1.12 K/UL — HIGH (ref 0–0.9)
MONOCYTES NFR BLD AUTO: 5.3 % — SIGNIFICANT CHANGE UP (ref 2–14)
MONOCYTES NFR BLD AUTO: 8.8 % — SIGNIFICANT CHANGE UP (ref 2–14)
NEUTROPHILS # BLD AUTO: 8.48 K/UL — HIGH (ref 1.8–7.4)
NEUTROPHILS # BLD AUTO: 9.04 K/UL — HIGH (ref 1.8–7.4)
NEUTROPHILS NFR BLD AUTO: 67.4 % — SIGNIFICANT CHANGE UP (ref 43–77)
NEUTROPHILS NFR BLD AUTO: 70.9 % — SIGNIFICANT CHANGE UP (ref 43–77)
NRBC # BLD: 0 /100 WBCS — SIGNIFICANT CHANGE UP (ref 0–0)
NRBC # BLD: 0 /100 WBCS — SIGNIFICANT CHANGE UP (ref 0–0)
NT-PROBNP SERPL-SCNC: 2577 PG/ML — HIGH (ref 0–300)
PHOSPHATE SERPL-MCNC: 3.3 MG/DL — SIGNIFICANT CHANGE UP (ref 2.5–4.5)
PLATELET # BLD AUTO: 203 K/UL — SIGNIFICANT CHANGE UP (ref 150–400)
PLATELET # BLD AUTO: 237 K/UL — SIGNIFICANT CHANGE UP (ref 150–400)
POTASSIUM SERPL-MCNC: 4.7 MMOL/L — SIGNIFICANT CHANGE UP (ref 3.5–5.3)
POTASSIUM SERPL-SCNC: 4.7 MMOL/L — SIGNIFICANT CHANGE UP (ref 3.5–5.3)
PROT SERPL-MCNC: 7.7 G/DL — SIGNIFICANT CHANGE UP (ref 6–8.3)
RBC # BLD: 4.05 M/UL — SIGNIFICANT CHANGE UP (ref 3.8–5.2)
RBC # BLD: 4.23 M/UL — SIGNIFICANT CHANGE UP (ref 3.8–5.2)
RBC # FLD: 19.4 % — HIGH (ref 10.3–14.5)
RBC # FLD: 19.4 % — HIGH (ref 10.3–14.5)
SODIUM SERPL-SCNC: 137 MMOL/L — SIGNIFICANT CHANGE UP (ref 135–145)
T4 AB SER-ACNC: 12.72 UG/DL — HIGH (ref 4.5–11.7)
TSH SERPL-MCNC: 1.81 UIU/ML — SIGNIFICANT CHANGE UP (ref 0.27–4.2)
WBC # BLD: 12.58 K/UL — HIGH (ref 3.8–10.5)
WBC # BLD: 12.74 K/UL — HIGH (ref 3.8–10.5)
WBC # FLD AUTO: 12.58 K/UL — HIGH (ref 3.8–10.5)
WBC # FLD AUTO: 12.74 K/UL — HIGH (ref 3.8–10.5)

## 2023-01-14 PROCEDURE — 72125 CT NECK SPINE W/O DYE: CPT | Mod: 26

## 2023-01-14 PROCEDURE — 93306 TTE W/DOPPLER COMPLETE: CPT | Mod: 26

## 2023-01-14 PROCEDURE — 71045 X-RAY EXAM CHEST 1 VIEW: CPT | Mod: 26

## 2023-01-14 PROCEDURE — 99232 SBSQ HOSP IP/OBS MODERATE 35: CPT

## 2023-01-14 PROCEDURE — 99233 SBSQ HOSP IP/OBS HIGH 50: CPT | Mod: GC

## 2023-01-14 PROCEDURE — 70450 CT HEAD/BRAIN W/O DYE: CPT | Mod: 26

## 2023-01-14 RX ORDER — HYDROMORPHONE HYDROCHLORIDE 2 MG/ML
1 INJECTION INTRAMUSCULAR; INTRAVENOUS; SUBCUTANEOUS ONCE
Refills: 0 | Status: DISCONTINUED | OUTPATIENT
Start: 2023-01-14 | End: 2023-01-14

## 2023-01-14 RX ORDER — ENOXAPARIN SODIUM 100 MG/ML
100 INJECTION SUBCUTANEOUS EVERY 12 HOURS
Refills: 0 | Status: DISCONTINUED | OUTPATIENT
Start: 2023-01-14 | End: 2023-01-15

## 2023-01-14 RX ORDER — CEFAZOLIN SODIUM 1 G
2000 VIAL (EA) INJECTION ONCE
Refills: 0 | Status: COMPLETED | OUTPATIENT
Start: 2023-01-14 | End: 2023-01-14

## 2023-01-14 RX ORDER — ACETAMINOPHEN 500 MG
650 TABLET ORAL EVERY 6 HOURS
Refills: 0 | Status: DISCONTINUED | OUTPATIENT
Start: 2023-01-15 | End: 2023-01-15

## 2023-01-14 RX ORDER — HYDROMORPHONE HYDROCHLORIDE 2 MG/ML
1 INJECTION INTRAMUSCULAR; INTRAVENOUS; SUBCUTANEOUS EVERY 6 HOURS
Refills: 0 | Status: DISCONTINUED | OUTPATIENT
Start: 2023-01-14 | End: 2023-01-17

## 2023-01-14 RX ORDER — SODIUM CHLORIDE 9 MG/ML
1000 INJECTION, SOLUTION INTRAVENOUS
Refills: 0 | Status: DISCONTINUED | OUTPATIENT
Start: 2023-01-14 | End: 2023-01-14

## 2023-01-14 RX ADMIN — ENOXAPARIN SODIUM 100 MILLIGRAM(S): 100 INJECTION SUBCUTANEOUS at 12:07

## 2023-01-14 RX ADMIN — HYDROMORPHONE HYDROCHLORIDE 1 MILLIGRAM(S): 2 INJECTION INTRAMUSCULAR; INTRAVENOUS; SUBCUTANEOUS at 12:23

## 2023-01-14 RX ADMIN — Medication: at 22:44

## 2023-01-14 RX ADMIN — Medication 100 MILLIGRAM(S): at 15:23

## 2023-01-14 RX ADMIN — CHLORHEXIDINE GLUCONATE 1 APPLICATION(S): 213 SOLUTION TOPICAL at 07:11

## 2023-01-14 RX ADMIN — Medication 100 MILLIGRAM(S): at 03:18

## 2023-01-14 RX ADMIN — HYDROMORPHONE HYDROCHLORIDE 1 MILLIGRAM(S): 2 INJECTION INTRAMUSCULAR; INTRAVENOUS; SUBCUTANEOUS at 12:08

## 2023-01-14 RX ADMIN — HEPARIN SODIUM 9000 UNIT(S): 5000 INJECTION INTRAVENOUS; SUBCUTANEOUS at 00:35

## 2023-01-14 RX ADMIN — SODIUM CHLORIDE 100 MILLILITER(S): 9 INJECTION, SOLUTION INTRAVENOUS at 03:00

## 2023-01-14 RX ADMIN — HYDROMORPHONE HYDROCHLORIDE 1 MILLIGRAM(S): 2 INJECTION INTRAMUSCULAR; INTRAVENOUS; SUBCUTANEOUS at 20:50

## 2023-01-14 RX ADMIN — HYDROMORPHONE HYDROCHLORIDE 1 MILLIGRAM(S): 2 INJECTION INTRAMUSCULAR; INTRAVENOUS; SUBCUTANEOUS at 20:28

## 2023-01-14 NOTE — PROGRESS NOTE ADULT - PROBLEM SELECTOR PLAN 2
CTA PE protocol indicating large thrombus burden involving all lobar and multiple segmental branches bilaterally w/ probable evolving infarcts in lateral basal segment of LLL and mild pulmonary artery dilation w/ RV strain and cardiomegaly. TTE with dilated RV size, mod-severely reduced RV function, pHTN (PASP 57). Heparin gtt running @ 24 mL/hr. Etiology likely unprovoked PE in nature -- risk factors: Nuvaring contraceptive use and obesity. Denies recent travel and family hx.   - Transitioned from hep gtt to Lovenox 100Q12  - F/u BNP and repeat TTE  - Previus TTE continues to show moderately-to-severely reduced RVSF, moderately dilated RA, mild-moderate TR, and PHTN  - Maintain active T&S

## 2023-01-14 NOTE — PROGRESS NOTE ADULT - ASSESSMENT
31 y/o female with asthma and anemia 2/2 myomectomy, on birth control (Nuvaring) presented to [facility] ER on 1/11/23 with 1 week of productive cough. Patient with no history of DVT, PE and also denies family history of clots, blood disorders. Patient found to have Large thrombus burden involving all lobar and multiple segmental branches bilaterally.  Patient was started on heparin drip.  ECHO showed moderate-severe dysfunction of the RV.  Structural heart consulted for evaluation. On 1/13/23 underwent pulmonary thrombectomy with Inari.      Plan:  Problem 1: Bilateral pulmonary embolism  -s/p thrombectomy with Inari  -mattress suture removed today  -needs postop ECHO still  -continue with hep gtt  -please obtain LE dopplers   -care per primary team     Problem 2: Asthma  -Continue inhalers    Problem 3: Anemia  -H/H 10/33  -Monitor and transfuse as needed    Problem 4: uterine fibroids  -pt with chronic pain from fibroids   -possible compression of right femoral vein, needs duplex LE to r/o DVT     I have reviewed clinical labs tests and reports, radiology tests and reports, as well as old patient medical records, and discussed with the referring physician.

## 2023-01-14 NOTE — PROGRESS NOTE ADULT - ASSESSMENT
33 yo female with PMHx of asthma presents to ED i/s/o worsening chest pain and cough. CT PE showing thrombus with large burden and RV strain. Admitted to MICU for PE with RV strain and possible intervention. Started on heparin gtt with improvement in symptoms, now stable for stepdown to telemetry.

## 2023-01-14 NOTE — PROVIDER CONTACT NOTE (FALL NOTIFICATION) - SITUATION
around 0059am, pt had a syncopal episode and was found with face flat on the floor in the course of using the commode. in addition, blood was observed oozing from pt's mouth unto the floor.

## 2023-01-14 NOTE — PROGRESS NOTE ADULT - PROBLEM SELECTOR PLAN 1
1/14 early AM pt on bedside commode when she apparently syncopized. (See resident chart note 1/14/23) Patient examined at bedside, able to communicate, was moving her neck without any pain or limitations, neurological exam was benign. Fall precautions and bed rest ordered for patient. CTH, CT c-spine, CXR WNL. Pt re-evaluated multiple times during the day, mentating at baseline and physical exam unremarkable.     - Fall precautions  - Bed rest  - Repeat CBC pending to assess for ?bleed

## 2023-01-14 NOTE — PROGRESS NOTE ADULT - PROBLEM SELECTOR PLAN 3
Pt w/ large PE involving all lobar and multiple segmental branches bilaterally w/ RV strain and cardiomegaly. TTE 1/11 with severely dilated RV size, mod-severely reduced RV function, mod TR, pHTN (PASP 57), normal LV size/function. Troponin 0.10, Pro-BNP 1395.  - AM troponin 0.01, , lactate 0.9  - f/u repeat TTE and BNP

## 2023-01-14 NOTE — PROVIDER CONTACT NOTE (FALL NOTIFICATION) - BACKGROUND
pt p/w worsening chest pain and cough. CT PE showed thrombus with large burden and RV strain. pt s/p thrombectomy via left groin.

## 2023-01-14 NOTE — PROGRESS NOTE ADULT - SUBJECTIVE AND OBJECTIVE BOX
Patient discussed on morning rounds with Dr. Crawford      OPERATION & DATE: 1/13/23 thrombectomy with Inari device     SUBJECTIVE ASSESSMENT: Pt states she has pain that she is frustrated by. States most of the pain is consistent with the fibroids that she has had before coming into the hospital. Otherwise Denies any chest pain, palpitations, orthopnea, dyspnea on exertion, shortness of breath, wheezing, abd pain, nausea, vomiting, constipation, lightheadedness, headaches, fevers, or chills.    VITAL SIGNS:  Vital Signs Last 24 Hrs  T(C): 36.4 (14 Jan 2023 14:00), Max: 37.6 (13 Jan 2023 22:13)  T(F): 97.6 (14 Jan 2023 14:00), Max: 99.7 (13 Jan 2023 22:13)  HR: 132 (14 Jan 2023 16:47) (96 - 150)  BP: 123/73 (14 Jan 2023 16:47) (73/49 - 135/94)  BP(mean): 91 (14 Jan 2023 16:47) (57 - 108)  RR: 16 (14 Jan 2023 16:47) (16 - 24)  SpO2: 98% (14 Jan 2023 16:47) (94% - 100%)    Parameters below as of 14 Jan 2023 16:47  Patient On (Oxygen Delivery Method): room air      I&O's Detail    13 Jan 2023 07:01  -  14 Jan 2023 07:00  --------------------------------------------------------  IN:    Heparin: 240 mL    Lactated Ringers: 200 mL  Total IN: 440 mL    OUT:  Total OUT: 0 mL    Total NET: 440 mL    CHEST TUBE:  no  BRITTON DRAIN:   no  EPICARDIAL WIRES:  no  STITCHES: no  STAPLES: no  CLAROS:  no  CENTRAL LINE: no  MIDLINE/PICC: no  WOUND VAC: no    PHYSICAL EXAM:  General: resting in bed in NAD   Neurological: AOx3. Motor skills grossly intact  Cardiovascular: Normal S1/S2. tachycardic, regular rhythm. No murmurs  Respiratory: Lungs CTA bilaterally. slightly tachypneic, no wheezing   Gastrointestinal: +BS in all 4 quadrants. Non-distended. Soft. Non-tender  Extremities: Strength 5/5 b/l upper/lower extremities. Sensation grossly intact upper/lower extremities. No edema. No calf tenderness.  Vascular: Radial 2+bilaterally, DP 2+ b/l  Incision Sites: left groin incision without erythema, purulence or ecchymosis.     LABS:                        10.6   12.58 )-----------( 237      ( 14 Jan 2023 01:42 )             33.7     PT/INR - ( 13 Jan 2023 19:57 )   PT: 13.9 sec;   INR: 1.17     PTT - ( 13 Jan 2023 12:00 )  PTT:26.5 sec    01-13    136  |  106  |  13  ----------------------------<  157<H>  4.5   |  19<L>  |  0.81    Ca    8.5      13 Jan 2023 19:57  Phos  3.6     01-13  Mg     1.8     01-13    MEDICATIONS  (STANDING):  chlorhexidine 2% Cloths 1 Application(s) Topical <User Schedule>  enoxaparin Injectable 100 milliGRAM(s) SubCutaneous every 12 hours  glucagon  Injectable 1 milliGRAM(s) IntraMuscular once  influenza   Vaccine 0.5 milliLiter(s) IntraMuscular once  insulin lispro (ADMELOG) corrective regimen sliding scale   SubCutaneous three times a day before meals  lactated ringers. 1000 milliLiter(s) (100 mL/Hr) IV Continuous <Continuous>    MEDICATIONS  (PRN):  dextrose Oral Gel 15 Gram(s) Oral once PRN Blood Glucose LESS THAN 70 milliGRAM(s)/deciliter  HYDROmorphone  Injectable 1 milliGRAM(s) IV Push every 6 hours PRN Severe Pain (7 - 10)    RADIOLOGY & ADDITIONAL TESTS:  < from: Xray Chest 1 View- PORTABLE-Routine (Xray Chest 1 View- PORTABLE-Routine in AM.) (01.14.23 @ 07:44) >  IMPRESSION: No acute infiltrates  < end of copied text >

## 2023-01-14 NOTE — PROGRESS NOTE ADULT - SUBJECTIVE AND OBJECTIVE BOX
OVERNIGHT EVENTS: After thrombectomy, pt came back from thrombectomy , HD stable but in pelvic pain (known uterine fibroids, 1g tylenol IV x1, restarting heparin gtt at 19/hr per nomogram- patient syncopized (see resident chart note).. heparin held while awaiting CT Head read, CTH WNL, Pt transitioned to Lovenox 100Q12 for AC    SUBJECTIVE:  Patient seen and examined at bedside.  ROS: Patient denies h/n/v/d, fever, chills, cp, palpitations, sob, leg swelling, rashes, dysuria, and changes in BM. Pt endorses lower abd pain consistent with her pain with uterine fibroids.     Vital Signs Last 12 Hrs  T(F): 97.6 (01-14-23 @ 14:00), Max: 98.9 (01-14-23 @ 10:13)  HR: 144 (01-14-23 @ 12:17) (134 - 144)  BP: 124/62 (01-14-23 @ 12:17) (124/62 - 130/76)  BP(mean): 87 (01-14-23 @ 12:17) (87 - 97)  RR: 17 (01-14-23 @ 12:17) (17 - 18)  SpO2: 97% (01-14-23 @ 12:17) (97% - 98%)  I&O's Summary    13 Jan 2023 07:01  -  14 Jan 2023 07:00  --------------------------------------------------------  IN: 440 mL / OUT: 0 mL / NET: 440 mL    PHYSICAL EXAM:  Constitutional: NAD, comfortable in bed.  HEENT: NC/AT, PERRLA, EOMI, no conjunctival pallor or scleral icterus, MMM  Neck: Supple, no JVD  Respiratory: CTA B/L. No w/r/r.   Cardiovascular: RRR, normal S1 and S2, no m/r/g.   Gastrointestinal: +BS, soft NTND, no guarding or rebound tenderness, no palpable masses   Extremities: wwp; no cyanosis, clubbing or edema.   Vascular: Pulses equal and strong throughout.   Neurological: AAOx3, no CN deficits, strength and sensation intact throughout.   Skin: No gross skin abnormalities or rashes    LABS:                        10.6   12.58 )-----------( 237      ( 14 Jan 2023 01:42 )             33.7     01-13    136  |  106  |  13  ----------------------------<  157<H>  4.5   |  19<L>  |  0.81    Ca    8.5      13 Jan 2023 19:57  Phos  3.6     01-13  Mg     1.8     01-13      PT/INR - ( 13 Jan 2023 19:57 )   PT: 13.9 sec;   INR: 1.17          PTT - ( 14 Jan 2023 12:00 )  PTT:26.5 sec        RADIOLOGY & ADDITIONAL TESTS:    MEDICATIONS  (STANDING):  chlorhexidine 2% Cloths 1 Application(s) Topical <User Schedule>  dextrose 5%. 1000 milliLiter(s) (50 mL/Hr) IV Continuous <Continuous>  dextrose 5%. 1000 milliLiter(s) (100 mL/Hr) IV Continuous <Continuous>  dextrose 50% Injectable 25 Gram(s) IV Push once  dextrose 50% Injectable 12.5 Gram(s) IV Push once  dextrose 50% Injectable 25 Gram(s) IV Push once  enoxaparin Injectable 100 milliGRAM(s) SubCutaneous every 12 hours  glucagon  Injectable 1 milliGRAM(s) IntraMuscular once  influenza   Vaccine 0.5 milliLiter(s) IntraMuscular once  insulin lispro (ADMELOG) corrective regimen sliding scale   SubCutaneous three times a day before meals  lactated ringers. 1000 milliLiter(s) (100 mL/Hr) IV Continuous <Continuous>    MEDICATIONS  (PRN):  dextrose Oral Gel 15 Gram(s) Oral once PRN Blood Glucose LESS THAN 70 milliGRAM(s)/deciliter  HYDROmorphone  Injectable 1 milliGRAM(s) IV Push every 6 hours PRN Severe Pain (7 - 10)

## 2023-01-14 NOTE — PROGRESS NOTE ADULT - TIME BILLING
Patient seen and examined with house-staff during bedside rounds.  Resident note read, including vitals, physical findings, laboratory data, and radiological reports.   Revisions included below.  Direct personal management at bed side and extensive interpretation of the data.  Plan was outlined and discussed in details with the housestaff.  Decision making of high complexity  Action taken for acute disease activity to reflect the level of care provided:  - medication reconciliation  - review laboratory data  Events overnight noticed.  The patient is clinically stable.  The tachycardia could be related to the pain.  The patient is complaining of pain in all but the fibroids.  It seems since that she has lost fibroids lying over the inferior vena cava.  The abdominal exam revealed tenderness over the right lower quadrant.  Follow-up with CT scan of the abdomen.  IV pain medication plus oral depends on the severity.  Changed to Lovenox.  Hemoglobin is stable.  Continue bedrest.  The proBNP is decreasing.  The echocardiogram was ordered and results were reviewed.  There is decrease in the pulmonary pressures initially was not informed that the right ventricle seems to be slightly improving but the report did not indicate there is no change.  Follow-up on proBNP continue pain medication.  The blood pressure is stable

## 2023-01-14 NOTE — PROVIDER CONTACT NOTE (FALL NOTIFICATION) - ASSESSMENT
pt was arousable. per pt, she couldn't remember events leading to the fall except that she had finished using the commode. RN observed a cut/ bruise to pt's lower lips. please see flowsheets for VS. neuro checks wnl pt arousable, awake and alert. per pt, she couldn't remember events leading to the fall except that she had finished using the commode. RN observed a cut/ bruise to pt's lower lips. please see flowsheets for VS. neuro checks wnl

## 2023-01-14 NOTE — PROGRESS NOTE ADULT - PROBLEM SELECTOR PLAN 5
F: PO  E: Replete K <4, Mg <2  N: Regular diet  DVT ppx: Lovenox 100 Q12  GI ppx: not needed  Code: Full code  Dispo: 7LA

## 2023-01-14 NOTE — PROGRESS NOTE ADULT - NS ATTEND AMEND GEN_ALL_CORE FT
Agree with above with the following additional comments    Pt reports that her breathing has improved. Needs US of LE to r/o sig clot. No sig clot seen in IVC during venogram.   --TTE today  --Close monitoring of vitals especially with activity. Concerned about her syncopal episode earlier today but story appears to be indicative of vasovagal symptoms. Per staff pt adamant to get out of bed despite strict bedrest being required post-intervention. She reports slumping to floor but not falling. CT head negative for bleeding.  --Continue monitoring on medicine telemetry. Low threshold to upgrade care given high risk of continued thromboemb

## 2023-01-14 NOTE — CHART NOTE - NSCHARTNOTEFT_GEN_A_CORE
Limited TTE to r/o pericardial effusion:    Tachycardic with probably normal LV function, difficult to assess  Moderately dilated RV with mildly reduced function  No evidence of pericardial effusion    Preliminary until attending read  Leticia Enamorado Cardiology Fellow PGY4

## 2023-01-14 NOTE — CHART NOTE - NSCHARTNOTEFT_GEN_A_CORE
Around 1:00 AM heard patient yell, we went over to see what happened. Patient had just finished using bedside commode which was now overturned on the floor. Patient had a syncopal episode and was lying face down on the floor, bleeding from her lips. Patient could not recall events leading up to fall, only recalling that she had finished using the commode. We assisted patient back up to her bed, will obtain stat CBC and coags, STAT CT head and CT cervical spine to rule out any hemorrhage (on heparin gtt), or fracture. Patient examined at bedside, able to communicate, was moving her neck without any pain or limitations, neurological exam was benign. Fall precautions and bed rest ordered for patient. Around 1:00 AM heard patient yell, we went over to see what happened. Patient had just finished using bedside commode which was now overturned on the floor. Patient had a syncopal episode and was lying face down on the floor, bleeding from her lips. Patient could not recall events leading up to fall, only recalling that she had finished using the commode. We assisted patient back up to her bed, will obtain stat CBC and coags, STAT CT head and CT cervical spine to rule out any hemorrhage (on heparin gtt), or fracture. Patient examined at bedside, able to communicate, was moving her neck without any pain or limitations, neurological exam was benign. Fall precautions and bed rest ordered for patient.     Earlier in the evening (after patient came back from the thrombectomy), CT surgery PA called and recommended removing the bed rest order so patient can sit up in bed (but without physically getting out of bed). As per nursing staff, patient was refusing the primafit, but was complaining of suprapubic pain and wanted to urinate. She states the pain was also aggravated by her fibroids, so she really wanted to urinate to alleviate the pain. Nursing placed a bed pan for patient, which she was reportedly on for about an hour without any bowel or bladder movements. Patient then requested to get up and go to the bathroom, stating that before her thrombectomy she was fully capable of getting up and walking to the bathroom, and she insisted on going to the bathroom. Nursing staff got a bedside commode for patient to use instead. As per nursing, patient was sitting on bedside commode for a while before the event occurred. Around 1:00 AM heard patient yell, we went over to see what happened. Patient had just finished using bedside commode which was now overturned on the floor. Patient had a syncopal episode and was lying face down on the floor, bleeding from her lips. Patient could not recall events leading up to fall, only recalling that she had finished using the commode. We assisted patient back up to her bed, will obtain stat CBC , STAT CT head and CT cervical spine to rule out any hemorrhage (on heparin gtt), or fracture. Patient examined at bedside, able to communicate, was moving her neck without any pain or limitations, neurological exam was benign. Fall precautions and bed rest ordered for patient.     Earlier in the evening (after patient came back from the thrombectomy), CT surgery recommended removing the bed rest order so patient can sit up in bed (but without physically getting out of bed). As per nursing staff, patient was refusing the primafit, but was complaining of suprapubic pain and wanted to urinate. She states the pain was also aggravated by her fibroids, so she really wanted to urinate to alleviate the pain. Nursing placed a bed pan for patient, which she was reportedly on for about an hour without any bowel or bladder movements. Patient then requested to get up and go to the bathroom, stating that before her thrombectomy she was fully capable of getting up and walking to the bathroom, and she insisted on going to the bathroom. Nursing staff got a bedside commode for patient to use instead. As per nursing, patient was sitting on bedside commode for a while before the event occurred.

## 2023-01-15 DIAGNOSIS — D21.9 BENIGN NEOPLASM OF CONNECTIVE AND OTHER SOFT TISSUE, UNSPECIFIED: ICD-10-CM

## 2023-01-15 LAB
ALBUMIN SERPL ELPH-MCNC: 3.6 G/DL — SIGNIFICANT CHANGE UP (ref 3.3–5)
ALBUMIN SERPL ELPH-MCNC: 3.7 G/DL — SIGNIFICANT CHANGE UP (ref 3.3–5)
ALBUMIN SERPL ELPH-MCNC: 3.7 G/DL — SIGNIFICANT CHANGE UP (ref 3.3–5)
ALP SERPL-CCNC: 73 U/L — SIGNIFICANT CHANGE UP (ref 40–120)
ALP SERPL-CCNC: 73 U/L — SIGNIFICANT CHANGE UP (ref 40–120)
ALP SERPL-CCNC: 79 U/L — SIGNIFICANT CHANGE UP (ref 40–120)
ALP SERPL-CCNC: 81 U/L — SIGNIFICANT CHANGE UP (ref 40–120)
ALP SERPL-CCNC: 83 U/L — SIGNIFICANT CHANGE UP (ref 40–120)
ALT FLD-CCNC: 35 U/L — SIGNIFICANT CHANGE UP (ref 10–45)
ALT FLD-CCNC: 36 U/L — SIGNIFICANT CHANGE UP (ref 10–45)
ALT FLD-CCNC: 39 U/L — SIGNIFICANT CHANGE UP (ref 10–45)
ALT FLD-CCNC: 42 U/L — SIGNIFICANT CHANGE UP (ref 10–45)
ALT FLD-CCNC: 44 U/L — SIGNIFICANT CHANGE UP (ref 10–45)
ANION GAP SERPL CALC-SCNC: 12 MMOL/L — SIGNIFICANT CHANGE UP (ref 5–17)
ANION GAP SERPL CALC-SCNC: 13 MMOL/L — SIGNIFICANT CHANGE UP (ref 5–17)
ANION GAP SERPL CALC-SCNC: 14 MMOL/L — SIGNIFICANT CHANGE UP (ref 5–17)
ANION GAP SERPL CALC-SCNC: 15 MMOL/L — SIGNIFICANT CHANGE UP (ref 5–17)
ANION GAP SERPL CALC-SCNC: 15 MMOL/L — SIGNIFICANT CHANGE UP (ref 5–17)
APPEARANCE UR: CLEAR — SIGNIFICANT CHANGE UP
APTT BLD: 24.2 SEC — LOW (ref 27.5–35.5)
AST SERPL-CCNC: 22 U/L — SIGNIFICANT CHANGE UP (ref 10–40)
AST SERPL-CCNC: 22 U/L — SIGNIFICANT CHANGE UP (ref 10–40)
AST SERPL-CCNC: 23 U/L — SIGNIFICANT CHANGE UP (ref 10–40)
AST SERPL-CCNC: 26 U/L — SIGNIFICANT CHANGE UP (ref 10–40)
AST SERPL-CCNC: 26 U/L — SIGNIFICANT CHANGE UP (ref 10–40)
BACTERIA # UR AUTO: SIGNIFICANT CHANGE UP /HPF
BASE EXCESS BLDA CALC-SCNC: -1.4 MMOL/L — SIGNIFICANT CHANGE UP (ref -2–3)
BASE EXCESS BLDA CALC-SCNC: -2.1 MMOL/L — LOW (ref -2–3)
BASE EXCESS BLDA CALC-SCNC: -3.8 MMOL/L — LOW (ref -2–3)
BASE EXCESS BLDA CALC-SCNC: -4.9 MMOL/L — LOW (ref -2–3)
BASE EXCESS BLDV CALC-SCNC: -1.4 MMOL/L — SIGNIFICANT CHANGE UP (ref -2–3)
BASE EXCESS BLDV CALC-SCNC: -1.7 MMOL/L — SIGNIFICANT CHANGE UP (ref -2–3)
BASE EXCESS BLDV CALC-SCNC: -1.9 MMOL/L — SIGNIFICANT CHANGE UP (ref -2–3)
BASE EXCESS BLDV CALC-SCNC: -2.8 MMOL/L — LOW (ref -2–3)
BASOPHILS # BLD AUTO: 0.05 K/UL — SIGNIFICANT CHANGE UP (ref 0–0.2)
BASOPHILS NFR BLD AUTO: 0.3 % — SIGNIFICANT CHANGE UP (ref 0–2)
BILIRUB SERPL-MCNC: 0.6 MG/DL — SIGNIFICANT CHANGE UP (ref 0.2–1.2)
BILIRUB SERPL-MCNC: 0.7 MG/DL — SIGNIFICANT CHANGE UP (ref 0.2–1.2)
BILIRUB SERPL-MCNC: 0.8 MG/DL — SIGNIFICANT CHANGE UP (ref 0.2–1.2)
BILIRUB UR-MCNC: NEGATIVE — SIGNIFICANT CHANGE UP
BUN SERPL-MCNC: 10 MG/DL — SIGNIFICANT CHANGE UP (ref 7–23)
BUN SERPL-MCNC: 14 MG/DL — SIGNIFICANT CHANGE UP (ref 7–23)
BUN SERPL-MCNC: 15 MG/DL — SIGNIFICANT CHANGE UP (ref 7–23)
BUN SERPL-MCNC: 16 MG/DL — SIGNIFICANT CHANGE UP (ref 7–23)
BUN SERPL-MCNC: 16 MG/DL — SIGNIFICANT CHANGE UP (ref 7–23)
CA-I SERPL-SCNC: 1.17 MMOL/L — SIGNIFICANT CHANGE UP (ref 1.15–1.33)
CA-I SERPL-SCNC: 1.19 MMOL/L — SIGNIFICANT CHANGE UP (ref 1.15–1.33)
CA-I SERPL-SCNC: 1.21 MMOL/L — SIGNIFICANT CHANGE UP (ref 1.15–1.33)
CA-I SERPL-SCNC: 1.22 MMOL/L — SIGNIFICANT CHANGE UP (ref 1.15–1.33)
CALCIUM SERPL-MCNC: 10 MG/DL — SIGNIFICANT CHANGE UP (ref 8.4–10.5)
CALCIUM SERPL-MCNC: 8.9 MG/DL — SIGNIFICANT CHANGE UP (ref 8.4–10.5)
CALCIUM SERPL-MCNC: 8.9 MG/DL — SIGNIFICANT CHANGE UP (ref 8.4–10.5)
CALCIUM SERPL-MCNC: 9.2 MG/DL — SIGNIFICANT CHANGE UP (ref 8.4–10.5)
CALCIUM SERPL-MCNC: 9.4 MG/DL — SIGNIFICANT CHANGE UP (ref 8.4–10.5)
CHLORIDE SERPL-SCNC: 100 MMOL/L — SIGNIFICANT CHANGE UP (ref 96–108)
CHLORIDE SERPL-SCNC: 101 MMOL/L — SIGNIFICANT CHANGE UP (ref 96–108)
CK MB CFR SERPL CALC: 1.4 NG/ML — SIGNIFICANT CHANGE UP (ref 0–6.7)
CK SERPL-CCNC: 42 U/L — SIGNIFICANT CHANGE UP (ref 25–170)
CO2 BLDA-SCNC: 18 MMOL/L — LOW (ref 19–24)
CO2 BLDA-SCNC: 19 MMOL/L — SIGNIFICANT CHANGE UP (ref 19–24)
CO2 BLDA-SCNC: 20 MMOL/L — SIGNIFICANT CHANGE UP (ref 19–24)
CO2 BLDA-SCNC: 21 MMOL/L — SIGNIFICANT CHANGE UP (ref 19–24)
CO2 BLDV-SCNC: 23 MMOL/L — SIGNIFICANT CHANGE UP (ref 22–26)
CO2 BLDV-SCNC: 23.1 MMOL/L — SIGNIFICANT CHANGE UP (ref 22–26)
CO2 BLDV-SCNC: 23.3 MMOL/L — SIGNIFICANT CHANGE UP (ref 22–26)
CO2 BLDV-SCNC: 23.9 MMOL/L — SIGNIFICANT CHANGE UP (ref 22–26)
CO2 SERPL-SCNC: 18 MMOL/L — LOW (ref 22–31)
CO2 SERPL-SCNC: 18 MMOL/L — LOW (ref 22–31)
CO2 SERPL-SCNC: 19 MMOL/L — LOW (ref 22–31)
CO2 SERPL-SCNC: 20 MMOL/L — LOW (ref 22–31)
CO2 SERPL-SCNC: 23 MMOL/L — SIGNIFICANT CHANGE UP (ref 22–31)
COHGB MFR BLDA: 1.9 % — SIGNIFICANT CHANGE UP
COHGB MFR BLDV: 0.1 % — SIGNIFICANT CHANGE UP
COLOR SPEC: YELLOW — SIGNIFICANT CHANGE UP
CREAT SERPL-MCNC: 1.04 MG/DL — SIGNIFICANT CHANGE UP (ref 0.5–1.3)
CREAT SERPL-MCNC: 1.28 MG/DL — SIGNIFICANT CHANGE UP (ref 0.5–1.3)
CREAT SERPL-MCNC: 1.29 MG/DL — SIGNIFICANT CHANGE UP (ref 0.5–1.3)
CREAT SERPL-MCNC: 1.42 MG/DL — HIGH (ref 0.5–1.3)
CREAT SERPL-MCNC: 1.43 MG/DL — HIGH (ref 0.5–1.3)
DIFF PNL FLD: NEGATIVE — SIGNIFICANT CHANGE UP
EGFR: 50 ML/MIN/1.73M2 — LOW
EGFR: 50 ML/MIN/1.73M2 — LOW
EGFR: 57 ML/MIN/1.73M2 — LOW
EGFR: 57 ML/MIN/1.73M2 — LOW
EGFR: 73 ML/MIN/1.73M2 — SIGNIFICANT CHANGE UP
EOSINOPHIL # BLD AUTO: 0 K/UL — SIGNIFICANT CHANGE UP (ref 0–0.5)
EOSINOPHIL NFR BLD AUTO: 0 % — SIGNIFICANT CHANGE UP (ref 0–6)
EPI CELLS # UR: SIGNIFICANT CHANGE UP /HPF (ref 0–5)
GAS PNL BLDA: SIGNIFICANT CHANGE UP
GAS PNL BLDV: 134 MMOL/L — LOW (ref 136–145)
GAS PNL BLDV: 134 MMOL/L — LOW (ref 136–145)
GAS PNL BLDV: 135 MMOL/L — LOW (ref 136–145)
GAS PNL BLDV: 135 MMOL/L — LOW (ref 136–145)
GAS PNL BLDV: SIGNIFICANT CHANGE UP
GLUCOSE BLDC GLUCOMTR-MCNC: 109 MG/DL — HIGH (ref 70–99)
GLUCOSE BLDC GLUCOMTR-MCNC: 127 MG/DL — HIGH (ref 70–99)
GLUCOSE BLDC GLUCOMTR-MCNC: 90 MG/DL — SIGNIFICANT CHANGE UP (ref 70–99)
GLUCOSE SERPL-MCNC: 125 MG/DL — HIGH (ref 70–99)
GLUCOSE SERPL-MCNC: 132 MG/DL — HIGH (ref 70–99)
GLUCOSE SERPL-MCNC: 134 MG/DL — HIGH (ref 70–99)
GLUCOSE SERPL-MCNC: 136 MG/DL — HIGH (ref 70–99)
GLUCOSE SERPL-MCNC: 154 MG/DL — HIGH (ref 70–99)
GLUCOSE UR QL: NEGATIVE — SIGNIFICANT CHANGE UP
HCO3 BLDA-SCNC: 17 MMOL/L — LOW (ref 21–28)
HCO3 BLDA-SCNC: 18 MMOL/L — LOW (ref 21–28)
HCO3 BLDA-SCNC: 20 MMOL/L — LOW (ref 21–28)
HCO3 BLDA-SCNC: 21 MMOL/L — SIGNIFICANT CHANGE UP (ref 21–28)
HCO3 BLDV-SCNC: 22 MMOL/L — SIGNIFICANT CHANGE UP (ref 22–29)
HCO3 BLDV-SCNC: 23 MMOL/L — SIGNIFICANT CHANGE UP (ref 22–29)
HCT VFR BLD CALC: 30.6 % — LOW (ref 34.5–45)
HCT VFR BLD CALC: 30.9 % — LOW (ref 34.5–45)
HCT VFR BLD CALC: 34.4 % — LOW (ref 34.5–45)
HGB BLD CALC-MCNC: 10.2 G/DL — LOW (ref 11.7–16.1)
HGB BLD-MCNC: 10.4 G/DL — LOW (ref 11.5–15.5)
HGB BLD-MCNC: 9.8 G/DL — LOW (ref 11.5–15.5)
HGB BLD-MCNC: 9.8 G/DL — LOW (ref 11.5–15.5)
HGB BLDA-MCNC: 10.3 G/DL — LOW (ref 11.7–16.1)
HYALINE CASTS # UR AUTO: SIGNIFICANT CHANGE UP /LPF (ref 0–2)
IMM GRANULOCYTES NFR BLD AUTO: 0.6 % — SIGNIFICANT CHANGE UP (ref 0–0.9)
INR BLD: 1.21 — HIGH (ref 0.88–1.16)
KETONES UR-MCNC: NEGATIVE — SIGNIFICANT CHANGE UP
LACTATE SERPL-SCNC: 1.3 MMOL/L — SIGNIFICANT CHANGE UP (ref 0.5–2)
LACTATE SERPL-SCNC: 1.8 MMOL/L — SIGNIFICANT CHANGE UP (ref 0.5–2)
LACTATE SERPL-SCNC: 2.6 MMOL/L — HIGH (ref 0.5–2)
LEUKOCYTE ESTERASE UR-ACNC: NEGATIVE — SIGNIFICANT CHANGE UP
LYMPHOCYTES # BLD AUTO: 18.7 % — SIGNIFICANT CHANGE UP (ref 13–44)
LYMPHOCYTES # BLD AUTO: 2.78 K/UL — SIGNIFICANT CHANGE UP (ref 1–3.3)
MAGNESIUM SERPL-MCNC: 2 MG/DL — SIGNIFICANT CHANGE UP (ref 1.6–2.6)
MCHC RBC-ENTMCNC: 24.6 PG — LOW (ref 27–34)
MCHC RBC-ENTMCNC: 24.7 PG — LOW (ref 27–34)
MCHC RBC-ENTMCNC: 24.8 PG — LOW (ref 27–34)
MCHC RBC-ENTMCNC: 30.2 GM/DL — LOW (ref 32–36)
MCHC RBC-ENTMCNC: 31.7 GM/DL — LOW (ref 32–36)
MCHC RBC-ENTMCNC: 32 GM/DL — SIGNIFICANT CHANGE UP (ref 32–36)
MCV RBC AUTO: 77.5 FL — LOW (ref 80–100)
MCV RBC AUTO: 78 FL — LOW (ref 80–100)
MCV RBC AUTO: 81.5 FL — SIGNIFICANT CHANGE UP (ref 80–100)
METHGB MFR BLDA: 0.5 % — SIGNIFICANT CHANGE UP
METHGB MFR BLDV: 0.8 % — SIGNIFICANT CHANGE UP
MONOCYTES # BLD AUTO: 1.39 K/UL — HIGH (ref 0–0.9)
MONOCYTES NFR BLD AUTO: 9.4 % — SIGNIFICANT CHANGE UP (ref 2–14)
NEUTROPHILS # BLD AUTO: 10.54 K/UL — HIGH (ref 1.8–7.4)
NEUTROPHILS NFR BLD AUTO: 71 % — SIGNIFICANT CHANGE UP (ref 43–77)
NITRITE UR-MCNC: NEGATIVE — SIGNIFICANT CHANGE UP
NRBC # BLD: 0 /100 WBCS — SIGNIFICANT CHANGE UP (ref 0–0)
NT-PROBNP SERPL-SCNC: 2785 PG/ML — HIGH (ref 0–300)
NT-PROBNP SERPL-SCNC: 4859 PG/ML — HIGH (ref 0–300)
NT-PROBNP SERPL-SCNC: 6060 PG/ML — HIGH (ref 0–300)
NT-PROBNP SERPL-SCNC: 6391 PG/ML — HIGH (ref 0–300)
NT-PROBNP SERPL-SCNC: 6846 PG/ML — HIGH (ref 0–300)
OXYHGB MFR BLDA: 87.1 % — LOW (ref 90–95)
PCO2 BLDA: 24 MMHG — LOW (ref 32–45)
PCO2 BLDA: 25 MMHG — LOW (ref 32–45)
PCO2 BLDA: 25 MMHG — LOW (ref 32–45)
PCO2 BLDA: 27 MMHG — LOW (ref 32–45)
PCO2 BLDV: 34 MMHG — LOW (ref 39–42)
PCO2 BLDV: 35 MMHG — LOW (ref 39–42)
PCO2 BLDV: 36 MMHG — LOW (ref 39–42)
PCO2 BLDV: 37 MMHG — LOW (ref 39–42)
PH BLDA: 7.45 — SIGNIFICANT CHANGE UP (ref 7.35–7.45)
PH BLDA: 7.48 — HIGH (ref 7.35–7.45)
PH BLDA: 7.49 — HIGH (ref 7.35–7.45)
PH BLDA: 7.5 — HIGH (ref 7.35–7.45)
PH BLDV: 7.38 — SIGNIFICANT CHANGE UP (ref 7.32–7.43)
PH BLDV: 7.41 — SIGNIFICANT CHANGE UP (ref 7.32–7.43)
PH BLDV: 7.41 — SIGNIFICANT CHANGE UP (ref 7.32–7.43)
PH BLDV: 7.42 — SIGNIFICANT CHANGE UP (ref 7.32–7.43)
PH UR: 5.5 — SIGNIFICANT CHANGE UP (ref 5–8)
PHOSPHATE SERPL-MCNC: 4.5 MG/DL — SIGNIFICANT CHANGE UP (ref 2.5–4.5)
PLATELET # BLD AUTO: 173 K/UL — SIGNIFICANT CHANGE UP (ref 150–400)
PLATELET # BLD AUTO: 198 K/UL — SIGNIFICANT CHANGE UP (ref 150–400)
PLATELET # BLD AUTO: 201 K/UL — SIGNIFICANT CHANGE UP (ref 150–400)
PO2 BLDA: 102 MMHG — SIGNIFICANT CHANGE UP (ref 83–108)
PO2 BLDA: 104 MMHG — SIGNIFICANT CHANGE UP (ref 83–108)
PO2 BLDA: 123 MMHG — HIGH (ref 83–108)
PO2 BLDA: 73 MMHG — LOW (ref 83–108)
PO2 BLDV: 37 MMHG — SIGNIFICANT CHANGE UP (ref 25–45)
PO2 BLDV: 39 MMHG — SIGNIFICANT CHANGE UP (ref 25–45)
PO2 BLDV: <33 MMHG — SIGNIFICANT CHANGE UP (ref 25–45)
PO2 BLDV: <33 MMHG — SIGNIFICANT CHANGE UP (ref 25–45)
POTASSIUM BLDV-SCNC: 4.4 MMOL/L — SIGNIFICANT CHANGE UP (ref 3.5–5.1)
POTASSIUM BLDV-SCNC: 4.5 MMOL/L — SIGNIFICANT CHANGE UP (ref 3.5–5.1)
POTASSIUM BLDV-SCNC: 4.6 MMOL/L — SIGNIFICANT CHANGE UP (ref 3.5–5.1)
POTASSIUM BLDV-SCNC: 5.3 MMOL/L — HIGH (ref 3.5–5.1)
POTASSIUM SERPL-MCNC: 4.4 MMOL/L — SIGNIFICANT CHANGE UP (ref 3.5–5.3)
POTASSIUM SERPL-MCNC: 4.5 MMOL/L — SIGNIFICANT CHANGE UP (ref 3.5–5.3)
POTASSIUM SERPL-MCNC: 4.9 MMOL/L — SIGNIFICANT CHANGE UP (ref 3.5–5.3)
POTASSIUM SERPL-MCNC: 4.9 MMOL/L — SIGNIFICANT CHANGE UP (ref 3.5–5.3)
POTASSIUM SERPL-MCNC: 5.6 MMOL/L — HIGH (ref 3.5–5.3)
POTASSIUM SERPL-SCNC: 4.4 MMOL/L — SIGNIFICANT CHANGE UP (ref 3.5–5.3)
POTASSIUM SERPL-SCNC: 4.5 MMOL/L — SIGNIFICANT CHANGE UP (ref 3.5–5.3)
POTASSIUM SERPL-SCNC: 4.9 MMOL/L — SIGNIFICANT CHANGE UP (ref 3.5–5.3)
POTASSIUM SERPL-SCNC: 4.9 MMOL/L — SIGNIFICANT CHANGE UP (ref 3.5–5.3)
POTASSIUM SERPL-SCNC: 5.6 MMOL/L — HIGH (ref 3.5–5.3)
PROT SERPL-MCNC: 7.7 G/DL — SIGNIFICANT CHANGE UP (ref 6–8.3)
PROT SERPL-MCNC: 7.9 G/DL — SIGNIFICANT CHANGE UP (ref 6–8.3)
PROT SERPL-MCNC: 8 G/DL — SIGNIFICANT CHANGE UP (ref 6–8.3)
PROT SERPL-MCNC: 8.2 G/DL — SIGNIFICANT CHANGE UP (ref 6–8.3)
PROT SERPL-MCNC: 8.4 G/DL — HIGH (ref 6–8.3)
PROT UR-MCNC: ABNORMAL MG/DL
PROTHROM AB SERPL-ACNC: 14.4 SEC — HIGH (ref 10.5–13.4)
RBC # BLD: 3.95 M/UL — SIGNIFICANT CHANGE UP (ref 3.8–5.2)
RBC # BLD: 3.96 M/UL — SIGNIFICANT CHANGE UP (ref 3.8–5.2)
RBC # BLD: 4.22 M/UL — SIGNIFICANT CHANGE UP (ref 3.8–5.2)
RBC # FLD: 19.4 % — HIGH (ref 10.3–14.5)
RBC # FLD: 19.4 % — HIGH (ref 10.3–14.5)
RBC # FLD: 19.7 % — HIGH (ref 10.3–14.5)
RBC CASTS # UR COMP ASSIST: < 5 /HPF — SIGNIFICANT CHANGE UP
SAO2 % BLDA: 89.2 % — LOW (ref 94–98)
SAO2 % BLDA: 95.9 % — SIGNIFICANT CHANGE UP (ref 94–98)
SAO2 % BLDA: 98.6 % — HIGH (ref 94–98)
SAO2 % BLDA: 98.7 % — HIGH (ref 94–98)
SAO2 % BLDA: 98.9 % — HIGH (ref 94–98)
SAO2 % BLDV: 26 % — LOW (ref 67–88)
SAO2 % BLDV: 41.4 % — LOW (ref 67–88)
SAO2 % BLDV: 46.2 % — LOW (ref 67–88)
SAO2 % BLDV: 58 % — LOW (ref 67–88)
SAO2 % BLDV: 59.7 % — LOW (ref 67–88)
SODIUM SERPL-SCNC: 133 MMOL/L — LOW (ref 135–145)
SODIUM SERPL-SCNC: 136 MMOL/L — SIGNIFICANT CHANGE UP (ref 135–145)
SP GR SPEC: 1.01 — SIGNIFICANT CHANGE UP (ref 1–1.03)
T4 FREE SERPL-MCNC: 1.45 NG/DL — SIGNIFICANT CHANGE UP (ref 0.93–1.7)
TROPONIN T SERPL-MCNC: 0.02 NG/ML — HIGH (ref 0–0.01)
TROPONIN T SERPL-MCNC: 0.02 NG/ML — HIGH (ref 0–0.01)
UROBILINOGEN FLD QL: 0.2 E.U./DL — SIGNIFICANT CHANGE UP
WBC # BLD: 14.85 K/UL — HIGH (ref 3.8–10.5)
WBC # BLD: 19.31 K/UL — HIGH (ref 3.8–10.5)
WBC # BLD: 20.68 K/UL — HIGH (ref 3.8–10.5)
WBC # FLD AUTO: 14.85 K/UL — HIGH (ref 3.8–10.5)
WBC # FLD AUTO: 19.31 K/UL — HIGH (ref 3.8–10.5)
WBC # FLD AUTO: 20.68 K/UL — HIGH (ref 3.8–10.5)
WBC UR QL: < 5 /HPF — SIGNIFICANT CHANGE UP

## 2023-01-15 PROCEDURE — 36600 WITHDRAWAL OF ARTERIAL BLOOD: CPT | Mod: 59

## 2023-01-15 PROCEDURE — 36620 INSERTION CATHETER ARTERY: CPT

## 2023-01-15 PROCEDURE — 36415 COLL VENOUS BLD VENIPUNCTURE: CPT

## 2023-01-15 PROCEDURE — 99291 CRITICAL CARE FIRST HOUR: CPT | Mod: GC

## 2023-01-15 PROCEDURE — 36014 PLACE CATHETER IN ARTERY: CPT

## 2023-01-15 PROCEDURE — 99292 CRITICAL CARE ADDL 30 MIN: CPT

## 2023-01-15 PROCEDURE — 93010 ELECTROCARDIOGRAM REPORT: CPT

## 2023-01-15 PROCEDURE — 36000 PLACE NEEDLE IN VEIN: CPT | Mod: 59

## 2023-01-15 PROCEDURE — 76937 US GUIDE VASCULAR ACCESS: CPT | Mod: 26,59

## 2023-01-15 PROCEDURE — 99291 CRITICAL CARE FIRST HOUR: CPT

## 2023-01-15 PROCEDURE — 75746 ARTERY X-RAYS LUNG: CPT | Mod: 26,59

## 2023-01-15 PROCEDURE — 37211 THROMBOLYTIC ART THERAPY: CPT

## 2023-01-15 PROCEDURE — 71045 X-RAY EXAM CHEST 1 VIEW: CPT | Mod: 26

## 2023-01-15 PROCEDURE — 93970 EXTREMITY STUDY: CPT | Mod: 26

## 2023-01-15 RX ORDER — VASOPRESSIN 20 [USP'U]/ML
0.04 INJECTION INTRAVENOUS
Qty: 40 | Refills: 0 | Status: DISCONTINUED | OUTPATIENT
Start: 2023-01-15 | End: 2023-01-16

## 2023-01-15 RX ORDER — VANCOMYCIN HCL 1 G
1000 VIAL (EA) INTRAVENOUS ONCE
Refills: 0 | Status: DISCONTINUED | OUTPATIENT
Start: 2023-01-15 | End: 2023-01-15

## 2023-01-15 RX ORDER — HEPARIN SODIUM 5000 [USP'U]/ML
400 INJECTION INTRAVENOUS; SUBCUTANEOUS
Qty: 25000 | Refills: 0 | Status: DISCONTINUED | OUTPATIENT
Start: 2023-01-15 | End: 2023-01-16

## 2023-01-15 RX ORDER — HEPARIN SODIUM 5000 [USP'U]/ML
400 INJECTION INTRAVENOUS; SUBCUTANEOUS
Qty: 25000 | Refills: 0 | Status: DISCONTINUED | OUTPATIENT
Start: 2023-01-15 | End: 2023-01-15

## 2023-01-15 RX ORDER — ACETAMINOPHEN 500 MG
1000 TABLET ORAL ONCE
Refills: 0 | Status: COMPLETED | OUTPATIENT
Start: 2023-01-15 | End: 2023-01-15

## 2023-01-15 RX ORDER — VASOPRESSIN 20 [USP'U]/ML
0.02 INJECTION INTRAVENOUS
Qty: 40 | Refills: 0 | Status: DISCONTINUED | OUTPATIENT
Start: 2023-01-15 | End: 2023-01-15

## 2023-01-15 RX ORDER — VANCOMYCIN HCL 1 G
1500 VIAL (EA) INTRAVENOUS ONCE
Refills: 0 | Status: COMPLETED | OUTPATIENT
Start: 2023-01-15 | End: 2023-01-15

## 2023-01-15 RX ORDER — ENOXAPARIN SODIUM 100 MG/ML
100 INJECTION SUBCUTANEOUS EVERY 12 HOURS
Refills: 0 | Status: DISCONTINUED | OUTPATIENT
Start: 2023-01-16 | End: 2023-01-19

## 2023-01-15 RX ORDER — PIPERACILLIN AND TAZOBACTAM 4; .5 G/20ML; G/20ML
3.38 INJECTION, POWDER, LYOPHILIZED, FOR SOLUTION INTRAVENOUS EVERY 8 HOURS
Refills: 0 | Status: DISCONTINUED | OUTPATIENT
Start: 2023-01-16 | End: 2023-01-16

## 2023-01-15 RX ORDER — ALTEPLASE 100 MG
1 KIT INTRAVENOUS
Qty: 25 | Refills: 0 | Status: DISCONTINUED | OUTPATIENT
Start: 2023-01-15 | End: 2023-01-16

## 2023-01-15 RX ORDER — PIPERACILLIN AND TAZOBACTAM 4; .5 G/20ML; G/20ML
3.38 INJECTION, POWDER, LYOPHILIZED, FOR SOLUTION INTRAVENOUS ONCE
Refills: 0 | Status: COMPLETED | OUTPATIENT
Start: 2023-01-15 | End: 2023-01-15

## 2023-01-15 RX ORDER — MILRINONE LACTATE 1 MG/ML
0.2 INJECTION, SOLUTION INTRAVENOUS
Qty: 20 | Refills: 0 | Status: DISCONTINUED | OUTPATIENT
Start: 2023-01-15 | End: 2023-01-16

## 2023-01-15 RX ORDER — CEFTRIAXONE 500 MG/1
2000 INJECTION, POWDER, FOR SOLUTION INTRAMUSCULAR; INTRAVENOUS EVERY 24 HOURS
Refills: 0 | Status: DISCONTINUED | OUTPATIENT
Start: 2023-01-15 | End: 2023-01-18

## 2023-01-15 RX ORDER — ALTEPLASE 100 MG
2 KIT INTRAVENOUS ONCE
Refills: 0 | Status: COMPLETED | OUTPATIENT
Start: 2023-01-15 | End: 2023-01-15

## 2023-01-15 RX ORDER — MILRINONE LACTATE 1 MG/ML
0.12 INJECTION, SOLUTION INTRAVENOUS
Qty: 20 | Refills: 0 | Status: DISCONTINUED | OUTPATIENT
Start: 2023-01-15 | End: 2023-01-15

## 2023-01-15 RX ORDER — PIPERACILLIN AND TAZOBACTAM 4; .5 G/20ML; G/20ML
3.38 INJECTION, POWDER, LYOPHILIZED, FOR SOLUTION INTRAVENOUS ONCE
Refills: 0 | Status: DISCONTINUED | OUTPATIENT
Start: 2023-01-16 | End: 2023-01-16

## 2023-01-15 RX ADMIN — Medication 1000 MILLIGRAM(S): at 12:21

## 2023-01-15 RX ADMIN — HYDROMORPHONE HYDROCHLORIDE 1 MILLIGRAM(S): 2 INJECTION INTRAMUSCULAR; INTRAVENOUS; SUBCUTANEOUS at 03:25

## 2023-01-15 RX ADMIN — Medication 1000 MILLIGRAM(S): at 20:41

## 2023-01-15 RX ADMIN — HYDROMORPHONE HYDROCHLORIDE 1 MILLIGRAM(S): 2 INJECTION INTRAMUSCULAR; INTRAVENOUS; SUBCUTANEOUS at 17:55

## 2023-01-15 RX ADMIN — PIPERACILLIN AND TAZOBACTAM 200 GRAM(S): 4; .5 INJECTION, POWDER, LYOPHILIZED, FOR SOLUTION INTRAVENOUS at 23:42

## 2023-01-15 RX ADMIN — Medication 650 MILLIGRAM(S): at 00:44

## 2023-01-15 RX ADMIN — HYDROMORPHONE HYDROCHLORIDE 1 MILLIGRAM(S): 2 INJECTION INTRAMUSCULAR; INTRAVENOUS; SUBCUTANEOUS at 02:47

## 2023-01-15 RX ADMIN — CEFTRIAXONE 100 MILLIGRAM(S): 500 INJECTION, POWDER, FOR SOLUTION INTRAMUSCULAR; INTRAVENOUS at 11:50

## 2023-01-15 RX ADMIN — ENOXAPARIN SODIUM 100 MILLIGRAM(S): 100 INJECTION SUBCUTANEOUS at 00:44

## 2023-01-15 RX ADMIN — VASOPRESSIN 6 UNIT(S)/MIN: 20 INJECTION INTRAVENOUS at 10:41

## 2023-01-15 RX ADMIN — Medication 650 MILLIGRAM(S): at 01:30

## 2023-01-15 RX ADMIN — CHLORHEXIDINE GLUCONATE 1 APPLICATION(S): 213 SOLUTION TOPICAL at 09:45

## 2023-01-15 RX ADMIN — HYDROMORPHONE HYDROCHLORIDE 1 MILLIGRAM(S): 2 INJECTION INTRAMUSCULAR; INTRAVENOUS; SUBCUTANEOUS at 08:03

## 2023-01-15 RX ADMIN — Medication 400 MILLIGRAM(S): at 20:26

## 2023-01-15 RX ADMIN — HYDROMORPHONE HYDROCHLORIDE 1 MILLIGRAM(S): 2 INJECTION INTRAMUSCULAR; INTRAVENOUS; SUBCUTANEOUS at 22:47

## 2023-01-15 RX ADMIN — HYDROMORPHONE HYDROCHLORIDE 1 MILLIGRAM(S): 2 INJECTION INTRAMUSCULAR; INTRAVENOUS; SUBCUTANEOUS at 17:31

## 2023-01-15 RX ADMIN — HYDROMORPHONE HYDROCHLORIDE 1 MILLIGRAM(S): 2 INJECTION INTRAMUSCULAR; INTRAVENOUS; SUBCUTANEOUS at 23:02

## 2023-01-15 RX ADMIN — Medication 400 MILLIGRAM(S): at 11:51

## 2023-01-15 RX ADMIN — Medication 650 MILLIGRAM(S): at 07:36

## 2023-01-15 RX ADMIN — MILRINONE LACTATE 4.03 MICROGRAM(S)/KG/MIN: 1 INJECTION, SOLUTION INTRAVENOUS at 10:40

## 2023-01-15 NOTE — PROGRESS NOTE ADULT - SUBJECTIVE AND OBJECTIVE BOX
ADAM BEACH, 32y, Female  MRN-[id number]  Patient is a 32y old  Female who presents with a chief complaint of PE (12 Jan 2023 16:31)    **TRANSFER NOTE FROM MICU-->CCU**  Hospital Course:   33 yo female with PMHx of asthma and anemia 2/2 myomectomy, on hormonal birth control (Nuvaring) for 4 months, presented with 1 week of productive cough. Pt stating she began feeling symptoms of mild cough and chest tightness 3 weeks prior. She went to  where workup was negative for COVID and Influenza. Pt prescribed Azithromycin and Prednisone. Pt presented to ED after she awoke in AM w/ progressively worse chest tightness. She used her inhaler and nebulizer tx at home with minimal relief and had syncopal event at home. Pt with no hx of DVT, PE and also denies family history of clots, blood disorders.     In the ED, CT PE with large thrombus burden involving all lobar and multiple segmental branches bilaterally w/ probable evolving infarcts in lateral basal segment of LLL and mild pulmonary artery dilation w/ RV strain and cardiomegaly. 1/11 TTE with severely dilated RV size, mod-severely reduced RV function, mod TR, pHTN (PASP 57), nml LV size/function. Troponin 0.10, Pro-BNP 1395, lactate normal. Pulmonology consulted. Patient admitted to MICU for intermediate risk PE. Patient started on Heparin drip with goal PTT 80-99 with improvement in tachycardia (HR 120s -> 100s). Per PERT, plan for thrombectomy was deferred. Pt was stabilized in MICU and transferred to [facility] for further management. On [facility], pt remained tachycardic with repeat TTE 1/12 showing severely dilated RV and mod-severely reduced RV function unchanged from prior. On 1/13, decision was made to proceed with mechanical thrombectomy i/s/o increasing pro-BNP 1300 -> 2200 -> 2600 with decreasing cardiac index 2.239 -> 2.2 and persistent tachycardia, suggesting worsening of acute right HF from pulmonary emboli. Pt underwent uncomplicated mechanical thrombectomy on 1/13, with improvement in tachycardia post-procedure ( -> 90s). On 1/14 at 1 AM, pt had syncopal event while using bedside commode. STAT CT head and C-spine were negative for hemorrhage (on heparin gtt) or fracture. CBC was unchanged and pt remained hemodynamically stable. Pt was transitioned to therpeutic Lovenox 100 mg SQ q12h. Repeat TTE continued to show severe but stable right heart strain & pulmonary HTN despite thrombectomy. On 1/15, pt had worsening tachycardia to 150s with persistent desaturations to mid-80s on RA which improved to mid-90s on 6LNC. BP remained stable and pt was asymptomatic. Bedside POCUS continued to show severe RV dilation. Decision was made to transfer pt to MICU due to worsening tachycardia and O2 saturation with uptrending pro-BNP suggesting progressive cardiac decompensation.     SUBJECTIVE:    12 Point ROS Negative unless noted otherwise above.  -------------------------------------------------------------------------------  VITAL SIGNS:  Vital Signs Last 24 Hrs  T(C): 38.5 (15 Trevon 2023 11:00), Max: 38.5 (15 Trevon 2023 11:00)  T(F): 101.3 (15 Trevon 2023 11:00), Max: 101.3 (15 Trevon 2023 11:00)  HR: 145 (15 Trevon 2023 11:00) (94 - 152)  BP: 113/70 (15 Trevon 2023 11:00) (113/70 - 136/73)  BP(mean): 84 (15 Trevon 2023 11:00) (84 - 99)  RR: 22 (15 Trevon 2023 09:25) (16 - 22)  SpO2: 94% (15 Trevon 2023 11:00) (92% - 98%)    Parameters below as of 15 Trevon 2023 11:00  Patient On (Oxygen Delivery Method): nasal cannula  O2 Flow (L/min): 6    I&O's Summary    14 Jan 2023 07:01  -  15 Trevon 2023 07:00  --------------------------------------------------------  IN: 0 mL / OUT: 2 mL / NET: -2 mL    15 Trevon 2023 07:01  -  15 Jan 2023 15:08  --------------------------------------------------------  IN: 8.1 mL / OUT: 0 mL / NET: 8.1 mL        PHYSICAL EXAM:  Constitutional: NAD, comfortable in bed.  HEENT: NC/AT, PERRLA, EOMI, no conjunctival pallor or scleral icterus, MMM  Neck: Supple, no JVD  Respiratory: CTA B/L. No w/r/r.   Cardiovascular: Tachycardic, normal rhythm, normal S1 and S2, no m/r/g.   Gastrointestinal: +BS, soft NTND, no guarding or rebound tenderness, no palpable masses   Extremities: wwp; no cyanosis, clubbing or edema. L femoral access site clean & dry without any bleeding, hematoma, or tenderness   Vascular: Pulses equal and strong throughout.   Neurological: AAOx3, no CN deficits, strength and sensation intact throughout.   Skin: No gross skin abnormalities or rashes    ALLERGIES:  No Known Allergies    MEDICATIONS  (STANDING):  acetaminophen     Tablet .. 650 milliGRAM(s) Oral every 6 hours  alteplase    CDT Bolus. 2 milliGRAM(s) IntraCatheter. once  alteplase    Infusion 1 mG/Hr (10 mL/Hr) IntraCatheter.. <Continuous>  cefTRIAXone   IVPB 2000 milliGRAM(s) IV Intermittent every 24 hours  chlorhexidine 2% Cloths 1 Application(s) Topical <User Schedule>  dextrose 5%. 1000 milliLiter(s) (50 mL/Hr) IV Continuous <Continuous>  dextrose 5%. 1000 milliLiter(s) (100 mL/Hr) IV Continuous <Continuous>  dextrose 50% Injectable 25 Gram(s) IV Push once  dextrose 50% Injectable 12.5 Gram(s) IV Push once  dextrose 50% Injectable 25 Gram(s) IV Push once  glucagon  Injectable 1 milliGRAM(s) IntraMuscular once  influenza   Vaccine 0.5 milliLiter(s) IntraMuscular once  insulin lispro (ADMELOG) corrective regimen sliding scale   SubCutaneous three times a day before meals  milrinone Infusion 0.125 MICROgram(s)/kG/Min (4.03 mL/Hr) IV Continuous <Continuous>  vasopressin Infusion 0.04 Unit(s)/Min (6 mL/Hr) IV Continuous <Continuous>    MEDICATIONS  (PRN):  dextrose Oral Gel 15 Gram(s) Oral once PRN Blood Glucose LESS THAN 70 milliGRAM(s)/deciliter  HYDROmorphone  Injectable 1 milliGRAM(s) IV Push every 6 hours PRN Severe Pain (7 - 10)  -------------------------------------------------------------------------------  LABS:                        9.8    20.68 )-----------( 201      ( 15 Trevon 2023 10:55 )             30.9     01-15    133<L>  |  100  |  14  ----------------------------<  134<H>  5.6<H>   |  18<L>  |  1.42<H>    Ca    9.4      15 Trevon 2023 11:27  Phos  4.5     01-15  Mg     2.0     01-15    TPro  8.2  /  Alb  3.6  /  TBili  0.8  /  DBili  x   /  AST  23  /  ALT  42  /  AlkPhos  81  01-15    LIVER FUNCTIONS - ( 15 Trevon 2023 11:27 )  Alb: 3.6 g/dL / Pro: 8.2 g/dL / ALK PHOS: 81 U/L / ALT: 42 U/L / AST: 23 U/L / GGT: x           PT/INR - ( 15 Trevon 2023 08:39 )   PT: 14.4 sec;   INR: 1.21   PTT - ( 15 Trevon 2023 08:39 )  PTT:24.2 sec    CAPILLARY BLOOD GLUCOSE  POCT Blood Glucose.: 109 mg/dL (15 Trevon 2023 11:49)    SARS-CoV-2: NotDetec (11 Jan 2023 09:33)    RADIOLOGY & ADDITIONAL TESTS: Reviewed.   ADAM BEACH, 32y, Female  MRN-[id number]  Patient is a 32y old  Female who presents with a chief complaint of PE (12 Jan 2023 16:31)    **TRANSFER NOTE FROM MICU-->CCU**  Hospital Course:   33 yo female with PMHx of asthma and anemia 2/2 myomectomy, on hormonal birth control (Nuvaring) for 4 months, presented with 1 week of productive cough. Pt stating she began feeling symptoms of mild cough and chest tightness 3 weeks prior. She went to  where workup was negative for COVID and Influenza. Pt prescribed Azithromycin and Prednisone. Pt presented to ED after she awoke in AM w/ progressively worse chest tightness. She used her inhaler and nebulizer tx at home with minimal relief and had syncopal event at home. Pt with no hx of DVT, PE and also denies family history of clots, blood disorders.     In the ED, CT PE with large thrombus burden involving all lobar and multiple segmental branches bilaterally w/ probable evolving infarcts in lateral basal segment of LLL and mild pulmonary artery dilation w/ RV strain and cardiomegaly. 1/11 TTE with severely dilated RV size, mod-severely reduced RV function, mod TR, pHTN (PASP 57), nml LV size/function. Troponin 0.10, Pro-BNP 1395, lactate normal. Pulmonology consulted. Patient admitted to MICU for intermediate risk PE. Patient started on Heparin drip with goal PTT 80-99 with improvement in tachycardia (HR 120s -> 100s). Per PERT, plan for thrombectomy was deferred. Pt was stabilized in MICU and transferred to [facility] for further management. On [facility], pt remained tachycardic with repeat TTE 1/12 showing severely dilated RV and mod-severely reduced RV function unchanged from prior. On 1/13, decision was made to proceed with mechanical thrombectomy i/s/o increasing pro-BNP 1300 -> 2200 -> 2600 with decreasing cardiac index 2.239 -> 2.2 and persistent tachycardia, suggesting worsening of acute right HF from pulmonary emboli. Pt underwent uncomplicated mechanical thrombectomy on 1/13, with improvement in tachycardia post-procedure ( -> 90s). On 1/14 at 1 AM, pt had syncopal event while using bedside commode. STAT CT head and C-spine were negative for hemorrhage (on heparin gtt) or fracture. CBC was unchanged and pt remained hemodynamically stable. Pt was transitioned to therpeutic Lovenox 100 mg SQ q12h. Repeat TTE continued to show severe but stable right heart strain & pulmonary HTN despite thrombectomy. On 1/15, pt had worsening tachycardia to 150s with persistent desaturations to mid-80s on RA which improved to mid-90s on 6LNC. BP remained stable and pt was asymptomatic. Bedside POCUS continued to show severe RV dilation. Decision was made to transfer pt to MICU due to worsening tachycardia and O2 saturation with uptrending pro-BNP suggesting progressive cardiac decompensation. Now s/p R heart cath with evidence of R heart failure, transferred to CCU    SUBJECTIVE: Patient reports lower abdominal discomfort and inability to urinate. Denies SOB, chest pain. Reports feeling thirsty but has no appetite.      12 Point ROS Negative unless noted otherwise above.  -------------------------------------------------------------------------------  VITAL SIGNS:  Vital Signs Last 24 Hrs  T(C): 38.5 (15 Trevon 2023 11:00), Max: 38.5 (15 Trevon 2023 11:00)  T(F): 101.3 (15 Trevon 2023 11:00), Max: 101.3 (15 Trevon 2023 11:00)  HR: 145 (15 Trevon 2023 11:00) (94 - 152)  BP: 113/70 (15 Trevon 2023 11:00) (113/70 - 136/73)  BP(mean): 84 (15 Trevon 2023 11:00) (84 - 99)  RR: 22 (15 Trevon 2023 09:25) (16 - 22)  SpO2: 94% (15 Trevon 2023 11:00) (92% - 98%)    Parameters below as of 15 Trevon 2023 11:00  Patient On (Oxygen Delivery Method): nasal cannula  O2 Flow (L/min): 6    I&O's Summary    14 Jan 2023 07:01  -  15 Trevon 2023 07:00  --------------------------------------------------------  IN: 0 mL / OUT: 2 mL / NET: -2 mL    15 Trevon 2023 07:01  -  15 Jan 2023 15:08  --------------------------------------------------------  IN: 8.1 mL / OUT: 0 mL / NET: 8.1 mL      PHYSICAL EXAM:  Constitutional: appears uncomfortable  HEENT: NC/AT, EOMI, no conjunctival pallor or scleral icterus, MMM  Neck: Supple, no JVD  Respiratory: CTA B/L. No w/r/r.   Cardiovascular: Tachycardic, normal rhythm, normal S1 and S2, no m/r/g.   Gastrointestinal: soft, tender to palpation of lower abdominal, palpable bladder  Extremities: wwp; no cyanosis, clubbing or edema. Left A-line in place.   Vascular: Pulses equal and strong throughout.   Neurological: AAOx3, no CN deficits, strength and sensation intact throughout.   Skin: No gross skin abnormalities or rashes    ALLERGIES:  No Known Allergies    MEDICATIONS  (STANDING):  acetaminophen     Tablet .. 650 milliGRAM(s) Oral every 6 hours  alteplase    CDT Bolus. 2 milliGRAM(s) IntraCatheter. once  alteplase    Infusion 1 mG/Hr (10 mL/Hr) IntraCatheter.. <Continuous>  cefTRIAXone   IVPB 2000 milliGRAM(s) IV Intermittent every 24 hours  chlorhexidine 2% Cloths 1 Application(s) Topical <User Schedule>  dextrose 5%. 1000 milliLiter(s) (50 mL/Hr) IV Continuous <Continuous>  dextrose 5%. 1000 milliLiter(s) (100 mL/Hr) IV Continuous <Continuous>  dextrose 50% Injectable 25 Gram(s) IV Push once  dextrose 50% Injectable 12.5 Gram(s) IV Push once  dextrose 50% Injectable 25 Gram(s) IV Push once  glucagon  Injectable 1 milliGRAM(s) IntraMuscular once  influenza   Vaccine 0.5 milliLiter(s) IntraMuscular once  insulin lispro (ADMELOG) corrective regimen sliding scale   SubCutaneous three times a day before meals  milrinone Infusion 0.125 MICROgram(s)/kG/Min (4.03 mL/Hr) IV Continuous <Continuous>  vasopressin Infusion 0.04 Unit(s)/Min (6 mL/Hr) IV Continuous <Continuous>    MEDICATIONS  (PRN):  dextrose Oral Gel 15 Gram(s) Oral once PRN Blood Glucose LESS THAN 70 milliGRAM(s)/deciliter  HYDROmorphone  Injectable 1 milliGRAM(s) IV Push every 6 hours PRN Severe Pain (7 - 10)  -------------------------------------------------------------------------------  LABS:                        9.8    20.68 )-----------( 201      ( 15 Trevon 2023 10:55 )             30.9     01-15    133<L>  |  100  |  14  ----------------------------<  134<H>  5.6<H>   |  18<L>  |  1.42<H>    Ca    9.4      15 Trevon 2023 11:27  Phos  4.5     01-15  Mg     2.0     01-15    TPro  8.2  /  Alb  3.6  /  TBili  0.8  /  DBili  x   /  AST  23  /  ALT  42  /  AlkPhos  81  01-15    LIVER FUNCTIONS - ( 15 Trevon 2023 11:27 )  Alb: 3.6 g/dL / Pro: 8.2 g/dL / ALK PHOS: 81 U/L / ALT: 42 U/L / AST: 23 U/L / GGT: x           PT/INR - ( 15 Trevon 2023 08:39 )   PT: 14.4 sec;   INR: 1.21   PTT - ( 15 Trevon 2023 08:39 )  PTT:24.2 sec    CAPILLARY BLOOD GLUCOSE  POCT Blood Glucose.: 109 mg/dL (15 Trevon 2023 11:49)    SARS-CoV-2: NotDetec (11 Jan 2023 09:33)    RADIOLOGY & ADDITIONAL TESTS: Reviewed.

## 2023-01-15 NOTE — PROGRESS NOTE ADULT - PROBLEM SELECTOR PLAN 3
1/14 early AM pt on bedside commode when she apparently syncopized. (See resident chart note 1/14/23) Patient examined at bedside, able to communicate, was moving her neck without any pain or limitations, neurological exam was benign. Fall precautions and bed rest ordered for patient. CTH, CT c-spine, CXR WNL. Pt re-evaluated multiple times during the day, mentating at baseline and physical exam unremarkable.     - Fall precautions  - Bed rest  - Repeat CBC pending to assess for ?bleed 1/14 early AM pt on bedside commode when she apparently syncopized. (See resident chart note 1/14/23) Patient examined at bedside, able to communicate, was moving her neck without any pain or limitations, neurological exam was benign. Fall precautions and bed rest ordered for patient. CTH, CT c-spine, CXR WNL. Pt re-evaluated multiple times during the day, mentating at baseline and physical exam unremarkable.   - Maintain fall precautions and strict bedrest   - Monitor CBC

## 2023-01-15 NOTE — PROGRESS NOTE ADULT - PROBLEM SELECTOR PLAN 1
The patient was urinating and had a syncopal episode and fell on the floor.  CT of the head was negative.  It is most likely related to decrease in the cardiac output related to acute right heart failure and severe pulmonary hypertension.

## 2023-01-15 NOTE — PROGRESS NOTE ADULT - PROBLEM SELECTOR PLAN 1
CTA PE protocol indicating large thrombus burden involving all lobar and multiple segmental branches bilaterally w/ probable evolving infarcts in lateral basal segment of LLL and mild pulmonary artery dilation w/ RV strain and cardiomegaly. TTE with dilated RV size, mod-severely reduced RV function, pHTN (PASP 57). Heparin gtt running @ 24 mL/hr. Etiology likely unprovoked PE in nature -- risk factors: Nuvaring contraceptive use and obesity. Denies recent travel and family hx.   - Transitioned from hep gtt to Lovenox 100Q12  - F/u BNP and repeat TTE  - Previus TTE continues to show moderately-to-severely reduced RVSF, moderately dilated RA, mild-moderate TR, and PHTN  - Maintain active T&S CTA PE protocol indicating large thrombus burden involving all lobar and multiple segmental branches bilaterally w/ probable evolving infarcts in lateral basal segment of LLL and mild pulmonary artery dilation w/ RV strain and cardiomegaly. TTE with persistently dilated RV size, mod-severely reduced RV function, pHTN. Previously on heparin gtt, transitioned to Lovenox 100 mg SQ q12h. Etiology likely unprovoked PE in nature -- risk factors: Nuvaring contraceptive use and obesity. Denies recent travel and family hx. On 1/15, pt was stepped up to MICU due to worsening tachycardia and decreasing O2 saturation, with bedside POCUS showing persistent R-sided HF and uptrending pro-BNP, suggestive of progressive cardiac decompensation.   - c/w Lovenox 100 mg SQ q12h   - Trend BNP   - Continue to monitor cardiac function via TTE   - f/u CT surgery recs   - Maintain active T&S

## 2023-01-15 NOTE — PROGRESS NOTE ADULT - PROBLEM SELECTOR PLAN 2
Pt w/ large PE involving all lobar and multiple segmental branches bilaterally w/ RV strain and cardiomegaly. TTE 1/11 with severely dilated RV size, mod-severely reduced RV function, mod TR, pHTN (PASP 57), normal LV size/function. Troponin 0.10, Pro-BNP 1395.  - AM troponin 0.01, , lactate 0.9  - f/u repeat TTE and BNP Pt w/ large PE involving all lobar and multiple segmental branches bilaterally w/ RV strain and cardiomegaly. TTE 1/11 with severely dilated RV size, mod-severely reduced RV function, mod TR, pHTN (PASP 57), normal LV size/function. Troponin 0.10 -> 0.01, Pro-BNP 1395.  - 1/15 AM BNP 2785, lactate 1.8  - Trend BNP   - Continue to monitor cardiac function via TTE   - f/u CT surgery recs

## 2023-01-15 NOTE — CONSULT NOTE ADULT - ASSESSMENT
Assessment: 32y Female with PMHx of asthma who presented to [facility] on 1/11 for 1 week of productive cough. On presentation, she was tachycardic to the 120s with elevated BNP and D-dimer. CT PE revealed large thrombus burden involving all lobar and multiple segmental branches bilaterally w/ probable evolving infarcts in lateral basal segment of LLL and mild pulmonary A dilation w/ RV strain and cardiomegaly. Patient was admitted to the MICU and started on heparin gtt with clinical deterioration (decreasing CI and increasing BNP) requiring mechanical thrombectomy (1/13). Patient was subsequently transferred to the CCU after R heart cath revealed severe RV dilation consistent with R heart failure. Patient underwent surveillance LE duplex today for etiology of PE which revealed LLE DVT in L common femoral, deep femoral and femoral veins.    Recommendations:  - No acute vascular surgical intervention at this time.  - Recommend CT venogram to visualize venocaval system   - Please contact vascular surgery Team 3C with any changes in status. May consider IVC filter placement if indicated  - discussed with Chief on call  - call [phone] with question

## 2023-01-15 NOTE — PROGRESS NOTE ADULT - ASSESSMENT
31 yo female with PMHx of asthma presents to ED i/s/o worsening chest pain and cough. CT PE showing thrombus with large burden and RV strain. Admitted to MICU for PE with RV strain and possible intervention. Started on heparin gtt with improvement in symptoms and stepped down to telemetry. Underwent mechanical thrombectomy on 1/13 and transitioned to Lovenox SQ. Stepped up to MICU on 1/15 for worsening R-sided heart failure.    NEURO  -SHELLY    PULM:  #Pulmonary embolus.   CTA PE protocol indicating large thrombus burden involving all lobar and multiple segmental branches bilaterally w/ probable evolving infarcts in lateral basal segment of LLL and mild pulmonary artery dilation w/ RV strain and cardiomegaly. TTE with persistently dilated RV size, mod-severely reduced RV function, pHTN. Previously on heparin gtt, transitioned to Lovenox 100 mg SQ q12h. Etiology likely unprovoked PE in nature -- risk factors: Nuvaring contraceptive use and obesity. Denies recent travel and family hx. On 1/15, pt was stepped up to MICU due to worsening tachycardia and decreasing O2 saturation, with bedside POCUS showing persistent R-sided HF and uptrending pro-BNP, suggestive of progressive cardiac decompensation.   - c/w Lovenox 100 mg SQ q12h   - Trend BNP   - Continue to monitor cardiac function via TTE   - f/u CT surgery recs   - Maintain active T&S.  #Asthma.  Holding steroids and albuterol as patient is asymptomatic  - No home asthma meds, as patient states she did not need any before onset of symptoms from Urgent Care.    CARDIOVASCULAR  #Acute right heart failure.   Pt w/ large PE involving all lobar and multiple segmental branches bilaterally w/ RV strain and cardiomegaly. TTE 1/11 with severely dilated RV size, mod-severely reduced RV function, mod TR, pHTN (PASP 57), normal LV size/function. Troponin 0.10 -> 0.01, Pro-BNP 1395.  - 1/15 AM BNP 2785, lactate 1.8  - Trend BNP   - Continue to monitor cardiac function via TTE   - f/u CT surgery recs.  #Syncope.   1/14 early AM pt on bedside commode when she apparently syncopized. (See resident chart note 1/14/23) Patient examined at bedside, able to communicate, was moving her neck without any pain or limitations, neurological exam was benign. Fall precautions and bed rest ordered for patient. CTH, CT c-spine, CXR WNL. Pt re-evaluated multiple times during the day, mentating at baseline and physical exam unremarkable.   - Maintain fall precautions and strict bedrest   - Monitor CBC.  GI  -SHELLY    /RENAL  #Fibroids.  Pt reports chronic lower abdominal pain 2/2 uterine fibroids. 8/10 in severity at its worst. Pt has been straining to urinate and pass BMs which she believes may be due to fibroids.   - c/w Tylenol 650 mg PO q6h standing   - c/w IV dilaudid 1 mg q6h PRN for severe pain   - Bladder scans q6h  - Monitor BMs   - Bedpan while on strict bedrest.  ENDO  -SHELLY    ID  -SHELLY    HEME  #Anemia  Hb 9.8 upon arrival to CCU. No signs or hx of active bleed.  -maintain active type and screen  -transfuse Hb < 7    PROPHYLAXIS   F: none; tolerating PO  E: Replete K <4, Mg <2  N: Regular diet  DVT ppx: heparin gtt--> Lovenox 100 Q12  GI ppx: not needed  Code: Full code  Dispo: MICU--> CCU   33 yo female with PMHx of asthma presents to ED i/s/o worsening chest pain and cough. CT PE showing thrombus with large burden and RV strain. Admitted to MICU for PE with RV strain and possible intervention. Started on heparin gtt with improvement in symptoms and stepped down to telemetry. Underwent mechanical thrombectomy on 1/13 and transitioned to Lovenox SQ. Stepped up to MICU on 1/15 for worsening R-sided heart failure.    NEURO  -SHELLY    PULM:  #Pulmonary embolus.   CTA PE protocol indicating large thrombus burden involving all lobar and multiple segmental branches bilaterally w/ probable evolving infarcts in lateral basal segment of LLL and mild pulmonary artery dilation w/ RV strain and cardiomegaly. TTE with persistently dilated RV size, mod-severely reduced RV function, pHTN. Previously on heparin gtt, transitioned to Lovenox 100 mg SQ q12h. Etiology likely unprovoked PE in nature -- risk factors: Nuvaring contraceptive use and obesity. Denies recent travel and family hx. On 1/15, pt was stepped up to MICU due to worsening tachycardia and decreasing O2 saturation, with bedside POCUS showing persistent R-sided HF and uptrending pro-BNP, suggestive of progressive cardiac decompensation.   - c/w Lovenox 100 mg SQ q12h   - Trend BNP   - Continue to monitor cardiac function via TTE   - f/u CT surgery recs   - Maintain active T&S.  #Asthma.  Holding steroids and albuterol as patient is asymptomatic  - No home asthma meds, as patient states she did not need any before onset of symptoms from Urgent Care.    CARDIOVASCULAR  #Acute right heart failure.   Pt w/ large PE involving all lobar and multiple segmental branches bilaterally w/ RV strain and cardiomegaly. TTE 1/11 with severely dilated RV size, mod-severely reduced RV function, mod TR, pHTN (PASP 57), normal LV size/function. Troponin 0.10 -> 0.01, Pro-BNP 1395.  - 1/15 AM BNP 2785, lactate 1.8  - Trend BNP   - Continue to monitor cardiac function via TTE   - f/u CT surgery recs.  #Syncope.   1/14 early AM pt on bedside commode when she apparently syncopized. (See resident chart note 1/14/23) Patient examined at bedside, able to communicate, was moving her neck without any pain or limitations, neurological exam was benign. Fall precautions and bed rest ordered for patient. CTH, CT c-spine, CXR WNL. Pt re-evaluated multiple times during the day, mentating at baseline and physical exam unremarkable.   - Maintain fall precautions and strict bedrest   - Monitor CBC.  GI  -SHELLY    /RENAL  #Fibroids.  Pt reports chronic lower abdominal pain 2/2 uterine fibroids. 8/10 in severity at its worst. Pt has been straining to urinate and pass BMs which she believes may be due to fibroids.   - c/w Tylenol 650 mg PO q6h standing   - c/w IV dilaudid 1 mg q6h PRN for severe pain   - Bladder scans q6h  - Monitor BMs   - Bedpan while on strict bedrest.    #SUNNY  Cr on admission 0.82, now 1.42.  -avoid nephrotoxic agents  -renally dose meds   -trend SCr    ENDO  -SHELLY    ID  -SHELLY    HEME  #Anemia  Hb 9.8 upon arrival to CCU. No signs or hx of active bleed.  -maintain active type and screen  -transfuse Hb < 7    PROPHYLAXIS   F: none; tolerating PO  E: Replete K <4, Mg <2  N: Regular diet  DVT ppx: heparin gtt--> Lovenox 100 Q12  GI ppx: not needed  Code: Full code  Dispo: MICU--> CCU   31 yo female with PMHx of asthma presents to ED i/s/o worsening chest pain and cough. CT PE showing thrombus with large burden and RV strain. Admitted to MICU for PE with RV strain and possible intervention. Started on heparin gtt with improvement in symptoms and stepped down to telemetry. Underwent mechanical thrombectomy on 1/13 and transitioned to Lovenox SQ. Stepped up to MICU on 1/15 for worsening R-sided heart failure.    NEURO  -SHELLY    PULM:  #Pulmonary embolus.   CTA PE protocol indicating large thrombus burden involving all lobar and multiple segmental branches bilaterally w/ probable evolving infarcts in lateral basal segment of LLL and mild pulmonary artery dilation w/ RV strain and cardiomegaly. TTE with persistently dilated RV size, mod-severely reduced RV function, pHTN. Previously on heparin gtt, transitioned to Lovenox 100 mg SQ q12h. Etiology likely unprovoked PE in nature -- risk factors: Nuvaring contraceptive use and obesity. Denies recent travel and family hx. On 1/15, pt was stepped up to MICU due to worsening tachycardia and decreasing O2 saturation, with bedside POCUS showing persistent R-sided HF and uptrending pro-BNP, suggestive of progressive cardiac decompensation.   - c/w Lovenox 100 mg SQ q12h   - Trend BNP   - Continue to monitor cardiac function via TTE   - f/u CT surgery recs   - Maintain active T&S.  #Asthma.  Holding steroids and albuterol as patient is asymptomatic  - No home asthma meds, as patient states she did not need any before onset of symptoms from Urgent Care.    CARDIOVASCULAR  #Acute right heart failure.   Pt w/ large PE involving all lobar and multiple segmental branches bilaterally w/ RV strain and cardiomegaly. TTE 1/11 with severely dilated RV size, mod-severely reduced RV function, mod TR, pHTN (PASP 57), normal LV size/function. Troponin 0.10 -> 0.01, Pro-BNP 1395.  - 1/15 AM BNP 2785, lactate 1.8  - Trend BNP   - Continue to monitor cardiac function via TTE   - f/u CT surgery recs.  #Syncope.   1/14 early AM pt on bedside commode when she apparently syncopized. (See resident chart note 1/14/23) Patient examined at bedside, able to communicate, was moving her neck without any pain or limitations, neurological exam was benign. Fall precautions and bed rest ordered for patient. CTH, CT c-spine, CXR WNL. Pt re-evaluated multiple times during the day, mentating at baseline and physical exam unremarkable.   - Maintain fall precautions and strict bedrest   - Monitor CBC.  GI  -SHELLY    /RENAL  #Fibroids.  Pt reports chronic lower abdominal pain 2/2 uterine fibroids. 8/10 in severity at its worst. Pt has been straining to urinate and pass BMs which she believes may be due to fibroids.   - c/w Tylenol 650 mg PO q6h standing   - c/w IV dilaudid 1 mg q6h PRN for severe pain   - Bladder scans q6h  - Monitor BMs   - Bedpan while on strict bedrest.    #SUNNY  Cr on admission 0.82, now 1.42.  -avoid nephrotoxic agents  -renally dose meds   -trend SCr    ENDO  -SHELLY    ID  #Fever   Patient febrile to 101.3. Most likely 2/2 thrombus, but septic workup initiated.  -obtain 2 sets of blood cx  -obtain urine cx     HEME  #Anemia  Hb 9.8 upon arrival to CCU. No signs or hx of active bleed.  -maintain active type and screen  -transfuse Hb < 7    PROPHYLAXIS   F: none; tolerating PO  E: Replete K <4, Mg <2  N: Regular diet  DVT ppx: heparin gtt--> Lovenox 100 Q12  GI ppx: not needed  Code: Full code  Dispo: MICU--> CCU   31 yo female with PMHx of asthma presents to ED i/s/o worsening chest pain and cough. CT PE showing thrombus with large burden and RV strain. Admitted to MICU for PE with RV strain and possible intervention. Started on heparin gtt with improvement in symptoms and stepped down to telemetry. Underwent mechanical thrombectomy on 1/13 and transitioned to Lovenox SQ. Stepped up to MICU on 1/15 for worsening R-sided heart failure.    NEURO  -SHELLY    PULM:  #Pulmonary embolus.   CTA PE protocol indicating large thrombus burden involving all lobar and multiple segmental branches bilaterally w/ probable evolving infarcts in lateral basal segment of LLL and mild pulmonary artery dilation w/ RV strain and cardiomegaly. TTE with persistently dilated RV size, mod-severely reduced RV function, pHTN. Previously on heparin gtt, transitioned to Lovenox 100 mg SQ q12h. Etiology likely unprovoked PE in nature -- risk factors: Nuvaring contraceptive use and obesity. Denies recent travel and family hx. On 1/15, pt was stepped up to MICU due to worsening tachycardia and decreasing O2 saturation, with bedside POCUS showing persistent R-sided HF and uptrending pro-BNP, suggestive of progressive cardiac decompensation.   - patient now with EKOS catheter to be removed 1 hr after tPA infusion is complete   - TPA until 21:30, heparin until 00:30, then transition to BID LMWH   - Trend BNP   - Continue to monitor cardiac function via TTE   - f/u CT surgery recs   - Maintain active T&S.    #Asthma.  Holding steroids and albuterol as patient is asymptomatic  - No home asthma meds, as patient states she did not need any before onset of symptoms from Urgent Care.    CARDIOVASCULAR  #Acute right heart failure.   Pt w/ large PE involving all lobar and multiple segmental branches bilaterally w/ RV strain and cardiomegaly. TTE 1/11 with severely dilated RV size, mod-severely reduced RV function, mod TR, pHTN (PASP 57), normal LV size/function. Troponin 0.10 -> 0.01, Pro-BNP 1395.  - 1/15 AM BNP 2785, lactate 1.8  - Trend BNP   - Continue to monitor cardiac function via TTE   - f/u CT surgery recs    #Syncope  1/14 early AM pt on bedside commode when she apparently syncopized. (See resident chart note 1/14/23) Patient examined at bedside, able to communicate, was moving her neck without any pain or limitations, neurological exam was benign. Fall precautions and bed rest ordered for patient. CTH, CT c-spine, CXR WNL. Pt re-evaluated multiple times during the day, mentating at baseline and physical exam unremarkable.   - Maintain fall precautions and strict bedrest   - Monitor CBC    GI  -SHELLY    /RENAL  #Fibroids.  Pt reports chronic lower abdominal pain 2/2 uterine fibroids. 8/10 in severity at its worst. Pt has been straining to urinate and pass BMs which she believes may be due to fibroids.   - c/w Tylenol 650 mg PO q6h standing   - c/w IV dilaudid 1 mg q6h PRN for severe pain   - Bladder scans q6h  - Monitor BMs   - Bedpan while on strict bedrest.    #SUNNY  Cr on admission 0.82, now 1.42.  -avoid nephrotoxic agents  -renally dose meds   -trend SCr    #Urinary Retention  -required straight cath x1 on 1/15  -q6h bladder scans    ENDO  -SHELLY    ID  #Fever   Patient febrile to 101.3. Most likely 2/2 thrombus, but septic workup initiated.  -obtain 2 sets of blood cx  -obtain urine cx     HEME  #Anemia  Hb 9.8 upon arrival to CCU. No signs or hx of active bleed.  -maintain active type and screen  -transfuse Hb < 7    PROPHYLAXIS   F: none; tolerating PO  E: Replete K <4, Mg <2  N: Regular diet  DVT ppx: heparin gtt--> Lovenox 100 Q12  GI ppx: not needed  Code: Full code  Dispo: MICU--> CCU

## 2023-01-15 NOTE — PROGRESS NOTE ADULT - PROBLEM SELECTOR PLAN 2
More tachycardic in around 3:00 in the morning and had increasing oxygen requirement.  The proBNP is about the same 2500.  Stat echocardiogram was done by the structural heart attending and reveal Can change and the dilated dysfunctional right heart which is severe with pulmonary pressure seems to be unchanged.  There is also is evidence of tricuspid regurg.  There is still bowing of the septum towards the left side.  The left heart is on the fill.  Await the official report.  At that time the blood pressure did decrease further and patient was transferred to the ICU and was started on vasopressin and Neurontin.  The drapes was put on hold prior to the right heart catheterization.  The right heart catheterization revealed elevated pulmonary pressures cardiac index is about 1.3-1.6 by calculation and also by thermodilution methods..  Cardiac end is decreased compared to the previous echocardiogram which was done yesterday which was cardiac index of 2.  The pulmonary artery oxygen saturation was 29 was no set up indicating no evidence of a shunting and is all in the support of the conclusion of acute right heart failure.  The patient had a right heart catheterization in the Cath Lab when she was transferred to the Cath Lab for right heart catheterization and angiogram of possible intervention.  After the cardiac catheterization the patient was started on milrinone and again and required nitrous oxide at 9 PPI.

## 2023-01-15 NOTE — PROGRESS NOTE ADULT - ASSESSMENT
33 yo female with PMHx of asthma presents to ED i/s/o worsening chest pain and cough. CT PE showing thrombus with large burden and RV strain. Admitted to MICU for PE with RV strain and possible intervention. Started on heparin gtt with improvement in symptoms, now stable for stepdown to telemetry.  33 yo female with PMHx of asthma presents to ED i/s/o worsening chest pain and cough. CT PE showing thrombus with large burden and RV strain. Admitted to MICU for PE with RV strain and possible intervention. Started on heparin gtt with improvement in symptoms and stepped down to telemetry. Underwent mechanical thrombectomy on 1/13 and transitioned to Lovenox SQ. Stepped up to MICU on 1/15 for worsening R-sided heart failure.

## 2023-01-15 NOTE — CONSULT NOTE ADULT - SUBJECTIVE AND OBJECTIVE BOX
Vascular Surgery Consult Note    Attending:  Dr. Rainey    HPI:  Patient is a 32F with PMHx of asthma who presented to [facility] on  for 1 week of productive cough. Patient reported on presentation that she had mild cough and chest tightness for 3 weeks and was worked up at an outpatient clinic and was negative for both COVID-19 and Influenza. She was prescribed azithromycin and prednisone and was not improving after 5 days with potential syncopal event at home which prompted her presentation. She was found to be tachycardic to the 120s with elevated BNP and D-dimer. CT PE revealed large thrombus burden involving all lobar and multiple segmental branches bilaterally w/ probable evolving infarcts in lateral basal segment of LLL and mild pulmonary A dilation w/ RV strain and cardiomegaly. Patient was admitted to the MICU and started on heparin gtt. Patient denies any history of DVT, PE or personal/family history of hypercoagulable disorder. She is now POD2 from mechanical thrombectomy given clinical deterioration on heparin gtt (decreasing cardiac indx and increase BNP). Patient was subsequently transferred to the CCU after R heart cath revealed severe RV dilation consistent with R heart failure. Patient underwent surveillance LE duplex today for etiology of PE which revealed LLE DVT in L common femoral, deep femoral and femoral veins. Patient reports L leg pain was gradual onset today and is located in L thigh. States the pain is sharp and sometimes throbbing. Patient is asking for morphine to relieve the pain.       (2023 15:43)      PAST MEDICAL & SURGICAL HISTORY:  Asthma      Fibroids      Anemia      No significant past surgical history          MEDICATIONS  (STANDING):  acetaminophen     Tablet .. 650 milliGRAM(s) Oral every 6 hours  alteplase    Infusion 1 mG/Hr (10 mL/Hr) IntraCatheter.. <Continuous>  cefTRIAXone   IVPB 2000 milliGRAM(s) IV Intermittent every 24 hours  chlorhexidine 2% Cloths 1 Application(s) Topical <User Schedule>  dextrose 5%. 1000 milliLiter(s) (50 mL/Hr) IV Continuous <Continuous>  dextrose 5%. 1000 milliLiter(s) (100 mL/Hr) IV Continuous <Continuous>  dextrose 50% Injectable 25 Gram(s) IV Push once  dextrose 50% Injectable 12.5 Gram(s) IV Push once  dextrose 50% Injectable 25 Gram(s) IV Push once  glucagon  Injectable 1 milliGRAM(s) IntraMuscular once  heparin  Infusion 400 Unit(s)/Hr (4 mL/Hr) IV Continuous <Continuous>  influenza   Vaccine 0.5 milliLiter(s) IntraMuscular once  insulin lispro (ADMELOG) corrective regimen sliding scale   SubCutaneous three times a day before meals  milrinone Infusion 0.125 MICROgram(s)/kG/Min (4.03 mL/Hr) IV Continuous <Continuous>  vasopressin Infusion 0.04 Unit(s)/Min (6 mL/Hr) IV Continuous <Continuous>    MEDICATIONS  (PRN):  dextrose Oral Gel 15 Gram(s) Oral once PRN Blood Glucose LESS THAN 70 milliGRAM(s)/deciliter  HYDROmorphone  Injectable 1 milliGRAM(s) IV Push every 6 hours PRN Severe Pain (7 - 10)      Allergies    No Known Allergies    Intolerances        SOCIAL HISTORY:    FAMILY HISTORY:      Vital Signs Last 24 Hrs  T(C): 38.5 (15 Trevon 2023 11:00), Max: 38.5 (15 Trevon 2023 11:00)  T(F): 101.3 (15 Trevon 2023 11:00), Max: 101.3 (15 Trevon 2023 11:00)  HR: 142 (15 Trevon 2023 17:00) (94 - 152)  BP: 113/70 (15 Trevon 2023 11:00) (113/70 - 136/73)  BP(mean): 84 (15 Trevon 2023 11:00) (84 - 99)  RR: 30 (15 Trevon 2023 16:00) (16 - 32)  SpO2: 100% (15 Trevon 2023 17:00) (92% - 100%)    Parameters below as of 15 Trevon 2023 17:00  Patient On (Oxygen Delivery Method): nasal cannula  O2 Flow (L/min): 5      I&O's Summary    2023 07:01  -  15 Trevon 2023 07:00  --------------------------------------------------------  IN: 0 mL / OUT: 2 mL / NET: -2 mL    15 Trevon 2023 07:01  -  15 Trevon 2023 17:55  --------------------------------------------------------  IN: 16.1 mL / OUT: 0 mL / NET: 16.1 mL    PHYSICAL EXAM:  General: Resting in bed, mildly uncomfortable, NAD  Neuro: A&Ox3, normal speech  HEENT: ATNC  Pulmonary: Equal chest wall expansion b/l, no respiratory distress  Cardiovascular: Tachycardic - sinus rhythm  Abdomen: Soft, nondistended, nontender  Extremities WWP, no significant edema noted in B/L, TTP in L upper thigh        LABS:                        9.8    19.31 )-----------( 173      ( 15 Trevon 2023 16:20 )             30.6     01-15    133<L>  |  100  |  16  ----------------------------<  136<H>  4.9   |  18<L>  |  1.43<H>    Ca    9.2      15 Trevon 2023 15:24  Phos  4.5     -15  Mg     2.0     01-15    TPro  8.0  /  Alb  3.6  /  TBili  0.7  /  DBili  x   /  AST  22  /  ALT  39  /  AlkPhos  79  01-15    PT/INR - ( 15 Trevon 2023 08:39 )   PT: 14.4 sec;   INR: 1.21          PTT - ( 15 Trevon 2023 08:39 )  PTT:24.2 sec  Urinalysis Basic - ( 15 Trevon 2023 16:48 )    Color: Yellow / Appearance: Clear / S.015 / pH: x  Gluc: x / Ketone: NEGATIVE  / Bili: Negative / Urobili: 0.2 E.U./dL   Blood: x / Protein: Trace mg/dL / Nitrite: NEGATIVE   Leuk Esterase: NEGATIVE / RBC: < 5 /HPF / WBC < 5 /HPF   Sq Epi: x / Non Sq Epi: 0-5 /HPF / Bacteria: None /HPF      CAPILLARY BLOOD GLUCOSE      POCT Blood Glucose.: 90 mg/dL (15 Trevon 2023 16:39)  POCT Blood Glucose.: 109 mg/dL (15 Trevon 2023 11:49)  POCT Blood Glucose.: 127 mg/dL (15 Trevon 2023 07:10)  POCT Blood Glucose.: 107 mg/dL (2023 21:51)  POCT Blood Glucose.: 130 mg/dL (2023 18:23)    LIVER FUNCTIONS - ( 15 Trevon 2023 15:24 )  Alb: 3.6 g/dL / Pro: 8.0 g/dL / ALK PHOS: 79 U/L / ALT: 39 U/L / AST: 22 U/L / GGT: x             RADIOLOGY:    INTERPRETATION:  CLINICAL INFORMATION: Pulmonary emboli.    COMPARISON: Chest CT dated 2023    TECHNIQUE: Duplex sonography of the BILATERAL LOWER extremity veins with   color and spectral Doppler, with and without compression.    FINDINGS:    RIGHT:  Normal compressibility of the RIGHT common femoral, femoral and popliteal   veins.  Doppler examination shows normal spontaneous and phasic flow.  No RIGHT calf vein thrombosis is detected.    LEFT:    There is acute deep vein thrombosis in the left common femoral vein, deep   femoral vein and femoral vein. The left popliteal vein is unremarkable.    Limited visualization of the calf veins.    IMPRESSION:  Extensive left leg acute deep vein thrombosis as described above.  Acute deep venous thrombosis: above the knee.

## 2023-01-15 NOTE — PROGRESS NOTE ADULT - PROBLEM SELECTOR PLAN 4
- Holding steroids and albuterol as patient is asymptomatic  - No home asthma meds, as patient states she did not need any before onset of symptoms from Urgent Care Pt reports chronic lower abdominal pain 2/2 uterine fibroids. 8/10 in severity at its worst. Pt has been straining to urinate and pass BMs which she believes may be due to fibroids.   - c/w Tylenol 650 mg PO q6h standing   - c/w IV dilaudid 1 mg q6h PRN for severe pain   - Bladder scans q6h  - Monitor BMs   - Bedpan while on strict bedrest

## 2023-01-15 NOTE — PROGRESS NOTE ADULT - PROBLEM SELECTOR PLAN 5
The pulmonary pressures were elevated on the right heart catheterization.  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  Cardiac directed thrombolytics was initiated with ultrasound guidance.  Also the patient was started on murmur on for increased contractility.  Also given diuretics and was started on nitrous oxide for the pulmonary hypertension.  The pulmonary artery catheter was removed for the catheter directed thrombolysis

## 2023-01-15 NOTE — PROCEDURE NOTE - ADDITIONAL PROCEDURE DETAILS
USPIV placed to left AC 18g 1.88inch  labs drawn as well. USPIV placed to left AC 18g 1.88inch  labs drawn with insertion

## 2023-01-15 NOTE — PROCEDURE NOTE - ADDITIONAL PROCEDURE DETAILS
Left radial art line placed x 1 attempt   note incomplete Left radial art line placed x 1 attempt   ABG and troponin sent after insertion

## 2023-01-15 NOTE — PROCEDURE NOTE - NSICDXPROCEDURE_GEN_ALL_CORE_FT
PROCEDURES:  Venipuncture in adult 15-Trevon-2023 17:26:29  Denny Pimentel  
PROCEDURES:  Insertion of intravenous catheter with ultrasound guidance 15-Trevon-2023 13:01:24  Denny Pimentel  Insertion, needle, vein 15-Trevon-2023 13:01:28  Denny Pimentel  
PROCEDURES:  Insertion of intravenous catheter with ultrasound guidance 15-Trevon-2023 13:01:24  Denny Pimentel  Insertion, needle, vein 15-Trevon-2023 13:01:28  Denny Pimentel  Insertion of arterial catheter with ultrasound guidance 15-Trevon-2023 13:04:58  Denny Pimentel

## 2023-01-15 NOTE — PROCEDURE NOTE - NSPROCDETAILS_GEN_ALL_CORE
location identified, draped/prepped, sterile technique used, needle inserted/introduced/positive blood return obtained via catheter/connected to a pressurized flush line/sutured in place/hemostasis with direct pressure, dressing applied/Seldinger technique/all materials/supplies accounted for at end of procedure
location identified, draped/prepped, sterile technique used/blood seen on insertion/dressing applied/flushes easily/secured in place

## 2023-01-15 NOTE — PROGRESS NOTE ADULT - SUBJECTIVE AND OBJECTIVE BOX
Interval Events: Reviewed  Patient seen and examined at bedside.    Patient is a 32y old  Female who presents with a chief complaint of R heart failure (2023 07:00)    she is sob  PAST MEDICAL & SURGICAL HISTORY:  Asthma      Fibroids      Anemia      No significant past surgical history          MEDICATIONS:  Pulmonary:    Antimicrobials:  cefTRIAXone   IVPB 2000 milliGRAM(s) IV Intermittent every 24 hours    Anticoagulants:  enoxaparin Injectable 100 milliGRAM(s) SubCutaneous every 12 hours    Cardiac:  milrinone Infusion 0.1 MICROgram(s)/kG/Min IV Continuous <Continuous>      Allergies    No Known Allergies    Intolerances        Vital Signs Last 24 Hrs  T(C): 37.4 (2023 10:00), Max: 38.9 (15 Trevon 2023 21:55)  T(F): 99.3 (2023 10:00), Max: 102 (15 Trevon 2023 21:55)  HR: 108 (2023 11:00) (107 - 153)  BP: 121/83 (2023 11:00) (121/83 - 132/82)  BP(mean): 98 (2023 11:00) (94 - 98)  RR: 18 (2023 11:00) (18 - 35)  SpO2: 100% (2023 11:00) (95% - 100%)    Parameters below as of 2023 11:00  Patient On (Oxygen Delivery Method): nasal cannula, high flow  O2 Flow (L/min): 40  O2 Concentration (%): 50    01-15 @ : @ 07:00  --------------------------------------------------------  IN: 229.5 mL / OUT: 1290 mL / NET: -1060.5 mL     @ :  -   @ 12:53  --------------------------------------------------------  IN: 12.5 mL / OUT: 0 mL / NET: 12.5 mL          Review of Systems:   •	General: negative  •	Skin/Breast: negative  •	Ophthalmologic: negative  •	ENMT: negative  •	Respiratory and Thorax: sob  •	Cardiovascular: negative  •	Gastrointestinal: negative  •	Genitourinary: negative  •	Musculoskeletal: negative  •	Neurological: negative  •	Psychiatric: negative  •	Hematology/Lymphatics: negative  •	Endocrine: negative  •	Allergic/Immunologic: negative    Physical Exam:   • Constitutional:	refer to the dietion /Nutritionist note  • Eyes:	EOMI; PERRL; no drainage or redness  • ENMT:	No oral lesions; no gross abnormalities  • Neck	No bruits; no thyromegaly or nodules  • Breasts:	not examined  • Back:	No deformity or limitation of movement  • Respiratory:	Breath Sounds equal & clear to percussion & auscultation, no accessory muscle use  • Cardiovascular:	Regular rate & rhythm, normal S1, S2; no murmurs, gallops or rubs; no S3, S4  • Gastrointestinal:	Soft, non-tender, no hepatosplenomegaly, normal bowel sounds  • Genitourinary:	not examined  • Rectal: not examined  • Extremities:	No cyanosis, clubbing or edema  • Vascular:	Equal and normal pulses (carotid, femoral, dorsalis pedis)  • Neurologica:l	not examined  • Skin:	No lesions; no rash  • Lymph Nodes:	No lymphadedenopathy  • Musculoskeletal:	No joint pain, swelling or deformity; no limitation of movement        LABS:                CBC Full  -  ( 2023 06:24 )  WBC Count : 21.34 K/uL  RBC Count : 3.87 M/uL  Hemoglobin : 9.8 g/dL  Hematocrit : 30.6 %  Platelet Count - Automated : 126 K/uL  Mean Cell Volume : 79.1 fl  Mean Cell Hemoglobin : 25.3 pg  Mean Cell Hemoglobin Concentration : 32.0 gm/dL  Auto Neutrophil # : x  Auto Lymphocyte # : x  Auto Monocyte # : x  Auto Eosinophil # : x  Auto Basophil # : x  Auto Neutrophil % : x  Auto Lymphocyte % : x  Auto Monocyte % : x  Auto Eosinophil % : x  Auto Basophil % : x    01-16    132<L>  |  99  |  16  ----------------------------<  165<H>  4.4   |  18<L>  |  0.98    Ca    8.8      2023 06:24  Phos  4.4     01-16  Mg     2.2         TPro  7.7  /  Alb  3.4  /  TBili  0.9  /  DBili  x   /  AST  24  /  ALT  31  /  AlkPhos  76      PT/INR - ( 2023 02:36 )   PT: 15.5 sec;   INR: 1.30          PTT - ( 2023 02:36 )  PTT:33.4 sec    ECHO acute right heart failure   < from: Xray Chest 1 View-PORTABLE IMMEDIATE (Xray Chest 1 View-PORTABLE IMMEDIATE .) (01.15.23 @ 08:42) >    ACC: [id number] EXAM:  XR CHEST PORTABLE IMMED 1V                          PROCEDURE DATE:  01/15/2023          INTERPRETATION:  Clinical History: Desaturation    Frontal examination of the chest demonstrates the heart to be within   normal limits intransverse diameter. No acute infiltrates. Visualized   osseous structures are within normal limits.    IMPRESSION: No acute infiltrates    < end of copied text >    Urinalysis Basic - ( 15 Trevon 2023 16:48 )    Color: Yellow / Appearance: Clear / S.015 / pH: x  Gluc: x / Ketone: NEGATIVE  / Bili: Negative / Urobili: 0.2 E.U./dL   Blood: x / Protein: Trace mg/dL / Nitrite: NEGATIVE   Leuk Esterase: NEGATIVE / RBC: < 5 /HPF / WBC < 5 /HPF   Sq Epi: x / Non Sq Epi: 0-5 /HPF / Bacteria: None /HPF              Culture Results:   No growth at 12 hours (01-15 @ 17:23)  Culture Results:   No growth at 12 hours (01-15 @ 17:23)      RADIOLOGY & ADDITIONAL STUDIES (The following images were personally reviewed):

## 2023-01-15 NOTE — PROGRESS NOTE ADULT - SUBJECTIVE AND OBJECTIVE BOX
*****TRANSFER NOTE FROM [facility] TO [facility]*****    Patient is a 32y old  Female who presents with a chief complaint of PE (12 Jan 2023 16:31)    HOSPITAL COURSE:  33 yo female with PMHx of asthma and anemia 2/2 myomectomy, on birth control (Nuvaring) presented with 1 week of productive cough. Pt stating she began feeling symptoms of mild cough and chest tightness 3 weeks prior. She went to  where workup was negative for COVID and Influenza. Pt prescribed Azithromycin and Prednisone 20mg -- currently on day 5. Pt stating she awoke this AM w/ progressively worse chest tightness. She used her inhaler and nebulizer tx at home with minimal relief and had syncopal event at home. Pt with no hx of DVT, PE and also denies family history of clots, blood disorders.     INTERVAL HPI/OVERNIGHT EVENTS:      REVIEW OF SYSTEMS:  CONSTITUTIONAL: No fever, weight loss, or fatigue  EYES: No eye pain, visual disturbances, or discharge  ENMT:  No difficulty hearing, tinnitus, vertigo; No sinus or throat pain  NECK: No pain or stiffness  BREASTS: No pain, masses, or nipple discharge  RESPIRATORY: No cough, wheezing, chills or hemoptysis; No shortness of breath  CARDIOVASCULAR: No chest pain, palpitations, dizziness, or leg swelling  GASTROINTESTINAL: No abdominal or epigastric pain. No nausea, vomiting, or hematemesis; No diarrhea or constipation. No melena or hematochezia.  GENITOURINARY: No dysuria, frequency, hematuria, or incontinence  NEUROLOGICAL: No headaches, memory loss, loss of strength, numbness, or tremors  SKIN: No itching, burning, rashes, or lesions   LYMPH NODES: No enlarged glands  ENDOCRINE: No heat or cold intolerance; No hair loss  MUSCULOSKELETAL: No joint pain or swelling; No muscle, back, or extremity pain  PSYCHIATRIC: No depression, anxiety, mood swings, or difficulty sleeping  HEME/LYMPH: No easy bruising, or bleeding gums  ALLERY AND IMMUNOLOGIC: No hives or eczema  FAMILY HISTORY:      VITAL SIGNS:  T(C): 36.4 (01-15-23 @ 07:00), Max: 37.9 (01-15-23 @ 01:31)  HR: 126 (01-15-23 @ 04:22) (94 - 144)  BP: 121/85 (01-15-23 @ 04:22) (121/85 - 124/62)  RR: 16 (01-15-23 @ 04:22) (16 - 17)  SpO2: 94% (01-15-23 @ 04:22) (94% - 98%)  Wt(kg): --Vital Signs Last 24 Hrs  T(C): 36.4 (15 Trevon 2023 07:00), Max: 37.9 (15 Trevon 2023 01:31)  T(F): 97.6 (15 Trevon 2023 07:00), Max: 100.2 (15 Trevon 2023 01:31)  HR: 126 (15 Trevon 2023 04:22) (94 - 144)  BP: 121/85 (15 Trevon 2023 04:22) (121/85 - 124/62)  BP(mean): 99 (15 Trevon 2023 04:22) (85 - 99)  RR: 16 (15 Trevon 2023 04:22) (16 - 17)  SpO2: 94% (15 Trevon 2023 04:22) (94% - 98%)    Parameters below as of 15 Trevon 2023 04:22  Patient On (Oxygen Delivery Method): room air      No Known Allergies      PHYSICAL EXAM:  GENERAL: NAD  HEAD:  Atraumatic, Normocephalic  EYES: EOMI, PERRLA, conjunctiva and sclera clear  ENMT: No tonsillar erythema, exudates, or enlargement; Moist mucous membranes, Good dentition, No lesions  NECK: Supple, No JVD, Normal thyroid  NERVOUS SYSTEM:  Alert & Oriented X3, Good concentration; Motor Strength 5/5 B/L upper and lower extremities; DTRs 2+ intact and symmetric  CHEST/LUNG: Clear to auscultation bilaterally; No rales, rhonchi, wheezing, or rubs  HEART: Regular rate and rhythm; No murmurs, rubs, or gallops  ABDOMEN: Soft, Nontender, Nondistended; Bowel sounds present  EXTREMITIES:  2+ Peripheral Pulses, No clubbing, cyanosis, or edema  LYMPH: No lymphadenopathy noted  SKIN: No rashes or lesions    Consultant(s) Notes Reviewed:  [x ] YES  [ ] NO  Care Discussed with Consultants/Other Providers [ x] YES  [ ] NO    LABS:      RADIOLOGY & ADDITIONAL TESTS:    Imaging Personally Reviewed:  [ ] YES  [ ] NO  acetaminophen     Tablet .. 650 milliGRAM(s) Oral every 6 hours  chlorhexidine 2% Cloths 1 Application(s) Topical <User Schedule>  dextrose 5%. 1000 milliLiter(s) IV Continuous <Continuous>  dextrose 5%. 1000 milliLiter(s) IV Continuous <Continuous>  dextrose 50% Injectable 25 Gram(s) IV Push once  dextrose 50% Injectable 12.5 Gram(s) IV Push once  dextrose 50% Injectable 25 Gram(s) IV Push once  dextrose Oral Gel 15 Gram(s) Oral once PRN  enoxaparin Injectable 100 milliGRAM(s) SubCutaneous every 12 hours  glucagon  Injectable 1 milliGRAM(s) IntraMuscular once  HYDROmorphone  Injectable 1 milliGRAM(s) IV Push every 6 hours PRN  influenza   Vaccine 0.5 milliLiter(s) IntraMuscular once  insulin lispro (ADMELOG) corrective regimen sliding scale   SubCutaneous three times a day before meals      HEALTH ISSUES - PROBLEM Dx:  Pulmonary embolus    Acute right heart failure    Pulmonary thromboembolism    Pulmonary embolism with acute cor pulmonale    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 Pt stating she began feeling symptoms of mild cough and chest tightness 3 weeks prior. She went to  where workup was negative for COVID and Influenza. Pt prescribed Azithromycin and Prednisone. Pt presented to ED after she awoke in AM w/ progressively worse chest tightness. She used her inhaler and nebulizer tx at home with minimal relief and had syncopal event at home. Pt with no hx of DVT, PE and also denies family history of clots, blood disorders. In the ED, CT PE with large thrombus burden involving all lobar and multiple segmental branches bilaterally w/ probable evolving infarcts in lateral basal segment of LLL and mild pulmonary artery dilation w/ RV strain and cardiomegaly. 1/11 TTE with severely dilated RV size, mod-severely reduced RV function, mod TR, pHTN (PASP 57), nml LV size/function. Troponin 0.10, Pro-BNP 1395, lactate normal. Pulmonology consulted. Patient admitted to MICU for intermediate risk PE. Patient started on Heparin drip, currently at 24cc/hr with improvement in tachycardia (HR 120s > 100s). Per PERT, no plan for thrombectomy. Pt is medically stable and transferred to telemetry for further management.        INTERVAL HPI/OVERNIGHT EVENTS:      REVIEW OF SYSTEMS:  CONSTITUTIONAL: No fever, weight loss, or fatigue  EYES: No eye pain, visual disturbances, or discharge  ENMT:  No difficulty hearing, tinnitus, vertigo; No sinus or throat pain  NECK: No pain or stiffness  BREASTS: No pain, masses, or nipple discharge  RESPIRATORY: No cough, wheezing, chills or hemoptysis; No shortness of breath  CARDIOVASCULAR: No chest pain, palpitations, dizziness, or leg swelling  GASTROINTESTINAL: No abdominal or epigastric pain. No nausea, vomiting, or hematemesis; No diarrhea or constipation. No melena or hematochezia.  GENITOURINARY: No dysuria, frequency, hematuria, or incontinence  NEUROLOGICAL: No headaches, memory loss, loss of strength, numbness, or tremors  SKIN: No itching, burning, rashes, or lesions   LYMPH NODES: No enlarged glands  ENDOCRINE: No heat or cold intolerance; No hair loss  MUSCULOSKELETAL: No joint pain or swelling; No muscle, back, or extremity pain  PSYCHIATRIC: No depression, anxiety, mood swings, or difficulty sleeping  HEME/LYMPH: No easy bruising, or bleeding gums  ALLERY AND IMMUNOLOGIC: No hives or eczema  FAMILY HISTORY:      VITAL SIGNS:  T(C): 36.4 (01-15-23 @ 07:00), Max: 37.9 (01-15-23 @ 01:31)  HR: 126 (01-15-23 @ 04:22) (94 - 144)  BP: 121/85 (01-15-23 @ 04:22) (121/85 - 124/62)  RR: 16 (01-15-23 @ 04:22) (16 - 17)  SpO2: 94% (01-15-23 @ 04:22) (94% - 98%)  Wt(kg): --Vital Signs Last 24 Hrs  T(C): 36.4 (15 Trevon 2023 07:00), Max: 37.9 (15 Trevon 2023 01:31)  T(F): 97.6 (15 Trevon 2023 07:00), Max: 100.2 (15 Trevon 2023 01:31)  HR: 126 (15 Trevon 2023 04:22) (94 - 144)  BP: 121/85 (15 Trevon 2023 04:22) (121/85 - 124/62)  BP(mean): 99 (15 Trevon 2023 04:22) (85 - 99)  RR: 16 (15 Trevon 2023 04:22) (16 - 17)  SpO2: 94% (15 Trevon 2023 04:22) (94% - 98%)    Parameters below as of 15 Trevon 2023 04:22  Patient On (Oxygen Delivery Method): room air      No Known Allergies      PHYSICAL EXAM:  GENERAL: NAD  HEAD:  Atraumatic, Normocephalic  EYES: EOMI, PERRLA, conjunctiva and sclera clear  ENMT: No tonsillar erythema, exudates, or enlargement; Moist mucous membranes, Good dentition, No lesions  NECK: Supple, No JVD, Normal thyroid  NERVOUS SYSTEM:  Alert & Oriented X3, Good concentration; Motor Strength 5/5 B/L upper and lower extremities; DTRs 2+ intact and symmetric  CHEST/LUNG: Clear to auscultation bilaterally; No rales, rhonchi, wheezing, or rubs  HEART: Regular rate and rhythm; No murmurs, rubs, or gallops  ABDOMEN: Soft, Nontender, Nondistended; Bowel sounds present  EXTREMITIES:  2+ Peripheral Pulses, No clubbing, cyanosis, or edema  LYMPH: No lymphadenopathy noted  SKIN: No rashes or lesions    Consultant(s) Notes Reviewed:  [x ] YES  [ ] NO  Care Discussed with Consultants/Other Providers [ x] YES  [ ] NO    LABS:      RADIOLOGY & ADDITIONAL TESTS:    Imaging Personally Reviewed:  [ ] YES  [ ] NO  acetaminophen     Tablet .. 650 milliGRAM(s) Oral every 6 hours  chlorhexidine 2% Cloths 1 Application(s) Topical <User Schedule>  dextrose 5%. 1000 milliLiter(s) IV Continuous <Continuous>  dextrose 5%. 1000 milliLiter(s) IV Continuous <Continuous>  dextrose 50% Injectable 25 Gram(s) IV Push once  dextrose 50% Injectable 12.5 Gram(s) IV Push once  dextrose 50% Injectable 25 Gram(s) IV Push once  dextrose Oral Gel 15 Gram(s) Oral once PRN  enoxaparin Injectable 100 milliGRAM(s) SubCutaneous every 12 hours  glucagon  Injectable 1 milliGRAM(s) IntraMuscular once  HYDROmorphone  Injectable 1 milliGRAM(s) IV Push every 6 hours PRN  influenza   Vaccine 0.5 milliLiter(s) IntraMuscular once  insulin lispro (ADMELOG) corrective regimen sliding scale   SubCutaneous three times a day before meals      HEALTH ISSUES - PROBLEM Dx:  Pulmonary embolus    Acute right heart failure    Pulmonary thromboembolism    Pulmonary embolism with acute cor pulmonale    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 Pt stating she began feeling symptoms of mild cough and chest tightness 3 weeks prior. She went to  where workup was negative for COVID and Influenza. Pt prescribed Azithromycin and Prednisone. Pt presented to ED after she awoke in AM w/ progressively worse chest tightness. She used her inhaler and nebulizer tx at home with minimal relief and had syncopal event at home. Pt with no hx of DVT, PE and also denies family history of clots, blood disorders.     In the ED, CT PE with large thrombus burden involving all lobar and multiple segmental branches bilaterally w/ probable evolving infarcts in lateral basal segment of LLL and mild pulmonary artery dilation w/ RV strain and cardiomegaly. 1/11 TTE with severely dilated RV size, mod-severely reduced RV function, mod TR, pHTN (PASP 57), nml LV size/function. Troponin 0.10, Pro-BNP 1395, lactate normal. Pulmonology consulted. Patient admitted to MICU for intermediate risk PE. Patient started on Heparin drip with goal PTT 80-99 with     Patient started on Heparin drip, currently at 24cc/hr with improvement in tachycardia (HR 120s > 100s). Per PERT, no plan for thrombectomy. Pt is medically stable and transferred to telemetry for further management.        INTERVAL HPI/OVERNIGHT EVENTS:      REVIEW OF SYSTEMS:  CONSTITUTIONAL: No fever, weight loss, or fatigue  EYES: No eye pain, visual disturbances, or discharge  ENMT:  No difficulty hearing, tinnitus, vertigo; No sinus or throat pain  NECK: No pain or stiffness  BREASTS: No pain, masses, or nipple discharge  RESPIRATORY: No cough, wheezing, chills or hemoptysis; No shortness of breath  CARDIOVASCULAR: No chest pain, palpitations, dizziness, or leg swelling  GASTROINTESTINAL: No abdominal or epigastric pain. No nausea, vomiting, or hematemesis; No diarrhea or constipation. No melena or hematochezia.  GENITOURINARY: No dysuria, frequency, hematuria, or incontinence  NEUROLOGICAL: No headaches, memory loss, loss of strength, numbness, or tremors  SKIN: No itching, burning, rashes, or lesions   LYMPH NODES: No enlarged glands  ENDOCRINE: No heat or cold intolerance; No hair loss  MUSCULOSKELETAL: No joint pain or swelling; No muscle, back, or extremity pain  PSYCHIATRIC: No depression, anxiety, mood swings, or difficulty sleeping  HEME/LYMPH: No easy bruising, or bleeding gums  ALLERY AND IMMUNOLOGIC: No hives or eczema  FAMILY HISTORY:      VITAL SIGNS:  T(C): 36.4 (01-15-23 @ 07:00), Max: 37.9 (01-15-23 @ 01:31)  HR: 126 (01-15-23 @ 04:22) (94 - 144)  BP: 121/85 (01-15-23 @ 04:22) (121/85 - 124/62)  RR: 16 (01-15-23 @ 04:22) (16 - 17)  SpO2: 94% (01-15-23 @ 04:22) (94% - 98%)  Wt(kg): --Vital Signs Last 24 Hrs  T(C): 36.4 (15 Trevon 2023 07:00), Max: 37.9 (15 Trevon 2023 01:31)  T(F): 97.6 (15 Trevon 2023 07:00), Max: 100.2 (15 Trevon 2023 01:31)  HR: 126 (15 Trevon 2023 04:22) (94 - 144)  BP: 121/85 (15 Trevon 2023 04:22) (121/85 - 124/62)  BP(mean): 99 (15 Trevon 2023 04:22) (85 - 99)  RR: 16 (15 Trevon 2023 04:22) (16 - 17)  SpO2: 94% (15 Trevon 2023 04:22) (94% - 98%)    Parameters below as of 15 Trevon 2023 04:22  Patient On (Oxygen Delivery Method): room air      No Known Allergies      PHYSICAL EXAM:  GENERAL: NAD  HEAD:  Atraumatic, Normocephalic  EYES: EOMI, PERRLA, conjunctiva and sclera clear  ENMT: No tonsillar erythema, exudates, or enlargement; Moist mucous membranes, Good dentition, No lesions  NECK: Supple, No JVD, Normal thyroid  NERVOUS SYSTEM:  Alert & Oriented X3, Good concentration; Motor Strength 5/5 B/L upper and lower extremities; DTRs 2+ intact and symmetric  CHEST/LUNG: Clear to auscultation bilaterally; No rales, rhonchi, wheezing, or rubs  HEART: Regular rate and rhythm; No murmurs, rubs, or gallops  ABDOMEN: Soft, Nontender, Nondistended; Bowel sounds present  EXTREMITIES:  2+ Peripheral Pulses, No clubbing, cyanosis, or edema  LYMPH: No lymphadenopathy noted  SKIN: No rashes or lesions    Consultant(s) Notes Reviewed:  [x ] YES  [ ] NO  Care Discussed with Consultants/Other Providers [ x] YES  [ ] NO    LABS:      RADIOLOGY & ADDITIONAL TESTS:    Imaging Personally Reviewed:  [ ] YES  [ ] NO  acetaminophen     Tablet .. 650 milliGRAM(s) Oral every 6 hours  chlorhexidine 2% Cloths 1 Application(s) Topical <User Schedule>  dextrose 5%. 1000 milliLiter(s) IV Continuous <Continuous>  dextrose 5%. 1000 milliLiter(s) IV Continuous <Continuous>  dextrose 50% Injectable 25 Gram(s) IV Push once  dextrose 50% Injectable 12.5 Gram(s) IV Push once  dextrose 50% Injectable 25 Gram(s) IV Push once  dextrose Oral Gel 15 Gram(s) Oral once PRN  enoxaparin Injectable 100 milliGRAM(s) SubCutaneous every 12 hours  glucagon  Injectable 1 milliGRAM(s) IntraMuscular once  HYDROmorphone  Injectable 1 milliGRAM(s) IV Push every 6 hours PRN  influenza   Vaccine 0.5 milliLiter(s) IntraMuscular once  insulin lispro (ADMELOG) corrective regimen sliding scale   SubCutaneous three times a day before meals      HEALTH ISSUES - PROBLEM Dx:  Pulmonary embolus    Acute right heart failure    Pulmonary thromboembolism    Pulmonary embolism with acute cor pulmonale    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 Pt stating she began feeling symptoms of mild cough and chest tightness 3 weeks prior. She went to  where workup was negative for COVID and Influenza. Pt prescribed Azithromycin and Prednisone. Pt presented to ED after she awoke in AM w/ progressively worse chest tightness. She used her inhaler and nebulizer tx at home with minimal relief and had syncopal event at home. Pt with no hx of DVT, PE and also denies family history of clots, blood disorders.     In the ED, CT PE with large thrombus burden involving all lobar and multiple segmental branches bilaterally w/ probable evolving infarcts in lateral basal segment of LLL and mild pulmonary artery dilation w/ RV strain and cardiomegaly. 1/11 TTE with severely dilated RV size, mod-severely reduced RV function, mod TR, pHTN (PASP 57), nml LV size/function. Troponin 0.10, Pro-BNP 1395, lactate normal. Pulmonology consulted. Patient admitted to MICU for intermediate risk PE. Patient started on Heparin drip with goal PTT 80-99 with improvement in tachycardia (HR 120s -> 100s). Per PERT, plan for thrombectomy was deferred. Pt was stabilized in MICU and transferred to [facility] for further management. On [facility], pt remained tachycardic with repeat TTE 1/12 showing severely dilated RV and mod-severely reduced RV function unchanged from prior. On 1/13, decision was made to proceed with mechanical thrombectomy i/s/o increasing pro-BNP 1300 -> 2200 -> 2600 with decreasing cardiac index 2.239 -> 2.2 and persistent tachycardia, suggesting worsening of acute right HF from pulmonary emboli. Pt underwent uncomplicated mechanical thrombectomy on 1/13, with improvement in tachycardia post-procedure ( -> 90s). On 1/14 at 1 AM, pt had syncopal event while using bedside commode. STAT CT head and C-spine were negative for hemorrhage (on heparin gtt) or fracture. CBC was unchanged and pt remained hemodynamically stable. Pt was transitioned to       INTERVAL HPI/OVERNIGHT EVENTS:      REVIEW OF SYSTEMS:  CONSTITUTIONAL: No fever, weight loss, or fatigue  EYES: No eye pain, visual disturbances, or discharge  ENMT:  No difficulty hearing, tinnitus, vertigo; No sinus or throat pain  NECK: No pain or stiffness  BREASTS: No pain, masses, or nipple discharge  RESPIRATORY: No cough, wheezing, chills or hemoptysis; No shortness of breath  CARDIOVASCULAR: No chest pain, palpitations, dizziness, or leg swelling  GASTROINTESTINAL: No abdominal or epigastric pain. No nausea, vomiting, or hematemesis; No diarrhea or constipation. No melena or hematochezia.  GENITOURINARY: No dysuria, frequency, hematuria, or incontinence  NEUROLOGICAL: No headaches, memory loss, loss of strength, numbness, or tremors  SKIN: No itching, burning, rashes, or lesions   LYMPH NODES: No enlarged glands  ENDOCRINE: No heat or cold intolerance; No hair loss  MUSCULOSKELETAL: No joint pain or swelling; No muscle, back, or extremity pain  PSYCHIATRIC: No depression, anxiety, mood swings, or difficulty sleeping  HEME/LYMPH: No easy bruising, or bleeding gums  ALLERY AND IMMUNOLOGIC: No hives or eczema  FAMILY HISTORY:      VITAL SIGNS:  T(C): 36.4 (01-15-23 @ 07:00), Max: 37.9 (01-15-23 @ 01:31)  HR: 126 (01-15-23 @ 04:22) (94 - 144)  BP: 121/85 (01-15-23 @ 04:22) (121/85 - 124/62)  RR: 16 (01-15-23 @ 04:22) (16 - 17)  SpO2: 94% (01-15-23 @ 04:22) (94% - 98%)  Wt(kg): --Vital Signs Last 24 Hrs  T(C): 36.4 (15 Trevon 2023 07:00), Max: 37.9 (15 Trevon 2023 01:31)  T(F): 97.6 (15 Trevon 2023 07:00), Max: 100.2 (15 Trevon 2023 01:31)  HR: 126 (15 Trevon 2023 04:22) (94 - 144)  BP: 121/85 (15 Trevon 2023 04:22) (121/85 - 124/62)  BP(mean): 99 (15 Trevon 2023 04:22) (85 - 99)  RR: 16 (15 Trevon 2023 04:22) (16 - 17)  SpO2: 94% (15 Trevon 2023 04:22) (94% - 98%)    Parameters below as of 15 Trevon 2023 04:22  Patient On (Oxygen Delivery Method): room air      No Known Allergies      PHYSICAL EXAM:  GENERAL: NAD  HEAD:  Atraumatic, Normocephalic  EYES: EOMI, PERRLA, conjunctiva and sclera clear  ENMT: No tonsillar erythema, exudates, or enlargement; Moist mucous membranes, Good dentition, No lesions  NECK: Supple, No JVD, Normal thyroid  NERVOUS SYSTEM:  Alert & Oriented X3, Good concentration; Motor Strength 5/5 B/L upper and lower extremities; DTRs 2+ intact and symmetric  CHEST/LUNG: Clear to auscultation bilaterally; No rales, rhonchi, wheezing, or rubs  HEART: Regular rate and rhythm; No murmurs, rubs, or gallops  ABDOMEN: Soft, Nontender, Nondistended; Bowel sounds present  EXTREMITIES:  2+ Peripheral Pulses, No clubbing, cyanosis, or edema  LYMPH: No lymphadenopathy noted  SKIN: No rashes or lesions    Consultant(s) Notes Reviewed:  [x ] YES  [ ] NO  Care Discussed with Consultants/Other Providers [ x] YES  [ ] NO    LABS:      RADIOLOGY & ADDITIONAL TESTS:    Imaging Personally Reviewed:  [ ] YES  [ ] NO  acetaminophen     Tablet .. 650 milliGRAM(s) Oral every 6 hours  chlorhexidine 2% Cloths 1 Application(s) Topical <User Schedule>  dextrose 5%. 1000 milliLiter(s) IV Continuous <Continuous>  dextrose 5%. 1000 milliLiter(s) IV Continuous <Continuous>  dextrose 50% Injectable 25 Gram(s) IV Push once  dextrose 50% Injectable 12.5 Gram(s) IV Push once  dextrose 50% Injectable 25 Gram(s) IV Push once  dextrose Oral Gel 15 Gram(s) Oral once PRN  enoxaparin Injectable 100 milliGRAM(s) SubCutaneous every 12 hours  glucagon  Injectable 1 milliGRAM(s) IntraMuscular once  HYDROmorphone  Injectable 1 milliGRAM(s) IV Push every 6 hours PRN  influenza   Vaccine 0.5 milliLiter(s) IntraMuscular once  insulin lispro (ADMELOG) corrective regimen sliding scale   SubCutaneous three times a day before meals      HEALTH ISSUES - PROBLEM Dx:  Pulmonary embolus    Acute right heart failure    Pulmonary thromboembolism    Pulmonary embolism with acute cor pulmonale    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 Pt stating she began feeling symptoms of mild cough and chest tightness 3 weeks prior. She went to  where workup was negative for COVID and Influenza. Pt prescribed Azithromycin and Prednisone. Pt presented to ED after she awoke in AM w/ progressively worse chest tightness. She used her inhaler and nebulizer tx at home with minimal relief and had syncopal event at home. Pt with no hx of DVT, PE and also denies family history of clots, blood disorders.     In the ED, CT PE with large thrombus burden involving all lobar and multiple segmental branches bilaterally w/ probable evolving infarcts in lateral basal segment of LLL and mild pulmonary artery dilation w/ RV strain and cardiomegaly. 1/11 TTE with severely dilated RV size, mod-severely reduced RV function, mod TR, pHTN (PASP 57), nml LV size/function. Troponin 0.10, Pro-BNP 1395, lactate normal. Pulmonology consulted. Patient admitted to MICU for intermediate risk PE. Patient started on Heparin drip with goal PTT 80-99 with improvement in tachycardia (HR 120s -> 100s). Per PERT, plan for thrombectomy was deferred. Pt was stabilized in MICU and transferred to [facility] for further management. On [facility], pt remained tachycardic with repeat TTE 1/12 showing severely dilated RV and mod-severely reduced RV function unchanged from prior. On 1/13, decision was made to proceed with mechanical thrombectomy i/s/o increasing pro-BNP 1300 -> 2200 -> 2600 with decreasing cardiac index 2.239 -> 2.2 and persistent tachycardia, suggesting worsening of acute right HF from pulmonary emboli. Pt underwent uncomplicated mechanical thrombectomy on 1/13, with improvement in tachycardia post-procedure ( -> 90s). On 1/14 at 1 AM, pt had syncopal event while using bedside commode. STAT CT head and C-spine were negative for hemorrhage (on heparin gtt) or fracture. CBC was unchanged and pt remained hemodynamically stable. Pt was transitioned to therpeutic Lovenox 100 mg SQ q12h. Repeat TTE continued to show severe but stable right heart strain & pulmonary HTN despite thrombectomy. On 1/15, pt had worsening tachycardia to 150s with persistent desaturations to mid-80s on RA which improved to mid-90s on 6LNC. BP remained stable and pt was asymptomatic. Bedside POCUS continued to show severe RV dilation. Decision was made to transfer pt to MICU due to worsening tachycardia and O2 saturation with uptrending pro-BNP suggesting progressive cardiac decompensation.       INTERVAL HPI/OVERNIGHT EVENTS:  Patient seen and examined at bedside. Remains on strict bedrest due to syncopal episode the previous day, however pt would like to ambulate. Pt continues to report moderate-to-severe fibroid pain relieved with standing PO tylenol and IV dilaudid. Reports that she has not urinated since yesterday morning. L femoral arterial access site clean and dry without any bleeding, hematoma, or tenderness. Denies HA, dizziness, lightheadedness, CP, SOB, or palpitations. Also denies fever, chills, abdominal pain, nausea, vomiting, flank pain, back pain, lower extremity swelling, numbness, or tingling. 12pt ROS otherwise negative.      FAMILY HISTORY:      VITAL SIGNS:  T(C): 36.4 (01-15-23 @ 07:00), Max: 37.9 (01-15-23 @ 01:31)  HR: 126 (01-15-23 @ 04:22) (94 - 144)  BP: 121/85 (01-15-23 @ 04:22) (121/85 - 124/62)  RR: 16 (01-15-23 @ 04:22) (16 - 17)  SpO2: 94% (01-15-23 @ 04:22) (94% - 98%)  Wt(kg): --Vital Signs Last 24 Hrs  T(C): 36.4 (15 Trevon 2023 07:00), Max: 37.9 (15 Trevon 2023 01:31)  T(F): 97.6 (15 Trevon 2023 07:00), Max: 100.2 (15 Trevon 2023 01:31)  HR: 126 (15 Trevon 2023 04:22) (94 - 144)  BP: 121/85 (15 Trevon 2023 04:22) (121/85 - 124/62)  BP(mean): 99 (15 Trevon 2023 04:22) (85 - 99)  RR: 16 (15 Trevon 2023 04:22) (16 - 17)  SpO2: 94% (15 Trevon 2023 04:22) (94% - 98%)    Parameters below as of 15 Trevon 2023 04:22  Patient On (Oxygen Delivery Method): room air      No Known Allergies      PHYSICAL EXAM:  Constitutional: NAD, comfortable in bed.  HEENT: NC/AT, PERRLA, EOMI, no conjunctival pallor or scleral icterus, MMM  Neck: Supple, no JVD  Respiratory: CTA B/L. No w/r/r.   Cardiovascular: Tachycardic, normal rhythm, normal S1 and S2, no m/r/g.   Gastrointestinal: +BS, soft NTND, no guarding or rebound tenderness, no palpable masses   Extremities: wwp; no cyanosis, clubbing or edema. L femoral access site clean & dry without any bleeding, hematoma, or tenderness   Vascular: Pulses equal and strong throughout.   Neurological: AAOx3, no CN deficits, strength and sensation intact throughout.   Skin: No gross skin abnormalities or rashes    Consultant(s) Notes Reviewed:  [x ] YES  [ ] NO  Care Discussed with Consultants/Other Providers [ x] YES  [ ] NO    LABS:                        10.4   14.85 )-----------( 198      ( 15 Trevon 2023 05:30 )             34.4     01-15    136  |  100  |  10  ----------------------------<  125<H>  4.9   |  23  |  1.04    Ca    10.0      15 Trevon 2023 05:30  Phos  4.5     01-15  Mg     2.0     01-15    TPro  8.4<H>  /  Alb  3.7  /  TBili  0.7  /  DBili  x   /  AST  26  /  ALT  44  /  AlkPhos  83  01-15      PT/INR - ( 15 Trevon 2023 08:39 )   PT: 14.4 sec;   INR: 1.21          PTT - ( 15 Trevon 2023 08:39 )  PTT:24.2 sec  CAPILLARY BLOOD GLUCOSE      POCT Blood Glucose.: 127 mg/dL (15 Trevon 2023 07:10)                          I & O Summary:    01-14-23 @ 07:01  -  01-15-23 @ 07:00  --------------------------------------------------------  IN: 0 mL / OUT: 2 mL / NET: -2 mL        Microbiology:        RADIOLOGY, EKG AND ADDITIONAL TESTS: Reviewed.      RADIOLOGY & ADDITIONAL TESTS:    Imaging Personally Reviewed:  [ ] YES  [ ] NO  acetaminophen     Tablet .. 650 milliGRAM(s) Oral every 6 hours  chlorhexidine 2% Cloths 1 Application(s) Topical <User Schedule>  dextrose 5%. 1000 milliLiter(s) IV Continuous <Continuous>  dextrose 5%. 1000 milliLiter(s) IV Continuous <Continuous>  dextrose 50% Injectable 25 Gram(s) IV Push once  dextrose 50% Injectable 12.5 Gram(s) IV Push once  dextrose 50% Injectable 25 Gram(s) IV Push once  dextrose Oral Gel 15 Gram(s) Oral once PRN  enoxaparin Injectable 100 milliGRAM(s) SubCutaneous every 12 hours  glucagon  Injectable 1 milliGRAM(s) IntraMuscular once  HYDROmorphone  Injectable 1 milliGRAM(s) IV Push every 6 hours PRN  influenza   Vaccine 0.5 milliLiter(s) IntraMuscular once  insulin lispro (ADMELOG) corrective regimen sliding scale   SubCutaneous three times a day before meals      HEALTH ISSUES - PROBLEM Dx:  Pulmonary embolus    Acute right heart failure    Pulmonary thromboembolism    Pulmonary embolism with acute cor pulmonale    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

## 2023-01-15 NOTE — PROGRESS NOTE ADULT - SUBJECTIVE AND OBJECTIVE BOX
Vascular Attending:      Chief Complaint: LLE DVT and PE s/p mechanical thrombectomy      HPI:  Patient is a 32F with PMHx of asthma who presented to [facility] on  for 1 week of productive cough. Patient reported on presentation that she had mild cough and chest tightness for 3 weeks and was worked up at an outpatient clinic and was negative for both COVID-19 and Influenza. She was prescribed azithromycin and prednisone and was not improving after 5 days with potential syncopal event at home which prompted her presentation. She was found to be tachycardic to the 120s with elevated BNP and D-dimer. CT PE revealed large thrombus burden involving all lobar and multiple segmental branches bilaterally w/ probable evolving infarcts in lateral basal segment of LLL and mild pulmonary A dilation w/ RV strain and cardiomegaly. Patient was admitted to the MICU and started on heparin gtt. Patient denies any history of DVT, PE or personal/family history of hypercoagulable disorder. She is now POD2 from mechanical thrombectomy given clinical deterioration on heparin gtt (decreasing cardiac indx and increase BNP). Patient was subsequently transferred to the CCU after R heart cath revealed severe RV dilation consistent with R heart failure. Patient underwent surveillance LE duplex today for etiology of PE which revealed LLE DVT in L common femoral, deep femoral and femoral veins. Patient reports L leg pain was gradual onset today and is located in L thigh. States the pain is sharp and sometimes throbbing. Patient is asking for morphine to relieve the pain.     (2023 15:43)    PAST MEDICAL & SURGICAL HISTORY:  Asthma      Fibroids      Anemia      No significant past surgical history    MEDICATIONS  (STANDING):  acetaminophen     Tablet .. 650 milliGRAM(s) Oral every 6 hours  alteplase    Infusion 1 mG/Hr (10 mL/Hr) IntraCatheter.. <Continuous>  cefTRIAXone   IVPB 2000 milliGRAM(s) IV Intermittent every 24 hours  chlorhexidine 2% Cloths 1 Application(s) Topical <User Schedule>  dextrose 5%. 1000 milliLiter(s) (50 mL/Hr) IV Continuous <Continuous>  dextrose 5%. 1000 milliLiter(s) (100 mL/Hr) IV Continuous <Continuous>  dextrose 50% Injectable 25 Gram(s) IV Push once  dextrose 50% Injectable 12.5 Gram(s) IV Push once  dextrose 50% Injectable 25 Gram(s) IV Push once  glucagon  Injectable 1 milliGRAM(s) IntraMuscular once  heparin  Infusion 400 Unit(s)/Hr (4 mL/Hr) IV Continuous <Continuous>  influenza   Vaccine 0.5 milliLiter(s) IntraMuscular once  insulin lispro (ADMELOG) corrective regimen sliding scale   SubCutaneous three times a day before meals  milrinone Infusion 0.125 MICROgram(s)/kG/Min (4.03 mL/Hr) IV Continuous <Continuous>  vasopressin Infusion 0.04 Unit(s)/Min (6 mL/Hr) IV Continuous <Continuous>    MEDICATIONS  (PRN):  dextrose Oral Gel 15 Gram(s) Oral once PRN Blood Glucose LESS THAN 70 milliGRAM(s)/deciliter  HYDROmorphone  Injectable 1 milliGRAM(s) IV Push every 6 hours PRN Severe Pain (7 - 10)      Allergies    No Known Allergies    Intolerances        SOCIAL HISTORY:    FAMILY HISTORY:      Vital Signs Last 24 Hrs  T(C): 38.5 (15 Trevon 2023 11:00), Max: 38.5 (15 Trevon 2023 11:00)  T(F): 101.3 (15 Trevon 2023 11:00), Max: 101.3 (15 Trevon 2023 11:00)  HR: 142 (15 Trevon 2023 17:00) (94 - 152)  BP: 113/70 (15 Trevon 2023 11:00) (113/70 - 136/73)  BP(mean): 84 (15 Trevon 2023 11:00) (84 - 99)  RR: 30 (15 Trevon 2023 16:00) (16 - 32)  SpO2: 100% (15 Trevon 2023 17:00) (92% - 100%)    Parameters below as of 15 Trevon 2023 17:00  Patient On (Oxygen Delivery Method): nasal cannula  O2 Flow (L/min): 5      PHYSICAL EXAM:  General: Resting in bed, mildly uncomfortable, NAD  Neuro: A&Ox3, normal speech  HEENT: ATNC  Pulmonary: Equal chest wall expansion b/l, no respiratory distress  Cardiovascular: Tachycardic - sinus rhythm  Abdomen: Soft, nondistended, nontender  Extremities WWP, no significant edema noted in B/L, TTP in L upper thigh      LABS:                        9.8    19.31 )-----------( 173      ( 15 Trevon 2023 16:20 )             30.6     01-15    133<L>  |  100  |  16  ----------------------------<  136<H>  4.9   |  18<L>  |  1.43<H>    Ca    9.2      15 Trevon 2023 15:24  Phos  4.5     01-15  Mg     2.0     01-15    TPro  8.0  /  Alb  3.6  /  TBili  0.7  /  DBili  x   /  AST  22  /  ALT  39  /  AlkPhos  79  01-15    PT/INR - ( 15 Trevon 2023 08:39 )   PT: 14.4 sec;   INR: 1.21          PTT - ( 15 Trevon 2023 08:39 )  PTT:24.2 sec  Urinalysis Basic - ( 15 Trevon 2023 16:48 )    Color: Yellow / Appearance: Clear / S.015 / pH: x  Gluc: x / Ketone: NEGATIVE  / Bili: Negative / Urobili: 0.2 E.U./dL   Blood: x / Protein: Trace mg/dL / Nitrite: NEGATIVE   Leuk Esterase: NEGATIVE / RBC: < 5 /HPF / WBC < 5 /HPF   Sq Epi: x / Non Sq Epi: 0-5 /HPF / Bacteria: None /HPF        RADIOLOGY & ADDITIONAL STUDIES  PROCEDURE DATE:  01/15/2023          INTERPRETATION:  CLINICAL INFORMATION: Pulmonary emboli.    COMPARISON: Chest CT dated 2023    TECHNIQUE: Duplex sonography of the BILATERAL LOWER extremity veins with   color and spectral Doppler, with and without compression.    FINDINGS:    RIGHT:  Normal compressibility of the RIGHT common femoral, femoral and popliteal   veins.  Doppler examination shows normal spontaneous and phasic flow.  No RIGHT calf vein thrombosis is detected.    LEFT:    There is acute deep vein thrombosis in the left common femoral vein, deep   femoral vein and femoral vein. The left popliteal vein is unremarkable.    Limited visualization of the calf veins.    IMPRESSION:  Extensive left leg acute deep vein thrombosis as described above.  Acute deep venous thrombosis: above the knee.

## 2023-01-15 NOTE — PROGRESS NOTE ADULT - PROBLEM SELECTOR PLAN 5
F: PO  E: Replete K <4, Mg <2  N: Regular diet  DVT ppx: Lovenox 100 Q12  GI ppx: not needed  Code: Full code  Dispo: 7LA - Holding steroids and albuterol as patient is asymptomatic  - No home asthma meds, as patient states she did not need any before onset of symptoms from Urgent Care

## 2023-01-15 NOTE — PROGRESS NOTE ADULT - TIME BILLING
Patient seen and examined with house-staff during bedside rounds.  Resident note read, including vitals, physical findings, laboratory data, and radiological reports.   Revisions included below.  Direct personal management at bed side and extensive interpretation of the data.  Plan was outlined and discussed in details with the housestaff.  Decision making of high complexity  Action taken for acute disease activity to reflect the level of care provided:  - medication reconciliation  - review laboratory data.  Seen from 9AM till 330PM  AEs  1) Syncope  2) Elevated creatinine  3) Fever  4) Worsening of acute right heart failure  (MAURY)  5) Recurrence of acute PE to the Right pulmonary artery (MAURY)  6) Cardiogenic shock (MAURY)  7) Acute hypoxic respiratory failure (MAURY)  Discussed with the patient and the family multiple times

## 2023-01-15 NOTE — PROCEDURE NOTE - NSINDICATIONS_GEN_A_CORE
arterial puncture to obtain ABG's/blood sampling/cannulation purposes/critical patient/monitoring purposes
emergency venous access

## 2023-01-15 NOTE — PROGRESS NOTE ADULT - PROBLEM SELECTOR PLAN 4
The patient decompensated around 6 AM in the morning with heart rate going from 90-1 50 and also her oxygen requirement increased and she was satting 100% on room air now she required 6 L for oxygen saturation about 93%.  The also the patient decompensated cardiopulmonary wise with decrease in her blood pressure.  Echocardiogram at the bedside revealed no change in the right ventricular failure with pulmonary pressures on change. The patient was started on vasopressin and melatonin then was put on hold for the right heart catheterization.  Multidisciplinary approach between the PERT team with structural heart and CHF both the members of the PERT team and decision to proceed with right heart catheterization pulmonary angiogram and possible intervention.  The right heart catheterization confirmed the acute right heart failure with decrease in the cardiac index with thermodilution and calculation to 1.3-1.6 which decreased from 2 yesterday.  The pulmonary artery saturation was 29%.  Pulmonary angiogram was done and revealed new recurrent pulmonary emboli in the right pulmonary artery and no change in the chronic clot in the left lower lobe branch.  Ultrasound-guided catheter like directed thrombolytics was performed.  2 mg tPA was infused and drip 1 mg for total of 7 hr.  Was transferred to the CCU and was started on vasopressin for cardiogenic shock bilirubin and required nitrous oxide at 9 PPI.  Patient was started on full dose Lovenox after 11 hours from the procedure.  [facility] were notified for standby

## 2023-01-15 NOTE — PROVIDER CONTACT NOTE (MEDICATION) - ACTION/TREATMENT ORDERED:
Both milirone and vasopressin drip stopped at this time until cath lab as per Felciiano Finney and dr. Haro

## 2023-01-15 NOTE — PROGRESS NOTE ADULT - ASSESSMENT
Assessment:    Recommendations:  - INCOMPLETE NOTE  - Discussed with chief on call and attending  - Please call vascular surgery with any questions concerns. Standing/Walking/Toileting

## 2023-01-16 ENCOUNTER — TRANSCRIPTION ENCOUNTER (OUTPATIENT)
Age: 33
End: 2023-01-16

## 2023-01-16 DIAGNOSIS — J96.21 ACUTE AND CHRONIC RESPIRATORY FAILURE WITH HYPOXIA: ICD-10-CM

## 2023-01-16 DIAGNOSIS — J96.01 ACUTE RESPIRATORY FAILURE WITH HYPOXIA: ICD-10-CM

## 2023-01-16 DIAGNOSIS — R50.9 FEVER, UNSPECIFIED: ICD-10-CM

## 2023-01-16 DIAGNOSIS — R57.0 CARDIOGENIC SHOCK: ICD-10-CM

## 2023-01-16 DIAGNOSIS — I82.409 ACUTE EMBOLISM AND THROMBOSIS OF UNSPECIFIED DEEP VEINS OF UNSPECIFIED LOWER EXTREMITY: ICD-10-CM

## 2023-01-16 LAB
ALBUMIN SERPL ELPH-MCNC: 3.1 G/DL — LOW (ref 3.3–5)
ALBUMIN SERPL ELPH-MCNC: 3.2 G/DL — LOW (ref 3.3–5)
ALBUMIN SERPL ELPH-MCNC: 3.3 G/DL — SIGNIFICANT CHANGE UP (ref 3.3–5)
ALBUMIN SERPL ELPH-MCNC: 3.3 G/DL — SIGNIFICANT CHANGE UP (ref 3.3–5)
ALBUMIN SERPL ELPH-MCNC: 3.4 G/DL — SIGNIFICANT CHANGE UP (ref 3.3–5)
ALBUMIN SERPL ELPH-MCNC: 3.4 G/DL — SIGNIFICANT CHANGE UP (ref 3.3–5)
ALBUMIN SERPL ELPH-MCNC: 3.5 G/DL — SIGNIFICANT CHANGE UP (ref 3.3–5)
ALP SERPL-CCNC: 69 U/L — SIGNIFICANT CHANGE UP (ref 40–120)
ALP SERPL-CCNC: 72 U/L — SIGNIFICANT CHANGE UP (ref 40–120)
ALP SERPL-CCNC: 75 U/L — SIGNIFICANT CHANGE UP (ref 40–120)
ALP SERPL-CCNC: 76 U/L — SIGNIFICANT CHANGE UP (ref 40–120)
ALP SERPL-CCNC: 76 U/L — SIGNIFICANT CHANGE UP (ref 40–120)
ALP SERPL-CCNC: 77 U/L — SIGNIFICANT CHANGE UP (ref 40–120)
ALP SERPL-CCNC: 77 U/L — SIGNIFICANT CHANGE UP (ref 40–120)
ALT FLD-CCNC: 25 U/L — SIGNIFICANT CHANGE UP (ref 10–45)
ALT FLD-CCNC: 26 U/L — SIGNIFICANT CHANGE UP (ref 10–45)
ALT FLD-CCNC: 30 U/L — SIGNIFICANT CHANGE UP (ref 10–45)
ALT FLD-CCNC: 31 U/L — SIGNIFICANT CHANGE UP (ref 10–45)
ALT FLD-CCNC: 32 U/L — SIGNIFICANT CHANGE UP (ref 10–45)
ALT FLD-CCNC: 33 U/L — SIGNIFICANT CHANGE UP (ref 10–45)
ALT FLD-CCNC: 33 U/L — SIGNIFICANT CHANGE UP (ref 10–45)
ANION GAP SERPL CALC-SCNC: 11 MMOL/L — SIGNIFICANT CHANGE UP (ref 5–17)
ANION GAP SERPL CALC-SCNC: 12 MMOL/L — SIGNIFICANT CHANGE UP (ref 5–17)
ANION GAP SERPL CALC-SCNC: 14 MMOL/L — SIGNIFICANT CHANGE UP (ref 5–17)
ANION GAP SERPL CALC-SCNC: 15 MMOL/L — SIGNIFICANT CHANGE UP (ref 5–17)
ANION GAP SERPL CALC-SCNC: 17 MMOL/L — SIGNIFICANT CHANGE UP (ref 5–17)
ANISOCYTOSIS BLD QL: SLIGHT — SIGNIFICANT CHANGE UP
APTT BLD: 29 SEC — SIGNIFICANT CHANGE UP (ref 27.5–35.5)
APTT BLD: 33.4 SEC — SIGNIFICANT CHANGE UP (ref 27.5–35.5)
AST SERPL-CCNC: 22 U/L — SIGNIFICANT CHANGE UP (ref 10–40)
AST SERPL-CCNC: 24 U/L — SIGNIFICANT CHANGE UP (ref 10–40)
AST SERPL-CCNC: 26 U/L — SIGNIFICANT CHANGE UP (ref 10–40)
BASE EXCESS BLDA CALC-SCNC: -1.6 MMOL/L — SIGNIFICANT CHANGE UP (ref -2–3)
BASE EXCESS BLDA CALC-SCNC: -2.1 MMOL/L — LOW (ref -2–3)
BASE EXCESS BLDA CALC-SCNC: -2.7 MMOL/L — LOW (ref -2–3)
BASE EXCESS BLDA CALC-SCNC: -2.7 MMOL/L — LOW (ref -2–3)
BASE EXCESS BLDA CALC-SCNC: -2.8 MMOL/L — LOW (ref -2–3)
BASE EXCESS BLDA CALC-SCNC: -4.5 MMOL/L — LOW (ref -2–3)
BASE EXCESS BLDA CALC-SCNC: -4.5 MMOL/L — LOW (ref -2–3)
BASE EXCESS BLDMV CALC-SCNC: -0.7 MMOL/L — SIGNIFICANT CHANGE UP
BASE EXCESS BLDMV CALC-SCNC: -0.9 MMOL/L — SIGNIFICANT CHANGE UP
BASE EXCESS BLDMV CALC-SCNC: -0.9 MMOL/L — SIGNIFICANT CHANGE UP
BASE EXCESS BLDMV CALC-SCNC: -1.1 MMOL/L — SIGNIFICANT CHANGE UP
BASE EXCESS BLDMV CALC-SCNC: -2.1 MMOL/L — SIGNIFICANT CHANGE UP
BASE EXCESS BLDMV CALC-SCNC: -2.3 MMOL/L — SIGNIFICANT CHANGE UP
BASE EXCESS BLDV CALC-SCNC: -2.3 MMOL/L — LOW (ref -2–3)
BASE EXCESS BLDV CALC-SCNC: -4.4 MMOL/L — LOW (ref -2–3)
BASOPHILS # BLD AUTO: 0.05 K/UL — SIGNIFICANT CHANGE UP (ref 0–0.2)
BASOPHILS # BLD AUTO: 0.23 K/UL — HIGH (ref 0–0.2)
BASOPHILS NFR BLD AUTO: 0.2 % — SIGNIFICANT CHANGE UP (ref 0–2)
BASOPHILS NFR BLD AUTO: 0.9 % — SIGNIFICANT CHANGE UP (ref 0–2)
BILIRUB SERPL-MCNC: 0.4 MG/DL — SIGNIFICANT CHANGE UP (ref 0.2–1.2)
BILIRUB SERPL-MCNC: 0.5 MG/DL — SIGNIFICANT CHANGE UP (ref 0.2–1.2)
BILIRUB SERPL-MCNC: 0.8 MG/DL — SIGNIFICANT CHANGE UP (ref 0.2–1.2)
BILIRUB SERPL-MCNC: 0.9 MG/DL — SIGNIFICANT CHANGE UP (ref 0.2–1.2)
BILIRUB SERPL-MCNC: 0.9 MG/DL — SIGNIFICANT CHANGE UP (ref 0.2–1.2)
BLD GP AB SCN SERPL QL: NEGATIVE — SIGNIFICANT CHANGE UP
BUN SERPL-MCNC: 14 MG/DL — SIGNIFICANT CHANGE UP (ref 7–23)
BUN SERPL-MCNC: 15 MG/DL — SIGNIFICANT CHANGE UP (ref 7–23)
BUN SERPL-MCNC: 16 MG/DL — SIGNIFICANT CHANGE UP (ref 7–23)
BUN SERPL-MCNC: 16 MG/DL — SIGNIFICANT CHANGE UP (ref 7–23)
BUN SERPL-MCNC: 17 MG/DL — SIGNIFICANT CHANGE UP (ref 7–23)
CA-I SERPL-SCNC: 1.2 MMOL/L — SIGNIFICANT CHANGE UP (ref 1.15–1.33)
CALCIUM SERPL-MCNC: 8.4 MG/DL — SIGNIFICANT CHANGE UP (ref 8.4–10.5)
CALCIUM SERPL-MCNC: 8.6 MG/DL — SIGNIFICANT CHANGE UP (ref 8.4–10.5)
CALCIUM SERPL-MCNC: 8.6 MG/DL — SIGNIFICANT CHANGE UP (ref 8.4–10.5)
CALCIUM SERPL-MCNC: 8.8 MG/DL — SIGNIFICANT CHANGE UP (ref 8.4–10.5)
CALCIUM SERPL-MCNC: 8.8 MG/DL — SIGNIFICANT CHANGE UP (ref 8.4–10.5)
CALCIUM SERPL-MCNC: 8.9 MG/DL — SIGNIFICANT CHANGE UP (ref 8.4–10.5)
CALCIUM SERPL-MCNC: 9 MG/DL — SIGNIFICANT CHANGE UP (ref 8.4–10.5)
CHLORIDE SERPL-SCNC: 100 MMOL/L — SIGNIFICANT CHANGE UP (ref 96–108)
CHLORIDE SERPL-SCNC: 100 MMOL/L — SIGNIFICANT CHANGE UP (ref 96–108)
CHLORIDE SERPL-SCNC: 101 MMOL/L — SIGNIFICANT CHANGE UP (ref 96–108)
CHLORIDE SERPL-SCNC: 98 MMOL/L — SIGNIFICANT CHANGE UP (ref 96–108)
CHLORIDE SERPL-SCNC: 99 MMOL/L — SIGNIFICANT CHANGE UP (ref 96–108)
CK MB CFR SERPL CALC: 1.4 NG/ML — SIGNIFICANT CHANGE UP (ref 0–6.7)
CK MB CFR SERPL CALC: 1.5 NG/ML — SIGNIFICANT CHANGE UP (ref 0–6.7)
CK MB CFR SERPL CALC: 1.6 NG/ML — SIGNIFICANT CHANGE UP (ref 0–6.7)
CK SERPL-CCNC: 78 U/L — SIGNIFICANT CHANGE UP (ref 25–170)
CK SERPL-CCNC: 79 U/L — SIGNIFICANT CHANGE UP (ref 25–170)
CK SERPL-CCNC: 80 U/L — SIGNIFICANT CHANGE UP (ref 25–170)
CO2 BLDA-SCNC: 20 MMOL/L — SIGNIFICANT CHANGE UP (ref 19–24)
CO2 BLDA-SCNC: 20 MMOL/L — SIGNIFICANT CHANGE UP (ref 19–24)
CO2 BLDA-SCNC: 21 MMOL/L — SIGNIFICANT CHANGE UP (ref 19–24)
CO2 BLDA-SCNC: 22 MMOL/L — SIGNIFICANT CHANGE UP (ref 19–24)
CO2 BLDA-SCNC: 23 MMOL/L — SIGNIFICANT CHANGE UP (ref 19–24)
CO2 BLDMV-SCNC: 23.4 MMOL/L — SIGNIFICANT CHANGE UP
CO2 BLDMV-SCNC: 23.5 MMOL/L — SIGNIFICANT CHANGE UP
CO2 BLDMV-SCNC: 24.1 MMOL/L — SIGNIFICANT CHANGE UP
CO2 BLDMV-SCNC: 24.6 MMOL/L — SIGNIFICANT CHANGE UP
CO2 BLDMV-SCNC: 24.6 MMOL/L — SIGNIFICANT CHANGE UP
CO2 BLDMV-SCNC: 24.7 MMOL/L — SIGNIFICANT CHANGE UP
CO2 BLDV-SCNC: 21.3 MMOL/L — LOW (ref 22–26)
CO2 BLDV-SCNC: 23.7 MMOL/L — SIGNIFICANT CHANGE UP (ref 22–26)
CO2 SERPL-SCNC: 18 MMOL/L — LOW (ref 22–31)
CO2 SERPL-SCNC: 18 MMOL/L — LOW (ref 22–31)
CO2 SERPL-SCNC: 20 MMOL/L — LOW (ref 22–31)
CO2 SERPL-SCNC: 20 MMOL/L — LOW (ref 22–31)
CO2 SERPL-SCNC: 21 MMOL/L — LOW (ref 22–31)
CO2 SERPL-SCNC: 22 MMOL/L — SIGNIFICANT CHANGE UP (ref 22–31)
CO2 SERPL-SCNC: 22 MMOL/L — SIGNIFICANT CHANGE UP (ref 22–31)
COHGB MFR BLDMV: 0.8 % — SIGNIFICANT CHANGE UP
COHGB MFR BLDMV: 1.3 % — SIGNIFICANT CHANGE UP
COHGB MFR BLDMV: 1.6 % — SIGNIFICANT CHANGE UP
COHGB MFR BLDMV: 1.7 % — SIGNIFICANT CHANGE UP
COHGB MFR BLDMV: 1.7 % — SIGNIFICANT CHANGE UP
COHGB MFR BLDMV: 1.8 % — SIGNIFICANT CHANGE UP
CREAT SERPL-MCNC: 0.86 MG/DL — SIGNIFICANT CHANGE UP (ref 0.5–1.3)
CREAT SERPL-MCNC: 0.95 MG/DL — SIGNIFICANT CHANGE UP (ref 0.5–1.3)
CREAT SERPL-MCNC: 0.98 MG/DL — SIGNIFICANT CHANGE UP (ref 0.5–1.3)
CREAT SERPL-MCNC: 1.17 MG/DL — SIGNIFICANT CHANGE UP (ref 0.5–1.3)
CREAT SERPL-MCNC: 1.18 MG/DL — SIGNIFICANT CHANGE UP (ref 0.5–1.3)
CREAT SERPL-MCNC: 1.28 MG/DL — SIGNIFICANT CHANGE UP (ref 0.5–1.3)
CREAT SERPL-MCNC: 1.33 MG/DL — HIGH (ref 0.5–1.3)
EGFR: 55 ML/MIN/1.73M2 — LOW
EGFR: 57 ML/MIN/1.73M2 — LOW
EGFR: 63 ML/MIN/1.73M2 — SIGNIFICANT CHANGE UP
EGFR: 64 ML/MIN/1.73M2 — SIGNIFICANT CHANGE UP
EGFR: 79 ML/MIN/1.73M2 — SIGNIFICANT CHANGE UP
EGFR: 82 ML/MIN/1.73M2 — SIGNIFICANT CHANGE UP
EGFR: 92 ML/MIN/1.73M2 — SIGNIFICANT CHANGE UP
EOSINOPHIL # BLD AUTO: 0 K/UL — SIGNIFICANT CHANGE UP (ref 0–0.5)
EOSINOPHIL # BLD AUTO: 0 K/UL — SIGNIFICANT CHANGE UP (ref 0–0.5)
EOSINOPHIL NFR BLD AUTO: 0 % — SIGNIFICANT CHANGE UP (ref 0–6)
EOSINOPHIL NFR BLD AUTO: 0 % — SIGNIFICANT CHANGE UP (ref 0–6)
GAS PNL BLDA: SIGNIFICANT CHANGE UP
GAS PNL BLDMV: SIGNIFICANT CHANGE UP
GAS PNL BLDV: 137 MMOL/L — SIGNIFICANT CHANGE UP (ref 136–145)
GAS PNL BLDV: SIGNIFICANT CHANGE UP
GAS PNL BLDV: SIGNIFICANT CHANGE UP
GIANT PLATELETS BLD QL SMEAR: PRESENT — SIGNIFICANT CHANGE UP
GLUCOSE BLDC GLUCOMTR-MCNC: 157 MG/DL — HIGH (ref 70–99)
GLUCOSE BLDC GLUCOMTR-MCNC: 164 MG/DL — HIGH (ref 70–99)
GLUCOSE SERPL-MCNC: 128 MG/DL — HIGH (ref 70–99)
GLUCOSE SERPL-MCNC: 138 MG/DL — HIGH (ref 70–99)
GLUCOSE SERPL-MCNC: 145 MG/DL — HIGH (ref 70–99)
GLUCOSE SERPL-MCNC: 155 MG/DL — HIGH (ref 70–99)
GLUCOSE SERPL-MCNC: 165 MG/DL — HIGH (ref 70–99)
GLUCOSE SERPL-MCNC: 207 MG/DL — HIGH (ref 70–99)
GLUCOSE SERPL-MCNC: 233 MG/DL — HIGH (ref 70–99)
HCO3 BLDA-SCNC: 19 MMOL/L — LOW (ref 21–28)
HCO3 BLDA-SCNC: 19 MMOL/L — LOW (ref 21–28)
HCO3 BLDA-SCNC: 20 MMOL/L — LOW (ref 21–28)
HCO3 BLDA-SCNC: 21 MMOL/L — SIGNIFICANT CHANGE UP (ref 21–28)
HCO3 BLDA-SCNC: 22 MMOL/L — SIGNIFICANT CHANGE UP (ref 21–28)
HCO3 BLDMV-SCNC: 22 MMOL/L — SIGNIFICANT CHANGE UP
HCO3 BLDMV-SCNC: 22 MMOL/L — SIGNIFICANT CHANGE UP
HCO3 BLDMV-SCNC: 23 MMOL/L — SIGNIFICANT CHANGE UP
HCO3 BLDMV-SCNC: 24 MMOL/L — SIGNIFICANT CHANGE UP
HCO3 BLDV-SCNC: 20 MMOL/L — LOW (ref 22–29)
HCO3 BLDV-SCNC: 22 MMOL/L — SIGNIFICANT CHANGE UP (ref 22–29)
HCT VFR BLD CALC: 27.4 % — LOW (ref 34.5–45)
HCT VFR BLD CALC: 29.1 % — LOW (ref 34.5–45)
HCT VFR BLD CALC: 29.4 % — LOW (ref 34.5–45)
HCT VFR BLD CALC: 30.1 % — LOW (ref 34.5–45)
HCT VFR BLD CALC: 30.6 % — LOW (ref 34.5–45)
HCT VFR BLD CALC: 31.8 % — LOW (ref 34.5–45)
HGB BLD-MCNC: 10 G/DL — LOW (ref 11.5–15.5)
HGB BLD-MCNC: 8.7 G/DL — LOW (ref 11.5–15.5)
HGB BLD-MCNC: 9.2 G/DL — LOW (ref 11.5–15.5)
HGB BLD-MCNC: 9.3 G/DL — LOW (ref 11.5–15.5)
HGB BLD-MCNC: 9.6 G/DL — LOW (ref 11.5–15.5)
HGB BLD-MCNC: 9.8 G/DL — LOW (ref 11.5–15.5)
HGB FLD-MCNC: 10 G/DL — LOW (ref 11.7–16.1)
HGB FLD-MCNC: 10.1 G/DL — LOW (ref 11.7–16.1)
HGB FLD-MCNC: 9 G/DL — LOW (ref 11.7–16.1)
HGB FLD-MCNC: 9.4 G/DL — LOW (ref 11.7–16.1)
HGB FLD-MCNC: 9.6 G/DL — LOW (ref 11.7–16.1)
HGB FLD-MCNC: 9.8 G/DL — LOW (ref 11.7–16.1)
IMM GRANULOCYTES NFR BLD AUTO: 1 % — HIGH (ref 0–0.9)
INR BLD: 1.23 — HIGH (ref 0.88–1.16)
INR BLD: 1.3 — HIGH (ref 0.88–1.16)
LACTATE SERPL-SCNC: 1 MMOL/L — SIGNIFICANT CHANGE UP (ref 0.5–2)
LACTATE SERPL-SCNC: 1.1 MMOL/L — SIGNIFICANT CHANGE UP (ref 0.5–2)
LACTATE SERPL-SCNC: 1.1 MMOL/L — SIGNIFICANT CHANGE UP (ref 0.5–2)
LACTATE SERPL-SCNC: 1.4 MMOL/L — SIGNIFICANT CHANGE UP (ref 0.5–2)
LACTATE SERPL-SCNC: 1.5 MMOL/L — SIGNIFICANT CHANGE UP (ref 0.5–2)
LACTATE SERPL-SCNC: 2 MMOL/L — SIGNIFICANT CHANGE UP (ref 0.5–2)
LACTATE SERPL-SCNC: 2.2 MMOL/L — HIGH (ref 0.5–2)
LYMPHOCYTES # BLD AUTO: 0.67 K/UL — LOW (ref 1–3.3)
LYMPHOCYTES # BLD AUTO: 14.3 % — SIGNIFICANT CHANGE UP (ref 13–44)
LYMPHOCYTES # BLD AUTO: 2.6 % — LOW (ref 13–44)
LYMPHOCYTES # BLD AUTO: 3.11 K/UL — SIGNIFICANT CHANGE UP (ref 1–3.3)
MACROCYTES BLD QL: SIGNIFICANT CHANGE UP
MAGNESIUM SERPL-MCNC: 2.2 MG/DL — SIGNIFICANT CHANGE UP (ref 1.6–2.6)
MANUAL SMEAR VERIFICATION: SIGNIFICANT CHANGE UP
MCHC RBC-ENTMCNC: 24.6 PG — LOW (ref 27–34)
MCHC RBC-ENTMCNC: 25 PG — LOW (ref 27–34)
MCHC RBC-ENTMCNC: 25.1 PG — LOW (ref 27–34)
MCHC RBC-ENTMCNC: 25.1 PG — LOW (ref 27–34)
MCHC RBC-ENTMCNC: 25.3 PG — LOW (ref 27–34)
MCHC RBC-ENTMCNC: 25.3 PG — LOW (ref 27–34)
MCHC RBC-ENTMCNC: 31.4 GM/DL — LOW (ref 32–36)
MCHC RBC-ENTMCNC: 31.6 GM/DL — LOW (ref 32–36)
MCHC RBC-ENTMCNC: 31.6 GM/DL — LOW (ref 32–36)
MCHC RBC-ENTMCNC: 31.8 GM/DL — LOW (ref 32–36)
MCHC RBC-ENTMCNC: 31.9 GM/DL — LOW (ref 32–36)
MCHC RBC-ENTMCNC: 32 GM/DL — SIGNIFICANT CHANGE UP (ref 32–36)
MCV RBC AUTO: 78.3 FL — LOW (ref 80–100)
MCV RBC AUTO: 78.7 FL — LOW (ref 80–100)
MCV RBC AUTO: 78.8 FL — LOW (ref 80–100)
MCV RBC AUTO: 79.1 FL — LOW (ref 80–100)
MCV RBC AUTO: 79.5 FL — LOW (ref 80–100)
MCV RBC AUTO: 79.9 FL — LOW (ref 80–100)
METHGB MFR BLDMV: 0 % — SIGNIFICANT CHANGE UP
METHGB MFR BLDMV: 0.2 % — SIGNIFICANT CHANGE UP
METHGB MFR BLDMV: 0.3 % — SIGNIFICANT CHANGE UP
METHGB MFR BLDMV: 0.4 % — SIGNIFICANT CHANGE UP
METHGB MFR BLDMV: 0.7 % — SIGNIFICANT CHANGE UP
METHGB MFR BLDMV: 0.9 % — SIGNIFICANT CHANGE UP
MONOCYTES # BLD AUTO: 0.44 K/UL — SIGNIFICANT CHANGE UP (ref 0–0.9)
MONOCYTES # BLD AUTO: 1.68 K/UL — HIGH (ref 0–0.9)
MONOCYTES NFR BLD AUTO: 1.7 % — LOW (ref 2–14)
MONOCYTES NFR BLD AUTO: 7.7 % — SIGNIFICANT CHANGE UP (ref 2–14)
MRSA PCR RESULT.: NEGATIVE — SIGNIFICANT CHANGE UP
NEUTROPHILS # BLD AUTO: 16.73 K/UL — HIGH (ref 1.8–7.4)
NEUTROPHILS # BLD AUTO: 24.27 K/UL — HIGH (ref 1.8–7.4)
NEUTROPHILS NFR BLD AUTO: 76.8 % — SIGNIFICANT CHANGE UP (ref 43–77)
NEUTROPHILS NFR BLD AUTO: 94.8 % — HIGH (ref 43–77)
NRBC # BLD: 0 /100 WBCS — SIGNIFICANT CHANGE UP (ref 0–0)
NT-PROBNP SERPL-SCNC: 3122 PG/ML — HIGH (ref 0–300)
NT-PROBNP SERPL-SCNC: 4135 PG/ML — HIGH (ref 0–300)
NT-PROBNP SERPL-SCNC: 5230 PG/ML — HIGH (ref 0–300)
NT-PROBNP SERPL-SCNC: 5877 PG/ML — HIGH (ref 0–300)
O2 CT VFR BLD CALC: 42 MMHG — SIGNIFICANT CHANGE UP
O2 CT VFR BLD CALC: 42 MMHG — SIGNIFICANT CHANGE UP
O2 CT VFR BLD CALC: 43 MMHG — SIGNIFICANT CHANGE UP
O2 CT VFR BLD CALC: 43 MMHG — SIGNIFICANT CHANGE UP
O2 CT VFR BLD CALC: 44 MMHG — SIGNIFICANT CHANGE UP
O2 CT VFR BLD CALC: 46 MMHG — SIGNIFICANT CHANGE UP
O2 CT VFR BLDMV CALC: 10 ML/DL — SIGNIFICANT CHANGE UP
O2 CT VFR BLDMV CALC: 9 ML/DL — SIGNIFICANT CHANGE UP
OVALOCYTES BLD QL SMEAR: SLIGHT — SIGNIFICANT CHANGE UP
OXYHGB MFR BLDMV: 61.4 % — SIGNIFICANT CHANGE UP
OXYHGB MFR BLDMV: 66.8 % — SIGNIFICANT CHANGE UP
OXYHGB MFR BLDMV: 67.3 % — SIGNIFICANT CHANGE UP
OXYHGB MFR BLDMV: 69.1 % — SIGNIFICANT CHANGE UP
OXYHGB MFR BLDMV: 69.5 % — SIGNIFICANT CHANGE UP
OXYHGB MFR BLDMV: 70.4 % — SIGNIFICANT CHANGE UP
PCO2 BLDA: 30 MMHG — LOW (ref 32–45)
PCO2 BLDA: 31 MMHG — LOW (ref 32–45)
PCO2 BLDA: 32 MMHG — SIGNIFICANT CHANGE UP (ref 32–45)
PCO2 BLDA: 32 MMHG — SIGNIFICANT CHANGE UP (ref 32–45)
PCO2 BLDMV: 34 MMHG — SIGNIFICANT CHANGE UP
PCO2 BLDMV: 36 MMHG — SIGNIFICANT CHANGE UP
PCO2 BLDMV: 37 MMHG — SIGNIFICANT CHANGE UP
PCO2 BLDMV: 38 MMHG — SIGNIFICANT CHANGE UP
PCO2 BLDV: 35 MMHG — LOW (ref 39–42)
PCO2 BLDV: 38 MMHG — LOW (ref 39–42)
PH BLDA: 7.41 — SIGNIFICANT CHANGE UP (ref 7.35–7.45)
PH BLDA: 7.41 — SIGNIFICANT CHANGE UP (ref 7.35–7.45)
PH BLDA: 7.42 — SIGNIFICANT CHANGE UP (ref 7.35–7.45)
PH BLDA: 7.43 — SIGNIFICANT CHANGE UP (ref 7.35–7.45)
PH BLDA: 7.44 — SIGNIFICANT CHANGE UP (ref 7.35–7.45)
PH BLDA: 7.44 — SIGNIFICANT CHANGE UP (ref 7.35–7.45)
PH BLDA: 7.45 — SIGNIFICANT CHANGE UP (ref 7.35–7.45)
PH BLDMV: 7.39 — SIGNIFICANT CHANGE UP
PH BLDMV: 7.4 — SIGNIFICANT CHANGE UP
PH BLDMV: 7.4 — SIGNIFICANT CHANGE UP
PH BLDMV: 7.41 — SIGNIFICANT CHANGE UP
PH BLDMV: 7.41 — SIGNIFICANT CHANGE UP
PH BLDMV: 7.44 — SIGNIFICANT CHANGE UP
PH BLDV: 7.37 — SIGNIFICANT CHANGE UP (ref 7.32–7.43)
PH BLDV: 7.38 — SIGNIFICANT CHANGE UP (ref 7.32–7.43)
PHOSPHATE SERPL-MCNC: 4.4 MG/DL — SIGNIFICANT CHANGE UP (ref 2.5–4.5)
PLAT MORPH BLD: NORMAL — SIGNIFICANT CHANGE UP
PLATELET # BLD AUTO: 114 K/UL — LOW (ref 150–400)
PLATELET # BLD AUTO: 126 K/UL — LOW (ref 150–400)
PLATELET # BLD AUTO: 131 K/UL — LOW (ref 150–400)
PLATELET # BLD AUTO: 136 K/UL — LOW (ref 150–400)
PLATELET # BLD AUTO: 149 K/UL — LOW (ref 150–400)
PLATELET # BLD AUTO: 163 K/UL — SIGNIFICANT CHANGE UP (ref 150–400)
PO2 BLDA: 125 MMHG — HIGH (ref 83–108)
PO2 BLDA: 142 MMHG — HIGH (ref 83–108)
PO2 BLDA: 163 MMHG — HIGH (ref 83–108)
PO2 BLDA: 222 MMHG — HIGH (ref 83–108)
PO2 BLDA: 250 MMHG — HIGH (ref 83–108)
PO2 BLDA: 269 MMHG — HIGH (ref 83–108)
PO2 BLDA: 59 MMHG — LOW (ref 83–108)
PO2 BLDV: 38 MMHG — SIGNIFICANT CHANGE UP (ref 25–45)
PO2 BLDV: 45 MMHG — SIGNIFICANT CHANGE UP (ref 25–45)
POIKILOCYTOSIS BLD QL AUTO: SIGNIFICANT CHANGE UP
POLYCHROMASIA BLD QL SMEAR: SLIGHT — SIGNIFICANT CHANGE UP
POTASSIUM BLDV-SCNC: 4.3 MMOL/L — SIGNIFICANT CHANGE UP (ref 3.5–5.1)
POTASSIUM SERPL-MCNC: 4.1 MMOL/L — SIGNIFICANT CHANGE UP (ref 3.5–5.3)
POTASSIUM SERPL-MCNC: 4.2 MMOL/L — SIGNIFICANT CHANGE UP (ref 3.5–5.3)
POTASSIUM SERPL-MCNC: 4.2 MMOL/L — SIGNIFICANT CHANGE UP (ref 3.5–5.3)
POTASSIUM SERPL-MCNC: 4.3 MMOL/L — SIGNIFICANT CHANGE UP (ref 3.5–5.3)
POTASSIUM SERPL-MCNC: 4.3 MMOL/L — SIGNIFICANT CHANGE UP (ref 3.5–5.3)
POTASSIUM SERPL-MCNC: 4.4 MMOL/L — SIGNIFICANT CHANGE UP (ref 3.5–5.3)
POTASSIUM SERPL-MCNC: 4.4 MMOL/L — SIGNIFICANT CHANGE UP (ref 3.5–5.3)
POTASSIUM SERPL-SCNC: 4.1 MMOL/L — SIGNIFICANT CHANGE UP (ref 3.5–5.3)
POTASSIUM SERPL-SCNC: 4.2 MMOL/L — SIGNIFICANT CHANGE UP (ref 3.5–5.3)
POTASSIUM SERPL-SCNC: 4.2 MMOL/L — SIGNIFICANT CHANGE UP (ref 3.5–5.3)
POTASSIUM SERPL-SCNC: 4.3 MMOL/L — SIGNIFICANT CHANGE UP (ref 3.5–5.3)
POTASSIUM SERPL-SCNC: 4.3 MMOL/L — SIGNIFICANT CHANGE UP (ref 3.5–5.3)
POTASSIUM SERPL-SCNC: 4.4 MMOL/L — SIGNIFICANT CHANGE UP (ref 3.5–5.3)
POTASSIUM SERPL-SCNC: 4.4 MMOL/L — SIGNIFICANT CHANGE UP (ref 3.5–5.3)
PROT SERPL-MCNC: 7.1 G/DL — SIGNIFICANT CHANGE UP (ref 6–8.3)
PROT SERPL-MCNC: 7.5 G/DL — SIGNIFICANT CHANGE UP (ref 6–8.3)
PROT SERPL-MCNC: 7.5 G/DL — SIGNIFICANT CHANGE UP (ref 6–8.3)
PROT SERPL-MCNC: 7.7 G/DL — SIGNIFICANT CHANGE UP (ref 6–8.3)
PROT SERPL-MCNC: 7.8 G/DL — SIGNIFICANT CHANGE UP (ref 6–8.3)
PROT SERPL-MCNC: 7.9 G/DL — SIGNIFICANT CHANGE UP (ref 6–8.3)
PROT SERPL-MCNC: 7.9 G/DL — SIGNIFICANT CHANGE UP (ref 6–8.3)
PROTHROM AB SERPL-ACNC: 14.7 SEC — HIGH (ref 10.5–13.4)
PROTHROM AB SERPL-ACNC: 15.5 SEC — HIGH (ref 10.5–13.4)
RAPID RVP RESULT: SIGNIFICANT CHANGE UP
RBC # BLD: 3.48 M/UL — LOW (ref 3.8–5.2)
RBC # BLD: 3.66 M/UL — LOW (ref 3.8–5.2)
RBC # BLD: 3.68 M/UL — LOW (ref 3.8–5.2)
RBC # BLD: 3.82 M/UL — SIGNIFICANT CHANGE UP (ref 3.8–5.2)
RBC # BLD: 3.87 M/UL — SIGNIFICANT CHANGE UP (ref 3.8–5.2)
RBC # BLD: 4.06 M/UL — SIGNIFICANT CHANGE UP (ref 3.8–5.2)
RBC # FLD: 18.9 % — HIGH (ref 10.3–14.5)
RBC # FLD: 18.9 % — HIGH (ref 10.3–14.5)
RBC # FLD: 19 % — HIGH (ref 10.3–14.5)
RBC # FLD: 19.2 % — HIGH (ref 10.3–14.5)
RBC # FLD: 19.3 % — HIGH (ref 10.3–14.5)
RBC # FLD: 19.5 % — HIGH (ref 10.3–14.5)
RBC BLD AUTO: SIGNIFICANT CHANGE UP
RH IG SCN BLD-IMP: POSITIVE — SIGNIFICANT CHANGE UP
S AUREUS DNA NOSE QL NAA+PROBE: NEGATIVE — SIGNIFICANT CHANGE UP
SAO2 % BLDA: 100 % — HIGH (ref 94–98)
SAO2 % BLDA: 86.9 % — LOW (ref 94–98)
SAO2 % BLDA: 99.5 % — HIGH (ref 94–98)
SAO2 % BLDA: 99.5 % — HIGH (ref 94–98)
SAO2 % BLDA: 99.6 % — HIGH (ref 94–98)
SAO2 % BLDA: 99.6 % — HIGH (ref 94–98)
SAO2 % BLDA: 99.8 % — HIGH (ref 94–98)
SAO2 % BLDMV: 62.8 % — SIGNIFICANT CHANGE UP
SAO2 % BLDMV: 67.8 % — SIGNIFICANT CHANGE UP
SAO2 % BLDMV: 68.5 % — SIGNIFICANT CHANGE UP
SAO2 % BLDMV: 70.3 % — SIGNIFICANT CHANGE UP
SAO2 % BLDMV: 70.9 % — SIGNIFICANT CHANGE UP
SAO2 % BLDMV: 72 % — SIGNIFICANT CHANGE UP
SAO2 % BLDV: 56.5 % — LOW (ref 67–88)
SAO2 % BLDV: 70.7 % — SIGNIFICANT CHANGE UP (ref 67–88)
SARS-COV-2 RNA SPEC QL NAA+PROBE: SIGNIFICANT CHANGE UP
SODIUM SERPL-SCNC: 130 MMOL/L — LOW (ref 135–145)
SODIUM SERPL-SCNC: 132 MMOL/L — LOW (ref 135–145)
SODIUM SERPL-SCNC: 132 MMOL/L — LOW (ref 135–145)
SODIUM SERPL-SCNC: 133 MMOL/L — LOW (ref 135–145)
SODIUM SERPL-SCNC: 134 MMOL/L — LOW (ref 135–145)
SODIUM SERPL-SCNC: 134 MMOL/L — LOW (ref 135–145)
SODIUM SERPL-SCNC: 135 MMOL/L — SIGNIFICANT CHANGE UP (ref 135–145)
SPHEROCYTES BLD QL SMEAR: SIGNIFICANT CHANGE UP
TROPONIN T SERPL-MCNC: 0.01 NG/ML — SIGNIFICANT CHANGE UP (ref 0–0.01)
TROPONIN T SERPL-MCNC: 0.02 NG/ML — HIGH (ref 0–0.01)
TROPONIN T SERPL-MCNC: 0.02 NG/ML — HIGH (ref 0–0.01)
WBC # BLD: 17.59 K/UL — HIGH (ref 3.8–10.5)
WBC # BLD: 20.06 K/UL — HIGH (ref 3.8–10.5)
WBC # BLD: 21.34 K/UL — HIGH (ref 3.8–10.5)
WBC # BLD: 21.78 K/UL — HIGH (ref 3.8–10.5)
WBC # BLD: 22.8 K/UL — HIGH (ref 3.8–10.5)
WBC # BLD: 25.6 K/UL — HIGH (ref 3.8–10.5)
WBC # FLD AUTO: 17.59 K/UL — HIGH (ref 3.8–10.5)
WBC # FLD AUTO: 20.06 K/UL — HIGH (ref 3.8–10.5)
WBC # FLD AUTO: 21.34 K/UL — HIGH (ref 3.8–10.5)
WBC # FLD AUTO: 21.78 K/UL — HIGH (ref 3.8–10.5)
WBC # FLD AUTO: 22.8 K/UL — HIGH (ref 3.8–10.5)
WBC # FLD AUTO: 25.6 K/UL — HIGH (ref 3.8–10.5)

## 2023-01-16 PROCEDURE — 93321 DOPPLER ECHO F-UP/LMTD STD: CPT | Mod: 26

## 2023-01-16 PROCEDURE — 93308 TTE F-UP OR LMTD: CPT | Mod: 26

## 2023-01-16 PROCEDURE — 74177 CT ABD & PELVIS W/CONTRAST: CPT | Mod: 26

## 2023-01-16 PROCEDURE — 99291 CRITICAL CARE FIRST HOUR: CPT

## 2023-01-16 PROCEDURE — 71045 X-RAY EXAM CHEST 1 VIEW: CPT | Mod: 26

## 2023-01-16 PROCEDURE — 93306 TTE W/DOPPLER COMPLETE: CPT | Mod: 26

## 2023-01-16 PROCEDURE — 99291 CRITICAL CARE FIRST HOUR: CPT | Mod: GC

## 2023-01-16 RX ORDER — SODIUM CHLORIDE 9 MG/ML
250 INJECTION INTRAMUSCULAR; INTRAVENOUS; SUBCUTANEOUS ONCE
Refills: 0 | Status: COMPLETED | OUTPATIENT
Start: 2023-01-16 | End: 2023-01-16

## 2023-01-16 RX ORDER — ACETAMINOPHEN 500 MG
1000 TABLET ORAL ONCE
Refills: 0 | Status: COMPLETED | OUTPATIENT
Start: 2023-01-16 | End: 2023-01-16

## 2023-01-16 RX ORDER — POLYETHYLENE GLYCOL 3350 17 G/17G
17 POWDER, FOR SOLUTION ORAL DAILY
Refills: 0 | Status: DISCONTINUED | OUTPATIENT
Start: 2023-01-16 | End: 2023-01-20

## 2023-01-16 RX ORDER — SENNA PLUS 8.6 MG/1
2 TABLET ORAL AT BEDTIME
Refills: 0 | Status: DISCONTINUED | OUTPATIENT
Start: 2023-01-16 | End: 2023-01-20

## 2023-01-16 RX ORDER — VASOPRESSIN 20 [USP'U]/ML
0.02 INJECTION INTRAVENOUS
Qty: 40 | Refills: 0 | Status: DISCONTINUED | OUTPATIENT
Start: 2023-01-16 | End: 2023-01-16

## 2023-01-16 RX ORDER — MILRINONE LACTATE 1 MG/ML
0.1 INJECTION, SOLUTION INTRAVENOUS
Qty: 20 | Refills: 0 | Status: DISCONTINUED | OUTPATIENT
Start: 2023-01-16 | End: 2023-01-17

## 2023-01-16 RX ORDER — OXYCODONE HYDROCHLORIDE 5 MG/1
5 TABLET ORAL EVERY 6 HOURS
Refills: 0 | Status: DISCONTINUED | OUTPATIENT
Start: 2023-01-16 | End: 2023-01-16

## 2023-01-16 RX ORDER — DIPHENHYDRAMINE HCL 50 MG
25 CAPSULE ORAL ONCE
Refills: 0 | Status: COMPLETED | OUTPATIENT
Start: 2023-01-16 | End: 2023-01-16

## 2023-01-16 RX ADMIN — HYDROMORPHONE HYDROCHLORIDE 1 MILLIGRAM(S): 2 INJECTION INTRAMUSCULAR; INTRAVENOUS; SUBCUTANEOUS at 20:21

## 2023-01-16 RX ADMIN — SODIUM CHLORIDE 250 MILLILITER(S): 9 INJECTION INTRAMUSCULAR; INTRAVENOUS; SUBCUTANEOUS at 10:06

## 2023-01-16 RX ADMIN — Medication 2: at 16:07

## 2023-01-16 RX ADMIN — ENOXAPARIN SODIUM 100 MILLIGRAM(S): 100 INJECTION SUBCUTANEOUS at 16:07

## 2023-01-16 RX ADMIN — Medication 1000 MILLIGRAM(S): at 20:06

## 2023-01-16 RX ADMIN — Medication 400 MILLIGRAM(S): at 19:36

## 2023-01-16 RX ADMIN — ENOXAPARIN SODIUM 100 MILLIGRAM(S): 100 INJECTION SUBCUTANEOUS at 01:08

## 2023-01-16 RX ADMIN — Medication 300 MILLIGRAM(S): at 00:41

## 2023-01-16 RX ADMIN — Medication 50 MILLIGRAM(S): at 01:41

## 2023-01-16 RX ADMIN — HYDROMORPHONE HYDROCHLORIDE 1 MILLIGRAM(S): 2 INJECTION INTRAMUSCULAR; INTRAVENOUS; SUBCUTANEOUS at 20:16

## 2023-01-16 RX ADMIN — HYDROMORPHONE HYDROCHLORIDE 1 MILLIGRAM(S): 2 INJECTION INTRAMUSCULAR; INTRAVENOUS; SUBCUTANEOUS at 09:15

## 2023-01-16 RX ADMIN — Medication 25 MILLIGRAM(S): at 01:42

## 2023-01-16 RX ADMIN — CHLORHEXIDINE GLUCONATE 1 APPLICATION(S): 213 SOLUTION TOPICAL at 07:23

## 2023-01-16 RX ADMIN — Medication 2: at 07:22

## 2023-01-16 RX ADMIN — VASOPRESSIN 6 UNIT(S)/MIN: 20 INJECTION INTRAVENOUS at 10:18

## 2023-01-16 RX ADMIN — HYDROMORPHONE HYDROCHLORIDE 1 MILLIGRAM(S): 2 INJECTION INTRAMUSCULAR; INTRAVENOUS; SUBCUTANEOUS at 09:30

## 2023-01-16 RX ADMIN — Medication 1000 MILLIGRAM(S): at 01:22

## 2023-01-16 RX ADMIN — Medication 2: at 12:04

## 2023-01-16 RX ADMIN — CEFTRIAXONE 100 MILLIGRAM(S): 500 INJECTION, POWDER, FOR SOLUTION INTRAMUSCULAR; INTRAVENOUS at 12:04

## 2023-01-16 RX ADMIN — SODIUM CHLORIDE 250 MILLILITER(S): 9 INJECTION INTRAMUSCULAR; INTRAVENOUS; SUBCUTANEOUS at 04:00

## 2023-01-16 RX ADMIN — Medication 400 MILLIGRAM(S): at 01:07

## 2023-01-16 NOTE — DIETITIAN INITIAL EVALUATION ADULT - PERTINENT MEDS FT
MEDICATIONS  (STANDING):  cefTRIAXone   IVPB 2000 milliGRAM(s) IV Intermittent every 24 hours  chlorhexidine 2% Cloths 1 Application(s) Topical <User Schedule>  dextrose 5%. 1000 milliLiter(s) (50 mL/Hr) IV Continuous <Continuous>  dextrose 5%. 1000 milliLiter(s) (100 mL/Hr) IV Continuous <Continuous>  dextrose 50% Injectable 25 Gram(s) IV Push once  dextrose 50% Injectable 12.5 Gram(s) IV Push once  dextrose 50% Injectable 25 Gram(s) IV Push once  enoxaparin Injectable 100 milliGRAM(s) SubCutaneous every 12 hours  glucagon  Injectable 1 milliGRAM(s) IntraMuscular once  influenza   Vaccine 0.5 milliLiter(s) IntraMuscular once  insulin lispro (ADMELOG) corrective regimen sliding scale   SubCutaneous three times a day before meals  milrinone Infusion 0.1 MICROgram(s)/kG/Min (3.23 mL/Hr) IV Continuous <Continuous>  vasopressin Infusion 0.02 Unit(s)/Min (3 mL/Hr) IV Continuous <Continuous>    MEDICATIONS  (PRN):  dextrose Oral Gel 15 Gram(s) Oral once PRN Blood Glucose LESS THAN 70 milliGRAM(s)/deciliter  HYDROmorphone  Injectable 1 milliGRAM(s) IV Push every 6 hours PRN Severe Pain (7 - 10)  oxyCODONE    IR 5 milliGRAM(s) Oral every 6 hours PRN Severe Pain (7 - 10)  polyethylene glycol 3350 17 Gram(s) Oral daily PRN Constipation  senna 2 Tablet(s) Oral at bedtime PRN Constipation

## 2023-01-16 NOTE — PROGRESS NOTE ADULT - SUBJECTIVE AND OBJECTIVE BOX
Interval Events: Reviewed  Patient seen and examined at bedside.    Patient is a 32y old  Female who presents with a chief complaint of R heart failure (2023 07:00)    she is much better  PAST MEDICAL & SURGICAL HISTORY:  Asthma      Fibroids      Anemia      No significant past surgical history          MEDICATIONS:  Pulmonary:    Antimicrobials:  cefTRIAXone   IVPB 2000 milliGRAM(s) IV Intermittent every 24 hours    Anticoagulants:  enoxaparin Injectable 100 milliGRAM(s) SubCutaneous every 12 hours    Cardiac:  milrinone Infusion 0.1 MICROgram(s)/kG/Min IV Continuous <Continuous>      Allergies    No Known Allergies    Intolerances        Vital Signs Last 24 Hrs  T(C): 37.4 (2023 10:00), Max: 38.9 (15 Trevon 2023 21:55)  T(F): 99.3 (2023 10:00), Max: 102 (15 Trevon 2023 21:55)  HR: 106 (2023 12:38) (106 - 153)  BP: 121/83 (2023 11:00) (121/83 - 132/82)  BP(mean): 98 (2023 11:00) (94 - 98)  RR: 16 (2023 12:38) (16 - 35)  SpO2: 100% (2023 12:38) (95% - 100%)    Parameters below as of 2023 12:38  Patient On (Oxygen Delivery Method): nasal cannula, high flow  O2 Flow (L/min): 40  O2 Concentration (%): 40    -15 @ 07:  -  16 @ 07:00  --------------------------------------------------------  IN: 229.5 mL / OUT: 1290 mL / NET: -1060.5 mL     @ 07:  -  16 @ 13:35  --------------------------------------------------------  IN: 12.5 mL / OUT: 0 mL / NET: 12.5 mL          Review of Systems:   •	General: negative  •	Skin/Breast: negative  •	Ophthalmologic: negative  •	ENMT: negative  •	Respiratory and Thorax: negative  •	Cardiovascular: negative  •	Gastrointestinal: negative  •	Genitourinary: negative  •	Musculoskeletal: negative  •	Neurological: negative  •	Psychiatric: negative  •	Hematology/Lymphatics: negative  •	Endocrine: negative  •	Allergic/Immunologic: negative    Physical Exam:   • Constitutional:	refer to the dietion /Nutritionist note  • Eyes:	EOMI; PERRL; no drainage or redness  • ENMT:	No oral lesions; no gross abnormalities  • Neck	No bruits; no thyromegaly or nodules  • Breasts:	not examined  • Back:	No deformity or limitation of movement  • Respiratory:	Breath Sounds equal & clear to percussion & auscultation, no accessory muscle use  • Cardiovascular:	Regular rate & rhythm, normal S1, S2; no murmurs, gallops or rubs; no S3, S4  • Gastrointestinal:	Soft, non-tender, no hepatosplenomegaly, normal bowel sounds  • Genitourinary:	not examined  • Rectal: not examined  • Extremities:	No cyanosis, clubbing or edema  • Vascular:	Equal and normal pulses (carotid, femoral, dorsalis pedis)  • Neurologica:l	not examined  • Skin:	No lesions; no rash  • Lymph Nodes:	No lymphadedenopathy  • Musculoskeletal:	No joint pain, swelling or deformity; no limitation of movement        LABS:  ABG - ( 2023 06:39 )  pH, Arterial: 7.42  pH, Blood: x     /  pCO2: 32    /  pO2: 269   / HCO3: 21    / Base Excess: -2.8  /  SaO2: 99.6                CBC Full  -  ( 2023 06:24 )  WBC Count : 21.34 K/uL  RBC Count : 3.87 M/uL  Hemoglobin : 9.8 g/dL  Hematocrit : 30.6 %  Platelet Count - Automated : 126 K/uL  Mean Cell Volume : 79.1 fl  Mean Cell Hemoglobin : 25.3 pg  Mean Cell Hemoglobin Concentration : 32.0 gm/dL  Auto Neutrophil # : x  Auto Lymphocyte # : x  Auto Monocyte # : x  Auto Eosinophil # : x  Auto Basophil # : x  Auto Neutrophil % : x  Auto Lymphocyte % : x  Auto Monocyte % : x  Auto Eosinophil % : x  Auto Basophil % : x    01-16    132<L>  |  99  |  16  ----------------------------<  165<H>  4.4   |  18<L>  |  0.98    Ca    8.8      2023 06:24  Phos  4.4       Mg     2.2         TPro  7.7  /  Alb  3.4  /  TBili  0.9  /  DBili  x   /  AST  24  /  ALT  31  /  AlkPhos  76      PT/INR - ( 2023 02:36 )   PT: 15.5 sec;   INR: 1.30          PTT - ( 2023 02:36 )  PTT:33.4 sec      Urinalysis Basic - ( 15 Trevon 2023 16:48 )    Color: Yellow / Appearance: Clear / S.015 / pH: x  Gluc: x / Ketone: NEGATIVE  / Bili: Negative / Urobili: 0.2 E.U./dL   Blood: x / Protein: Trace mg/dL / Nitrite: NEGATIVE   Leuk Esterase: NEGATIVE / RBC: < 5 /HPF / WBC < 5 /HPF   Sq Epi: x / Non Sq Epi: 0-5 /HPF / Bacteria: None /HPF              Culture Results:   No growth at 12 hours (01-15 @ 17:23)  Culture Results:   No growth at 12 hours (01-15 @ 17:23)      RADIOLOGY & ADDITIONAL STUDIES (The following images were personally reviewed):

## 2023-01-16 NOTE — PROGRESS NOTE ADULT - PROBLEM SELECTOR PLAN 2
the PA catheter was reinserted after the ECOS.  The CI increased and the PA sat increased.  Continue Milrinone, vasopressin and NO.  May need diuresis as the filling pressures are elevated.  Discussed with CCU

## 2023-01-16 NOTE — PROGRESS NOTE ADULT - SUBJECTIVE AND OBJECTIVE BOX
ADAM BEACH, 32y, Female  MRN-[id number]  Patient is a 32y old  Female who presents with a chief complaint of R heart failure (15 Trevon 2023 15:07)      OVERNIGHT EVENTS:     SUBJECTIVE:    12 Point ROS Negative unless noted otherwise above.  -------------------------------------------------------------------------------  VITAL SIGNS:  Vital Signs Last 24 Hrs  T(C): 37.6 (2023 06:00), Max: 38.9 (15 Trevon 2023 21:55)  T(F): 99.7 (2023 06:00), Max: 102 (15 Trevon 2023 21:55)  HR: 111 (2023 06:08) (107 - 153)  BP: 113/70 (15 Trevon 2023 11:00) (113/70 - 136/73)  BP(mean): 84 (15 Trevon 2023 11:00) (84 - 91)  RR: 22 (2023 06:08) (16 - 35)  SpO2: 100% (2023 06:08) (92% - 100%)    Parameters below as of 2023 06:08  Patient On (Oxygen Delivery Method): nasal cannula, high flow  O2 Flow (L/min): 50  O2 Concentration (%): 60  I&O's Summary    15 Trevon 2023 07:01  -  2023 07:00  --------------------------------------------------------  IN: 80.1 mL / OUT: 990 mL / NET: -909.9 mL        PHYSICAL EXAM:    General: NAD, well developed  HEENT: NC/AT; EOMI, PERRLA, anicteric sclera; moist mucosal membranes.  Neck: supple, trachea midline  Cardiovascular: RRR, +S1/S2; NO M/R/G  Respiratory: CTA B/L; no W/R/R  Gastrointestinal: soft, NT/ND; +BSx4  Extremities: WWP; no edema or cyanosis  Vascular: 2+ radial, DP/PT pulses B/L  Neurological: AAOx3; no focal deficits    ALLERGIES:  Allergies    No Known Allergies    Intolerances        MEDICATIONS:  MEDICATIONS  (STANDING):  cefTRIAXone   IVPB 2000 milliGRAM(s) IV Intermittent every 24 hours  chlorhexidine 2% Cloths 1 Application(s) Topical <User Schedule>  dextrose 5%. 1000 milliLiter(s) (50 mL/Hr) IV Continuous <Continuous>  dextrose 5%. 1000 milliLiter(s) (100 mL/Hr) IV Continuous <Continuous>  dextrose 50% Injectable 25 Gram(s) IV Push once  dextrose 50% Injectable 12.5 Gram(s) IV Push once  dextrose 50% Injectable 25 Gram(s) IV Push once  enoxaparin Injectable 100 milliGRAM(s) SubCutaneous every 12 hours  glucagon  Injectable 1 milliGRAM(s) IntraMuscular once  influenza   Vaccine 0.5 milliLiter(s) IntraMuscular once  insulin lispro (ADMELOG) corrective regimen sliding scale   SubCutaneous three times a day before meals  milrinone Infusion 0.2 MICROgram(s)/kG/Min (6.45 mL/Hr) IV Continuous <Continuous>  vasopressin Infusion 0.04 Unit(s)/Min (6 mL/Hr) IV Continuous <Continuous>    MEDICATIONS  (PRN):  dextrose Oral Gel 15 Gram(s) Oral once PRN Blood Glucose LESS THAN 70 milliGRAM(s)/deciliter  HYDROmorphone  Injectable 1 milliGRAM(s) IV Push every 6 hours PRN Severe Pain (7 - 10)      -------------------------------------------------------------------------------  LABS:                        9.6    20.06 )-----------( 136      ( 2023 04:32 )             30.1     01-16    132<L>  |  100  |  17  ----------------------------<  155<H>  4.3   |  21<L>  |  1.18    Ca    8.8      2023 04:32  Phos  4.4     01-16  Mg     2.2     -16    TPro  7.5  /  Alb  3.5  /  TBili  0.9  /  DBili  x   /  AST  24  /  ALT  32  /  AlkPhos  72  01-16    LIVER FUNCTIONS - ( 2023 04:32 )  Alb: 3.5 g/dL / Pro: 7.5 g/dL / ALK PHOS: 72 U/L / ALT: 32 U/L / AST: 24 U/L / GGT: x           PT/INR - ( 2023 02:36 )   PT: 15.5 sec;   INR: 1.30          PTT - ( 2023 02:36 )  PTT:33.4 sec  Urinalysis Basic - ( 15 Trevon 2023 16:48 )    Color: Yellow / Appearance: Clear / S.015 / pH: x  Gluc: x / Ketone: NEGATIVE  / Bili: Negative / Urobili: 0.2 E.U./dL   Blood: x / Protein: Trace mg/dL / Nitrite: NEGATIVE   Leuk Esterase: NEGATIVE / RBC: < 5 /HPF / WBC < 5 /HPF   Sq Epi: x / Non Sq Epi: 0-5 /HPF / Bacteria: None /HPF      CAPILLARY BLOOD GLUCOSE      POCT Blood Glucose.: 90 mg/dL (15 Trevon 2023 16:39)      Culture - Blood (collected 15 Trevon 2023 17:23)  Source: .Blood Blood  Preliminary Report (2023 06:00):    No growth at 12 hours    Culture - Blood (collected 15 Trevon 2023 17:23)  Source: .Blood Blood  Preliminary Report (2023 06:00):    No growth at 12 hours    Urinalysis with Rflx Culture (collected 15 Trevon 2023 16:48)      SARS-CoV-2: NotDetec (2023 00:17)  SARS-CoV-2: NotDetec (2023 09:33)      RADIOLOGY & ADDITIONAL TESTS: Reviewed.   ADAM BEACH, 32y, Female  MRN-[id number]  Patient is a 32y old  Female who presents with a chief complaint of R heart failure (15 Trevon 2023 15:07)    OVERNIGHT EVENTS: At 00:15 23 patient was noted to be hypoxic to Spo2 75% with increased work of breathing on 6LNC. Repeat ABG 7.4 PCO2 30 pO59 Ao 86.9% (previously pH 7.45 pCO2 30 pO2 125 Ao 99.5%). Patient was transitioned to from nasal cannula to HFNC and Lay 10ppm with improvement in her oxygenation. Prior to the hypoxic event she was noted to be febrile to 102 with MAPs in the low 60s. Vasopressin was added in addition to milrinone 0.2. Antibiotics were broadened from Ceftriaxone to Vanc/Zosyn. After receiving a dose of Zosyn, she was noted have a new diffuse onset rash on her abdomen and upper extremities that was treated with Benadryl and solumedrol. [place]-katherine catheter was placed and repeat hemodynamics were obtained with bedside TTE showing dilated RV with less interventricular septal bowing.    SUBJECTIVE:    12 Point ROS Negative unless noted otherwise above.  -------------------------------------------------------------------------------  VITAL SIGNS:  Vital Signs Last 24 Hrs  T(C): 37.6 (2023 06:00), Max: 38.9 (15 Trevon 2023 21:55)  T(F): 99.7 (2023 06:00), Max: 102 (15 Trevon 2023 21:55)  HR: 111 (2023 06:08) (107 - 153)  BP: 113/70 (15 Trevon 2023 11:00) (113/70 - 136/73)  BP(mean): 84 (15 Trevon 2023 11:00) (84 - 91)  RR: 22 (2023 06:08) (16 - 35)  SpO2: 100% (2023 06:08) (92% - 100%)    Parameters below as of 2023 06:08  Patient On (Oxygen Delivery Method): nasal cannula, high flow  O2 Flow (L/min): 50  O2 Concentration (%): 60    I&O's Summary    15 Trevon 2023 07:01  -  2023 07:00  --------------------------------------------------------  IN: 80.1 mL / OUT: 990 mL / NET: -909.9 mL    PHYSICAL EXAM:  General: NAD, well developed  HEENT: NC/AT; EOMI, anicteric sclera; moist mucosal membranes.  Neck: supple, trachea midline  Cardiovascular: RRR, +S1/S2; NO M/R/G  Respiratory: CTA B/L; no W/R/R  Gastrointestinal: soft, NT/ND; +BSx4  Extremities: WWP; no edema or cyanosis. A-line in L wrist.   Vascular: 2+ radial, DP/PT pulses B/L  Neurological: AAOx3; no focal deficits    ALLERGIES:  No Known Allergies    MEDICATIONS  (STANDING):  cefTRIAXone   IVPB 2000 milliGRAM(s) IV Intermittent every 24 hours  chlorhexidine 2% Cloths 1 Application(s) Topical <User Schedule>  dextrose 5%. 1000 milliLiter(s) (50 mL/Hr) IV Continuous <Continuous>  dextrose 5%. 1000 milliLiter(s) (100 mL/Hr) IV Continuous <Continuous>  dextrose 50% Injectable 25 Gram(s) IV Push once  dextrose 50% Injectable 12.5 Gram(s) IV Push once  dextrose 50% Injectable 25 Gram(s) IV Push once  enoxaparin Injectable 100 milliGRAM(s) SubCutaneous every 12 hours  glucagon  Injectable 1 milliGRAM(s) IntraMuscular once  influenza   Vaccine 0.5 milliLiter(s) IntraMuscular once  insulin lispro (ADMELOG) corrective regimen sliding scale   SubCutaneous three times a day before meals  milrinone Infusion 0.2 MICROgram(s)/kG/Min (6.45 mL/Hr) IV Continuous <Continuous>  vasopressin Infusion 0.04 Unit(s)/Min (6 mL/Hr) IV Continuous <Continuous>    MEDICATIONS  (PRN):  dextrose Oral Gel 15 Gram(s) Oral once PRN Blood Glucose LESS THAN 70 milliGRAM(s)/deciliter  HYDROmorphone  Injectable 1 milliGRAM(s) IV Push every 6 hours PRN Severe Pain (7 - 10)      -------------------------------------------------------------------------------  LABS:                        9.6    20.06 )-----------( 136      ( 2023 04:32 )             30.1     -    132<L>  |  100  |  17  ----------------------------<  155<H>  4.3   |  21<L>  |  1.18    Ca    8.8      2023 04:32  Phos  4.4       Mg     2.2         TPro  7.5  /  Alb  3.5  /  TBili  0.9  /  DBili  x   /  AST  24  /  ALT  32  /  AlkPhos  72  -    LIVER FUNCTIONS - ( 2023 04:32 )  Alb: 3.5 g/dL / Pro: 7.5 g/dL / ALK PHOS: 72 U/L / ALT: 32 U/L / AST: 24 U/L / GGT: x           PT/INR - ( 2023 02:36 )   PT: 15.5 sec;   INR: 1.30          PTT - ( 2023 02:36 )  PTT:33.4 sec  Urinalysis Basic - ( 15 Trevon 2023 16:48 )    Color: Yellow / Appearance: Clear / S.015 / pH: x  Gluc: x / Ketone: NEGATIVE  / Bili: Negative / Urobili: 0.2 E.U./dL   Blood: x / Protein: Trace mg/dL / Nitrite: NEGATIVE   Leuk Esterase: NEGATIVE / RBC: < 5 /HPF / WBC < 5 /HPF   Sq Epi: x / Non Sq Epi: 0-5 /HPF / Bacteria: None /HPF    CAPILLARY BLOOD GLUCOSE  POCT Blood Glucose.: 90 mg/dL (15 Trevon 2023 16:39)    Culture - Blood (collected 15 Trevon 2023 17:23)  Source: .Blood Blood  Preliminary Report (2023 06:00):    No growth at 12 hours    Culture - Blood (collected 15 Trevon 2023 17:23)  Source: .Blood Blood  Preliminary Report (2023 06:00):    No growth at 12 hours    Urinalysis with Rflx Culture (collected 15 Trevon 2023 16:48)    SARS-CoV-2: NotDetec (2023 00:17)  SARS-CoV-2: NotDetec (2023 09:33)    RADIOLOGY & ADDITIONAL TESTS: Reviewed.   ADAM BEACH, 32y, Female  MRN-[id number]  Patient is a 32y old  Female who presents with a chief complaint of R heart failure (15 Trevon 2023 15:07)    OVERNIGHT EVENTS: At 00:15 23 patient was noted to be hypoxic to Spo2 75% with increased work of breathing on 6LNC. Repeat ABG 7.4 PCO2 30 pO59 Ao 86.9% (previously pH 7.45 pCO2 30 pO2 125 Ao 99.5%). Patient was transitioned to from nasal cannula to HFNC and Lay 10ppm with improvement in her oxygenation. Prior to the hypoxic event she was noted to be febrile to 102 with MAPs in the low 60s. Vasopressin was added in addition to milrinone 0.2. Antibiotics were broadened from Ceftriaxone to Vanc/Zosyn. After receiving a dose of Zosyn, she was noted have a new diffuse onset rash on her abdomen and upper extremities that was treated with Benadryl and solumedrol. [place]-katherine catheter was placed and repeat hemodynamics were obtained with bedside TTE showing dilated RV with less interventricular septal bowing.    SUBJECTIVE: Patient reports feeling much better than yesterday 2/2 less chest tightness. She denies chest pain, SOB, dysuria. Reports some left leg pain 2/2 DVT.    12 Point ROS Negative unless noted otherwise above.  -------------------------------------------------------------------------------  VITAL SIGNS:  Vital Signs Last 24 Hrs  T(C): 37.6 (2023 06:00), Max: 38.9 (15 Trevon 2023 21:55)  T(F): 99.7 (2023 06:00), Max: 102 (15 Trevon 2023 21:55)  HR: 111 (2023 06:08) (107 - 153)  BP: 113/70 (15 Trevon 2023 11:00) (113/70 - 136/73)  BP(mean): 84 (15 Trevon 2023 11:00) (84 - 91)  RR: 22 (2023 06:08) (16 - 35)  SpO2: 100% (2023 06:08) (92% - 100%)    Parameters below as of 2023 06:08  Patient On (Oxygen Delivery Method): nasal cannula, high flow  O2 Flow (L/min): 50  O2 Concentration (%): 60    I&O's Summary    15 Trevon 2023 07:01  -  2023 07:00  --------------------------------------------------------  IN: 80.1 mL / OUT: 990 mL / NET: -909.9 mL    PHYSICAL EXAM:  General: NAD, resting comfortably   HEENT: NC/AT; EOMI, anicteric sclera; moist mucosal membranes.  Neck: supple, trachea midline  Cardiovascular: RRR, +S1/S2; NO M/R/G  Respiratory: CTA B/L; no W/R/R. on HFNC   Gastrointestinal: soft, NT/ND; +BSx4  Extremities: WWP; no edema or cyanosis. A-line in L wrist. TTP LLE.   Vascular: 2+ radial, DP/PT pulses B/L  Neurological: AAOx3; no focal deficits    ALLERGIES:  No Known Allergies    MEDICATIONS  (STANDING):  cefTRIAXone   IVPB 2000 milliGRAM(s) IV Intermittent every 24 hours  chlorhexidine 2% Cloths 1 Application(s) Topical <User Schedule>  dextrose 5%. 1000 milliLiter(s) (50 mL/Hr) IV Continuous <Continuous>  dextrose 5%. 1000 milliLiter(s) (100 mL/Hr) IV Continuous <Continuous>  dextrose 50% Injectable 25 Gram(s) IV Push once  dextrose 50% Injectable 12.5 Gram(s) IV Push once  dextrose 50% Injectable 25 Gram(s) IV Push once  enoxaparin Injectable 100 milliGRAM(s) SubCutaneous every 12 hours  glucagon  Injectable 1 milliGRAM(s) IntraMuscular once  influenza   Vaccine 0.5 milliLiter(s) IntraMuscular once  insulin lispro (ADMELOG) corrective regimen sliding scale   SubCutaneous three times a day before meals  milrinone Infusion 0.2 MICROgram(s)/kG/Min (6.45 mL/Hr) IV Continuous <Continuous>  vasopressin Infusion 0.04 Unit(s)/Min (6 mL/Hr) IV Continuous <Continuous>    MEDICATIONS  (PRN):  dextrose Oral Gel 15 Gram(s) Oral once PRN Blood Glucose LESS THAN 70 milliGRAM(s)/deciliter  HYDROmorphone  Injectable 1 milliGRAM(s) IV Push every 6 hours PRN Severe Pain (7 - 10)  -------------------------------------------------------------------------------  LABS:                        9.6    20.06 )-----------( 136      ( 2023 04:32 )             30.1     01-16    132<L>  |  100  |  17  ----------------------------<  155<H>  4.3   |  21<L>  |  1.18    Ca    8.8      2023 04:32  Phos  4.4     -  Mg     2.2     -    TPro  7.5  /  Alb  3.5  /  TBili  0.9  /  DBili  x   /  AST  24  /  ALT  32  /  AlkPhos  72  01-16    LIVER FUNCTIONS - ( 2023 04:32 )  Alb: 3.5 g/dL / Pro: 7.5 g/dL / ALK PHOS: 72 U/L / ALT: 32 U/L / AST: 24 U/L / GGT: x           PT/INR - ( 2023 02:36 )   PT: 15.5 sec;   INR: 1.30   PTT - ( 2023 02:36 )  PTT:33.4 sec    Urinalysis Basic - ( 15 Trevon 2023 16:48 )  Color: Yellow / Appearance: Clear / S.015 / pH: x  Gluc: x / Ketone: NEGATIVE  / Bili: Negative / Urobili: 0.2 E.U./dL   Blood: x / Protein: Trace mg/dL / Nitrite: NEGATIVE   Leuk Esterase: NEGATIVE / RBC: < 5 /HPF / WBC < 5 /HPF   Sq Epi: x / Non Sq Epi: 0-5 /HPF / Bacteria: None /HPF    CAPILLARY BLOOD GLUCOSE  POCT Blood Glucose.: 90 mg/dL (15 Trevon 2023 16:39)    Culture - Blood (collected 15 Trevon 2023 17:23)  Source: .Blood Blood  Preliminary Report (2023 06:00):    No growth at 12 hours    Culture - Blood (collected 15 Trevon 2023 17:23)  Source: .Blood Blood  Preliminary Report (2023 06:00):    No growth at 12 hours    Urinalysis with Rflx Culture (collected 15 Trevon 2023 16:48)    SARS-CoV-2: NotDetec (2023 00:17)  SARS-CoV-2: NotDetec (2023 09:33)    RADIOLOGY & ADDITIONAL TESTS: Reviewed.

## 2023-01-16 NOTE — PROGRESS NOTE ADULT - SUBJECTIVE AND OBJECTIVE BOX
SUBJECTIVE: Patient seen and examined bedside; feeling well this am, no complaints.    cefTRIAXone   IVPB 2000 milliGRAM(s) IV Intermittent every 24 hours  enoxaparin Injectable 100 milliGRAM(s) SubCutaneous every 12 hours  milrinone Infusion 0.2 MICROgram(s)/kG/Min IV Continuous <Continuous>      Vital Signs Last 24 Hrs  T(C): 37.6 (2023 06:00), Max: 38.9 (15 Trevon 2023 21:55)  T(F): 99.7 (2023 06:00), Max: 102 (15 Trevon 2023 21:55)  HR: 108 (2023 07:00) (107 - 153)  BP: 113/70 (15 Trevon 2023 11:00) (113/70 - 136/73)  BP(mean): 84 (15 Trevon 2023 11:00) (84 - 91)  RR: 20 (2023 07:00) (16 - 35)  SpO2: 100% (2023 07:00) (93% - 100%)    Parameters below as of 2023 07:00  Patient On (Oxygen Delivery Method): nasal cannula, high flow  O2 Flow (L/min): 50  O2 Concentration (%): 60  I&O's Detail    15 Trevon 2023 07:01  -  2023 07:00  --------------------------------------------------------  IN:    Alteplase: 40 mL    Heparin: 16 mL    Milrinone: 72 mL    Milrinone: 24 mL    Vasopressin: 77.5 mL  Total IN: 229.5 mL    OUT:    Intermittent Catheterization - Urethral (mL): 590 mL    Voided (mL): 700 mL  Total OUT: 1290 mL    Total NET: -1060.5 mL      2023 07:01  -  2023 07:53  --------------------------------------------------------  IN:    Milrinone: 6 mL    Vasopressin: 6.5 mL  Total IN: 12.5 mL    OUT:  Total OUT: 0 mL    Total NET: 12.5 mL      PE:    General: NAD, resting comfortably in bed  C/V: S1 s2, tachycardic  Pulm: On HFNC, nonlabored breathing, no respiratory distress  Abd: Soft, NTND  Extrem: WWP, no edema        LABS:                        9.8    21.34 )-----------( 126      ( 2023 06:24 )             30.6         132<L>  |  99  |  16  ----------------------------<  165<H>  4.4   |  18<L>  |  0.98    Ca    8.8      2023 06:24  Phos  4.4       Mg     2.2         TPro  7.7  /  Alb  3.4  /  TBili  0.9  /  DBili  x   /  AST  24  /  ALT  31  /  AlkPhos  76  -16    PT/INR - ( 2023 02:36 )   PT: 15.5 sec;   INR: 1.30          PTT - ( 2023 02:36 )  PTT:33.4 sec  Urinalysis Basic - ( 15 Trevon 2023 16:48 )    Color: Yellow / Appearance: Clear / S.015 / pH: x  Gluc: x / Ketone: NEGATIVE  / Bili: Negative / Urobili: 0.2 E.U./dL   Blood: x / Protein: Trace mg/dL / Nitrite: NEGATIVE   Leuk Esterase: NEGATIVE / RBC: < 5 /HPF / WBC < 5 /HPF   Sq Epi: x / Non Sq Epi: 0-5 /HPF / Bacteria: None /HPF        RADIOLOGY & ADDITIONAL STUDIES:

## 2023-01-16 NOTE — DIETITIAN INITIAL EVALUATION ADULT - PERTINENT LABORATORY DATA
01-16    132<L>  |  99  |  16  ----------------------------<  165<H>  4.4   |  18<L>  |  0.98    Ca    8.8      16 Jan 2023 06:24  Phos  4.4     01-16  Mg     2.2     01-16    TPro  7.7  /  Alb  3.4  /  TBili  0.9  /  DBili  x   /  AST  24  /  ALT  31  /  AlkPhos  76  01-16  POCT Blood Glucose.: 157 mg/dL (01-16-23 @ 11:20)  A1C with Estimated Average Glucose Result: 5.3 % (01-14-23 @ 18:09)

## 2023-01-16 NOTE — PROGRESS NOTE ADULT - PROBLEM SELECTOR PLAN 2
More tachycardic in around 3:00 in the morning and had increasing oxygen requirement.  The proBNP is about the same 2500.  Stat echocardiogram was done by the structural heart attending and revealed no change and the dilated dysfunctional right heart which is severe with pulmonary pressure seems to be unchanged.  There is also is evidence of tricuspid regurge.  There is still bowing of the septum towards the left side.  The left heart is on the under filled.  Await the official report.  At that time the blood pressure did decrease further and patient was transferred to the ICU and was started on vasopressin and Milrinone .  The drips were put on hold prior to the right heart catheterization.  The right heart catheterization revealed elevated pulmonary pressures, cardiac index is about 1.3-1.6 by calculation and also by thermodilution methods..  Cardiac Index is decreased compared to the previous echocardiogram which was done yesterday which was cardiac index of 2.  The pulmonary artery oxygen saturation 29%.  After the cardiac catheterization the patient was started on milrinone and later required nitrous oxide at 9 PPI.

## 2023-01-16 NOTE — DIETITIAN INITIAL EVALUATION ADULT - OTHER CALCULATIONS
5'10''  pounds+-10%; %[id number]  IBW used to calculate energy needs due to pt's current body weight exceeding 120% of IBW  Adjust for age, HF, OR; Fluids per team

## 2023-01-16 NOTE — DIETITIAN INITIAL EVALUATION ADULT - OTHER INFO
31 yo female, PMHx asthma and anemia 2/2 myomectomy, on birth control (Nuvaring) presented with 1 week of productive cough. Pt stating she began feeling symptoms of mild cough and chest tightness 3 weeks prior. She went to  where workup was negative for COVID and Influenza. CT PE showing thrombus with large burden and RV strain. Admitted for PE with RV strain and possible intervention. Underwent mechanical thrombectomy on 1/13 and transitioned to Lovenox SQ. On 1/14 early AM pt on bedside commode when she apparently syncopized. On 1/15, pt was stepped up to MICU due to worsening tachycardia and decreasing O2 saturation, with bedside POCUS showing persistent R-sided HF and uptrending pro-BNP, suggestive of progressive cardiac decompensation. On 1/16 Pt noted to be hypoxic to Spo2 75% with increased work of breathing on 6LNC. Prior to the hypoxic event she was noted to be febrile to 102 with MAPs in the low 60s. Vasopressin was added in addition to milrinone 0.2. [place]-katherine catheter was placed and repeat hemodynamics were obtained with bedside TTE showing dilated RV with less interventricular septal bowing. Nuvaring removed 1/16, Vaginal bleeding noted during pelvic exam, Pending w/u - noted No acute GYN intervention indicated at this time.     Pt seen this AM on 5EAST. Family at [facility]. Spoke with RN. MAP 82, /83. Orderd for Vaso and milrinone Infusion. Pt NPO at this time, reports hunger. Despite hunger this AM, reports fair PO intake prior to NPO; prior had been ordered for regular diet. NPO for Pending CT ABD/Pelv today. Regular diet PTA, good PO. NKFA. No issues chewing/swallowing. Denies wt changes PTA. Per flow sheets complains of pain/discomfort to ABD. Pt has been straining to urinate and pass BMs which she believes may be due to fibroids. Miralax and senna ordered. Sixto 18. 1+Edema (right foot; left foot). No pressure ulcers, SX site noted.    Labs: low HH, POCT 157, na 132, , BUN/Cr WDL, K Mg Phos WDL, A1c 5.3, BNP 3122.   Please see below for nutritions recommendations.

## 2023-01-16 NOTE — CONSULT NOTE ADULT - SUBJECTIVE AND OBJECTIVE BOX
33 y/o  (LMP 2022, 2/2 continuous nuvaring) presenting to ED on  for cough, chest pain, and SOB found to have PE w/ large clot burden w/ mild pulmonary artery dilation w/ RV strain and cardiomegaly. Pt states that about 1 week prior to presentation she was experiencing a productive cough for which she was seen at urgent care. COVID/flu negative and pt prescribed azithromycin and prednisone with minimal relief. Pt then had worsening SOB and chest pain associated with syncopal episode on  for which she presented to ED for evaluation. In ED she was found to be tachycardic to the 120s with elevated BNP and D-dimer. CT PE revealed large thrombus burden involving all lobar and multiple segmental branches bilaterally w/ probable evolving infarcts in lateral basal segment of LLL and mild pulmonary A dilation w/ RV strain and cardiomegaly. Patient was admitted to the MICU and started on heparin gtt. She is now POD3 from mechanical thrombectomy given clinical deterioration on heparin gtt (decreasing cardiac indx and increase BNP). Patient was subsequently transferred to the CCU after R heart cath revealed severe RV dilation consistent with R heart failure. Patient underwent surveillance LE duplex for etiology of PE which revealed LLE DVT in L common femoral, deep femoral and femoral veins. GYN consulted given history of large fibroid uterus and concern for possible mass effect.     Pt seen and evaluated at bedside. Pt states that she is scheduled for an abdominal myomectomy with Dr. Telles in February. She has known that she had fibroids for many years associated with heavy periods. She has been using nuvaring for contraception for over one year but has been continuously replacing her nuvaring without allowing for withdrawal bleed since 2022. She currently reports lightheadedness and SOB as well as diffuse abdominal pain. She has been NPO since hospital admission and is requesting to eat.    ObHx:  -  x1 (), VTOP D&Cx2  GynHx: known fibroids as above, LMP 2022, denies cysts/abnormal pap/STI  PMH: asthma (never hospitalized/intubated)  PSH: denies  Meds: denies  Allergies: NKDA            PHYSICAL EXAM:   Vital Signs Last 24 Hrs  T(C): 37.6 (2023 06:00), Max: 38.9 (15 Trevon 2023 21:55)  T(F): 99.7 (2023 06:00), Max: 102 (15 Trevon 2023 21:55)  HR: 111 (2023 08:38) (107 - 153)  BP: 113/70 (15 Trevon 2023 11:00) (113/70 - 136/73)  BP(mean): 84 (15 Trevon 2023 11:00) (84 - 91)  RR: 20 (2023 07:00) (20 - 35)  SpO2: 100% (2023 08:38) (93% - 100%)    Parameters below as of 2023 08:00  Patient On (Oxygen Delivery Method): nasal cannula, high flow  O2 Flow (L/min): 40  O2 Concentration (%): 50    **************************  Constitutional: Alert & Oriented x3, appears fatigued, dry mucous membranes   Respiratory: Clear to ausculation bilaterally; on HFNC, tachypneic   Cardiovascular: tachycardic  Gastrointestinal: soft, mild diffuse abdominal tenderness, approx 20w size fibroid uterus, positive bowel sounds, no rebound or guarding   Pelvic exam: scant dark red blood on perineum, bimanual exam performed, large bulky uterus, approx 20w size, no cervical motion tenderness, no adnexal masses appreciated. Nuvaring removed without difficulty. Speculum exam performed, normal external genitalia and vaginal mucosa, no lesions, cervix appears closed, scant dark red blood in vaginal vault. No abnormal vaginal discharge  Extremities: mild L calf tenderness    LABS:                        9.8    21.34 )-----------( 126      ( 2023 06:24 )             30.6     01-16    132<L>  |  99  |  16  ----------------------------<  165<H>  4.4   |  18<L>  |  0.98    Ca    8.8      2023 06:24  Phos  4.4     01-16  Mg     2.2     -16    TPro  7.7  /  Alb  3.4  /  TBili  0.9  /  DBili  x   /  AST  24  /  ALT  31  /  AlkPhos  76  01-16    PT/INR - ( 2023 02:36 )   PT: 15.5 sec;   INR: 1.30          PTT - ( 2023 02:36 )  PTT:33.4 sec  Urinalysis Basic - ( 15 Trevon 2023 16:48 )    Color: Yellow / Appearance: Clear / S.015 / pH: x  Gluc: x / Ketone: NEGATIVE  / Bili: Negative / Urobili: 0.2 E.U./dL   Blood: x / Protein: Trace mg/dL / Nitrite: NEGATIVE   Leuk Esterase: NEGATIVE / RBC: < 5 /HPF / WBC < 5 /HPF   Sq Epi: x / Non Sq Epi: 0-5 /HPF / Bacteria: None /HPF          RADIOLOGY & ADDITIONAL STUDIES:    < from: US Duplex Venous Lower Ext Complete, Bilateral (01.15.23 @ 12:15) >    ACC: [id number] EXAM:  US DPLX LWR EXT VEINS COMPL BI                          PROCEDURE DATE:  01/15/2023          INTERPRETATION:  CLINICAL INFORMATION: Pulmonary emboli.    COMPARISON: Chest CT dated 2023    TECHNIQUE: Duplex sonography of the BILATERAL LOWER extremity veins with   color and spectral Doppler, with and without compression.    FINDINGS:    RIGHT:  Normal compressibility of the RIGHT common femoral, femoral and popliteal   veins.  Doppler examination shows normal spontaneous and phasic flow.  No RIGHT calf vein thrombosis is detected.    LEFT:    There is acute deep vein thrombosis in the left common femoral vein, deep   femoral vein and femoral vein. The left popliteal vein is unremarkable.    Limited visualization of the calf veins.    IMPRESSION:  Extensive left leg acute deep vein thrombosis as described above.  Acute deep venous thrombosis: above the knee.        --- End of Report ---            NATASHA SOMERS MD; Attending Radiologist  This document has been electronically signed. Trevon 15 2023  1:01PM    < end of copied text >      OUTPATIENT IMAGING FROM [facility]   31 y/o  (LMP 2022, 2/2 continuous nuvaring) presenting to ED on  for cough, chest pain, and SOB found to have PE w/ large clot burden w/ mild pulmonary artery dilation w/ RV strain and cardiomegaly. Pt states that about 1 week prior to presentation she was experiencing a productive cough for which she was seen at urgent care. COVID/flu negative and pt prescribed azithromycin and prednisone with minimal relief. Pt then had worsening SOB and chest pain associated with syncopal episode on  for which she presented to ED for evaluation. In ED she was found to be tachycardic to the 120s with elevated BNP and D-dimer. CT PE revealed large thrombus burden involving all lobar and multiple segmental branches bilaterally w/ probable evolving infarcts in lateral basal segment of LLL and mild pulmonary A dilation w/ RV strain and cardiomegaly. Patient was admitted to the MICU and started on heparin gtt. She is now POD3 from mechanical thrombectomy given clinical deterioration on heparin gtt (decreasing cardiac indx and increase BNP). Patient was subsequently transferred to the CCU after R heart cath revealed severe RV dilation consistent with R heart failure. Patient underwent surveillance LE duplex for etiology of PE which revealed LLE DVT in L common femoral, deep femoral and femoral veins. GYN consulted given history of large fibroid uterus and concern for possible mass effect.     Pt seen and evaluated at bedside. Pt states that she is scheduled for an abdominal myomectomy with Dr. Telles in February. She has known that she had fibroids for many years associated with heavy periods. She has been using nuvaring for contraception for over one year but has been continuously replacing her nuvaring without allowing for withdrawal bleed since 2022. She currently reports lightheadedness and SOB as well as diffuse abdominal pain. She has been NPO since hospital admission and is requesting to eat.    ObHx:  -  x1 (), VTOP D&Cx2  GynHx: known fibroids as above, LMP 2022, denies cysts/abnormal pap/STI  PMH: asthma (never hospitalized/intubated)  PSH: denies  Meds: denies  Allergies: NKDA            PHYSICAL EXAM:   Vital Signs Last 24 Hrs  T(C): 37.6 (2023 06:00), Max: 38.9 (15 Trevon 2023 21:55)  T(F): 99.7 (2023 06:00), Max: 102 (15 Trevon 2023 21:55)  HR: 111 (2023 08:38) (107 - 153)  BP: 113/70 (15 Trevon 2023 11:00) (113/70 - 136/73)  BP(mean): 84 (15 Trevon 2023 11:00) (84 - 91)  RR: 20 (2023 07:00) (20 - 35)  SpO2: 100% (2023 08:38) (93% - 100%)    Parameters below as of 2023 08:00  Patient On (Oxygen Delivery Method): nasal cannula, high flow  O2 Flow (L/min): 40  O2 Concentration (%): 50    **************************  Constitutional: Alert & Oriented x3, appears fatigued, dry mucous membranes   Respiratory: Clear to ausculation bilaterally; on HFNC, tachypneic   Cardiovascular: tachycardic  Gastrointestinal: soft, mild diffuse abdominal tenderness, approx 20w size fibroid uterus, positive bowel sounds, no rebound or guarding   Pelvic exam: scant dark red blood on perineum, bimanual exam performed, large bulky uterus, approx 20w size, no cervical motion tenderness, no adnexal masses appreciated. Nuvaring removed without difficulty. Speculum exam performed, normal external genitalia and vaginal mucosa, no lesions, cervix appears closed, scant dark red blood in vaginal vault. No abnormal vaginal discharge  Extremities: mild L calf tenderness    LABS:                        9.8    21.34 )-----------( 126      ( 2023 06:24 )             30.6     01-16    132<L>  |  99  |  16  ----------------------------<  165<H>  4.4   |  18<L>  |  0.98    Ca    8.8      2023 06:24  Phos  4.4     01-16  Mg     2.2     -16    TPro  7.7  /  Alb  3.4  /  TBili  0.9  /  DBili  x   /  AST  24  /  ALT  31  /  AlkPhos  76  01-16    PT/INR - ( 2023 02:36 )   PT: 15.5 sec;   INR: 1.30          PTT - ( 2023 02:36 )  PTT:33.4 sec  Urinalysis Basic - ( 15 Trevon 2023 16:48 )    Color: Yellow / Appearance: Clear / S.015 / pH: x  Gluc: x / Ketone: NEGATIVE  / Bili: Negative / Urobili: 0.2 E.U./dL   Blood: x / Protein: Trace mg/dL / Nitrite: NEGATIVE   Leuk Esterase: NEGATIVE / RBC: < 5 /HPF / WBC < 5 /HPF   Sq Epi: x / Non Sq Epi: 0-5 /HPF / Bacteria: None /HPF          RADIOLOGY & ADDITIONAL STUDIES:    < from: US Duplex Venous Lower Ext Complete, Bilateral (01.15.23 @ 12:15) >    ACC: [id number] EXAM:  US DPLX LWR EXT VEINS COMPL BI                          PROCEDURE DATE:  01/15/2023          INTERPRETATION:  CLINICAL INFORMATION: Pulmonary emboli.    COMPARISON: Chest CT dated 2023    TECHNIQUE: Duplex sonography of the BILATERAL LOWER extremity veins with   color and spectral Doppler, with and without compression.    FINDINGS:    RIGHT:  Normal compressibility of the RIGHT common femoral, femoral and popliteal   veins.  Doppler examination shows normal spontaneous and phasic flow.  No RIGHT calf vein thrombosis is detected.    LEFT:    There is acute deep vein thrombosis in the left common femoral vein, deep   femoral vein and femoral vein. The left popliteal vein is unremarkable.    Limited visualization of the calf veins.    IMPRESSION:  Extensive left leg acute deep vein thrombosis as described above.  Acute deep venous thrombosis: above the knee.        --- End of Report ---            NATASHA SOMERS MD; Attending Radiologist  This document has been electronically signed. Trevon 15 2023  1:01PM    < end of copied text >      OUTPATIENT IMAGING FROM [facility] REVIEWED

## 2023-01-16 NOTE — CHART NOTE - NSCHARTNOTEFT_GEN_A_CORE
At 00:15 1/16/23 patient was noted to be hypoxic to Spo2 75% with increased work of breathing on 6LNC. Repeat ABG 7.4 PCO2 30 pO59 Ao 86.9% (previously pH 7.45 pCO2 30 pO2 125 Ao 99.5%). Patient was transitioned to from nasal cannula to HFNC and Lay 10ppm with improvement in her oxygenation. Prior to the hypoxic event she was noted to be febrile to 102 with MAPs in the low 60s. Vasopressin was added in addition to milrinone 0.2. Antibiotics were broadened from Ceftriaxone to Vanc/Zosyn. After receiving a dose of Zosyn, she was noted have a new diffuse onset rash on her abdomen and upper extremities that was treated with Benadryl and solumedrol. Gaston-katherine catheter was placed and repeat hemodynamics were obtained with bedside TTE showing dilated RV with less interventricular septal bowing:    Thermodilution at 0230: RA 6 PA (mean) 30 PAW 22 CO 5.5 CI 2.4 SVR 1206  MAP 89    Edwina's: : RA 7, RV 47/3, PA 49/25, MAP 85, PaO2 67.8, AO2 100, CO 6.7, CI 2.9,  Yasmin 3.42 (previously 3.08) (Milrinone 0.2, vaso 0.04* on HFNC  60/95% 10PPM Lay)    Plan  - Repeat hemodynamic and perfusion labs q2hr  - s/p Vanc/Zosyn - discontinued Zosyn due to concern for allergic reaction (consult ID)  - Overall perfusion markers and cardiac function appear to be improving, concern for underlying SIRS 2/2 infectious process vs fever from high clot burden.  - Cooling blanket and tylenol for fever control - goal to maintain euthermia.

## 2023-01-16 NOTE — PROGRESS NOTE ADULT - ASSESSMENT
31 yo female with PMHx of asthma presents to ED i/s/o worsening chest pain and cough. CT PE showing thrombus with large burden and RV strain. Admitted to MICU for PE with RV strain and possible intervention. Started on heparin gtt with improvement in symptoms and stepped down to telemetry. Underwent mechanical thrombectomy on 1/13 and transitioned to Lovenox SQ. Stepped up to MICU on 1/15 for worsening R-sided heart failure.    NEURO  -SHELLY    PULM:  #Pulmonary embolus.   CTA PE protocol indicating large thrombus burden involving all lobar and multiple segmental branches bilaterally w/ probable evolving infarcts in lateral basal segment of LLL and mild pulmonary artery dilation w/ RV strain and cardiomegaly. TTE with persistently dilated RV size, mod-severely reduced RV function, pHTN. Previously on heparin gtt, transitioned to Lovenox 100 mg SQ q12h. Etiology likely unprovoked PE in nature -- risk factors: Nuvaring contraceptive use and obesity. Denies recent travel and family hx. On 1/15, pt was stepped up to MICU due to worsening tachycardia and decreasing O2 saturation, with bedside POCUS showing persistent R-sided HF and uptrending pro-BNP, suggestive of progressive cardiac decompensation.   - patient now with EKOS catheter to be removed 1 hr after tPA infusion is complete   - TPA until 21:30, heparin until 00:30, then transition to BID LMWH   - Trend BNP   - Continue to monitor cardiac function via TTE   - f/u CT surgery recs   - Maintain active T&S.    #Asthma.  Holding steroids and albuterol as patient is asymptomatic  - No home asthma meds, as patient states she did not need any before onset of symptoms from Urgent Care.    CARDIOVASCULAR  #Acute right heart failure.   Pt w/ large PE involving all lobar and multiple segmental branches bilaterally w/ RV strain and cardiomegaly. TTE 1/11 with severely dilated RV size, mod-severely reduced RV function, mod TR, pHTN (PASP 57), normal LV size/function. Troponin 0.10 -> 0.01, Pro-BNP 1395.  - 1/15 AM BNP 2785, lactate 1.8  - Trend BNP   - Continue to monitor cardiac function via TTE   - f/u CT surgery recs    #Syncope  1/14 early AM pt on bedside commode when she apparently syncopized. (See resident chart note 1/14/23) Patient examined at bedside, able to communicate, was moving her neck without any pain or limitations, neurological exam was benign. Fall precautions and bed rest ordered for patient. CTH, CT c-spine, CXR WNL. Pt re-evaluated multiple times during the day, mentating at baseline and physical exam unremarkable.   - Maintain fall precautions and strict bedrest   - Monitor CBC    GI  -SHELLY    /RENAL  #Fibroids.  Pt reports chronic lower abdominal pain 2/2 uterine fibroids. 8/10 in severity at its worst. Pt has been straining to urinate and pass BMs which she believes may be due to fibroids.   - c/w Tylenol 650 mg PO q6h standing   - c/w IV dilaudid 1 mg q6h PRN for severe pain   - Bladder scans q6h  - Monitor BMs   - Bedpan while on strict bedrest.    #SUNNY  Cr on admission 0.82, now 1.42.  -avoid nephrotoxic agents  -renally dose meds   -trend SCr    #Urinary Retention  -required straight cath x1 on 1/15  -q6h bladder scans    ENDO  -SHELLY    ID  #Fever   Patient febrile to 101.3. Most likely 2/2 thrombus, but septic workup initiated.  -obtain 2 sets of blood cx  -obtain urine cx     HEME  #Anemia  Hb 9.8 upon arrival to CCU. No signs or hx of active bleed.  -maintain active type and screen  -transfuse Hb < 7    PROPHYLAXIS   F: none; tolerating PO  E: Replete K <4, Mg <2  N: Regular diet  DVT ppx: heparin gtt--> Lovenox 100 Q12  GI ppx: not needed  Code: Full code  Dispo: MICU--> CCU   31 yo female with PMHx of asthma presents to ED i/s/o worsening chest pain and cough. CT PE showing thrombus with large burden and RV strain. Admitted to MICU for PE with RV strain and possible intervention. Started on heparin gtt with improvement in symptoms and stepped down to telemetry. Underwent mechanical thrombectomy on 1/13 and transitioned to Lovenox SQ. Stepped up to MICU on 1/15 for worsening R-sided heart failure.    NEURO  -SHELLY    PULM:  #Acute hypoxic respiratory failure--etiology likely 2/2 V/Q mismatch in the setting of massive PE   -patient required BiPAP overnight   -continue HFNC, wean as tolerated  -q12 ABG     #Pulmonary embolus.   CTA PE protocol indicating large thrombus burden involving all lobar and multiple segmental branches bilaterally w/ probable evolving infarcts in lateral basal segment of LLL and mild pulmonary artery dilation w/ RV strain and cardiomegaly. TTE with persistently dilated RV size, mod-severely reduced RV function, pHTN. Previously on heparin gtt, transitioned to Lovenox 100 mg SQ q12h. Etiology likely unprovoked PE in nature -- risk factors: Nuvaring contraceptive use and obesity. Denies recent travel and family hx. On 1/15, pt was stepped up to MICU due to worsening tachycardia and decreasing O2 saturation, with bedside POCUS showing persistent R-sided HF and uptrending pro-BNP, suggestive of progressive cardiac decompensation.   - s/p ekos catheter removal, s/p heparin grr  - Trend BNP   - Continue to monitor cardiac function via TTE   - f/u CT surgery recs   - Maintain active T&S.  - repeat Dopplers lower extremities   - vascular following; recommend CT venogram to assess for extent of clot, consider IVC filter     #Asthma.  Holding steroids and albuterol as patient is asymptomatic  - No home asthma meds, as patient states she did not need any before onset of symptoms from Urgent Care.    CARDIOVASCULAR  #Acute right heart failure.   Pt w/ large PE involving all lobar and multiple segmental branches bilaterally w/ RV strain and cardiomegaly. TTE 1/11 with severely dilated RV size, mod-severely reduced RV function, mod TR, pHTN (PASP 57), normal LV size/function. Troponin 0.10 -> 0.01, Pro-BNP 1395.  - 1/15 AM BNP 2785, lactate 1.8  - Trend BNP   - Continue to monitor cardiac function via TTE   - f/u CT surgery recs    #Syncope  1/14 early AM pt on bedside commode when she apparently syncopized. (See resident chart note 1/14/23) Patient examined at bedside, able to communicate, was moving her neck without any pain or limitations, neurological exam was benign. Fall precautions and bed rest ordered for patient. CTH, CT c-spine, CXR WNL. Pt re-evaluated multiple times during the day, mentating at baseline and physical exam unremarkable.   - Maintain fall precautions  - Monitor CBC    GI  -SHELLY    /RENAL  #Fibroids.  Pt reports chronic lower abdominal pain 2/2 uterine fibroids. 8/10 in severity at its worst. Pt has been straining to urinate and pass BMs which she believes may be due to fibroids.   - c/w IV dilaudid 1 mg q6h PRN for severe pain   - start PO oxy 5 PRN moderate pain   - Monitor BMs   - Bedpan while on strict bedrest  - planned for abdominal myomectomy with Dr. Telles in February    #Vaginal Bleeding  She has been using nuvaring for contraception for >1 year but has been continuously replacing her nuvaring without allowing for withdrawal bleed since 09/22.    -Nuvaring removed 1/16  -Vaginal bleeding noted during pelvic exam. Monitor for s/s active vaginal bleeding     #SUNNY  resolved    #Urinary Retention  -required straight cath x1 on 1/15  resolved    ENDO  -SHELLY    ID  #Fever   Patient febrile to 101.3. Most likely 2/2 SIRS, but septic workup initiated.   -febrile overnight to 102. Initially broadened to vanc/zosyn but with concern for allergy, was transitioned back to CTX   -f/u blood cx  -UA negative  -c/w CTX until blood cx NGTD x72 hours. If negative, d/c CTX     HEME  #Anemia  Hb 9.8 upon arrival to CCU. No signs or hx of active bleed.  -maintain active type and screen  -transfuse Hb < 7    PROPHYLAXIS   F: 250cc NaCl  E: Replete K <4, Mg <2  N: Regular diet  DVT ppx: Lovenox 100 Q12  GI ppx: not needed  Code: Full code  Dispo: CCU

## 2023-01-16 NOTE — PROGRESS NOTE ADULT - PROBLEM SELECTOR PLAN 4
The patient decompensated around 6 AM in the morning with heart rate going from  and also her oxygen requirement increased as she was satting 100% on room air now she required 6 L for oxygen saturation about 93%.  The also the patient decompensated cardiopulmonary wise with decrease in her blood pressure.  Echocardiogram at the bedside revealed no change in the right ventricular failure with pulmonary pressures on change. The patient was started on vasopressin and melatonin then was put on hold for the right heart catheterization.  Multidisciplinary approach between the PERT team with structural heart and CHF both the members of the PERT team and decision to proceed with right heart catheterization pulmonary angiogram and possible intervention.  The right heart catheterization confirmed the acute right heart failure with decrease in the cardiac index with thermodilution and calculation to 1.3-1.6 which decreased from 2 yesterday.  The pulmonary artery saturation was 29%.  Pulmonary angiogram was done and revealed new recurrent pulmonary emboli in the right pulmonary artery and no change in the chronic clot in the left lower lobe branch.  Ultrasound-guided catheter like directed thrombolytics was performed.  2 mg tPA was infused and drip 1 mg for total of 7 hr.  Was transferred to the CCU and was started on vasopressin for cardiogenic shock in addition to Milirinone and required nitrous oxide at 9 PPI.  Patient was started on full dose Lovenox after 11 hours from the procedure.  [facility] were notified for standby

## 2023-01-16 NOTE — PROGRESS NOTE ADULT - SUBJECTIVE AND OBJECTIVE BOX
Interval Events: Reviewed  Patient seen and examined at bedside.  she is better  Patient is a 32y old  Female who presents with a chief complaint of R heart failure (2023 07:00)      PAST MEDICAL & SURGICAL HISTORY:  Asthma      Fibroids      Anemia      No significant past surgical history          MEDICATIONS:  Pulmonary:    Antimicrobials:  cefTRIAXone   IVPB 2000 milliGRAM(s) IV Intermittent every 24 hours    Anticoagulants:  enoxaparin Injectable 100 milliGRAM(s) SubCutaneous every 12 hours    Cardiac:  milrinone Infusion 0.1 MICROgram(s)/kG/Min IV Continuous <Continuous>      Allergies    No Known Allergies    Intolerances        Vital Signs Last 24 Hrs  T(C): 37.4 (2023 10:00), Max: 38.9 (15 Trevon 2023 21:55)  T(F): 99.3 (2023 10:00), Max: 102 (15 Trevon 2023 21:55)  HR: 106 (2023 12:38) (106 - 153)  BP: 121/83 (2023 11:00) (121/83 - 132/82)  BP(mean): 98 (2023 11:00) (94 - 98)  RR: 16 (2023 12:38) (16 - 35)  SpO2: 100% (2023 12:38) (95% - 100%)    Parameters below as of 2023 12:38  Patient On (Oxygen Delivery Method): nasal cannula, high flow  O2 Flow (L/min): 40  O2 Concentration (%): 40    -15 @ :  -  16 @ 07:00  --------------------------------------------------------  IN: 229.5 mL / OUT: 1290 mL / NET: -1060.5 mL     @ 07:  -  16 @ 13:43  --------------------------------------------------------  IN: 12.5 mL / OUT: 0 mL / NET: 12.5 mL          Review of Systems:   •	General: negative  •	Skin/Breast: negative  •	Ophthalmologic: negative  •	ENMT: negative  •	Respiratory and Thorax: negative  •	Cardiovascular: negative  •	Gastrointestinal: negative  •	Genitourinary: negative  •	Musculoskeletal: negative  •	Neurological: negative  •	Psychiatric: negative  •	Hematology/Lymphatics: negative  •	Endocrine: negative  •	Allergic/Immunologic: negative    Physical Exam:   • Constitutional:	refer to the dietion /Nutritionist note  • Eyes:	EOMI; PERRL; no drainage or redness  • ENMT:	No oral lesions; no gross abnormalities  • Neck	No bruits; no thyromegaly or nodules  • Breasts:	not examined  • Back:	No deformity or limitation of movement  • Respiratory:	Breath Sounds equal & clear to percussion & auscultation, no accessory muscle use  • Cardiovascular:	Regular rate & rhythm, normal S1, S2; no murmurs, gallops or rubs; no S3, S4  • Gastrointestinal:	Soft, non-tender, no hepatosplenomegaly, normal bowel sounds  • Genitourinary:	not examined  • Rectal: not examined  • Extremities:	No cyanosis, clubbing or edema  • Vascular:	Equal and normal pulses (carotid, femoral, dorsalis pedis)  • Neurologica:l	not examined  • Skin:	No lesions; no rash  • Lymph Nodes:	No lymphadedenopathy  • Musculoskeletal:	No joint pain, swelling or deformity; no limitation of movement        LABS:  ABG - ( 2023 06:39 )  pH, Arterial: 7.42  pH, Blood: x     /  pCO2: 32    /  pO2: 269   / HCO3: 21    / Base Excess: -2.8  /  SaO2: 99.6                CBC Full  -  ( 2023 06:24 )  WBC Count : 21.34 K/uL  RBC Count : 3.87 M/uL  Hemoglobin : 9.8 g/dL  Hematocrit : 30.6 %  Platelet Count - Automated : 126 K/uL  Mean Cell Volume : 79.1 fl  Mean Cell Hemoglobin : 25.3 pg  Mean Cell Hemoglobin Concentration : 32.0 gm/dL  Auto Neutrophil # : x  Auto Lymphocyte # : x  Auto Monocyte # : x  Auto Eosinophil # : x  Auto Basophil # : x  Auto Neutrophil % : x  Auto Lymphocyte % : x  Auto Monocyte % : x  Auto Eosinophil % : x  Auto Basophil % : x    01-16    132<L>  |  99  |  16  ----------------------------<  165<H>  4.4   |  18<L>  |  0.98    Ca    8.8      2023 06:24  Phos  4.4       Mg     2.2         TPro  7.7  /  Alb  3.4  /  TBili  0.9  /  DBili  x   /  AST  24  /  ALT  31  /  AlkPhos  76      PT/INR - ( 2023 02:36 )   PT: 15.5 sec;   INR: 1.30          PTT - ( 2023 02:36 )  PTT:33.4 sec      Urinalysis Basic - ( 15 Trevon 2023 16:48 )    Color: Yellow / Appearance: Clear / S.015 / pH: x  Gluc: x / Ketone: NEGATIVE  / Bili: Negative / Urobili: 0.2 E.U./dL   Blood: x / Protein: Trace mg/dL / Nitrite: NEGATIVE   Leuk Esterase: NEGATIVE / RBC: < 5 /HPF / WBC < 5 /HPF   Sq Epi: x / Non Sq Epi: 0-5 /HPF / Bacteria: None /HPF              Culture Results:   No growth at 12 hours (01-15 @ 17:23)  Culture Results:   No growth at 12 hours (01-15 @ 17:23)      RADIOLOGY & ADDITIONAL STUDIES (The following images were personally reviewed):

## 2023-01-16 NOTE — CONSULT NOTE ADULT - ATTENDING COMMENTS
Please see H&P
Pt seen and evaluated. Will follow closely as an outpatient. No plan for intervention for fibroids given critical status.   Consult as needed.

## 2023-01-16 NOTE — PROGRESS NOTE ADULT - PROBLEM SELECTOR PLAN 1
The patient was urinating and had a syncopal episode and fell on the floor.  CT of the head was negative.  It is most likely related to decrease in the cardiac output related to acute right heart failure and severe pulmonary hypertension.  No recurrence

## 2023-01-16 NOTE — DIETITIAN INITIAL EVALUATION ADULT - DIET TYPE
Diet to be advanced in 24-48hrs as medically feasible; Resume Regular diet vs DASH TLC Pending %PO and BP. Pending %PO, consider need for oral nutrition supplements.

## 2023-01-16 NOTE — CHART NOTE - NSCHARTNOTEFT_GEN_A_CORE
Hemodynamics:  Patient on vasoactive medications?: YES (milrinone 0.2, vaso 0.04)    BP: 147/92 ()  HR: 108  RA: 7  PA: 49/17/29  PCWP:   PA sat: 70.3  Pulse Ox: 100%  Hgb: 9.8  Edwina CO: 8.1  Edwina CI: 3.5  SVR: 1037  TPG:   PVR:   Yasmin: 4.5  : 2 Hemodynamics:  Patient on vasoactive medications?: YES (milrinone 0.2, vaso 0.04)    BP: 147/92 ()  HR: 108  RA: 7  PA: 49/17/29  PCWP:   PA sat: 70.3  Pulse Ox: 100%  Hgb: 9.8  Edwina CO: 8.1  TD CO: 6.3  Edwina CI: 3.5  TD CI: 2.8  SVR: 1037  TPG:   PVR:   Yasmin: 4.5  : 2

## 2023-01-16 NOTE — CONSULT NOTE ADULT - ASSESSMENT
33 y/o  (LMP 2022, 2/2 continuous nuvaring) presenting to ED on  for cough, chest pain, and SOB found to have PE w/ large clot burden w/ mild pulmonary artery dilation w/ RV strain and cardiomegaly, admitted to CCU for concerns of R heart failure. GYN consulted for large multifibroid uterus and concern for mass effect.    - No acute GYN intervention indicated at this time  - Nuvaring removed given contraindication for pts w/ VTE  - Pt will need alternative method of contraception once clinically stable  - Agree with CT venogram to assess for venous congestion 2/2 mass effect  - GYN to continue to follow  - D/w Dr. Lance, PGY4  - D/w AMMY Serrano Attending    Piter Carranza MD PGY2

## 2023-01-17 ENCOUNTER — TRANSCRIPTION ENCOUNTER (OUTPATIENT)
Age: 33
End: 2023-01-17

## 2023-01-17 LAB
ALBUMIN SERPL ELPH-MCNC: 2.8 G/DL — LOW (ref 3.3–5)
ALBUMIN SERPL ELPH-MCNC: 3 G/DL — LOW (ref 3.3–5)
ALBUMIN SERPL ELPH-MCNC: 3.1 G/DL — LOW (ref 3.3–5)
ALBUMIN SERPL ELPH-MCNC: 3.2 G/DL — LOW (ref 3.3–5)
ALP SERPL-CCNC: 108 U/L — SIGNIFICANT CHANGE UP (ref 40–120)
ALP SERPL-CCNC: 74 U/L — SIGNIFICANT CHANGE UP (ref 40–120)
ALP SERPL-CCNC: 76 U/L — SIGNIFICANT CHANGE UP (ref 40–120)
ALP SERPL-CCNC: 77 U/L — SIGNIFICANT CHANGE UP (ref 40–120)
ALT FLD-CCNC: 18 U/L — SIGNIFICANT CHANGE UP (ref 10–45)
ALT FLD-CCNC: 18 U/L — SIGNIFICANT CHANGE UP (ref 10–45)
ALT FLD-CCNC: 21 U/L — SIGNIFICANT CHANGE UP (ref 10–45)
ALT FLD-CCNC: 22 U/L — SIGNIFICANT CHANGE UP (ref 10–45)
ANION GAP SERPL CALC-SCNC: 10 MMOL/L — SIGNIFICANT CHANGE UP (ref 5–17)
ANION GAP SERPL CALC-SCNC: 9 MMOL/L — SIGNIFICANT CHANGE UP (ref 5–17)
APCR PPP: 2.22 RATIO — SIGNIFICANT CHANGE UP
AST SERPL-CCNC: 21 U/L — SIGNIFICANT CHANGE UP (ref 10–40)
AST SERPL-CCNC: 21 U/L — SIGNIFICANT CHANGE UP (ref 10–40)
AST SERPL-CCNC: 22 U/L — SIGNIFICANT CHANGE UP (ref 10–40)
AST SERPL-CCNC: 23 U/L — SIGNIFICANT CHANGE UP (ref 10–40)
BASE EXCESS BLDA CALC-SCNC: 0.3 MMOL/L — SIGNIFICANT CHANGE UP (ref -2–3)
BASE EXCESS BLDMV CALC-SCNC: -0.5 MMOL/L — SIGNIFICANT CHANGE UP
BASE EXCESS BLDMV CALC-SCNC: 1 MMOL/L — SIGNIFICANT CHANGE UP
BASE EXCESS BLDMV CALC-SCNC: 1.2 MMOL/L — SIGNIFICANT CHANGE UP
BASE EXCESS BLDMV CALC-SCNC: 2.4 MMOL/L — SIGNIFICANT CHANGE UP
BILIRUB SERPL-MCNC: 0.3 MG/DL — SIGNIFICANT CHANGE UP (ref 0.2–1.2)
BILIRUB SERPL-MCNC: 0.3 MG/DL — SIGNIFICANT CHANGE UP (ref 0.2–1.2)
BILIRUB SERPL-MCNC: 0.4 MG/DL — SIGNIFICANT CHANGE UP (ref 0.2–1.2)
BILIRUB SERPL-MCNC: 0.4 MG/DL — SIGNIFICANT CHANGE UP (ref 0.2–1.2)
BUN SERPL-MCNC: 14 MG/DL — SIGNIFICANT CHANGE UP (ref 7–23)
CALCIUM SERPL-MCNC: 8.5 MG/DL — SIGNIFICANT CHANGE UP (ref 8.4–10.5)
CALCIUM SERPL-MCNC: 8.5 MG/DL — SIGNIFICANT CHANGE UP (ref 8.4–10.5)
CALCIUM SERPL-MCNC: 8.8 MG/DL — SIGNIFICANT CHANGE UP (ref 8.4–10.5)
CALCIUM SERPL-MCNC: 8.8 MG/DL — SIGNIFICANT CHANGE UP (ref 8.4–10.5)
CHLORIDE SERPL-SCNC: 100 MMOL/L — SIGNIFICANT CHANGE UP (ref 96–108)
CHLORIDE SERPL-SCNC: 100 MMOL/L — SIGNIFICANT CHANGE UP (ref 96–108)
CHLORIDE SERPL-SCNC: 101 MMOL/L — SIGNIFICANT CHANGE UP (ref 96–108)
CHLORIDE SERPL-SCNC: 99 MMOL/L — SIGNIFICANT CHANGE UP (ref 96–108)
CO2 BLDA-SCNC: 24 MMOL/L — SIGNIFICANT CHANGE UP (ref 19–24)
CO2 BLDMV-SCNC: 25.4 MMOL/L — SIGNIFICANT CHANGE UP
CO2 BLDMV-SCNC: 26.1 MMOL/L — SIGNIFICANT CHANGE UP
CO2 BLDMV-SCNC: 26.2 MMOL/L — SIGNIFICANT CHANGE UP
CO2 BLDMV-SCNC: 27.6 MMOL/L — SIGNIFICANT CHANGE UP
CO2 SERPL-SCNC: 24 MMOL/L — SIGNIFICANT CHANGE UP (ref 22–31)
CO2 SERPL-SCNC: 24 MMOL/L — SIGNIFICANT CHANGE UP (ref 22–31)
CO2 SERPL-SCNC: 25 MMOL/L — SIGNIFICANT CHANGE UP (ref 22–31)
CO2 SERPL-SCNC: 25 MMOL/L — SIGNIFICANT CHANGE UP (ref 22–31)
COHGB MFR BLDMV: 1.4 % — SIGNIFICANT CHANGE UP
COHGB MFR BLDMV: 1.6 % — SIGNIFICANT CHANGE UP
COHGB MFR BLDMV: 1.8 % — SIGNIFICANT CHANGE UP
COHGB MFR BLDMV: 2 % — SIGNIFICANT CHANGE UP
CONFIRM APTT STACLOT: NEGATIVE — SIGNIFICANT CHANGE UP
CREAT SERPL-MCNC: 1.03 MG/DL — SIGNIFICANT CHANGE UP (ref 0.5–1.3)
CREAT SERPL-MCNC: 1.06 MG/DL — SIGNIFICANT CHANGE UP (ref 0.5–1.3)
CREAT SERPL-MCNC: 1.08 MG/DL — SIGNIFICANT CHANGE UP (ref 0.5–1.3)
CREAT SERPL-MCNC: 1.18 MG/DL — SIGNIFICANT CHANGE UP (ref 0.5–1.3)
DRVVT SCREEN TO CONFIRM RATIO: SIGNIFICANT CHANGE UP
EGFR: 63 ML/MIN/1.73M2 — SIGNIFICANT CHANGE UP
EGFR: 70 ML/MIN/1.73M2 — SIGNIFICANT CHANGE UP
EGFR: 72 ML/MIN/1.73M2 — SIGNIFICANT CHANGE UP
EGFR: 74 ML/MIN/1.73M2 — SIGNIFICANT CHANGE UP
GAS PNL BLDA: SIGNIFICANT CHANGE UP
GAS PNL BLDMV: SIGNIFICANT CHANGE UP
GLUCOSE BLDC GLUCOMTR-MCNC: 117 MG/DL — HIGH (ref 70–99)
GLUCOSE SERPL-MCNC: 107 MG/DL — HIGH (ref 70–99)
GLUCOSE SERPL-MCNC: 111 MG/DL — HIGH (ref 70–99)
GLUCOSE SERPL-MCNC: 118 MG/DL — HIGH (ref 70–99)
GLUCOSE SERPL-MCNC: 99 MG/DL — SIGNIFICANT CHANGE UP (ref 70–99)
HCO3 BLDA-SCNC: 23 MMOL/L — SIGNIFICANT CHANGE UP (ref 21–28)
HCO3 BLDMV-SCNC: 24 MMOL/L — SIGNIFICANT CHANGE UP
HCO3 BLDMV-SCNC: 25 MMOL/L — SIGNIFICANT CHANGE UP
HCO3 BLDMV-SCNC: 25 MMOL/L — SIGNIFICANT CHANGE UP
HCO3 BLDMV-SCNC: 26 MMOL/L — SIGNIFICANT CHANGE UP
HCT VFR BLD CALC: 26.1 % — LOW (ref 34.5–45)
HCYS SERPL-MCNC: 12.7 UMOL/L — SIGNIFICANT CHANGE UP
HEPARIN-PF4 AB RESULT: <0.6 U/ML — SIGNIFICANT CHANGE UP (ref 0–0.9)
HGB BLD-MCNC: 8.3 G/DL — LOW (ref 11.5–15.5)
HGB FLD-MCNC: 10.9 G/DL — LOW (ref 11.7–16.1)
HGB FLD-MCNC: 13.2 G/DL — SIGNIFICANT CHANGE UP (ref 11.7–16.1)
HGB FLD-MCNC: 8.6 G/DL — LOW (ref 11.7–16.1)
HGB FLD-MCNC: 8.9 G/DL — LOW (ref 11.7–16.1)
LA NT DPL PPP QL: 33.4 SEC — SIGNIFICANT CHANGE UP
LACTATE SERPL-SCNC: 0.8 MMOL/L — SIGNIFICANT CHANGE UP (ref 0.5–2)
LACTATE SERPL-SCNC: 0.9 MMOL/L — SIGNIFICANT CHANGE UP (ref 0.5–2)
LACTATE SERPL-SCNC: 1 MMOL/L — SIGNIFICANT CHANGE UP (ref 0.5–2)
LACTATE SERPL-SCNC: 2 MMOL/L — SIGNIFICANT CHANGE UP (ref 0.5–2)
MAGNESIUM SERPL-MCNC: 2.4 MG/DL — SIGNIFICANT CHANGE UP (ref 1.6–2.6)
MAGNESIUM SERPL-MCNC: 2.4 MG/DL — SIGNIFICANT CHANGE UP (ref 1.6–2.6)
MCHC RBC-ENTMCNC: 25.2 PG — LOW (ref 27–34)
MCHC RBC-ENTMCNC: 31.8 GM/DL — LOW (ref 32–36)
MCV RBC AUTO: 79.1 FL — LOW (ref 80–100)
METHGB MFR BLDMV: 0.2 % — SIGNIFICANT CHANGE UP
METHGB MFR BLDMV: 0.3 % — SIGNIFICANT CHANGE UP
METHGB MFR BLDMV: 0.4 % — SIGNIFICANT CHANGE UP
METHGB MFR BLDMV: 0.4 % — SIGNIFICANT CHANGE UP
NRBC # BLD: 0 /100 WBCS — SIGNIFICANT CHANGE UP (ref 0–0)
NT-PROBNP SERPL-SCNC: 891 PG/ML — HIGH (ref 0–300)
O2 CT VFR BLD CALC: 39 MMHG — SIGNIFICANT CHANGE UP
O2 CT VFR BLD CALC: 40 MMHG — SIGNIFICANT CHANGE UP
O2 CT VFR BLD CALC: 41 MMHG — SIGNIFICANT CHANGE UP
O2 CT VFR BLD CALC: 44 MMHG — SIGNIFICANT CHANGE UP
OXYHGB MFR BLDMV: 58.7 % — SIGNIFICANT CHANGE UP
OXYHGB MFR BLDMV: 65.3 % — SIGNIFICANT CHANGE UP
OXYHGB MFR BLDMV: 66.2 % — SIGNIFICANT CHANGE UP
OXYHGB MFR BLDMV: 67.9 % — SIGNIFICANT CHANGE UP
PCO2 BLDA: 32 MMHG — SIGNIFICANT CHANGE UP (ref 32–45)
PCO2 BLDMV: 36 MMHG — SIGNIFICANT CHANGE UP
PCO2 BLDMV: 37 MMHG — SIGNIFICANT CHANGE UP
PCO2 BLDMV: 38 MMHG — SIGNIFICANT CHANGE UP
PCO2 BLDMV: 39 MMHG — SIGNIFICANT CHANGE UP
PF4 HEPARIN CMPLX AB SER-ACNC: NEGATIVE — SIGNIFICANT CHANGE UP
PH BLDA: 7.47 — HIGH (ref 7.35–7.45)
PH BLDMV: 7.4 — SIGNIFICANT CHANGE UP
PH BLDMV: 7.44 — SIGNIFICANT CHANGE UP
PH BLDMV: 7.45 — SIGNIFICANT CHANGE UP
PH BLDMV: 7.45 — SIGNIFICANT CHANGE UP
PHOSPHATE SERPL-MCNC: 2.5 MG/DL — SIGNIFICANT CHANGE UP (ref 2.5–4.5)
PHOSPHATE SERPL-MCNC: 2.8 MG/DL — SIGNIFICANT CHANGE UP (ref 2.5–4.5)
PLATELET # BLD AUTO: 121 K/UL — LOW (ref 150–400)
PO2 BLDA: 143 MMHG — HIGH (ref 83–108)
POTASSIUM SERPL-MCNC: 3.9 MMOL/L — SIGNIFICANT CHANGE UP (ref 3.5–5.3)
POTASSIUM SERPL-MCNC: 3.9 MMOL/L — SIGNIFICANT CHANGE UP (ref 3.5–5.3)
POTASSIUM SERPL-MCNC: 4.1 MMOL/L — SIGNIFICANT CHANGE UP (ref 3.5–5.3)
POTASSIUM SERPL-MCNC: 4.1 MMOL/L — SIGNIFICANT CHANGE UP (ref 3.5–5.3)
POTASSIUM SERPL-SCNC: 3.9 MMOL/L — SIGNIFICANT CHANGE UP (ref 3.5–5.3)
POTASSIUM SERPL-SCNC: 3.9 MMOL/L — SIGNIFICANT CHANGE UP (ref 3.5–5.3)
POTASSIUM SERPL-SCNC: 4.1 MMOL/L — SIGNIFICANT CHANGE UP (ref 3.5–5.3)
POTASSIUM SERPL-SCNC: 4.1 MMOL/L — SIGNIFICANT CHANGE UP (ref 3.5–5.3)
PROT SERPL-MCNC: 7.4 G/DL — SIGNIFICANT CHANGE UP (ref 6–8.3)
PROT SERPL-MCNC: 7.4 G/DL — SIGNIFICANT CHANGE UP (ref 6–8.3)
PROT SERPL-MCNC: 7.5 G/DL — SIGNIFICANT CHANGE UP (ref 6–8.3)
PROT SERPL-MCNC: 7.6 G/DL — SIGNIFICANT CHANGE UP (ref 6–8.3)
RBC # BLD: 3.3 M/UL — LOW (ref 3.8–5.2)
RBC # FLD: 18.9 % — HIGH (ref 10.3–14.5)
SAO2 % BLDA: 99.5 % — HIGH (ref 94–98)
SAO2 % BLDMV: 60 % — SIGNIFICANT CHANGE UP
SAO2 % BLDMV: 66.8 % — SIGNIFICANT CHANGE UP
SAO2 % BLDMV: 67.3 % — SIGNIFICANT CHANGE UP
SAO2 % BLDMV: 69.6 % — SIGNIFICANT CHANGE UP
SODIUM SERPL-SCNC: 133 MMOL/L — LOW (ref 135–145)
SODIUM SERPL-SCNC: 133 MMOL/L — LOW (ref 135–145)
SODIUM SERPL-SCNC: 134 MMOL/L — LOW (ref 135–145)
SODIUM SERPL-SCNC: 135 MMOL/L — SIGNIFICANT CHANGE UP (ref 135–145)
TROPONIN T SERPL-MCNC: 0.01 NG/ML — SIGNIFICANT CHANGE UP (ref 0–0.01)
WBC # BLD: 19.45 K/UL — HIGH (ref 3.8–10.5)
WBC # FLD AUTO: 19.45 K/UL — HIGH (ref 3.8–10.5)

## 2023-01-17 PROCEDURE — 37252 INTRVASC US NONCORONARY 1ST: CPT | Mod: GC

## 2023-01-17 PROCEDURE — 37253 INTRVASC US NONCORONARY ADDL: CPT | Mod: GC

## 2023-01-17 PROCEDURE — 37187 VENOUS MECH THROMBECTOMY: CPT | Mod: GC

## 2023-01-17 PROCEDURE — 99233 SBSQ HOSP IP/OBS HIGH 50: CPT

## 2023-01-17 PROCEDURE — 99222 1ST HOSP IP/OBS MODERATE 55: CPT | Mod: GC,25

## 2023-01-17 PROCEDURE — 93308 TTE F-UP OR LMTD: CPT | Mod: 26

## 2023-01-17 PROCEDURE — 99291 CRITICAL CARE FIRST HOUR: CPT

## 2023-01-17 PROCEDURE — 71045 X-RAY EXAM CHEST 1 VIEW: CPT | Mod: 26

## 2023-01-17 RX ORDER — MORPHINE SULFATE 50 MG/1
2 CAPSULE, EXTENDED RELEASE ORAL EVERY 6 HOURS
Refills: 0 | Status: DISCONTINUED | OUTPATIENT
Start: 2023-01-17 | End: 2023-01-19

## 2023-01-17 RX ADMIN — CEFTRIAXONE 100 MILLIGRAM(S): 500 INJECTION, POWDER, FOR SOLUTION INTRAMUSCULAR; INTRAVENOUS at 11:14

## 2023-01-17 RX ADMIN — MORPHINE SULFATE 2 MILLIGRAM(S): 50 CAPSULE, EXTENDED RELEASE ORAL at 21:40

## 2023-01-17 RX ADMIN — MORPHINE SULFATE 2 MILLIGRAM(S): 50 CAPSULE, EXTENDED RELEASE ORAL at 21:16

## 2023-01-17 RX ADMIN — CHLORHEXIDINE GLUCONATE 1 APPLICATION(S): 213 SOLUTION TOPICAL at 06:15

## 2023-01-17 RX ADMIN — ENOXAPARIN SODIUM 100 MILLIGRAM(S): 100 INJECTION SUBCUTANEOUS at 00:42

## 2023-01-17 NOTE — PROGRESS NOTE ADULT - ASSESSMENT
31 yo female with PMHx of asthma presents to ED i/s/o worsening chest pain and cough. CT PE showing thrombus with large burden and RV strain. Admitted to MICU for PE with RV strain and possible intervention. Started on heparin gtt with improvement in symptoms and stepped down to telemetry. Underwent mechanical thrombectomy on 1/13 and transitioned to Lovenox SQ. Stepped up to MICU on 1/15 for worsening R-sided heart failure.    NEURO  -SHELLY    PULM:  #Acute hypoxic respiratory failure--etiology likely 2/2 V/Q mismatch in the setting of massive PE   -patient required BiPAP overnight   -continue HFNC, wean as tolerated  -q12 ABG     #Pulmonary embolus.   CTA PE protocol indicating large thrombus burden involving all lobar and multiple segmental branches bilaterally w/ probable evolving infarcts in lateral basal segment of LLL and mild pulmonary artery dilation w/ RV strain and cardiomegaly. TTE with persistently dilated RV size, mod-severely reduced RV function, pHTN. Previously on heparin gtt, transitioned to Lovenox 100 mg SQ q12h. Etiology likely unprovoked PE in nature -- risk factors: Nuvaring contraceptive use and obesity. Denies recent travel and family hx. On 1/15, pt was stepped up to MICU due to worsening tachycardia and decreasing O2 saturation, with bedside POCUS showing persistent R-sided HF and uptrending pro-BNP, suggestive of progressive cardiac decompensation.   - s/p ekos catheter removal, s/p heparin grr  - Trend BNP   - Continue to monitor cardiac function via TTE   - f/u CT surgery recs   - Maintain active T&S.  - repeat Dopplers lower extremities   - vascular following; recommend CT venogram to assess for extent of clot, consider IVC filter     #Asthma.  Holding steroids and albuterol as patient is asymptomatic  - No home asthma meds, as patient states she did not need any before onset of symptoms from Urgent Care.    CARDIOVASCULAR  #Acute right heart failure.   Pt w/ large PE involving all lobar and multiple segmental branches bilaterally w/ RV strain and cardiomegaly. TTE 1/11 with severely dilated RV size, mod-severely reduced RV function, mod TR, pHTN (PASP 57), normal LV size/function. Troponin 0.10 -> 0.01, Pro-BNP 1395.  - 1/15 AM BNP 2785, lactate 1.8  - Trend BNP   - Continue to monitor cardiac function via TTE   - f/u CT surgery recs    #Syncope  1/14 early AM pt on bedside commode when she apparently syncopized. (See resident chart note 1/14/23) Patient examined at bedside, able to communicate, was moving her neck without any pain or limitations, neurological exam was benign. Fall precautions and bed rest ordered for patient. CTH, CT c-spine, CXR WNL. Pt re-evaluated multiple times during the day, mentating at baseline and physical exam unremarkable.   - Maintain fall precautions  - Monitor CBC    GI  -SHELLY    /RENAL  #Fibroids.  Pt reports chronic lower abdominal pain 2/2 uterine fibroids. 8/10 in severity at its worst. Pt has been straining to urinate and pass BMs which she believes may be due to fibroids.   - c/w IV dilaudid 1 mg q6h PRN for severe pain   - start PO oxy 5 PRN moderate pain   - Monitor BMs   - Bedpan while on strict bedrest  - planned for abdominal myomectomy with Dr. Telles in February    #Vaginal Bleeding  She has been using nuvaring for contraception for >1 year but has been continuously replacing her nuvaring without allowing for withdrawal bleed since 09/22.    -Nuvaring removed 1/16  -Vaginal bleeding noted during pelvic exam. Monitor for s/s active vaginal bleeding     #SUNNY  resolved    #Urinary Retention  Likely 2/2 mass effect from uterus   -valencia placed 1/16    ENDO  -SHELLY    ID  #Fever   Patient febrile to 101.3. Most likely 2/2 SIRS, but septic workup initiated.   -febrile overnight to 102. Initially broadened to vanc/zosyn but with concern for allergy, was transitioned back to CTX   -f/u blood cx  -UA negative  -c/w CTX until blood cx NGTD x72 hours. If negative, d/c CTX     HEME  #Anemia  Hb 9.8 upon arrival to CCU. No signs or hx of active bleed.  -maintain active type and screen  -transfuse Hb < 7    PROPHYLAXIS   F:   E: Replete K <4, Mg <2  N: Regular diet  DVT ppx: Lovenox 100 Q12  GI ppx: not needed  Code: Full code  Dispo: CCU     31 yo female with PMHx of asthma presents to ED i/s/o worsening chest pain and cough. CT PE showing thrombus with large burden and RV strain. Admitted to MICU for PE with RV strain and possible intervention. Started on heparin gtt with improvement in symptoms and stepped down to telemetry. Underwent mechanical thrombectomy on 1/13 and transitioned to Lovenox SQ. Stepped up to MICU on 1/15 for worsening R-sided heart failure.    NEURO  -SHELLY    PULM:  #Acute hypoxic respiratory failure--etiology likely 2/2 V/Q mismatch in the setting of massive PE   -continue HFNC, wean to NC today  -q12 ABG    #Pulmonary embolus.   CTA PE protocol indicating large thrombus burden involving all lobar and multiple segmental branches bilaterally w/ probable evolving infarcts in lateral basal segment of LLL and mild pulmonary artery dilation w/ RV strain and cardiomegaly. TTE with persistently dilated RV size, mod-severely reduced RV function, pHTN. Previously on heparin gtt, transitioned to Lovenox 100 mg SQ q12h. Etiology likely unprovoked PE in nature -- risk factors: Nuvaring contraceptive use and obesity. Denies recent travel and family hx. On 1/15, pt was stepped up to MICU due to worsening tachycardia and decreasing O2 saturation, with bedside POCUS showing persistent R-sided HF and uptrending pro-BNP, suggestive of progressive cardiac decompensation.   - s/p ekos catheter removal, s/p heparin gtt  - Trend BNP (down to 891 today)  - Continue to monitor cardiac function via TTE daily  - IVC filter placement today    #Asthma.  Holding steroids and albuterol as patient is asymptomatic  - No home asthma meds, as patient states she did not need any before onset of symptoms from Urgent Care.    CARDIOVASCULAR  #Acute right heart failure.   Pt w/ large PE involving all lobar and multiple segmental branches bilaterally w/ RV strain and cardiomegaly. TTE 1/11 with severely dilated RV size, mod-severely reduced RV function, mod TR, pHTN (PASP 57), normal LV size/function. Troponin 0.10 -> 0.01, Pro-BNP 1395.  - Trend BNP (dowtrending)  - Continue to monitor cardiac function via TTE daily  - f/u CT surgery recs    #Syncope  1/14 early AM pt on bedside commode when she apparently syncopized. (See resident chart note 1/14/23) Patient examined at bedside, able to communicate, was moving her neck without any pain or limitations, neurological exam was benign. Fall precautions and bed rest ordered for patient. CTH, CT c-spine, CXR WNL. Pt re-evaluated multiple times during the day, mentating at baseline and physical exam unremarkable.   - Maintain fall precautions    GI  -SHELLY    /RENAL  #Fibroids.  Pt reports chronic lower abdominal pain 2/2 uterine fibroids. 8/10 in severity at its worst. Pt has been straining to urinate and pass BMs which she believes may be due to fibroids. CTAP: 1. Markedly bulky multi fibroid uterus with mass effect upon the external iliac veins, as described. Bilateral external iliac vein and common femoral vein thrombosis. Bilateral pulmonary emboli, previously identified. No evidence of extension of thrombus into the IVC. 2. Mild left hydroureteronephrosis secondary to mass effect of the bulky multi fibroid uterus upon the left ureter at the level of the pelvic sidewall.  - change dilaudid to morphine 2mg IV PRN severe pain  - start PO oxy 5 PRN moderate pain   - GYN following; planned for abdominal myomectomy with Dr. Telles in February, will try to facilitate earlier myomectomy or hysterectomy while inpatient     #Vaginal Bleeding  She has been using nuvaring for contraception for >1 year but has been continuously replacing her nuvaring without allowing for withdrawal bleed since 09/22.    -Nuvaring removed 1/16  -Vaginal bleeding noted during pelvic exam. Monitor for s/s active vaginal bleeding     #SUNNY  resolved    #Urinary Retention  #Hydroureteronephrosis  2/2 mass effect from uterus   -valencia placed 1/16    ENDO  -SHELLY    ID  #Fever   Patient febrile to 101.3. Most likely 2/2 SIRS, but septic workup initiated.   -febrile overnight to 102. Initially broadened to vanc/zosyn but with concern for allergy, was transitioned back to CTX   -f/u blood cx  -UA negative  -c/w CTX until blood cx NGTD x72 hours. If negative, d/c CTX     HEME  #Anemia  Hb 9.8 upon arrival to CCU. No signs or hx of active bleed.  -maintain active type and screen  -transfuse Hb < 7    PROPHYLAXIS   F: none   E: Replete K <4, Mg <2  N: NPO for IVC filter  DVT ppx: Lovenox 100 Q12  GI ppx: not needed  Code: Full code  Dispo: CCU     31 yo female with PMHx of asthma presents to ED i/s/o worsening chest pain and cough. CT PE showing thrombus with large burden and RV strain. Admitted to MICU for PE with RV strain and possible intervention. Started on heparin gtt with improvement in symptoms and stepped down to telemetry. Underwent mechanical thrombectomy on 1/13 and transitioned to Lovenox SQ. Stepped up to MICU on 1/15 for worsening R-sided heart failure.    NEURO  -SHELLY    PULM:  #Acute hypoxic respiratory failure--etiology likely 2/2 V/Q mismatch in the setting of massive PE   -continue HFNC, wean to NC today  -q12 ABG    #Pulmonary embolus.   CTA PE protocol indicating large thrombus burden involving all lobar and multiple segmental branches bilaterally w/ probable evolving infarcts in lateral basal segment of LLL and mild pulmonary artery dilation w/ RV strain and cardiomegaly. TTE with persistently dilated RV size, mod-severely reduced RV function, pHTN. Previously on heparin gtt, transitioned to Lovenox 100 mg SQ q12h. Etiology likely unprovoked PE in nature -- risk factors: Nuvaring contraceptive use and obesity. Denies recent travel and family hx. On 1/15, pt was stepped up to MICU due to worsening tachycardia and decreasing O2 saturation, with bedside POCUS showing persistent R-sided HF and uptrending pro-BNP, suggestive of progressive cardiac decompensation.   - s/p ekos catheter removal, s/p heparin gtt  - Trend BNP (down to 891 today)  - Continue to monitor cardiac function via TTE daily  - IVC filter placement today  -f/u hypercoagulability w/u     #Asthma.  Holding steroids and albuterol as patient is asymptomatic  - No home asthma meds, as patient states she did not need any before onset of symptoms from Urgent Care.    CARDIOVASCULAR  #Acute right heart failure.   Pt w/ large PE involving all lobar and multiple segmental branches bilaterally w/ RV strain and cardiomegaly. TTE 1/11 with severely dilated RV size, mod-severely reduced RV function, mod TR, pHTN (PASP 57), normal LV size/function. Troponin 0.10 -> 0.01, Pro-BNP 1395.  - Trend BNP (dowtrending)  - Continue to monitor cardiac function via TTE daily  - f/u CT surgery recs    #Syncope  1/14 early AM pt on bedside commode when she apparently syncopized. (See resident chart note 1/14/23) Patient examined at bedside, able to communicate, was moving her neck without any pain or limitations, neurological exam was benign. Fall precautions and bed rest ordered for patient. CTH, CT c-spine, CXR WNL. Pt re-evaluated multiple times during the day, mentating at baseline and physical exam unremarkable.   - Maintain fall precautions    GI  -SHELLY    /RENAL  #Fibroids.  Pt reports chronic lower abdominal pain 2/2 uterine fibroids. 8/10 in severity at its worst. Pt has been straining to urinate and pass BMs which she believes may be due to fibroids. CTAP: 1. Markedly bulky multi fibroid uterus with mass effect upon the external iliac veins, as described. Bilateral external iliac vein and common femoral vein thrombosis. Bilateral pulmonary emboli, previously identified. No evidence of extension of thrombus into the IVC. 2. Mild left hydroureteronephrosis secondary to mass effect of the bulky multi fibroid uterus upon the left ureter at the level of the pelvic sidewall.  - change dilaudid to morphine 2mg IV PRN severe pain  - start PO oxy 5 PRN moderate pain   - GYN following; planned for abdominal myomectomy with Dr. Telles in February, will try to facilitate earlier myomectomy or hysterectomy while inpatient     #Vaginal Bleeding  She has been using nuvaring for contraception for >1 year but has been continuously replacing her nuvaring without allowing for withdrawal bleed since 09/22.    -Nuvaring removed 1/16  -Vaginal bleeding noted during pelvic exam. Monitor for s/s active vaginal bleeding     #SUNNY  resolved    #Urinary Retention  #Hydroureteronephrosis  2/2 mass effect from uterus   -valencia placed 1/16    ENDO  -SHELLY    ID  #Fever   Patient febrile to 101.3. Most likely 2/2 SIRS, but septic workup initiated.   -febrile overnight to 102. Initially broadened to vanc/zosyn but with concern for allergy, was transitioned back to CTX   -f/u blood cx  -UA negative  -c/w CTX until blood cx NGTD x72 hours. If negative, d/c CTX     HEME  #Anemia  Hb 9.8 upon arrival to CCU. No signs or hx of active bleed.  -maintain active type and screen  -transfuse Hb < 7    PROPHYLAXIS   F: none   E: Replete K <4, Mg <2  N: NPO for IVC filter  DVT ppx: Lovenox 100 Q12  GI ppx: not needed  Code: Full code  Dispo: CCU

## 2023-01-17 NOTE — PROGRESS NOTE ADULT - PROBLEM SELECTOR PLAN 5
The pulmonary pressures were elevated on the right heart catheterization.  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  Cardiac directed thrombolytics was initiated with ultrasound guidance.  Also the patient was started on murmur on for increased contractility.  Also given diuretics and was started on nitrous oxide for the pulmonary hypertension.  The pulmonary artery catheter was removed for the catheter directed thrombolysis  PA catheter revealed improvement in PA pressure and also echo.  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

## 2023-01-17 NOTE — CHART NOTE - NSCHARTNOTEFT_GEN_A_CORE
STATUS POST: IVC filter placement attempt.     SUBJECTIVE: Patient seen and examined bedside; tolerated the procedure well, no complaints at this time, feels tired, but otherwise doing well.    cefTRIAXone   IVPB 2000 milliGRAM(s) IV Intermittent every 24 hours  enoxaparin Injectable 100 milliGRAM(s) SubCutaneous every 12 hours      Vital Signs Last 24 Hrs  T(C): 36.7 (2023 09:14), Max: 37.9 (2023 16:49)  T(F): 98.1 (2023 09:14), Max: 100.2 (2023 16:49)  HR: 99 (2023 16:00) (99 - 124)  BP: 121/92 (2023 15:00) (121/92 - 149/98)  BP(mean): 103 (2023 15:00) (95 - 114)  RR: 18 (2023 16:00) (16 - 22)  SpO2: 100% (2023 16:00) (99% - 100%)    Parameters below as of 2023 16:00  Patient On (Oxygen Delivery Method): nasal cannula  O2 Flow (L/min): 4    I&O's Detail    2023 07:01  -  2023 07:00  --------------------------------------------------------  IN:    IV PiggyBack: 50 mL    Milrinone: 6 mL    Milrinone: 67.8 mL    Oral Fluid: 455 mL    Sodium Chloride 0.9% Bolus: 250 mL    Vasopressin: 36.5 mL    Vasopressin: 9 mL  Total IN: 874.3 mL    OUT:    Indwelling Catheter - Urethral (mL): 778 mL    Intermittent Catheterization - Urethral (mL): 600 mL  Total OUT: 1378 mL    Total NET: -503.7 mL      2023 07:01  -  2023 16:09  --------------------------------------------------------  IN:    Milrinone: 6.5 mL  Total IN: 6.5 mL    OUT:    Indwelling Catheter - Urethral (mL): 386 mL  Total OUT: 386 mL    Total NET: -379.5 mL      PE:    General: NAD, resting comfortably in bed  HEENT: Right neck access site c/d/i, no evidence of bleeding or hematoma, 9fr sheet still in place covered with chg dressing  C/V: S1 s2, RRR  Pulm: Nonlabored breathing, no respiratory distress  Abd: Soft, NTND  Extrem: WW        LABS:                        8.3    19.45 )-----------( 121      ( 2023 05:30 )             26.1         133<L>  |  100  |  14  ----------------------------<  99  3.9   |  24  |  1.08    Ca    8.8      2023 11:56  Phos  2.8       Mg     2.4         TPro  7.4  /  Alb  2.8<L>  /  TBili  0.3  /  DBili  x   /  AST  21  /  ALT  18  /  AlkPhos  77  -17    PT/INR - ( 2023 20:26 )   PT: 14.7 sec;   INR: 1.23          PTT - ( 2023 20:26 )  PTT:29.0 sec  Urinalysis Basic - ( 15 Trevon 2023 16:48 )    Color: Yellow / Appearance: Clear / S.015 / pH: x  Gluc: x / Ketone: NEGATIVE  / Bili: Negative / Urobili: 0.2 E.U./dL   Blood: x / Protein: Trace mg/dL / Nitrite: NEGATIVE   Leuk Esterase: NEGATIVE / RBC: < 5 /HPF / WBC < 5 /HPF   Sq Epi: x / Non Sq Epi: 0-5 /HPF / Bacteria: None /HPF        RADIOLOGY & ADDITIONAL STUDIES:    Recommendations:    - Rest of care per primary team  - Vascular surgery Team 3C will continue to follow. Please call [phone] with any questions or concerns

## 2023-01-17 NOTE — PROGRESS NOTE ADULT - ASSESSMENT
32y Female with PMHx of asthma who presented to [facility] on 1/11 for 1 week of productive cough. On presentation, she was tachycardic to the 120s with elevated BNP and D-dimer. CT PE revealed large thrombus burden involving all lobar and multiple segmental branches bilaterally w/ probable evolving infarcts in lateral basal segment of LLL and mild pulmonary A dilation w/ RV strain and cardiomegaly. Patient was admitted to the MICU and started on heparin gtt with clinical deterioration (decreasing CI and increasing BNP) requiring mechanical thrombectomy (1/13). Patient was subsequently transferred to the CCU after R heart cath revealed severe RV dilation consistent with R heart failure. Patient underwent surveillance LE duplex for etiology of PE which revealed LLE DVT in L common femoral, deep femoral and femoral veins 01/15. CT venogram with findings consistent with Markedly bulky multi fibroid uterus with mass effect upon the external iliac veins, as described; Bilateral external iliac vein and common femoral vein thrombosis. Bilateral pulmonary emboli, previously identified; No evidence of extension of thrombus into the IVC; Mild left hydroureteronephrosis secondary to mass effect of the bulky multi fibroid uterus upon the left ureter at the level of the pelvic sidewall.    Recommendations:    - Plan for OR today for IVC filter placement  - Rest of care per primary team  - Vascular surgery Team 3C will continue to follow. Please call [phone] with any questions or concerns

## 2023-01-17 NOTE — PROGRESS NOTE ADULT - SUBJECTIVE AND OBJECTIVE BOX
ADAM BEACH, 32y, Female  MRN-[id number]  Patient is a 32y old  Female who presents with a chief complaint of PULMONARY EMBOLISM    OVERNIGHT EVENTS:  9pm HD and perfusion labs improving. bladder scan at 10pm with 400cc. Valencia placed at [id number].     SUBJECTIVE: Pt seen and examined at bedside. Pt states she just has irritation from her valencia. She denies abd pain. denies fevers, chills, chest pain, palpitations and, SOB with her oxygen.     12 Point ROS Negative unless noted otherwise above.  -------------------------------------------------------------------------------  VITAL SIGNS:  Vital Signs Last 24 Hrs  T(C): 37.2 (2023 04:52), Max: 37.9 (2023 16:49)  T(F): 99 (2023 04:52), Max: 100.2 (2023 16:49)  HR: 111 (2023 07:00) (99 - 124)  BP: 135/80 (2023 07:00) (121/83 - 149/98)  BP(mean): 99 (2023 07:00) (94 - 114)  RR: 18 (2023 07:00) (16 - 19)  SpO2: 100% (2023 07:00) (99% - 100%)    Parameters below as of 2023 07:00  Patient On (Oxygen Delivery Method): nasal cannula, high flow  O2 Flow (L/min): 40  O2 Concentration (%): 40  I&O's Summary    2023 07:01  -  2023 07:00  --------------------------------------------------------  IN: 871.1 mL / OUT: 1333 mL / NET: -462 mL    PHYSICAL EXAM:  General: NAD, resting comfortably   HEENT: NC/AT; EOMI, anicteric sclera; moist mucosal membranes.  Neck: supple, trachea midline  Cardiovascular: RRR, +S1/S2; NO M/R/G  Respiratory: CTA B/L; no W/R/R. on HFNC   Gastrointestinal: soft, NT/ND; +BSx4  Extremities: WWP; no edema or cyanosis. A-line in L wrist. TTP LLE.   Vascular: 2+ radial, DP/PT pulses B/L  Neurological: AAOx3; no focal deficits    ALLERGIES:  No Known Allergies    MEDICATIONS  (STANDING):  cefTRIAXone   IVPB 2000 milliGRAM(s) IV Intermittent every 24 hours  chlorhexidine 2% Cloths 1 Application(s) Topical <User Schedule>  dextrose 5%. 1000 milliLiter(s) (50 mL/Hr) IV Continuous <Continuous>  dextrose 5%. 1000 milliLiter(s) (100 mL/Hr) IV Continuous <Continuous>  dextrose 50% Injectable 25 Gram(s) IV Push once  dextrose 50% Injectable 12.5 Gram(s) IV Push once  dextrose 50% Injectable 25 Gram(s) IV Push once  enoxaparin Injectable 100 milliGRAM(s) SubCutaneous every 12 hours  glucagon  Injectable 1 milliGRAM(s) IntraMuscular once  influenza   Vaccine 0.5 milliLiter(s) IntraMuscular once  insulin lispro (ADMELOG) corrective regimen sliding scale   SubCutaneous three times a day before meals  milrinone Infusion 0.1 MICROgram(s)/kG/Min (3.23 mL/Hr) IV Continuous <Continuous>    MEDICATIONS  (PRN):  dextrose Oral Gel 15 Gram(s) Oral once PRN Blood Glucose LESS THAN 70 milliGRAM(s)/deciliter  HYDROmorphone  Injectable 1 milliGRAM(s) IV Push every 6 hours PRN Severe Pain (7 - 10)  polyethylene glycol 3350 17 Gram(s) Oral daily PRN Constipation  senna 2 Tablet(s) Oral at bedtime PRN Constipation  -------------------------------------------------------------------------------  LABS:                        8.3    19.45 )-----------( 121      ( 2023 05:30 )             26.1     -17    134<L>  |  100  |  14  ----------------------------<  118<H>  4.1   |  24  |  1.06    Ca    8.5      2023 05:30  Phos  2.8       Mg     2.4         TPro  7.6  /  Alb  3.2<L>  /  TBili  0.4  /  DBili  x   /  AST  23  /  ALT  21  /  AlkPhos  74  17    LIVER FUNCTIONS - ( 2023 05:30 )  Alb: 3.2 g/dL / Pro: 7.6 g/dL / ALK PHOS: 74 U/L / ALT: 21 U/L / AST: 23 U/L / GGT: x           PT/INR - ( 2023 20:26 )   PT: 14.7 sec;   INR: 1.23          PTT - ( 2023 20:26 )  PTT:29.0 sec  Urinalysis Basic - ( 15 Trevon 2023 16:48 )    Color: Yellow / Appearance: Clear / S.015 / pH: x  Gluc: x / Ketone: NEGATIVE  / Bili: Negative / Urobili: 0.2 E.U./dL   Blood: x / Protein: Trace mg/dL / Nitrite: NEGATIVE   Leuk Esterase: NEGATIVE / RBC: < 5 /HPF / WBC < 5 /HPF   Sq Epi: x / Non Sq Epi: 0-5 /HPF / Bacteria: None /HPF    CAPILLARY BLOOD GLUCOSE  POCT Blood Glucose.: 164 mg/dL (2023 16:01)    Culture - Blood (collected 15 Trevon 2023 17:23)  Source: .Blood Blood  Preliminary Report (2023 18:00):    No growth at 1 day.    Culture - Blood (collected 15 Trevon 2023 17:23)  Source: .Blood Blood  Preliminary Report (2023 18:00):    No growth at 1 day.    Urinalysis with Rflx Culture (collected 15 Trevon 2023 16:48)    SARS-CoV-2: NotDetec (2023 00:17)  SARS-CoV-2: NotDetec (2023 09:33)    RADIOLOGY & ADDITIONAL TESTS: Reviewed.   ADAM BEACH, 32y, Female  MRN-[id number]  Patient is a 32y old  Female who presents with a chief complaint of PULMONARY EMBOLISM    OVERNIGHT EVENTS: bladder scan at 10pm with 400cc. Valencia placed at [id number].     SUBJECTIVE: Pt seen and examined at bedside. Pt states she just has irritation from her valencia. She denies abd pain. denies fevers, chills, chest pain, palpitations and, SOB with her oxygen.     12 Point ROS Negative unless noted otherwise above.  -------------------------------------------------------------------------------  VITAL SIGNS:  Vital Signs Last 24 Hrs  T(C): 37.2 (2023 04:52), Max: 37.9 (2023 16:49)  T(F): 99 (2023 04:52), Max: 100.2 (2023 16:49)  HR: 111 (2023 07:00) (99 - 124)  BP: 135/80 (2023 07:00) (121/83 - 149/98)  BP(mean): 99 (2023 07:00) (94 - 114)  RR: 18 (2023 07:00) (16 - 19)  SpO2: 100% (2023 07:00) (99% - 100%)    Parameters below as of 2023 07:00  Patient On (Oxygen Delivery Method): nasal cannula, high flow  O2 Flow (L/min): 40  O2 Concentration (%): 40  I&O's Summary    2023 07:01  -  2023 07:00  --------------------------------------------------------  IN: 871.1 mL / OUT: 1333 mL / NET: -462 mL    PHYSICAL EXAM:  General: NAD, sleeping comfortably   HEENT: NC/AT; EOMI, anicteric sclera; moist mucosal membranes.  Neck: supple, trachea midline  Cardiovascular: RRR, +S1/S2; NO M/R/G  Respiratory: CTA B/L; no W/R/R. on HFNC   Gastrointestinal: soft, NT/ND; +BSx4  Extremities: WWP; no edema or cyanosis. A-line in L wrist. TTP LLE.   Vascular: 2+ radial, DP/PT pulses B/L  Neurological: AAOx3; no focal deficits    ALLERGIES:  No Known Allergies    MEDICATIONS  (STANDING):  cefTRIAXone   IVPB 2000 milliGRAM(s) IV Intermittent every 24 hours  chlorhexidine 2% Cloths 1 Application(s) Topical <User Schedule>  dextrose 5%. 1000 milliLiter(s) (50 mL/Hr) IV Continuous <Continuous>  dextrose 5%. 1000 milliLiter(s) (100 mL/Hr) IV Continuous <Continuous>  dextrose 50% Injectable 25 Gram(s) IV Push once  dextrose 50% Injectable 12.5 Gram(s) IV Push once  dextrose 50% Injectable 25 Gram(s) IV Push once  enoxaparin Injectable 100 milliGRAM(s) SubCutaneous every 12 hours  glucagon  Injectable 1 milliGRAM(s) IntraMuscular once  influenza   Vaccine 0.5 milliLiter(s) IntraMuscular once  insulin lispro (ADMELOG) corrective regimen sliding scale   SubCutaneous three times a day before meals  milrinone Infusion 0.1 MICROgram(s)/kG/Min (3.23 mL/Hr) IV Continuous <Continuous>    MEDICATIONS  (PRN):  dextrose Oral Gel 15 Gram(s) Oral once PRN Blood Glucose LESS THAN 70 milliGRAM(s)/deciliter  HYDROmorphone  Injectable 1 milliGRAM(s) IV Push every 6 hours PRN Severe Pain (7 - 10)  polyethylene glycol 3350 17 Gram(s) Oral daily PRN Constipation  senna 2 Tablet(s) Oral at bedtime PRN Constipation  -------------------------------------------------------------------------------  LABS:                        8.3    19.45 )-----------( 121      ( 2023 05:30 )             26.1     01-17    134<L>  |  100  |  14  ----------------------------<  118<H>  4.1   |  24  |  1.06    Ca    8.5      2023 05:30  Phos  2.8     -  Mg     2.4         TPro  7.6  /  Alb  3.2<L>  /  TBili  0.4  /  DBili  x   /  AST  23  /  ALT  21  /  AlkPhos  74  -17    LIVER FUNCTIONS - ( 2023 05:30 )  Alb: 3.2 g/dL / Pro: 7.6 g/dL / ALK PHOS: 74 U/L / ALT: 21 U/L / AST: 23 U/L / GGT: x           PT/INR - ( 2023 20:26 )   PT: 14.7 sec;   INR: 1.23          PTT - ( 2023 20:26 )  PTT:29.0 sec  Urinalysis Basic - ( 15 Trevon 2023 16:48 )    Color: Yellow / Appearance: Clear / S.015 / pH: x  Gluc: x / Ketone: NEGATIVE  / Bili: Negative / Urobili: 0.2 E.U./dL   Blood: x / Protein: Trace mg/dL / Nitrite: NEGATIVE   Leuk Esterase: NEGATIVE / RBC: < 5 /HPF / WBC < 5 /HPF   Sq Epi: x / Non Sq Epi: 0-5 /HPF / Bacteria: None /HPF    CAPILLARY BLOOD GLUCOSE  POCT Blood Glucose.: 164 mg/dL (2023 16:01)    Culture - Blood (collected 15 Trevon 2023 17:23)  Source: .Blood Blood  Preliminary Report (2023 18:00):    No growth at 1 day.    Culture - Blood (collected 15 Trevon 2023 17:23)  Source: .Blood Blood  Preliminary Report (2023 18:00):    No growth at 1 day.    Urinalysis with Rflx Culture (collected 15 Trevon 2023 16:48)    SARS-CoV-2: NotDetec (2023 00:17)  SARS-CoV-2: NotDetec (2023 09:33)    RADIOLOGY & ADDITIONAL TESTS: Reviewed.

## 2023-01-17 NOTE — CONSULT NOTE ADULT - SUBJECTIVE AND OBJECTIVE BOX
HPI:  31 yo female with PMHx of asthma and anemia 2/2 myomectomy, on birth control (Nuvaring) presented with 1 week of productive cough. Pt stating she began feeling symptoms of mild cough and chest tightness 3 weeks prior. She went to  where workup was negative for COVID and Influenza. Pt prescribed Azithromycin and Prednisone 20mg -- currently on day 5. Pt stating she awoke this AM w/ progressively worse chest tightness. She used her inhaler and nebulizer tx at home with minimal relief and had syncopal event at home. Pt with no hx of DVT, PE and also denies family history of clots, blood disorders.     ED Vitals:   ED Labs: WBC 9.87, Hgb 11.2, Plt 235, Na 139, K 3.6, Cl 103, Cr 0.82, AST 47, ALT 58, Troponin T 0.10, Pro-BNP 1359, D-dimer 1781  CT PE:  large thrombus burden involving all lobar and multiple segmental branches bilaterally w/ probable evolving infarcts in lateral basal segment of LLL and mild pulmonary A dilation w/ RV strain and cardiomegaly.  ED Interventions: Heparin 9000 IVP x1, NS 1000mL x1, Heparin gtt @ 19 mL/hr running      (11 Jan 2023 15:43)      ROS: A 10-point review of systems was otherwise negative.    PAST MEDICAL & SURGICAL HISTORY:  Asthma      Fibroids      Anemia      No significant past surgical history          SOCIAL HISTORY:  FAMILY HISTORY:      ALLERGIES: 	  No Known Allergies            MEDICATIONS:  cefTRIAXone   IVPB 2000 milliGRAM(s) IV Intermittent every 24 hours  chlorhexidine 2% Cloths 1 Application(s) Topical <User Schedule>  dextrose 5%. 1000 milliLiter(s) IV Continuous <Continuous>  dextrose 5%. 1000 milliLiter(s) IV Continuous <Continuous>  dextrose 50% Injectable 25 Gram(s) IV Push once  dextrose 50% Injectable 12.5 Gram(s) IV Push once  dextrose 50% Injectable 25 Gram(s) IV Push once  dextrose Oral Gel 15 Gram(s) Oral once PRN  enoxaparin Injectable 100 milliGRAM(s) SubCutaneous every 12 hours  glucagon  Injectable 1 milliGRAM(s) IntraMuscular once  influenza   Vaccine 0.5 milliLiter(s) IntraMuscular once  insulin lispro (ADMELOG) corrective regimen sliding scale   SubCutaneous Before meals and at bedtime  morphine  - Injectable 2 milliGRAM(s) IV Push every 6 hours PRN  polyethylene glycol 3350 17 Gram(s) Oral daily PRN  senna 2 Tablet(s) Oral at bedtime PRN      PHYSICAL EXAM:  T(C): 36.7 (01-17-23 @ 09:14), Max: 37.9 (01-16-23 @ 16:49)  HR: 108 (01-17-23 @ 12:00) (99 - 124)  BP: 140/89 (01-17-23 @ 12:00) (134/88 - 149/98)  RR: 18 (01-17-23 @ 11:00) (16 - 19)  SpO2: 100% (01-17-23 @ 12:00) (99% - 100%)  Wt(kg): --    GEN: Awake, comfortable. NAD.   HEENT: NCAT, PERRL, EOMI. Mucosa moist. No JVD.   RESP: CTA b/l  CV: RRR, normal s1/s2. No m/r/g.  ABD: Soft, NTND. BS+  EXT: Warm. No edema, clubbing, or cyanosis.   NEURO: AAOx3. No focal deficits.    I&O's Summary    16 Jan 2023 07:01  -  17 Jan 2023 07:00  --------------------------------------------------------  IN: 874.3 mL / OUT: 1378 mL / NET: -503.7 mL    17 Jan 2023 07:01  -  17 Jan 2023 13:01  --------------------------------------------------------  IN: 6.5 mL / OUT: 170 mL / NET: -163.5 mL        	  LABS:	 	    CARDIAC MARKERS:                                  8.3    19.45 )-----------( 121      ( 17 Jan 2023 05:30 )             26.1     01-17    133<L>  |  100  |  14  ----------------------------<  99  3.9   |  24  |  1.08    Ca    8.8      17 Jan 2023 11:56  Phos  2.8     01-17  Mg     2.4     01-17    TPro  7.4  /  Alb  2.8<L>  /  TBili  0.3  /  DBili  x   /  AST  21  /  ALT  18  /  AlkPhos  77  01-17    proBNP: Serum Pro-Brain Natriuretic Peptide: 891 pg/mL (01-17 @ 05:30)    Lipid Profile:   HgA1c:   TSH:     TELEMETRY: 	    ECG:  	  RADIOLOGY:   ECHO:  STRESS:  CATH:   HPI:  31 yo female with PMHx of asthma and anemia 2/2 uterine fibroids w/ estrogen ring in place( now removed by GYN) with planned myomectomy in Feb 2023 presented with SOB. She was originally admitted to MICU for acute PE, initially managed with IV heparin. Worsened clinically and underwent Inari thrombectomy on 1/13/23. Did well after that and worsened again on 1/15/23 with tachycardia and hypoxia. Returned to cath lab where pulmonary angiogram showed new thrombus in distal main R PA. RHC numbers consistent with cardiogenic shock. She is now s/p EKOS in the CCU. Was initially on Lay and milrinone which has since been weaned off. She has a significant thrombus burden ( extensive bilateral external iliac and CFV thrombosis). Was planned for IVC filter placement but procedure was unsuccessful. Heart failure following for RV failure.         ROS: A 10-point review of systems was otherwise negative.    PAST MEDICAL & SURGICAL HISTORY:  Asthma      Fibroids      Anemia      No significant past surgical history          SOCIAL HISTORY:  FAMILY HISTORY:      ALLERGIES: 	  No Known Allergies            MEDICATIONS:  cefTRIAXone   IVPB 2000 milliGRAM(s) IV Intermittent every 24 hours  chlorhexidine 2% Cloths 1 Application(s) Topical <User Schedule>  dextrose 5%. 1000 milliLiter(s) IV Continuous <Continuous>  dextrose 5%. 1000 milliLiter(s) IV Continuous <Continuous>  dextrose 50% Injectable 25 Gram(s) IV Push once  dextrose 50% Injectable 12.5 Gram(s) IV Push once  dextrose 50% Injectable 25 Gram(s) IV Push once  dextrose Oral Gel 15 Gram(s) Oral once PRN  enoxaparin Injectable 100 milliGRAM(s) SubCutaneous every 12 hours  glucagon  Injectable 1 milliGRAM(s) IntraMuscular once  influenza   Vaccine 0.5 milliLiter(s) IntraMuscular once  insulin lispro (ADMELOG) corrective regimen sliding scale   SubCutaneous Before meals and at bedtime  morphine  - Injectable 2 milliGRAM(s) IV Push every 6 hours PRN  polyethylene glycol 3350 17 Gram(s) Oral daily PRN  senna 2 Tablet(s) Oral at bedtime PRN      PHYSICAL EXAM:  T(C): 36.7 (01-17-23 @ 09:14), Max: 37.9 (01-16-23 @ 16:49)  HR: 108 (01-17-23 @ 12:00) (99 - 124)  BP: 140/89 (01-17-23 @ 12:00) (134/88 - 149/98)  RR: 18 (01-17-23 @ 11:00) (16 - 19)  SpO2: 100% (01-17-23 @ 12:00) (99% - 100%)  Wt(kg): --    GEN: Awake, comfortable. NAD.   HEENT: NCAT, PERRL, EOMI. Mucosa moist. No JVD.   RESP: CTA b/l  CV: RRR, normal s1/s2. No m/r/g.  ABD: Soft, NTND. BS+  EXT: Warm. No edema, clubbing, or cyanosis.   NEURO: AAOx3. No focal deficits.    I&O's Summary    16 Jan 2023 07:01  -  17 Jan 2023 07:00  --------------------------------------------------------  IN: 874.3 mL / OUT: 1378 mL / NET: -503.7 mL    17 Jan 2023 07:01  -  17 Jan 2023 13:01  --------------------------------------------------------  IN: 6.5 mL / OUT: 170 mL / NET: -163.5 mL        	  LABS:	 	    CARDIAC MARKERS:                                  8.3    19.45 )-----------( 121      ( 17 Jan 2023 05:30 )             26.1     01-17    133<L>  |  100  |  14  ----------------------------<  99  3.9   |  24  |  1.08    Ca    8.8      17 Jan 2023 11:56  Phos  2.8     01-17  Mg     2.4     01-17    TPro  7.4  /  Alb  2.8<L>  /  TBili  0.3  /  DBili  x   /  AST  21  /  ALT  18  /  AlkPhos  77  01-17    proBNP: Serum Pro-Brain Natriuretic Peptide: 891 pg/mL (01-17 @ 05:30)    < from: TTE Echo Complete w/o Contrast w/ Doppler (01.16.23 @ 12:59) >      CONCLUSIONS:     1. Patient was tachycardic during the study.   2. Normal left ventricular size and systolic function.   3. Stroke volume is 63 ml. Cardiac output is 7.1 L. Cardiac index is   3.172 L/sqm.   4. Moderately dilated right ventricular size.   5. Moderately reduced right ventricular systolic function.   6. Dilated right atrium.   7. Mild-to-moderate tricuspid regurgitation.   8. Pulmonary hypertension present, pulmonary artery systolic pressure is   56 mmHg.   9. Trivial pericardial effusion.  10. Compared to the previous TTE performed on 1/13/2023, RV size has   decreased and cardiac index has increased.

## 2023-01-17 NOTE — PROGRESS NOTE ADULT - ASSESSMENT
33 y/o  (LMP 2022, 2/2 continuous nuvaring) presenting to ED on  for cough, chest pain, and SOB found to have PE w/ large clot burden w/ mild pulmonary artery dilation w/ RV strain and cardiomegaly, admitted to CCU for concerns of R heart failure. GYN consulted for large multifibroid uterus and concern for mass effect.    - No acute GYN intervention indicated at this time  - Awaiting hypercoagulability labs  - Replete electrolytes as needed  - GYN to continue to follow    d/w Dr. Elfego FARIA PGY2

## 2023-01-17 NOTE — PROGRESS NOTE ADULT - SUBJECTIVE AND OBJECTIVE BOX
Patient discussed on morning rounds with Dr. Tijerina     Operation / Date: 1/13/23 thrombectomy with Inari device     SUBJECTIVE ASSESSMENT:  32y Female. NAEO. Patient feeling better today, c/o being week.     Vital Signs Last 24 Hrs  T(C): 37.3 (17 Jan 2023 22:36), Max: 37.3 (17 Jan 2023 22:36)  T(F): 99.2 (17 Jan 2023 22:36), Max: 99.2 (17 Jan 2023 22:36)  HR: 104 (17 Jan 2023 22:00) (99 - 117)  BP: 126/91 (17 Jan 2023 22:00) (121/92 - 149/98)  BP(mean): 103 (17 Jan 2023 22:00) (95 - 114)  RR: 24 (17 Jan 2023 22:00) (16 - 25)  SpO2: 99% (17 Jan 2023 22:00) (99% - 100%)    Parameters below as of 17 Jan 2023 22:00  Patient On (Oxygen Delivery Method): nasal cannula  O2 Flow (L/min): 3    I&O's Detail    16 Jan 2023 07:01  -  17 Jan 2023 07:00  --------------------------------------------------------  IN:    IV PiggyBack: 50 mL    Milrinone: 6 mL    Milrinone: 67.8 mL    Oral Fluid: 455 mL    Sodium Chloride 0.9% Bolus: 250 mL    Vasopressin: 36.5 mL    Vasopressin: 9 mL  Total IN: 874.3 mL    OUT:    Indwelling Catheter - Urethral (mL): 778 mL    Intermittent Catheterization - Urethral (mL): 600 mL  Total OUT: 1378 mL    Total NET: -503.7 mL      17 Jan 2023 07:01  -  17 Jan 2023 22:58  --------------------------------------------------------  IN:    Milrinone: 6.5 mL  Total IN: 6.5 mL    OUT:    Indwelling Catheter - Urethral (mL): 606 mL  Total OUT: 606 mL    Total NET: -599.5 mL    PHYSICAL EXAM:  GEN: Awake, comfortable. NAD.   HEENT: NCAT, PERRL, EOMI. Mucosa moist. No JVD.   RESP: CTA b/l  CV: RRR, normal s1/s2. No m/r/g.  ABD: Soft, NTND. BS+  EXT: Warm. No edema, clubbing, or cyanosis.   NEURO: AAOx3. No focal deficits.      LABS:                        8.3    19.45 )-----------( 121      ( 17 Jan 2023 05:30 )             26.1       COUMADIN: No.    PT/INR - ( 16 Jan 2023 20:26 )   PT: 14.7 sec;   INR: 1.23          PTT - ( 16 Jan 2023 20:26 )  PTT:29.0 sec    01-17    135  |  101  |  14  ----------------------------<  111<H>  4.1   |  25  |  1.18    Ca    8.5      17 Jan 2023 21:54  Phos  2.5     01-17  Mg     2.4     01-17    TPro  7.4  /  Alb  3.1<L>  /  TBili  0.3  /  DBili  x   /  AST  22  /  ALT  22  /  AlkPhos  108  01-17    MEDICATIONS  (STANDING):  cefTRIAXone   IVPB 2000 milliGRAM(s) IV Intermittent every 24 hours  chlorhexidine 2% Cloths 1 Application(s) Topical <User Schedule>  dextrose 5%. 1000 milliLiter(s) (50 mL/Hr) IV Continuous <Continuous>  dextrose 5%. 1000 milliLiter(s) (100 mL/Hr) IV Continuous <Continuous>  dextrose 50% Injectable 25 Gram(s) IV Push once  dextrose 50% Injectable 12.5 Gram(s) IV Push once  dextrose 50% Injectable 25 Gram(s) IV Push once  enoxaparin Injectable 100 milliGRAM(s) SubCutaneous every 12 hours  glucagon  Injectable 1 milliGRAM(s) IntraMuscular once  influenza   Vaccine 0.5 milliLiter(s) IntraMuscular once  insulin lispro (ADMELOG) corrective regimen sliding scale   SubCutaneous Before meals and at bedtime    MEDICATIONS  (PRN):  dextrose Oral Gel 15 Gram(s) Oral once PRN Blood Glucose LESS THAN 70 milliGRAM(s)/deciliter  morphine  - Injectable 2 milliGRAM(s) IV Push every 6 hours PRN Severe Pain (7 - 10)  polyethylene glycol 3350 17 Gram(s) Oral daily PRN Constipation  senna 2 Tablet(s) Oral at bedtime PRN Constipation      RADIOLOGY & ADDITIONAL TESTS:  < from: Xray Chest 1 View- PORTABLE-Routine (Xray Chest 1 View- PORTABLE-Routine in AM.) (01.17.23 @ 06:15) >  Findings/  impression: Right internal jugular Morris-Bev catheter advancement to a   lower lobe branch right pulmonary artery. Right midlung opacity. Heart,   mediastinum and thorax are unremarkable.    < end of copied text >

## 2023-01-17 NOTE — PROGRESS NOTE ADULT - PROBLEM SELECTOR PLAN 4
The patient decompensated around 6 AM in the morning with heart rate going from  and also her oxygen requirement increased as she was satting 100% on room air now she required 6 L for oxygen saturation about 93%.  The also the patient decompensated cardiopulmonary wise with decrease in her blood pressure.  Echocardiogram at the bedside revealed no change in the right ventricular failure with pulmonary pressures on change. The patient was started on vasopressin and melatonin then was put on hold for the right heart catheterization.  Multidisciplinary approach between the PERT team with structural heart and CHF both the members of the PERT team and decision to proceed with right heart catheterization pulmonary angiogram and possible intervention.  The right heart catheterization confirmed the acute right heart failure with decrease in the cardiac index with thermodilution and calculation to 1.3-1.6 which decreased from 2 yesterday.  The pulmonary artery saturation was 29%.  Pulmonary angiogram was done and revealed new recurrent pulmonary emboli in the right pulmonary artery and no change in the chronic clot in the left lower lobe branch.  Ultrasound-guided catheter like directed thrombolytics was performed.  2 mg tPA was infused and drip 1 mg for total of 7 hr.  Was transferred to the CCU and was started on vasopressin for cardiogenic shock in addition to Milirinone and required nitrous oxide at 9 PPI.  Patient was started on full dose Lovenox after 11 hours from the procedure.  she is stable and on Lovenox no evidence of reembolization

## 2023-01-17 NOTE — PROGRESS NOTE ADULT - SUBJECTIVE AND OBJECTIVE BOX
Interval Events: Reviewed  Patient seen and examined at bedside.    Patient is a 32y old  Female who presents with a chief complaint of PULMONARY EMBOLISM     (16 Jan 2023 13:59)  she is feeling better    PAST MEDICAL & SURGICAL HISTORY:  Asthma      Fibroids      Anemia      No significant past surgical history          MEDICATIONS:  Pulmonary:    Antimicrobials:  cefTRIAXone   IVPB 2000 milliGRAM(s) IV Intermittent every 24 hours    Anticoagulants:  enoxaparin Injectable 100 milliGRAM(s) SubCutaneous every 12 hours    Cardiac:      Allergies    No Known Allergies    Intolerances        Vital Signs Last 24 Hrs  T(C): 37 (17 Jan 2023 18:00), Max: 37.3 (16 Jan 2023 22:14)  T(F): 98.6 (17 Jan 2023 18:00), Max: 99.1 (16 Jan 2023 22:14)  HR: 104 (17 Jan 2023 22:00) (99 - 117)  BP: 126/91 (17 Jan 2023 22:00) (121/92 - 149/98)  BP(mean): 103 (17 Jan 2023 22:00) (95 - 114)  RR: 24 (17 Jan 2023 22:00) (16 - 25)  SpO2: 99% (17 Jan 2023 22:00) (99% - 100%)    Parameters below as of 17 Jan 2023 22:00  Patient On (Oxygen Delivery Method): nasal cannula  O2 Flow (L/min): 3      01-16 @ 07:01 - 01-17 @ 07:00  --------------------------------------------------------  IN: 874.3 mL / OUT: 1378 mL / NET: -503.7 mL    01-17 @ 07:01  -  01-17 @ 22:02  --------------------------------------------------------  IN: 6.5 mL / OUT: 606 mL / NET: -599.5 mL          Review of Systems:   •	General: negative  •	Skin/Breast: negative  •	Ophthalmologic: negative  •	ENMT: negative  •	Respiratory and Thorax: negative  •	Cardiovascular: negative  •	Gastrointestinal: negative  •	Genitourinary: negative  •	Musculoskeletal: negative  •	Neurological: negative  •	Psychiatric: negative  •	Hematology/Lymphatics: negative  •	Endocrine: negative  •	Allergic/Immunologic: negative    Physical Exam:   • Constitutional:	refer to the dietion /Nutritionist note  • Eyes:	EOMI; PERRL; no drainage or redness  • ENMT:	No oral lesions; no gross abnormalities  • Neck	No bruits; no thyromegaly or nodules  • Breasts:	not examined  • Back:	No deformity or limitation of movement  • Respiratory:	Breath Sounds equal & clear to percussion & auscultation, no accessory muscle use  • Cardiovascular:	Regular rate & rhythm, normal S1, S2; no murmurs, gallops or rubs; no S3, S4  • Gastrointestinal:	Soft, non-tender, no hepatosplenomegaly, normal bowel sounds  • Genitourinary:	not examined  • Rectal: not examined  • Extremities:	No cyanosis, clubbing or edema  • Vascular:	Equal and normal pulses (carotid, femoral, dorsalis pedis)  • Neurologica:l	not examined  • Skin:	No lesions; no rash  • Lymph Nodes:	No lymphadedenopathy  • Musculoskeletal:	No joint pain, swelling or deformity; no limitation of movement        LABS:  ABG - ( 17 Jan 2023 05:40 )  pH, Arterial: 7.47  pH, Blood: x     /  pCO2: 32    /  pO2: 143   / HCO3: 23    / Base Excess: 0.3   /  SaO2: 99.5                CBC Full  -  ( 17 Jan 2023 05:30 )  WBC Count : 19.45 K/uL  RBC Count : 3.30 M/uL  Hemoglobin : 8.3 g/dL  Hematocrit : 26.1 %  Platelet Count - Automated : 121 K/uL  Mean Cell Volume : 79.1 fl  Mean Cell Hemoglobin : 25.2 pg  Mean Cell Hemoglobin Concentration : 31.8 gm/dL  Auto Neutrophil # : x  Auto Lymphocyte # : x  Auto Monocyte # : x  Auto Eosinophil # : x  Auto Basophil # : x  Auto Neutrophil % : x  Auto Lymphocyte % : x  Auto Monocyte % : x  Auto Eosinophil % : x  Auto Basophil % : x    01-17    133<L>  |  100  |  14  ----------------------------<  99  3.9   |  24  |  1.08    Ca    8.8      17 Jan 2023 11:56  Phos  2.8     01-17  Mg     2.4     01-17    TPro  7.4  /  Alb  2.8<L>  /  TBili  0.3  /  DBili  x   /  AST  21  /  ALT  18  /  AlkPhos  77  01-17    PT/INR - ( 16 Jan 2023 20:26 )   PT: 14.7 sec;   INR: 1.23          PTT - ( 16 Jan 2023 20:26 )  PTT:29.0 sec      < from: CT Abdomen and Pelvis w/ IV Cont (01.16.23 @ 14:42) >    IMPRESSION:  1. Markedly bulky multi fibroid uteruswith mass effect upon the external   iliac veins, as described. Bilateral external iliac vein and common   femoral vein thrombosis. Bilateral pulmonary emboli, previously   identified. No evidence of extension of thrombus into the IVC.  2. Mild left hydroureteronephrosis secondary to mass effect of the bulky   multi fibroid uterus upon the left ureter at the level of the pelvic   sidewall.    < end of copied text >  < from: TTE Limited Echo w/o Cont (01.17.23 @ 11:42) >  -----  CONCLUSIONS:     1. Normal left ventricular systolic function.   2. Moderately dilated right ventricular size.   3. Moderately reduced right ventricular systolic function, similar to   prior study from 1/16/23.   4. Mild tricuspid regurgitation.   5. Pulmonary hypertension present, pulmonary artery systolic pressure is   49 mmHg.    < end of copied text >                RADIOLOGY & ADDITIONAL STUDIES (The following images were personally reviewed):

## 2023-01-17 NOTE — BRIEF OPERATIVE NOTE - OPERATION/FINDINGS
pulmonary embolism
Aborted IVC filter placement 2/2 wide IVC incompatible with device size. Right internal jugular vein accessed through pre-existing cordis. Cordis exchanged for 9 Fr sheath and Cook device advanced to intraabdominal IVC. Imaging revealed wide IVC and decision made to abort procedure at that time. Sheath left in place.     Contrast: 10cc

## 2023-01-17 NOTE — CONSULT NOTE ADULT - ASSESSMENT
31 yo female with PMHx of asthma and anemia 2/2 uterine fibroids w/ estrogen ring in place( now removed by GYN) with planned myomectomy in Feb 2023 presented with SOB. She was originally admitted to MICU for acute PE, initially managed with IV heparin. Worsened clinically and underwent Inari thrombectomy on 1/13/23. Did well after that and worsened again on 1/15/23 with tachycardia and hypoxia. Returned to cath lab where pulmonary angiogram showed new thrombus in distal main R PA. RHC numbers consistent with cardiogenic shock. She is now s/p EKOS in the CCU. Was initially on Lay and milrinone which has since been weaned off. She has a significant thrombus burden ( extensive bilateral external iliac and CFV thrombosis). Was planned for IVC filter placement but procedure was unsuccessful. Heart failure following for RV failure.     #RV failure   In the setting of acute PE c/b shock    RHC on 1/15: RAP~13, PAP-60/20 with PA sat~21% (!) with CI-1.4. Patient was transferred to CCU on EKOS and started on milrinone, vasopressin and Lay  TTE 1/16:  normal LV size and function, RV dilated with reduced RV function, dilated RA, mild- mod TR   Over the weekend, milrinone was weaned down and vasopressin was turned off. This AM, Lay was turned off and her hemodynamics were as followed:   On milrinone 0.1mcg: RA 8, PA 22/9 ( 16), PA sat 69.6%, CO 8.7/ CI 3.8. Normal lactate  She was taken off milrinone and repeat HD were: RA 8, PA 69/33 m44, PA sat 66/8%, CO 8, CI 3.5   - Elevated PA pressures in the setting of PE  - [place] has now been removed. Will need continued monitoring of perfusion markers as she remains off inotropes/ Lay  - Continue w. Lovenox 100mg BID for PE  - Please order official TTE today   - Still with significant thrombus burden and unsuccessful IVC filter placement. Appreciate further Vascular/ IR recs for further options   - FIbroids with significant mass effect on adjacent structures. No surgical plans as of now given acute PE. Her Nuvaring has now been removed by GYN     HF to follow

## 2023-01-17 NOTE — PROGRESS NOTE ADULT - SUBJECTIVE AND OBJECTIVE BOX
SUBJECTIVE: Patient seen and examined bedside; no acute events overnight, pt continues on milrinone, feeling okay, sleepy but arousable.    cefTRIAXone   IVPB 2000 milliGRAM(s) IV Intermittent every 24 hours  enoxaparin Injectable 100 milliGRAM(s) SubCutaneous every 12 hours  milrinone Infusion 0.1 MICROgram(s)/kG/Min IV Continuous <Continuous>      Vital Signs Last 24 Hrs  T(C): 37.2 (2023 04:52), Max: 37.9 (2023 16:49)  T(F): 99 (2023 04:52), Max: 100.2 (2023 16:49)  HR: 111 (2023 07:00) (99 - 124)  BP: 135/80 (2023 07:00) (121/83 - 149/98)  BP(mean): 99 (2023 07:00) (94 - 114)  RR: 18 (2023 07:00) (16 - 19)  SpO2: 100% (2023 07:00) (99% - 100%)    Parameters below as of 2023 07:00  Patient On (Oxygen Delivery Method): nasal cannula, high flow  O2 Flow (L/min): 40  O2 Concentration (%): 40  I&O's Detail    2023 07:01  -  2023 07:00  --------------------------------------------------------  IN:    IV PiggyBack: 50 mL    Milrinone: 6 mL    Milrinone: 64.6 mL    Oral Fluid: 455 mL    Sodium Chloride 0.9% Bolus: 250 mL    Vasopressin: 36.5 mL    Vasopressin: 9 mL  Total IN: 871.1 mL    OUT:    Indwelling Catheter - Urethral (mL): 733 mL    Intermittent Catheterization - Urethral (mL): 600 mL  Total OUT: 1333 mL    Total NET: -462 mL      PE:    General: NAD, resting comfortably in bed  C/V: S1 s2, RRR  Pulm: Nonlabored breathing, no respiratory distress  Abd: Soft, NTND  Extrem: [facility]        LABS:                        8.3    19.45 )-----------( 121      ( 2023 05:30 )             26.1         134<L>  |  100  |  14  ----------------------------<  118<H>  4.1   |  24  |  1.06    Ca    8.5      2023 05:30  Phos  2.8       Mg     2.4         TPro  7.6  /  Alb  3.2<L>  /  TBili  0.4  /  DBili  x   /  AST  23  /  ALT  21  /  AlkPhos  74  -    PT/INR - ( 2023 20:26 )   PT: 14.7 sec;   INR: 1.23          PTT - ( 2023 20:26 )  PTT:29.0 sec  Urinalysis Basic - ( 15 Trevon 2023 16:48 )    Color: Yellow / Appearance: Clear / S.015 / pH: x  Gluc: x / Ketone: NEGATIVE  / Bili: Negative / Urobili: 0.2 E.U./dL   Blood: x / Protein: Trace mg/dL / Nitrite: NEGATIVE   Leuk Esterase: NEGATIVE / RBC: < 5 /HPF / WBC < 5 /HPF   Sq Epi: x / Non Sq Epi: 0-5 /HPF / Bacteria: None /HPF        RADIOLOGY & ADDITIONAL STUDIES:

## 2023-01-17 NOTE — BRIEF OPERATIVE NOTE - NSICDXBRIEFPROCEDURE_GEN_ALL_CORE_FT
PROCEDURES:  IVC filter placement 17-Jan-2023 14:19:52 ABORTED Ena Cifuentes  
PROCEDURES:  Pulmonary artery thrombectomy 13-Jan-2023 19:52:01 Pulmonary thrombectomy with Donna Zarate

## 2023-01-17 NOTE — PROGRESS NOTE ADULT - SUBJECTIVE AND OBJECTIVE BOX
Pt seen and examined at bedside. Pt states she just has irritation from her valencia. She denies abd pain. denies fevers, chills, CP, plpitations and SOB with her oxygen.     T(F): 99 (23 @ 04:52), Max: 100.2 (23 @ 16:49)  HR: 112 (23 @ 06:38) (99 - 124)  BP: 143/76 (23 @ 06:00) (121/83 - 149/98)  RR: 19 (23 @ 06:38) (16 - 20)  SpO2: 100% (23 @ 06:38) (99% - 100%)  Wt(kg): --  I&O's Summary    15 Trevon 2023 07:01  -  2023 07:00  --------------------------------------------------------  IN: 229.5 mL / OUT: 1290 mL / NET: -1060.5 mL    2023 07:01  -  2023 06:51  --------------------------------------------------------  IN: 871.1 mL / OUT: 1333 mL / NET: -462 mL        MEDICATIONS  (STANDING):  cefTRIAXone   IVPB 2000 milliGRAM(s) IV Intermittent every 24 hours  chlorhexidine 2% Cloths 1 Application(s) Topical <User Schedule>  dextrose 5%. 1000 milliLiter(s) (50 mL/Hr) IV Continuous <Continuous>  dextrose 5%. 1000 milliLiter(s) (100 mL/Hr) IV Continuous <Continuous>  dextrose 50% Injectable 25 Gram(s) IV Push once  dextrose 50% Injectable 12.5 Gram(s) IV Push once  dextrose 50% Injectable 25 Gram(s) IV Push once  enoxaparin Injectable 100 milliGRAM(s) SubCutaneous every 12 hours  glucagon  Injectable 1 milliGRAM(s) IntraMuscular once  influenza   Vaccine 0.5 milliLiter(s) IntraMuscular once  insulin lispro (ADMELOG) corrective regimen sliding scale   SubCutaneous three times a day before meals  milrinone Infusion 0.1 MICROgram(s)/kG/Min (3.23 mL/Hr) IV Continuous <Continuous>    MEDICATIONS  (PRN):  dextrose Oral Gel 15 Gram(s) Oral once PRN Blood Glucose LESS THAN 70 milliGRAM(s)/deciliter  HYDROmorphone  Injectable 1 milliGRAM(s) IV Push every 6 hours PRN Severe Pain (7 - 10)  polyethylene glycol 3350 17 Gram(s) Oral daily PRN Constipation  senna 2 Tablet(s) Oral at bedtime PRN Constipation      Physical Exam:  Constitutional: NAD  Pulmonary: clear to auscultation bilaterally   Cardiovascular: Regular rate and rhythm   Abdomen:  Soft, nontender, non distended, no guarding, no rebound  Extremities: lower extremity edema and calve tenderness on the L>R    LABS:                        8.3    19.45 )-----------( 121      ( 2023 05:30 )             26.1         134<L>  |  100  |  14  ----------------------------<  118<H>  4.1   |  24  |  1.06    Ca    8.5      2023 05:30  Phos  2.8       Mg     2.4         TPro  7.6  /  Alb  3.2<L>  /  TBili  0.4  /  DBili  x   /  AST  23  /  ALT  21  /  AlkPhos  74  17    PT/INR - ( 2023 20:26 )   PT: 14.7 sec;   INR: 1.23          PTT - ( 2023 20:26 )  PTT:29.0 sec  Urinalysis Basic - ( 15 Trevon 2023 16:48 )    Color: Yellow / Appearance: Clear / S.015 / pH: x  Gluc: x / Ketone: NEGATIVE  / Bili: Negative / Urobili: 0.2 E.U./dL   Blood: x / Protein: Trace mg/dL / Nitrite: NEGATIVE   Leuk Esterase: NEGATIVE / RBC: < 5 /HPF / WBC < 5 /HPF   Sq Epi: x / Non Sq Epi: 0-5 /HPF / Bacteria: None /HPF        RADIOLOGY & ADDITIONAL TESTS:

## 2023-01-17 NOTE — PROGRESS NOTE ADULT - NSPROGADDITIONALINFOA_GEN_ALL_CORE
olism.
Gyn consult to remove the ring and evaluation fo the fibroids
Gyn consult to remove the ring and evaluation fo the fibroids

## 2023-01-17 NOTE — PROGRESS NOTE ADULT - ASSESSMENT
33 yo female with PMHx of asthma and anemia 2/2 uterine fibroids w/ estrogen ring in place( now removed by GYN) with planned myomectomy in Feb 2023 presented with SOB. She was originally admitted to MICU for acute PE, initially managed with IV heparin. Worsened clinically and underwent Inari thrombectomy on 1/13/23. Did well after that and worsened again on 1/15/23 with tachycardia and hypoxia. Returned to cath lab where pulmonary angiogram showed new thrombus in distal main R PA. RHC numbers consistent with cardiogenic shock. She is now s/p EKOS in the CCU. Was initially on Lay and milrinone which has since been weaned off. She has a significant thrombus burden (extensive bilateral external iliac and CFV thrombosis). Was planned for IVC filter placement but procedure was unsuccessful. Heart failure following for RV failure. Patient went for IVC filter placement 1/17, however procedure aborted. Patient will remain on AC.    Plan:  Problem 1: Bilateral pulmonary embolism  -s/p thrombectomy with Inari  -mattress suture removed today  -needs postop ECHO still  -continue with hep gtt  -please obtain LE dopplers   -care per primary team     Proiblem 2: RV failure   In the setting of acute PE c/b shock    RHC on 1/15: RAP~13, PAP-60/20 with PA sat~21% (!) with CI-1.4. Patient was transferred to CCU on EKOS and started on milrinone, vasopressin and Lay  TTE 1/16:  normal LV size and function, RV dilated with reduced RV function, dilated RA, mild- mod TR   Over the weekend, milrinone was weaned down and vasopressin was turned off. This AM, Lay was turned off and her hemodynamics were as followed:   On milrinone 0.1mcg: RA 8, PA 22/9 ( 16), PA sat 69.6%, CO 8.7/ CI 3.8. Normal lactate  She was taken off milrinone and repeat HD were: RA 8, PA 69/33 m44, PA sat 66/8%, CO 8, CI 3.5   - Elevated PA pressures in the setting of PE  - [place] has now been removed. Will need continued monitoring of perfusion markers as she remains off inotropes/ Lay  - Continue w. Lovenox 100mg BID for PE  - Please order official TTE today   - Still with significant thrombus burden and unsuccessful IVC filter placement. Appreciate further Vascular/ IR recs for further options   - FIbroids with significant mass effect on adjacent structures. No surgical plans as of now given acute PE    Problem 3: Asthma  -Continue inhalers    Problem 4: Anemia  -H/H 10/33  -Monitor and transfuse as needed    Problem 5: uterine fibroids  -pt with chronic pain from fibroids   -possible compression of right femoral vein, needs duplex LE to r/o DVT   -nuvaring removed by gyn    I have reviewed clinical labs tests and reports, radiology tests and reports, as well as old patient medical records, and discussed with the referring physician.           33 yo female with PMHx of asthma and anemia 2/2 uterine fibroids w/ estrogen ring in place( now removed by GYN) with planned myomectomy in Feb 2023 presented with SOB. She was originally admitted to MICU for acute PE, initially managed with IV heparin. Worsened clinically and underwent Inari thrombectomy on 1/13/23. Did well after that and worsened again on 1/15/23 with tachycardia and hypoxia. Returned to cath lab where pulmonary angiogram showed new thrombus in distal main R PA. RHC numbers consistent with cardiogenic shock. She is now s/p EKOS in the CCU. Was initially on Lay and milrinone which has since been weaned off. She has a significant thrombus burden (extensive bilateral external iliac and CFV thrombosis). Was planned for IVC filter placement but procedure was unsuccessful. Heart failure following for RV failure. Patient went for IVC filter placement 1/17, however procedure aborted. Patient will remain on AC.    Plan:  Problem 1: Bilateral pulmonary embolism  -s/p thrombectomy with Inari  -mattress suture removed   -needs postop ECHO still  -continue with hep gtt  -please obtain LE dopplers   -care per primary team   -IVC filter aborted    Proiblem 2: RV failure   In the setting of acute PE c/b shock    RHC on 1/15: RAP~13, PAP-60/20 with PA sat~21% (!) with CI-1.4. Patient was transferred to CCU on EKOS and started on milrinone, vasopressin and Lay  TTE 1/16:  normal LV size and function, RV dilated with reduced RV function, dilated RA, mild- mod TR   Over the weekend, milrinone was weaned down and vasopressin was turned off. This AM, Lay was turned off and her hemodynamics were as followed:   On milrinone 0.1mcg: RA 8, PA 22/9 ( 16), PA sat 69.6%, CO 8.7/ CI 3.8. Normal lactate  She was taken off milrinone and repeat HD were: RA 8, PA 69/33 m44, PA sat 66/8%, CO 8, CI 3.5   - Elevated PA pressures in the setting of PE  - [place] has now been removed. Will need continued monitoring of perfusion markers as she remains off inotropes/ Lay  - Continue w. Lovenox 100mg BID for PE  - Please order official TTE today   - Still with significant thrombus burden and unsuccessful IVC filter placement. Appreciate further Vascular/ IR recs for further options   - FIbroids with significant mass effect on adjacent structures. No surgical plans as of now given acute PE    Problem 3: Asthma  -Continue inhalers    Problem 4: Anemia  -H/H 10/33  -Monitor and transfuse as needed    Problem 5: uterine fibroids  -pt with chronic pain from fibroids   -possible compression of right femoral vein, needs duplex LE to r/o DVT   -nuvaring removed by gyn    I have reviewed clinical labs tests and reports, radiology tests and reports, as well as old patient medical records, and discussed with the referring physician.

## 2023-01-17 NOTE — PROGRESS NOTE ADULT - PROBLEM SELECTOR PLAN 2
There is improvement in the cardiac index and next based on saturation.  The patient is off nitrous oxide, and vasopressin.  Patient was weaned off milirinonr during the day.  The echocardiogram revealed improvement in right ventricular function..

## 2023-01-17 NOTE — BRIEF OPERATIVE NOTE - NSICDXBRIEFPOSTOP_GEN_ALL_CORE_FT
POST-OP DIAGNOSIS:  Pulmonary embolism 13-Jan-2023 19:51:48  Donna Rodriguez  
POST-OP DIAGNOSIS:  Pulmonary embolism 13-Jan-2023 19:51:48  Donna Rodriguez

## 2023-01-17 NOTE — BRIEF OPERATIVE NOTE - NSICDXBRIEFPREOP_GEN_ALL_CORE_FT
PRE-OP DIAGNOSIS:  Pulmonary embolism 13-Jan-2023 19:51:38  Donna Rodriguez  
PRE-OP DIAGNOSIS:  Pulmonary embolism 13-Jan-2023 19:51:38  Donna Rodriguez

## 2023-01-18 DIAGNOSIS — D64.9 ANEMIA, UNSPECIFIED: ICD-10-CM

## 2023-01-18 DIAGNOSIS — N93.9 ABNORMAL UTERINE AND VAGINAL BLEEDING, UNSPECIFIED: ICD-10-CM

## 2023-01-18 DIAGNOSIS — R33.9 RETENTION OF URINE, UNSPECIFIED: ICD-10-CM

## 2023-01-18 DIAGNOSIS — J96.01 ACUTE RESPIRATORY FAILURE WITH HYPOXIA: ICD-10-CM

## 2023-01-18 LAB
ALBUMIN SERPL ELPH-MCNC: 3 G/DL — LOW (ref 3.3–5)
ALBUMIN SERPL ELPH-MCNC: 3.1 G/DL — LOW (ref 3.3–5)
ALP SERPL-CCNC: 101 U/L — SIGNIFICANT CHANGE UP (ref 40–120)
ALP SERPL-CCNC: 108 U/L — SIGNIFICANT CHANGE UP (ref 40–120)
ALT FLD-CCNC: 17 U/L — SIGNIFICANT CHANGE UP (ref 10–45)
ALT FLD-CCNC: 18 U/L — SIGNIFICANT CHANGE UP (ref 10–45)
ANION GAP SERPL CALC-SCNC: 11 MMOL/L — SIGNIFICANT CHANGE UP (ref 5–17)
ANION GAP SERPL CALC-SCNC: 8 MMOL/L — SIGNIFICANT CHANGE UP (ref 5–17)
AST SERPL-CCNC: 19 U/L — SIGNIFICANT CHANGE UP (ref 10–40)
AST SERPL-CCNC: 21 U/L — SIGNIFICANT CHANGE UP (ref 10–40)
BASE EXCESS BLDMV CALC-SCNC: 0.8 MMOL/L — SIGNIFICANT CHANGE UP
BASE EXCESS BLDMV CALC-SCNC: 1.5 MMOL/L — SIGNIFICANT CHANGE UP
BILIRUB SERPL-MCNC: 0.3 MG/DL — SIGNIFICANT CHANGE UP (ref 0.2–1.2)
BILIRUB SERPL-MCNC: 0.3 MG/DL — SIGNIFICANT CHANGE UP (ref 0.2–1.2)
BUN SERPL-MCNC: 12 MG/DL — SIGNIFICANT CHANGE UP (ref 7–23)
BUN SERPL-MCNC: 13 MG/DL — SIGNIFICANT CHANGE UP (ref 7–23)
CALCIUM SERPL-MCNC: 8.5 MG/DL — SIGNIFICANT CHANGE UP (ref 8.4–10.5)
CALCIUM SERPL-MCNC: 8.7 MG/DL — SIGNIFICANT CHANGE UP (ref 8.4–10.5)
CHLORIDE SERPL-SCNC: 101 MMOL/L — SIGNIFICANT CHANGE UP (ref 96–108)
CHLORIDE SERPL-SCNC: 101 MMOL/L — SIGNIFICANT CHANGE UP (ref 96–108)
CO2 BLDMV-SCNC: 26.5 MMOL/L — SIGNIFICANT CHANGE UP
CO2 BLDMV-SCNC: 27.9 MMOL/L — SIGNIFICANT CHANGE UP
CO2 SERPL-SCNC: 25 MMOL/L — SIGNIFICANT CHANGE UP (ref 22–31)
CO2 SERPL-SCNC: 26 MMOL/L — SIGNIFICANT CHANGE UP (ref 22–31)
COHGB MFR BLDMV: 0.6 % — SIGNIFICANT CHANGE UP
COHGB MFR BLDMV: 1.3 % — SIGNIFICANT CHANGE UP
CREAT SERPL-MCNC: 1.02 MG/DL — SIGNIFICANT CHANGE UP (ref 0.5–1.3)
CREAT SERPL-MCNC: 1.1 MG/DL — SIGNIFICANT CHANGE UP (ref 0.5–1.3)
EGFR: 68 ML/MIN/1.73M2 — SIGNIFICANT CHANGE UP
EGFR: 75 ML/MIN/1.73M2 — SIGNIFICANT CHANGE UP
GLUCOSE BLDC GLUCOMTR-MCNC: 102 MG/DL — HIGH (ref 70–99)
GLUCOSE BLDC GLUCOMTR-MCNC: 121 MG/DL — HIGH (ref 70–99)
GLUCOSE BLDC GLUCOMTR-MCNC: 92 MG/DL — SIGNIFICANT CHANGE UP (ref 70–99)
GLUCOSE BLDC GLUCOMTR-MCNC: 97 MG/DL — SIGNIFICANT CHANGE UP (ref 70–99)
GLUCOSE SERPL-MCNC: 105 MG/DL — HIGH (ref 70–99)
GLUCOSE SERPL-MCNC: 93 MG/DL — SIGNIFICANT CHANGE UP (ref 70–99)
HCO3 BLDMV-SCNC: 25 MMOL/L — SIGNIFICANT CHANGE UP
HCO3 BLDMV-SCNC: 27 MMOL/L — SIGNIFICANT CHANGE UP
HCT VFR BLD CALC: 25.4 % — LOW (ref 34.5–45)
HGB BLD-MCNC: 7.9 G/DL — LOW (ref 11.5–15.5)
HGB FLD-MCNC: 11.4 G/DL — LOW (ref 11.7–16.1)
HGB FLD-MCNC: 7.6 G/DL — LOW (ref 11.7–16.1)
LACTATE SERPL-SCNC: 0.7 MMOL/L — SIGNIFICANT CHANGE UP (ref 0.5–2)
LACTATE SERPL-SCNC: 1.2 MMOL/L — SIGNIFICANT CHANGE UP (ref 0.5–2)
MAGNESIUM SERPL-MCNC: 2.3 MG/DL — SIGNIFICANT CHANGE UP (ref 1.6–2.6)
MAGNESIUM SERPL-MCNC: 2.4 MG/DL — SIGNIFICANT CHANGE UP (ref 1.6–2.6)
MCHC RBC-ENTMCNC: 25 PG — LOW (ref 27–34)
MCHC RBC-ENTMCNC: 31.1 GM/DL — LOW (ref 32–36)
MCV RBC AUTO: 80.4 FL — SIGNIFICANT CHANGE UP (ref 80–100)
METHGB MFR BLDMV: 0.7 % — SIGNIFICANT CHANGE UP
METHGB MFR BLDMV: 1.6 % — HIGH
NRBC # BLD: 0 /100 WBCS — SIGNIFICANT CHANGE UP (ref 0–0)
NT-PROBNP SERPL-SCNC: 1091 PG/ML — HIGH (ref 0–300)
O2 CT VFR BLD CALC: 33 MMHG — SIGNIFICANT CHANGE UP
O2 CT VFR BLD CALC: 40 MMHG — SIGNIFICANT CHANGE UP
OXYHGB MFR BLDMV: 52.8 % — SIGNIFICANT CHANGE UP
OXYHGB MFR BLDMV: 63.1 % — SIGNIFICANT CHANGE UP
PCO2 BLDMV: 39 MMHG — SIGNIFICANT CHANGE UP
PCO2 BLDMV: 43 MMHG — SIGNIFICANT CHANGE UP
PH BLDMV: 7.4 — SIGNIFICANT CHANGE UP
PH BLDMV: 7.42 — SIGNIFICANT CHANGE UP
PHOSPHATE SERPL-MCNC: 2.7 MG/DL — SIGNIFICANT CHANGE UP (ref 2.5–4.5)
PHOSPHATE SERPL-MCNC: 2.9 MG/DL — SIGNIFICANT CHANGE UP (ref 2.5–4.5)
PLATELET # BLD AUTO: 159 K/UL — SIGNIFICANT CHANGE UP (ref 150–400)
POTASSIUM SERPL-MCNC: 3.8 MMOL/L — SIGNIFICANT CHANGE UP (ref 3.5–5.3)
POTASSIUM SERPL-MCNC: 3.8 MMOL/L — SIGNIFICANT CHANGE UP (ref 3.5–5.3)
POTASSIUM SERPL-SCNC: 3.8 MMOL/L — SIGNIFICANT CHANGE UP (ref 3.5–5.3)
POTASSIUM SERPL-SCNC: 3.8 MMOL/L — SIGNIFICANT CHANGE UP (ref 3.5–5.3)
PROT SERPL-MCNC: 7.2 G/DL — SIGNIFICANT CHANGE UP (ref 6–8.3)
PROT SERPL-MCNC: 7.3 G/DL — SIGNIFICANT CHANGE UP (ref 6–8.3)
RBC # BLD: 3.16 M/UL — LOW (ref 3.8–5.2)
RBC # FLD: 18.7 % — HIGH (ref 10.3–14.5)
SAO2 % BLDMV: 54 % — SIGNIFICANT CHANGE UP
SAO2 % BLDMV: 64.4 % — SIGNIFICANT CHANGE UP
SODIUM SERPL-SCNC: 135 MMOL/L — SIGNIFICANT CHANGE UP (ref 135–145)
SODIUM SERPL-SCNC: 137 MMOL/L — SIGNIFICANT CHANGE UP (ref 135–145)
TROPONIN T SERPL-MCNC: 0.01 NG/ML — SIGNIFICANT CHANGE UP (ref 0–0.01)
WBC # BLD: 8.96 K/UL — SIGNIFICANT CHANGE UP (ref 3.8–10.5)
WBC # FLD AUTO: 8.96 K/UL — SIGNIFICANT CHANGE UP (ref 3.8–10.5)

## 2023-01-18 PROCEDURE — 99291 CRITICAL CARE FIRST HOUR: CPT

## 2023-01-18 PROCEDURE — 93010 ELECTROCARDIOGRAM REPORT: CPT

## 2023-01-18 PROCEDURE — 93308 TTE F-UP OR LMTD: CPT | Mod: 26

## 2023-01-18 PROCEDURE — 99233 SBSQ HOSP IP/OBS HIGH 50: CPT | Mod: GC

## 2023-01-18 RX ADMIN — CHLORHEXIDINE GLUCONATE 1 APPLICATION(S): 213 SOLUTION TOPICAL at 06:15

## 2023-01-18 RX ADMIN — MORPHINE SULFATE 2 MILLIGRAM(S): 50 CAPSULE, EXTENDED RELEASE ORAL at 22:37

## 2023-01-18 RX ADMIN — ENOXAPARIN SODIUM 100 MILLIGRAM(S): 100 INJECTION SUBCUTANEOUS at 01:02

## 2023-01-18 RX ADMIN — MORPHINE SULFATE 2 MILLIGRAM(S): 50 CAPSULE, EXTENDED RELEASE ORAL at 07:00

## 2023-01-18 RX ADMIN — MORPHINE SULFATE 2 MILLIGRAM(S): 50 CAPSULE, EXTENDED RELEASE ORAL at 06:43

## 2023-01-18 RX ADMIN — MORPHINE SULFATE 2 MILLIGRAM(S): 50 CAPSULE, EXTENDED RELEASE ORAL at 23:30

## 2023-01-18 RX ADMIN — ENOXAPARIN SODIUM 100 MILLIGRAM(S): 100 INJECTION SUBCUTANEOUS at 14:21

## 2023-01-18 RX ADMIN — CEFTRIAXONE 100 MILLIGRAM(S): 500 INJECTION, POWDER, FOR SOLUTION INTRAMUSCULAR; INTRAVENOUS at 11:43

## 2023-01-18 NOTE — CHART NOTE - NSCHARTNOTEFT_GEN_A_CORE
33 yo female with PMHx of asthma and anemia 2/2 uterine fibroids w/ estrogen ring in place (now removed by GYN) with planned myomectomy in Feb 2023 presented with SOB. She was originally admitted to MICU for acute PE, initially managed with IV heparin. Worsened clinically and underwent Inari thrombectomy on 1/13/23. Did well after that and worsened again on 1/15/23 with tachycardia and hypoxia. Returned to cath lab where pulmonary angiogram showed new thrombus in distal main R PA. RHC numbers consistent with cardiogenic shock. She is now s/p EKOS in the CCU. Was initially on Lay and milrinone which has since been weaned off. She has a significant thrombus burden (extensive bilateral external iliac and CFV thrombosis). Heart failure following for RV failure. Patient went for IVC filter placement 1/17/, however procedure aborted 2/2 anatomy. Patient will continue lovenox.     Plan:  Problem 1: Bilateral pulmonary embolism  -Discussed with Dr. Tijerina and Dr. Batres  -s/p thrombectomy with Inari 1/13/23  -mattress suture removed   -TTE 1/18/23: Mildly reduced right ventricular systolic function. (improved compared to 1/11)  -continue with lovenox  -LE dopplers 1/15: extensive Left LE thrombus  -IVC filter aborted 2/2 anatomy  -Structural team will sign off at this time. Please reconsult with any further questions/concerns.  -Care per primary team    Problem 2: right heart failure  Large PE involving all lobar and multiple segmental branches bilaterally w/ RV strain and cardiomegaly. TTE 1/11 with severely dilated RV size, mod-severely reduced RV function, mod TR, pHTN (PASP 57), normal LV size/function.   - Trend BNP   - f/u HF recs  - Care per primary team    Problem 3: Asthma  -Continue inhalers  -Care per primary team    Problem 4: Anemia  -Monitor and transfuse as needed  -Care per primary team    Problem 5: uterine fibroids  -gyn following  -Care per primary team    I have reviewed clinical labs tests and reports, radiology tests and reports, as well as old patient medical records, and discussed with the referring physician.

## 2023-01-18 NOTE — PROGRESS NOTE ADULT - ASSESSMENT
33 y/o  (LMP 2022, 2/2 continuous nuvaring) presenting to ED on  for cough, chest pain, and SOB found to have PE w/ large clot burden w/ mild pulmonary artery dilation w/ RV strain and cardiomegaly, admitted to CCU for concerns of R heart failure. GYN consulted for large multifibroid uterus and concern for mass effect. IVC filter placement aborted.     - No acute GYN intervention indicated at this time  - Replete electrolytes as needed  - GYN to continue to follow    d/w Dr. Elfego FARIA PGY2

## 2023-01-18 NOTE — PROGRESS NOTE ADULT - ATTENDING COMMENTS
Pt in guarded condition. No plan for intervention.   Will follow up outpatient when more clinically stable.
32F w/ pmh of asthma p/w acute PE initially admitted to MICU underwent mechanical thrombectomy on 1/13, subsequently had repeat PE on 1/15  c/b RV failure and cardiogenic shock -> tx'd to CCU for further mgmt    -RV failure - Acute RV failure c/b cardiogenic shock 2/2 acute PE; s/p thrombectomy on 1/13; Developed repeat PE w/ worsening RV failure on 1/15 underwent EKOS directed thrombolysis; Additionally started on Milrinone, Vasopressin and Inhaled NO at that time. She has since improved, now off both Vasopressin and Lay; Was on low dose Milrinone 0.1mcg today, now weaned off; Lactate remains WNL, stable end-organ function, CO/CI remain WNL; Repeat TTE today; Advanced HF following  -Heme/Gyn: - Large Uterine fibroid w/ mass effect on lower extremity vasulature and ureter leading to extensive b/l iliac thrombosis (source of PE) and also unilateral hydronephrosis; Planned today for IVC filter -> however unsuccessful d/t IVC size being too large, will d/w IR and Vascular other options; c/w Lovenox 100 BID; Once stable from a cardiac standpoint the fibroids should be surgically addressed as it is leading to multiple complications including recurrent PE  -DASH diet  -DVT PPx  -Dispo: CCU    Lucy Dorsey MD  CCU Attending
Intermiediate risk PE  physical as above  no more S3  HR better while at rest  continue heparin  breathing is comfortable
31 yo with uterine fibroids (estrogen ring in place with planned myomectomy for February 2023) admitted with acute PE (1/11/23) randomized in HI-PEITHO study to heparin. Worsened clinically and underewnt Inari thrombectomy on 1/13/23. Did well after that and worsened again on 1/15/23 with tachycardia and hypoxia. Returned to cath lab where pulmonary angiogram showed new thrombus in distal main R PA.     RHC performed at that time showed RAP~13, PAP-60/20 with PA sat~21% (!) with CI~1.4  CXR clear, no infiltrates    Overnight after EKOS catheter removed developed worsening hypoxia, persistent fever and worsening hypotension requiring initiation of vasopressors. Milrinone increased to 0.2 and Lay 10ppm started.  Antibiotics broadened, rash in abdomen after infusion of Zosyn/Vanc. Treated with benadryl and solumedrol.    Merlin in place, this AM RA~7 and PAP~40/20 with CI~2.5 TD (see fellow hemodynamic note)    TTE shows improved RV function   FiO2 this AM down to 40/40    Lower Milrinone to 0.1 again - monitor central venous saturation  Wean vaso slowly   No further diuresis   Lay at 10ppm for now  Lovenox therapeutic dose   CT venogram to determine need for IVC filter  Ceftriaxone empirically for fever and elevated WBC, blood cultures drawn    Jorge Luis Husain MD
32F w/ pmh of asthma p/w acute PE initially admitted to MICU underwent mechanical thrombectomy on 1/13, subsequently had repeat PE on 1/15  c/b RV failure and cardiogenic shock -> tx'd to CCU for further mgmt    -RV failure - Acute RV failure c/b cardiogenic shock 2/2 acute PE - now stable off inotropes; s/p thrombectomy on 1/13; Developed repeat PE w/ worsening RV failure on 1/15 underwent EKOS directed thrombolysis; TTE(1/17): Normal LVEF, Moderately reduced RV function, Dilated RV but improved when compared to 1/13 Patient remains stable off Milrinone, Vasopressin and Lay -> Lactate remains WNL, stable end-organ function, WWP and euvolemic on exam; Advanced HF following  -Heme/Gyn: - Large Uterine fibroid w/ mass effect on lower extremity vasculature leading to extensive b/l iliac thrombosis and also compression ir ureter leading to mild, unilateral hydronephrosis; Underwent placement of IVC filter w/ Vascular Surg -> however unsuccessful d/t IVC size being too large, Reviewed case with IR - agree that an IVC filter will not be possible - they did propose b/l iliac stents to decrease external compression, however removing fibroids when safe would likely be a better plan to relieve compression; Also, given that patient will require long term AC, she invariably will develop increased bleeding from fibroids; c/w Lovenox 100 BID; Pt. is optimized from a cardiac standpoint -  Fibroid removal should be reconsidered on this admission  -DASH diet  -DVT PPx  -Dispo: Transfer to  Lachman Akshai Bhandary MD  CCU Attending
33 yo with uterine fibroids (estrogen ring in place with planned myomectomy for February 2023) admitted with acute PE (1/11/23) randomized in HI-PEITHO study to heparin. Worsened clinically and underewnt Inari thrombectomy on 1/13/23. Did well after that and worsened again on 1/15/23 with tachycardia and hypoxia. Returned to cath lab where pulmonary angiogram showed new thrombus in distal main R PA.     RHC performed at that time showed RAP~13, PAP-60/20 with PA sat~21% (!) with CI~1.4  CXR clear, no infiltrates    Single EKOS catheter placed - infusion for 7h   Milrinone 0.1 - monitor central venous saturation  Lasix 40 mg IV once   Q2h labs per protocol   TTE in AM  Ceftriaxone empirically for fever and elevated WBC, blood cultures drawn    Jorge Luis Husain MD
Called by team that pt was tachycardic to 150's. Comfortable on NCO2 but was diaphoretic. Extremities were cool. Immediate concern was for worsening right heart failure secondary to additional pulmonary emboli. I spoke with Dr. Haro and Dr. Crawford and plan was to move patient to ICU immediately. Bedside ECHO did reveal dilated RV and severe systolic dysfunction. Plan was to take pt to the OR for PA catheter and assessment for need for catheter directed thrombolysis. Pt to be started on Milrinone for inotropic support and pulm vasodilation and vasopressin and to be admitted to the heart failure team in CCU post procedure.

## 2023-01-18 NOTE — PROGRESS NOTE ADULT - ASSESSMENT
31 yo female with PMHx of asthma presents to ED i/s/o worsening chest pain and cough. CT PE showing thrombus with large burden and RV strain. Admitted to MICU for PE with RV strain and possible intervention. Started on heparin gtt with improvement in symptoms and stepped down to telemetry. Underwent mechanical thrombectomy on 1/13 and transitioned to Lovenox SQ. Stepped up to MICU on 1/15 for worsening R-sided heart failure.    NEURO  -SHELLY    PULM:  #Acute hypoxic respiratory failure--etiology likely 2/2 V/Q mismatch in the setting of massive PE   -continue HFNC, wean to NC today  -q12 ABG    #Pulmonary embolus.   CTA PE protocol indicating large thrombus burden involving all lobar and multiple segmental branches bilaterally w/ probable evolving infarcts in lateral basal segment of LLL and mild pulmonary artery dilation w/ RV strain and cardiomegaly. TTE with persistently dilated RV size, mod-severely reduced RV function, pHTN. Previously on heparin gtt, transitioned to Lovenox 100 mg SQ q12h. Etiology likely unprovoked PE in nature -- risk factors: Nuvaring contraceptive use and obesity. Denies recent travel and family hx. On 1/15, pt was stepped up to MICU due to worsening tachycardia and decreasing O2 saturation, with bedside POCUS showing persistent R-sided HF and uptrending pro-BNP, suggestive of progressive cardiac decompensation.   - s/p ekos catheter removal, s/p heparin gtt  - Trend BNP (down to 891 today)  - Continue to monitor cardiac function via TTE daily  - IVC filter placement today  - f/u hypercoagulability w/u   - advanced HF following; f/u recs     #Asthma.  Holding steroids and albuterol as patient is asymptomatic  - No home asthma meds, as patient states she did not need any before onset of symptoms from Urgent Care.    CARDIOVASCULAR  #Acute right heart failure.   Pt w/ large PE involving all lobar and multiple segmental branches bilaterally w/ RV strain and cardiomegaly. TTE 1/11 with severely dilated RV size, mod-severely reduced RV function, mod TR, pHTN (PASP 57), normal LV size/function. Troponin 0.10 -> 0.01, Pro-BNP 1395.  - Trend BNP (dowtrending)  - Continue to monitor cardiac function via TTE daily  - f/u CT surgery recs    #Syncope  1/14 early AM pt on bedside commode when she apparently syncopized. (See resident chart note 1/14/23) Patient examined at bedside, able to communicate, was moving her neck without any pain or limitations, neurological exam was benign. Fall precautions and bed rest ordered for patient. CTH, CT c-spine, CXR WNL. Pt re-evaluated multiple times during the day, mentating at baseline and physical exam unremarkable.   - Maintain fall precautions    GI  -SHELLY    /RENAL  #Fibroids.  Pt reports chronic lower abdominal pain 2/2 uterine fibroids. 8/10 in severity at its worst. Pt has been straining to urinate and pass BMs which she believes may be due to fibroids. CTAP: 1. Markedly bulky multi fibroid uterus with mass effect upon the external iliac veins, as described. Bilateral external iliac vein and common femoral vein thrombosis. Bilateral pulmonary emboli, previously identified. No evidence of extension of thrombus into the IVC. 2. Mild left hydroureteronephrosis secondary to mass effect of the bulky multi fibroid uterus upon the left ureter at the level of the pelvic sidewall.  - change dilaudid to morphine 2mg IV PRN severe pain  - start PO oxy 5 PRN moderate pain   - GYN following; planned for abdominal myomectomy with Dr. Telles in February, will try to facilitate earlier myomectomy or hysterectomy while inpatient     #Vaginal Bleeding  She has been using nuvaring for contraception for >1 year but has been continuously replacing her nuvaring without allowing for withdrawal bleed since 09/22.    -Nuvaring removed 1/16  -Vaginal bleeding noted during pelvic exam. Monitor for s/s active vaginal bleeding     #SUNNY  resolved    #Urinary Retention  #Hydroureteronephrosis  2/2 mass effect from uterus   -valencia placed 1/16    ENDO  -SHELLY    ID  #Fever   Patient febrile to 101.3. Most likely 2/2 SIRS, but septic workup initiated.   -febrile overnight to 102. Initially broadened to vanc/zosyn but with concern for allergy, was transitioned back to CTX   -f/u blood cx  -UA negative  -c/w CTX until blood cx NGTD x72 hours. If negative, d/c CTX     HEME  #Anemia  Hb 9.8 upon arrival to CCU. No signs or hx of active bleed.  -maintain active type and screen  -transfuse Hb < 7    PROPHYLAXIS   F: none   E: Replete K <4, Mg <2  N: NPO for IVC filter  DVT ppx: Lovenox 100 Q12  GI ppx: not needed  Code: Full code  Dispo: CCU   33 yo female with PMHx of asthma presents to ED i/s/o worsening chest pain and cough. CT PE showing thrombus with large burden and RV strain. Admitted to MICU for PE with RV strain and possible intervention. Started on heparin gtt with improvement in symptoms and stepped down to telemetry. Underwent mechanical thrombectomy on 1/13 and transitioned to Lovenox SQ. Stepped up to MICU on 1/15 for worsening R-sided heart failure.    NEURO  -SHELLY    PULM:  #Acute hypoxic respiratory failure--etiology likely 2/2 V/Q mismatch in the setting of massive PE   -Now on 2L NC, wean as tolerated       #Pulmonary embolus.   CTA PE protocol indicating large thrombus burden involving all lobar and multiple segmental branches bilaterally w/ probable evolving infarcts in lateral basal segment of LLL and mild pulmonary artery dilation w/ RV strain and cardiomegaly. TTE with persistently dilated RV size, mod-severely reduced RV function, pHTN. Previously on heparin gtt, transitioned to Lovenox 100 mg SQ q12h. Etiology likely unprovoked PE in nature -- risk factors: Nuvaring contraceptive use and obesity. Denies recent travel and family hx. On 1/15, pt was stepped up to MICU due to worsening tachycardia and decreasing O2 saturation, with bedside POCUS showing persistent R-sided HF and uptrending pro-BNP, suggestive of progressive cardiac decompensation.   - s/p ekos catheter removal, s/p heparin gtt  - Trend BNP  - Continue to monitor cardiac function via limited TTE daily  - IVC filter placement not completed (IVC too large); will discuss other options with IR   - f/u hypercoagulability w/u   - advanced HF following; f/u recs     #Asthma.  Holding steroids and albuterol as patient is asymptomatic  - No home asthma meds, as patient states she did not need any before onset of symptoms from Urgent Care.    CARDIOVASCULAR  #Acute right heart failure.   Pt w/ large PE involving all lobar and multiple segmental branches bilaterally w/ RV strain and cardiomegaly. TTE 1/11 with severely dilated RV size, mod-severely reduced RV function, mod TR, pHTN (PASP 57), normal LV size/function. Troponin 0.10 -> 0.01, Pro-BNP 1395.  - Trend BNP (dowtrending)  - Continue to monitor cardiac function via TTE daily  - f/u CT surgery recs    #Syncope  1/14 early AM pt on bedside commode when she apparently syncopized. (See resident chart note 1/14/23) Patient examined at bedside, able to communicate, was moving her neck without any pain or limitations, neurological exam was benign. Fall precautions and bed rest ordered for patient. CTH, CT c-spine, CXR WNL. Pt re-evaluated multiple times during the day, mentating at baseline and physical exam unremarkable.   - Maintain fall precautions    GI  -SHELLY    /RENAL  #Fibroids.  Pt reports chronic lower abdominal pain 2/2 uterine fibroids. 8/10 in severity at its worst. Pt has been straining to urinate and pass BMs which she believes may be due to fibroids. CTAP: 1. Markedly bulky multi fibroid uterus with mass effect upon the external iliac veins, as described. Bilateral external iliac vein and common femoral vein thrombosis. Bilateral pulmonary emboli, previously identified. No evidence of extension of thrombus into the IVC. 2. Mild left hydroureteronephrosis secondary to mass effect of the bulky multi fibroid uterus upon the left ureter at the level of the pelvic sidewall.  - change dilaudid to morphine 2mg IV PRN severe pain  - start PO oxy 5 PRN moderate pain   - GYN following; planned for abdominal myomectomy with Dr. Telles in February, will try to facilitate earlier myomectomy or hysterectomy while inpatient     #Vaginal Bleeding  She has been using nuvaring for contraception for >1 year but has been continuously replacing her nuvaring without allowing for withdrawal bleed since 09/22.    -Nuvaring removed 1/16  -Vaginal bleeding noted during pelvic exam. Patient now on menstrual cycle  -f/u pad count     #SUNNY  resolved    #Urinary Retention  #Hydroureteronephrosis  2/2 mass effect from uterus   -valencia placed 1/16  -TOV today    ENDO  -SHELLY    ID  #Fever   Patient febrile to 101.3. Most likely 2/2 SIRS, but septic workup initiated.   -febrile overnight to 102. Initially broadened to vanc/zosyn but with concern for allergy, was transitioned back to CTX   -f/u blood cx  -UA negative  -c/w CTX until blood cx NGTD x72 hours. If negative, d/c CTX     HEME  #Anemia  Hb 9.8 upon arrival to CCU. No signs or hx of active bleed.  -maintain active type and screen  -transfuse Hb < 7    PROPHYLAXIS   F: none   E: Replete K <4, Mg <2  N: DASH/TLC   DVT ppx: Lovenox 100 Q12  GI ppx: not needed  Code: Full code  Dispo: CCU   31 yo female with PMHx of asthma presents to ED i/s/o worsening chest pain and cough. CT PE showing thrombus with large burden and RV strain. Admitted to MICU for PE with RV strain and possible intervention. Started on heparin gtt with improvement in symptoms and stepped down to telemetry. Underwent mechanical thrombectomy on 1/13 and transitioned to Lovenox SQ. Stepped up to CCU on 1/15 for worsening R-sided heart failure.    NEURO  -SHELLY    PULM:  #Acute hypoxic respiratory failure--etiology likely 2/2 V/Q mismatch in the setting of massive PE   -Now on 2L NC, wean as tolerated     #Pulmonary embolus.   CTA PE protocol indicating large thrombus burden involving all lobar and multiple segmental branches bilaterally w/ probable evolving infarcts in lateral basal segment of LLL and mild pulmonary artery dilation w/ RV strain and cardiomegaly. TTE with persistently dilated RV size, mod-severely reduced RV function, pHTN. Previously on heparin gtt, transitioned to Lovenox 100 mg SQ q12h. Etiology likely unprovoked PE in nature -- risk factors: Nuvaring contraceptive use and obesity. Denies recent travel and family hx. On 1/15, pt was stepped up to MICU due to worsening tachycardia and decreasing O2 saturation, with bedside POCUS showing persistent R-sided HF and uptrending pro-BNP, suggestive of progressive cardiac decompensation.   - s/p ekos catheter removal, s/p heparin gtt  - Trend BNP  - Continue to monitor cardiac function via limited TTE daily  - IVC filter placement not completed (IVC too large); will discuss other options with IR (including iliac vein stenting)  - f/u hypercoagulability w/u   - advanced HF following; f/u recs     #Asthma.  Holding steroids and albuterol as patient is asymptomatic  - No home asthma meds, as patient states she did not need any before onset of symptoms from Urgent Care.    CARDIOVASCULAR  #Acute right heart failure.   Pt w/ large PE involving all lobar and multiple segmental branches bilaterally w/ RV strain and cardiomegaly. TTE 1/11 with severely dilated RV size, mod-severely reduced RV function, mod TR, pHTN (PASP 57), normal LV size/function. Troponin 0.10 -> 0.01, Pro-BNP 1395.  - Trend BNP (dowtrending)  - Continue to monitor cardiac function via TTE daily  - f/u HF recs    #Syncope  1/14 early AM pt on bedside commode when she apparently syncopized. (See resident chart note 1/14/23) Patient examined at bedside, able to communicate, was moving her neck without any pain or limitations, neurological exam was benign. Fall precautions and bed rest ordered for patient. CTH, CT c-spine, CXR WNL. Pt re-evaluated multiple times during the day, mentating at baseline and physical exam unremarkable.   - Maintain fall precautions    GI  -SHELLY    /RENAL  #Fibroids.  Pt reports chronic lower abdominal pain 2/2 uterine fibroids. 8/10 in severity at its worst. Pt has been straining to urinate and pass BMs which she believes may be due to fibroids. CTAP: 1. Markedly bulky multi fibroid uterus with mass effect upon the external iliac veins, as described. Bilateral external iliac vein and common femoral vein thrombosis. Bilateral pulmonary emboli, previously identified. No evidence of extension of thrombus into the IVC. 2. Mild left hydroureteronephrosis secondary to mass effect of the bulky multi fibroid uterus upon the left ureter at the level of the pelvic sidewall.  - change dilaudid to morphine 2mg IV PRN severe pain  - start PO oxy 5 PRN moderate pain   - GYN following; planned for abdominal myomectomy with Dr. Telles in February, will try to facilitate earlier myomectomy or hysterectomy while inpatient     #Vaginal Bleeding  She has been using nuvaring for contraception for >1 year but has been continuously replacing her nuvaring without allowing for withdrawal bleed since 09/22.    -Nuvaring removed 1/16  -Vaginal bleeding noted during pelvic exam. Patient now on menstrual cycle  -f/u pad count     #SUNNY  resolved    #Urinary Retention  #Hydroureteronephrosis  2/2 mass effect from uterus   -valencia placed 1/16  -TOV today    ENDO  -SHELLY    ID  #Fever   Patient febrile to 101.3. Most likely 2/2 SIRS, but septic workup initiated.   -febrile overnight to 102. Initially broadened to vanc/zosyn but with concern for allergy, was transitioned back to CTX   -f/u blood cx  -UA negative  -c/w CTX until blood cx NGTD x72 hours. If negative, d/c CTX     HEME  #Anemia  Hb 9.8 upon arrival to CCU. No signs or hx of active bleed.  -maintain active type and screen  -transfuse Hb < 7    PROPHYLAXIS   F: none   E: Replete K <4, Mg <2  N: DASH/TLC   DVT ppx: Lovenox 100 Q12  GI ppx: not needed  Code: Full code  Dispo: CCU

## 2023-01-18 NOTE — PROGRESS NOTE ADULT - SUBJECTIVE AND OBJECTIVE BOX
Pt seen and examined at bedside. Pt on 2LNC. Pt states she has valencia irritation. Pt  non ambulating, tolerating diet, valencia in place   Pt denies fever, chills, chest pain, SOB, nausea, vomiting, lightheadedness, dizziness.      T(F): 99 (01-18-23 @ 00:30), Max: 99.2 (01-17-23 @ 22:36)  HR: 92 (01-18-23 @ 06:00) (92 - 116)  BP: 132/92 (01-18-23 @ 06:00) (119/88 - 145/85)  RR: 20 (01-18-23 @ 06:00) (14 - 26)  SpO2: 100% (01-18-23 @ 06:00) (97% - 100%)  Wt(kg): --  I&O's Summary    16 Jan 2023 07:01  -  17 Jan 2023 07:00  --------------------------------------------------------  IN: 874.3 mL / OUT: 1378 mL / NET: -503.7 mL    17 Jan 2023 07:01  -  18 Jan 2023 06:40  --------------------------------------------------------  IN: 6.5 mL / OUT: 1126 mL / NET: -1119.5 mL        MEDICATIONS  (STANDING):  cefTRIAXone   IVPB 2000 milliGRAM(s) IV Intermittent every 24 hours  chlorhexidine 2% Cloths 1 Application(s) Topical <User Schedule>  dextrose 5%. 1000 milliLiter(s) (50 mL/Hr) IV Continuous <Continuous>  dextrose 5%. 1000 milliLiter(s) (100 mL/Hr) IV Continuous <Continuous>  dextrose 50% Injectable 25 Gram(s) IV Push once  dextrose 50% Injectable 12.5 Gram(s) IV Push once  dextrose 50% Injectable 25 Gram(s) IV Push once  enoxaparin Injectable 100 milliGRAM(s) SubCutaneous every 12 hours  glucagon  Injectable 1 milliGRAM(s) IntraMuscular once  influenza   Vaccine 0.5 milliLiter(s) IntraMuscular once  insulin lispro (ADMELOG) corrective regimen sliding scale   SubCutaneous Before meals and at bedtime    MEDICATIONS  (PRN):  dextrose Oral Gel 15 Gram(s) Oral once PRN Blood Glucose LESS THAN 70 milliGRAM(s)/deciliter  morphine  - Injectable 2 milliGRAM(s) IV Push every 6 hours PRN Severe Pain (7 - 10)  polyethylene glycol 3350 17 Gram(s) Oral daily PRN Constipation  senna 2 Tablet(s) Oral at bedtime PRN Constipation      Physical Exam:  Constitutional: NAD  Pulmonary: clear to auscultation bilaterally   Cardiovascular: Regular rate and rhythm   Abdomen: Soft, nontender, large fibroid uterus above umbilicus, no guarding, no rebound, +bowel sounds  Extremities: L leg>R, tender to palpation L calve, no pitting edema, trace edema    LABS:                        7.9    8.96  )-----------( 159      ( 18 Jan 2023 05:30 )             25.4     01-18    137  |  101  |  x   ----------------------------<  x   3.8   |  x   |  x     Ca    8.5      18 Jan 2023 01:03  Phos  2.9     01-18  Mg     2.4     01-18    TPro  7.2  /  Alb  3.0<L>  /  TBili  0.3  /  DBili  x   /  AST  21  /  ALT  18  /  AlkPhos  101  01-18    PT/INR - ( 16 Jan 2023 20:26 )   PT: 14.7 sec;   INR: 1.23          PTT - ( 16 Jan 2023 20:26 )  PTT:29.0 sec      RADIOLOGY & ADDITIONAL TESTS:

## 2023-01-18 NOTE — PROGRESS NOTE ADULT - PROBLEM SELECTOR PLAN 11
F: none   E: Replete K <4, Mg <2  N: DASH/TLC   DVT ppx: Lovenox 100 Q12  GI ppx: not needed  Code: Full code  Dispo: [facility]

## 2023-01-18 NOTE — PROGRESS NOTE ADULT - ASSESSMENT
33 yo female with PMHx of asthma presents to ED i/s/o worsening chest pain and cough. CT PE showing thrombus with large burden and RV strain. Admitted to MICU for PE with RV strain and possible intervention. Started on heparin gtt with improvement in symptoms and stepped down to telemetry. Underwent mechanical thrombectomy on 1/13 and transitioned to Lovenox SQ. Stepped up to CCU on 1/15 for worsening R-sided heart failure, treated with milrinone gtt and inhaled NO, now with improving R sided heart function, stable for stepdown to [facility].

## 2023-01-18 NOTE — PROGRESS NOTE ADULT - TIME BILLING
Patient seen and examined with house-staff during bedside rounds.  Resident note read, including vitals, physical findings, laboratory data, and radiological reports.   Revisions included below.  Direct personal management at bed side and extensive interpretation of the data.  Plan was outlined and discussed in details with the housestaff.  Decision making of high complexity  Action taken for acute disease activity to reflect the level of care provided:  - medication reconciliation  - review laboratory data  The patient clinically stable.  The patient is to be weaned off the oxygen.  Decreased to 2 L/min.  The patient is transitioned to Lovenox.  Hemoglobin is stable.  The echocardiogram was reviewed and discussed with the family.  On Xarelto 7 Lachman.  Start physical therapy.  Anatomic at the site of the puncture.  Will discuss with vascular and dementia cardiology regarding stent filter and native iliacs

## 2023-01-18 NOTE — PROGRESS NOTE ADULT - PROBLEM SELECTOR PLAN 3
Pt w/ large PE involving all lobar and multiple segmental branches bilaterally w/ RV strain and cardiomegaly. TTE 1/11 with severely dilated RV size, mod-severely reduced RV function, mod TR, pHTN (PASP 57), normal LV size/function. Troponin 0.10 -> 0.01, Pro-BNP 1395.  - Trend BNP (downtrending)  - Continue to monitor cardiac function via TTE daily  - f/u HF recs

## 2023-01-18 NOTE — PROGRESS NOTE ADULT - PROBLEM SELECTOR PLAN 4
Pt reports chronic lower abdominal pain 2/2 uterine fibroids. 8/10 in severity at its worst. Pt has been straining to urinate and pass BMs which she believes may be due to fibroids. CTAP: 1. Markedly bulky multi fibroid uterus with mass effect upon the external iliac veins, as described. Bilateral external iliac vein and common femoral vein thrombosis. Bilateral pulmonary emboli, previously identified. No evidence of extension of thrombus into the IVC. 2. Mild left hydroureteronephrosis secondary to mass effect of the bulky multi fibroid uterus upon the left ureter at the level of the pelvic sidewall.  - change dilaudid to morphine 2mg IV PRN severe pain  - start PO oxy 5 PRN moderate pain   - GYN following; planned for abdominal myomectomy with Dr. Telles in February, currently no plans for surgery while inpatient due to high risk; continue to follow-up recs

## 2023-01-18 NOTE — PROGRESS NOTE ADULT - PROBLEM SELECTOR PLAN 1
CTA PE protocol indicating large thrombus burden involving all lobar and multiple segmental branches bilaterally w/ probable evolving infarcts in lateral basal segment of LLL and mild pulmonary artery dilation w/ RV strain and cardiomegaly. TTE with persistently dilated RV size, mod-severely reduced RV function, pHTN. Previously on heparin gtt, transitioned to Lovenox 100 mg SQ q12h. Etiology likely unprovoked PE in nature -- risk factors: Nuvaring contraceptive use and obesity. Denies recent travel and family hx. On 1/15, pt was stepped up to MICU -> CCU due to worsening tachycardia and decreasing O2 saturation, with bedside POCUS showing persistent R-sided HF and uptrending pro-BNP, suggestive of progressive cardiac decompensation. Cardiac function improved s/p milrinone gtt and inhaled NO, in addition to continued anticoagulation.  - s/p ekos catheter removal, s/p heparin gtt  - Trend BNP  - Continue to monitor cardiac function via limited TTE daily  - IVC filter placement not completed (IVC too large); will discuss other options with IR (including iliac vein stenting)  - f/u hypercoagulability w/u   - advanced HF following; f/u recs

## 2023-01-18 NOTE — PROGRESS NOTE ADULT - SUBJECTIVE AND OBJECTIVE BOX
ADAM BEACH, 32y, Female  MRN-[id number]  Patient is a 32y old  Female who presents with a chief complaint of PULMONARY EMBOLISM    OVERNIGHT EVENTS: SILVANA    SUBJECTIVE:     12 Point ROS Negative unless noted otherwise above.  -------------------------------------------------------------------------------  VITAL SIGNS:  Vital Signs Last 24 Hrs  T(C): 37.2 (18 Jan 2023 05:00), Max: 37.3 (17 Jan 2023 22:36)  T(F): 98.9 (18 Jan 2023 05:00), Max: 99.2 (17 Jan 2023 22:36)  HR: 91 (18 Jan 2023 07:00) (91 - 116)  BP: 113/80 (18 Jan 2023 07:00) (113/80 - 145/85)  BP(mean): 94 (18 Jan 2023 07:00) (94 - 110)  RR: 19 (18 Jan 2023 07:00) (14 - 26)  SpO2: 100% (18 Jan 2023 07:00) (97% - 100%)    Parameters below as of 18 Jan 2023 07:00  Patient On (Oxygen Delivery Method): nasal cannula  O2 Flow (L/min): 3    I&O's Summary    17 Jan 2023 07:01  -  18 Jan 2023 07:00  --------------------------------------------------------  IN: 6.5 mL / OUT: 1166 mL / NET: -1159.5 mL    PHYSICAL EXAM:  General: NAD, well developed  HEENT: NC/AT; EOMI, PERRLA, anicteric sclera; moist mucosal membranes.  Neck: supple, trachea midline  Cardiovascular: RRR, +S1/S2; NO M/R/G  Respiratory: CTA B/L; no W/R/R  Gastrointestinal: soft, NT/ND; +BSx4  Extremities: WWP; no edema or cyanosis  Vascular: 2+ radial, DP/PT pulses B/L  Neurological: AAOx3; no focal deficits    ALLERGIES:  Allergies    No Known Allergies    Intolerances        MEDICATIONS:  MEDICATIONS  (STANDING):  cefTRIAXone   IVPB 2000 milliGRAM(s) IV Intermittent every 24 hours  chlorhexidine 2% Cloths 1 Application(s) Topical <User Schedule>  dextrose 5%. 1000 milliLiter(s) (50 mL/Hr) IV Continuous <Continuous>  dextrose 5%. 1000 milliLiter(s) (100 mL/Hr) IV Continuous <Continuous>  dextrose 50% Injectable 25 Gram(s) IV Push once  dextrose 50% Injectable 12.5 Gram(s) IV Push once  dextrose 50% Injectable 25 Gram(s) IV Push once  enoxaparin Injectable 100 milliGRAM(s) SubCutaneous every 12 hours  glucagon  Injectable 1 milliGRAM(s) IntraMuscular once  influenza   Vaccine 0.5 milliLiter(s) IntraMuscular once  insulin lispro (ADMELOG) corrective regimen sliding scale   SubCutaneous Before meals and at bedtime    MEDICATIONS  (PRN):  dextrose Oral Gel 15 Gram(s) Oral once PRN Blood Glucose LESS THAN 70 milliGRAM(s)/deciliter  morphine  - Injectable 2 milliGRAM(s) IV Push every 6 hours PRN Severe Pain (7 - 10)  polyethylene glycol 3350 17 Gram(s) Oral daily PRN Constipation  senna 2 Tablet(s) Oral at bedtime PRN Constipation      -------------------------------------------------------------------------------  LABS:                        7.9    8.96  )-----------( 159      ( 18 Jan 2023 05:30 )             25.4     01-18    137  |  101  |  12  ----------------------------<  93  3.8   |  25  |  1.02    Ca    8.7      18 Jan 2023 05:30  Phos  2.9     01-18  Mg     2.4     01-18    TPro  7.3  /  Alb  3.1<L>  /  TBili  0.3  /  DBili  x   /  AST  19  /  ALT  17  /  AlkPhos  108  01-18    LIVER FUNCTIONS - ( 18 Jan 2023 05:30 )  Alb: 3.1 g/dL / Pro: 7.3 g/dL / ALK PHOS: 108 U/L / ALT: 17 U/L / AST: 19 U/L / GGT: x           PT/INR - ( 16 Jan 2023 20:26 )   PT: 14.7 sec;   INR: 1.23          PTT - ( 16 Jan 2023 20:26 )  PTT:29.0 sec    CAPILLARY BLOOD GLUCOSE      POCT Blood Glucose.: 92 mg/dL (18 Jan 2023 06:08)      Culture - Blood (collected 15 Trevon 2023 17:23)  Source: .Blood Blood  Preliminary Report (17 Jan 2023 18:00):    No growth at 2 days.    Culture - Blood (collected 15 Trevon 2023 17:23)  Source: .Blood Blood  Preliminary Report (17 Jan 2023 18:00):    No growth at 2 days.    Urinalysis with Rflx Culture (collected 15 Trevon 2023 16:48)      SARS-CoV-2: NotDetec (16 Jan 2023 00:17)  SARS-CoV-2: NotDetec (11 Jan 2023 09:33)      RADIOLOGY & ADDITIONAL TESTS: Reviewed.   ADAM BEACH, 32y, Female  MRN-[id number]  Patient is a 32y old  Female who presents with a chief complaint of PULMONARY EMBOLISM    OVERNIGHT EVENTS: SILVANA    SUBJECTIVE: Patient reports feeling well. Feels some discomfort 2/2 Galan catheter but otherwise ROS is negative. Denies fevers, chills, SOB, n/v/d, chest pain. States she is on her menstrual period after Nuvaring was removed but does not feel that her period is especially heavy.     12 Point ROS Negative unless noted otherwise above.  -------------------------------------------------------------------------------  VITAL SIGNS:  Vital Signs Last 24 Hrs  T(C): 37.2 (18 Jan 2023 05:00), Max: 37.3 (17 Jan 2023 22:36)  T(F): 98.9 (18 Jan 2023 05:00), Max: 99.2 (17 Jan 2023 22:36)  HR: 91 (18 Jan 2023 07:00) (91 - 116)  BP: 113/80 (18 Jan 2023 07:00) (113/80 - 145/85)  BP(mean): 94 (18 Jan 2023 07:00) (94 - 110)  RR: 19 (18 Jan 2023 07:00) (14 - 26)  SpO2: 100% (18 Jan 2023 07:00) (97% - 100%)    Parameters below as of 18 Jan 2023 07:00  Patient On (Oxygen Delivery Method): nasal cannula  O2 Flow (L/min): 3    I&O's Summary    17 Jan 2023 07:01  -  18 Jan 2023 07:00  --------------------------------------------------------  IN: 6.5 mL / OUT: 1166 mL / NET: -1159.5 mL    PHYSICAL EXAM:  General: NAD, resting comfortably   HEENT: NC/AT; EOMI, PERRLA, anicteric sclera; moist mucosal membranes.  Neck: supple, trachea midline  Cardiovascular: RRR, +S1/S2; NO M/R/G  Respiratory: CTA B/L; no W/R/R  Gastrointestinal: soft, ND; +BSx4. Slightly TTP RLQ, LLQ   Extremities: WWP; no edema or cyanosis. LLE larger in size than RLE.   Vascular: 2+ radial, DP/PT pulses B/L  Neurological: AAOx3; no focal deficits    ALLERGIES:  No Known Allergies    MEDICATIONS  (STANDING):  cefTRIAXone   IVPB 2000 milliGRAM(s) IV Intermittent every 24 hours  chlorhexidine 2% Cloths 1 Application(s) Topical <User Schedule>  dextrose 5%. 1000 milliLiter(s) (50 mL/Hr) IV Continuous <Continuous>  dextrose 5%. 1000 milliLiter(s) (100 mL/Hr) IV Continuous <Continuous>  dextrose 50% Injectable 25 Gram(s) IV Push once  dextrose 50% Injectable 12.5 Gram(s) IV Push once  dextrose 50% Injectable 25 Gram(s) IV Push once  enoxaparin Injectable 100 milliGRAM(s) SubCutaneous every 12 hours  glucagon  Injectable 1 milliGRAM(s) IntraMuscular once  influenza   Vaccine 0.5 milliLiter(s) IntraMuscular once  insulin lispro (ADMELOG) corrective regimen sliding scale   SubCutaneous Before meals and at bedtime    MEDICATIONS  (PRN):  dextrose Oral Gel 15 Gram(s) Oral once PRN Blood Glucose LESS THAN 70 milliGRAM(s)/deciliter  morphine  - Injectable 2 milliGRAM(s) IV Push every 6 hours PRN Severe Pain (7 - 10)  polyethylene glycol 3350 17 Gram(s) Oral daily PRN Constipation  senna 2 Tablet(s) Oral at bedtime PRN Constipation      -------------------------------------------------------------------------------  LABS:                        7.9    8.96  )-----------( 159      ( 18 Jan 2023 05:30 )             25.4     01-18    137  |  101  |  12  ----------------------------<  93  3.8   |  25  |  1.02    Ca    8.7      18 Jan 2023 05:30  Phos  2.9     01-18  Mg     2.4     01-18    TPro  7.3  /  Alb  3.1<L>  /  TBili  0.3  /  DBili  x   /  AST  19  /  ALT  17  /  AlkPhos  108  01-18    LIVER FUNCTIONS - ( 18 Jan 2023 05:30 )  Alb: 3.1 g/dL / Pro: 7.3 g/dL / ALK PHOS: 108 U/L / ALT: 17 U/L / AST: 19 U/L / GGT: x           PT/INR - ( 16 Jan 2023 20:26 )   PT: 14.7 sec;   INR: 1.23   PTT - ( 16 Jan 2023 20:26 )  PTT:29.0 sec    CAPILLARY BLOOD GLUCOSE  POCT Blood Glucose.: 92 mg/dL (18 Jan 2023 06:08)    Culture - Blood (collected 15 Trevon 2023 17:23)  Source: .Blood Blood  Preliminary Report (17 Jan 2023 18:00):    No growth at 2 days.    Culture - Blood (collected 15 Trevon 2023 17:23)  Source: .Blood Blood  Preliminary Report (17 Jan 2023 18:00):    No growth at 2 days.    Urinalysis with Rflx Culture (collected 15 Trevon 2023 16:48)    SARS-CoV-2: NotDetec (16 Jan 2023 00:17)  SARS-CoV-2: NotDetec (11 Jan 2023 09:33)    RADIOLOGY & ADDITIONAL TESTS: Reviewed.   ADAM BEACH, 32y, Female  MRN-[id number]  Patient is a 32y old  Female who presents with a chief complaint of PULMONARY EMBOLISM    **TRANSFER NOTE FROM CCU-->7LA**  Hospital Course:   33 yo female with PMHx of asthma and anemia 2/2 myomectomy, on hormonal birth control (Nuvaring) for 4 months, presented with 1 week of productive cough. Pt stating she began feeling symptoms of mild cough and chest tightness 3 weeks prior. She went to  where workup was negative for COVID and Influenza. Pt prescribed Azithromycin and Prednisone. Pt presented to ED after she awoke in AM w/ progressively worse chest tightness. She used her inhaler and nebulizer tx at home with minimal relief and had syncopal event at home. Pt with no hx of DVT, PE and also denies family history of clots, blood disorders.     In the ED, CT PE with large thrombus burden involving all lobar and multiple segmental branches bilaterally w/ probable evolving infarcts in lateral basal segment of LLL and mild pulmonary artery dilation w/ RV strain and cardiomegaly. 1/11 TTE with severely dilated RV size, mod-severely reduced RV function, mod TR, pHTN (PASP 57), nml LV size/function. Troponin 0.10, Pro-BNP 1395, lactate normal. Pulmonology consulted. Patient admitted to MICU for intermediate risk PE. Patient started on Heparin drip with goal PTT 80-99 with improvement in tachycardia (HR 120s -> 100s). Per PERT, plan for thrombectomy was deferred. Pt was stabilized in MICU and transferred to [facility] for further management. On [facility], pt remained tachycardic with repeat TTE 1/12 showing severely dilated RV and mod-severely reduced RV function unchanged from prior. On 1/13, decision was made to proceed with mechanical thrombectomy i/s/o increasing pro-BNP 1300 -> 2200 -> 2600 with decreasing cardiac index 2.239 -> 2.2 and persistent tachycardia, suggesting worsening of acute right HF from pulmonary emboli. Pt underwent uncomplicated mechanical thrombectomy on 1/13, with improvement in tachycardia post-procedure ( -> 90s). On 1/14 at 1 AM, pt had syncopal event while using bedside commode. STAT CT head and C-spine were negative for hemorrhage (on heparin gtt) or fracture. CBC was unchanged and pt remained hemodynamically stable. Pt was transitioned to therpeutic Lovenox 100 mg SQ q12h. Repeat TTE continued to show severe but stable right heart strain & pulmonary HTN despite thrombectomy. On 1/15, pt had worsening tachycardia to 150s with persistent desaturations to mid-80s on RA which improved to mid-90s on 6LNC. BP remained stable and pt was asymptomatic. Bedside POCUS continued to show severe RV dilation. Decision was made to transfer pt to MICU due to worsening tachycardia and O2 saturation with uptrending pro-BNP suggesting progressive cardiac decompensation. R heart cath with evidence of R heart failure, transferred to CCU. Ekos protocol with clot directed tPA--> heparin gtt--> lovenox q12h w/ daily limited TTE.     CT venogram to evaluate for extension of clot found 1. Markedly bulky multi fibroid uterus with mass effect upon the external iliac veins. Bilateral external iliac vein and common femoral vein thrombosis. Bilateral pulmonary emboli, previously identified. No evidence of extension of thrombus into the IVC. 2. Mild left hydroureteronephrosis secondary to mass effect of the bulky multi fibroid uterus upon the left ureter at the level of the pelvic sidewall.      OVERNIGHT EVENTS: SILVANA    SUBJECTIVE: Patient reports feeling well. Feels some discomfort 2/2 Galan catheter but otherwise ROS is negative. Denies fevers, chills, SOB, n/v/d, chest pain. States she is on her menstrual period after Nuvaring was removed but does not feel that her period is especially heavy.     12 Point ROS Negative unless noted otherwise above.  -------------------------------------------------------------------------------  VITAL SIGNS:  Vital Signs Last 24 Hrs  T(C): 37.2 (18 Jan 2023 05:00), Max: 37.3 (17 Jan 2023 22:36)  T(F): 98.9 (18 Jan 2023 05:00), Max: 99.2 (17 Jan 2023 22:36)  HR: 91 (18 Jan 2023 07:00) (91 - 116)  BP: 113/80 (18 Jan 2023 07:00) (113/80 - 145/85)  BP(mean): 94 (18 Jan 2023 07:00) (94 - 110)  RR: 19 (18 Jan 2023 07:00) (14 - 26)  SpO2: 100% (18 Jan 2023 07:00) (97% - 100%)    Parameters below as of 18 Jan 2023 07:00  Patient On (Oxygen Delivery Method): nasal cannula  O2 Flow (L/min): 3    I&O's Summary    17 Jan 2023 07:01  -  18 Jan 2023 07:00  --------------------------------------------------------  IN: 6.5 mL / OUT: 1166 mL / NET: -1159.5 mL    PHYSICAL EXAM:  General: NAD, resting comfortably   HEENT: NC/AT; EOMI, PERRLA, anicteric sclera; moist mucosal membranes.  Neck: supple, trachea midline  Cardiovascular: RRR, +S1/S2; NO M/R/G  Respiratory: CTA B/L; no W/R/R  Gastrointestinal: soft, ND; +BSx4. Slightly TTP RLQ, LLQ   Extremities: WWP; no edema or cyanosis. LLE larger in size than RLE.   Vascular: 2+ radial, DP/PT pulses B/L  Neurological: AAOx3; no focal deficits    ALLERGIES:  No Known Allergies    MEDICATIONS  (STANDING):  cefTRIAXone   IVPB 2000 milliGRAM(s) IV Intermittent every 24 hours  chlorhexidine 2% Cloths 1 Application(s) Topical <User Schedule>  dextrose 5%. 1000 milliLiter(s) (50 mL/Hr) IV Continuous <Continuous>  dextrose 5%. 1000 milliLiter(s) (100 mL/Hr) IV Continuous <Continuous>  dextrose 50% Injectable 25 Gram(s) IV Push once  dextrose 50% Injectable 12.5 Gram(s) IV Push once  dextrose 50% Injectable 25 Gram(s) IV Push once  enoxaparin Injectable 100 milliGRAM(s) SubCutaneous every 12 hours  glucagon  Injectable 1 milliGRAM(s) IntraMuscular once  influenza   Vaccine 0.5 milliLiter(s) IntraMuscular once  insulin lispro (ADMELOG) corrective regimen sliding scale   SubCutaneous Before meals and at bedtime    MEDICATIONS  (PRN):  dextrose Oral Gel 15 Gram(s) Oral once PRN Blood Glucose LESS THAN 70 milliGRAM(s)/deciliter  morphine  - Injectable 2 milliGRAM(s) IV Push every 6 hours PRN Severe Pain (7 - 10)  polyethylene glycol 3350 17 Gram(s) Oral daily PRN Constipation  senna 2 Tablet(s) Oral at bedtime PRN Constipation      -------------------------------------------------------------------------------  LABS:                        7.9    8.96  )-----------( 159      ( 18 Jan 2023 05:30 )             25.4     01-18    137  |  101  |  12  ----------------------------<  93  3.8   |  25  |  1.02    Ca    8.7      18 Jan 2023 05:30  Phos  2.9     01-18  Mg     2.4     01-18    TPro  7.3  /  Alb  3.1<L>  /  TBili  0.3  /  DBili  x   /  AST  19  /  ALT  17  /  AlkPhos  108  01-18    LIVER FUNCTIONS - ( 18 Jan 2023 05:30 )  Alb: 3.1 g/dL / Pro: 7.3 g/dL / ALK PHOS: 108 U/L / ALT: 17 U/L / AST: 19 U/L / GGT: x           PT/INR - ( 16 Jan 2023 20:26 )   PT: 14.7 sec;   INR: 1.23   PTT - ( 16 Jan 2023 20:26 )  PTT:29.0 sec    CAPILLARY BLOOD GLUCOSE  POCT Blood Glucose.: 92 mg/dL (18 Jan 2023 06:08)    Culture - Blood (collected 15 Trevon 2023 17:23)  Source: .Blood Blood  Preliminary Report (17 Jan 2023 18:00):    No growth at 2 days.    Culture - Blood (collected 15 Trevon 2023 17:23)  Source: .Blood Blood  Preliminary Report (17 Jan 2023 18:00):    No growth at 2 days.    Urinalysis with Rflx Culture (collected 15 Trevon 2023 16:48)    SARS-CoV-2: NotDetec (16 Jan 2023 00:17)  SARS-CoV-2: NotDetec (11 Jan 2023 09:33)    RADIOLOGY & ADDITIONAL TESTS: Reviewed.

## 2023-01-18 NOTE — PROGRESS NOTE ADULT - SUBJECTIVE AND OBJECTIVE BOX
*****TRANSFER ACCEPTANCE FROM CCU TO [id number]*****    Patient is a 32y old  Female who presents with a chief complaint of R heart failure (18 Jan 2023 07:28)    HOSPITAL COURSE:  31 yo female with PMHx of asthma and anemia 2/2 myomectomy, on hormonal birth control (Nuvaring) for 4 months, presented with 1 week of productive cough. Pt stating she began feeling symptoms of mild cough and chest tightness 3 weeks prior. She went to  where workup was negative for COVID and Influenza. Pt prescribed Azithromycin and Prednisone. Pt presented to ED after she awoke in AM w/ progressively worse chest tightness. She used her inhaler and nebulizer tx at home with minimal relief and had syncopal event at home. Pt with no hx of DVT, PE and also denies family history of clots, blood disorders.     In the ED, CT PE with large thrombus burden involving all lobar and multiple segmental branches bilaterally w/ probable evolving infarcts in lateral basal segment of LLL and mild pulmonary artery dilation w/ RV strain and cardiomegaly. 1/11 TTE with severely dilated RV size, mod-severely reduced RV function, mod TR, pHTN (PASP 57), nml LV size/function. Troponin 0.10, Pro-BNP 1395, lactate normal. Pulmonology consulted. Patient admitted to MICU for intermediate risk PE. Patient started on Heparin drip with goal PTT 80-99 with improvement in tachycardia (HR 120s -> 100s). Per PERT, plan for thrombectomy was deferred. Pt was stabilized in MICU and transferred to [facility] for further management. On [facility], pt remained tachycardic with repeat TTE 1/12 showing severely dilated RV and mod-severely reduced RV function unchanged from prior. On 1/13, decision was made to proceed with mechanical thrombectomy i/s/o increasing pro-BNP 1300 -> 2200 -> 2600 with decreasing cardiac index 2.239 -> 2.2 and persistent tachycardia, suggesting worsening of acute right HF from pulmonary emboli. Pt underwent uncomplicated mechanical thrombectomy on 1/13, with improvement in tachycardia post-procedure ( -> 90s). On 1/14 at 1 AM, pt had syncopal event while using bedside commode. STAT CT head and C-spine were negative for hemorrhage (on heparin gtt) or fracture. CBC was unchanged and pt remained hemodynamically stable. Pt was transitioned to therapeutic Lovenox 100 mg SQ q12h. Repeat TTE continued to show severe but stable right heart strain & pulmonary HTN despite thrombectomy. On 1/15, pt had worsening tachycardia to 150s with persistent desaturations to mid-80s on RA which improved to mid-90s on 6LNC. BP remained stable and pt was asymptomatic. Bedside POCUS continued to show severe RV dilation. Decision was made to transfer pt to MICU due to worsening tachycardia and O2 saturation with uptrending pro-BNP suggesting progressive cardiac decompensation. R heart cath with evidence of R heart failure, transferred to CCU. Ekos protocol with clot directed tPA--> heparin gtt--> lovenox q12h w/ daily limited TTE.     CT venogram to evaluate for extension of clot found 1. Markedly bulky multi fibroid uterus with mass effect upon the external iliac veins. Bilateral external iliac vein and common femoral vein thrombosis. Bilateral pulmonary emboli, previously identified. No evidence of extension of thrombus into the IVC. 2. Mild left hydroureteronephrosis secondary to mass effect of the bulky multi fibroid uterus upon the left ureter at the level of the pelvic sidewall.      INTERVAL HPI/OVERNIGHT EVENTS:  Patient seen and examined at bedside. Pt reports feeling much better today, is looking forward to having many of her lines removed. Remains tachycardic to low 100s on monitor but denies any CP, SOB, or palpitations. Does report swelling of her L leg. Otherwise denies fever, chills, HA, dizziness, abdominal pain, n/v, changes in bowel/urinary habits, numbness, or tingling. 12pt ROS otherwise negative.    FAMILY HISTORY:      VITAL SIGNS:  T(C): 36.7 (01-18-23 @ 09:00), Max: 37.3 (01-17-23 @ 22:36)  HR: 97 (01-18-23 @ 16:00) (89 - 111)  BP: 142/92 (01-18-23 @ 16:00) (113/80 - 142/92)  RR: 23 (01-18-23 @ 16:00) (14 - 26)  SpO2: 100% (01-18-23 @ 16:00) (97% - 100%)  Wt(kg): --Vital Signs Last 24 Hrs  T(C): 36.7 (18 Jan 2023 09:00), Max: 37.3 (17 Jan 2023 22:36)  T(F): 98.1 (18 Jan 2023 09:00), Max: 99.2 (17 Jan 2023 22:36)  HR: 97 (18 Jan 2023 16:00) (89 - 111)  BP: 142/92 (18 Jan 2023 16:00) (113/80 - 142/92)  BP(mean): 111 (18 Jan 2023 16:00) (94 - 111)  RR: 23 (18 Jan 2023 16:00) (14 - 26)  SpO2: 100% (18 Jan 2023 16:00) (97% - 100%)    Parameters below as of 18 Jan 2023 16:00  Patient On (Oxygen Delivery Method): nasal cannula  O2 Flow (L/min): 2    No Known Allergies      PHYSICAL EXAM:  General: NAD, resting comfortably   HEENT: NC/AT; EOMI, PERRLA, anicteric sclera; moist mucosal membranes.  Neck: supple, trachea midline  Cardiovascular: RRR, +S1/S2; NO M/R/G  Respiratory: CTA B/L; no W/R/R  Gastrointestinal: soft, ND; +BSx4. Slightly TTP RLQ, LLQ   Extremities: WWP; no edema or cyanosis. LLE larger in size than RLE.   Vascular: 2+ radial, DP/PT pulses B/L  Neurological: AAOx3; no focal deficits    Consultant(s) Notes Reviewed:  [x ] YES  [ ] NO  Care Discussed with Consultants/Other Providers [ x] YES  [ ] NO    LABS:                        7.9    8.96  )-----------( 159      ( 18 Jan 2023 05:30 )             25.4     01-18    137  |  101  |  12  ----------------------------<  93  3.8   |  25  |  1.02    Ca    8.7      18 Jan 2023 05:30  Phos  2.9     01-18  Mg     2.4     01-18    TPro  7.3  /  Alb  3.1<L>  /  TBili  0.3  /  DBili  x   /  AST  19  /  ALT  17  /  AlkPhos  108  01-18      PT/INR - ( 16 Jan 2023 20:26 )   PT: 14.7 sec;   INR: 1.23          PTT - ( 16 Jan 2023 20:26 )  PTT:29.0 sec  CAPILLARY BLOOD GLUCOSE      POCT Blood Glucose.: 102 mg/dL (18 Jan 2023 12:02)      CARDIAC MARKERS ( 18 Jan 2023 05:30 )  x     / 0.01 ng/mL / x     / x     / x      CARDIAC MARKERS ( 17 Jan 2023 05:30 )  x     / 0.01 ng/mL / x     / x     / x                  ABG - ( 17 Jan 2023 05:40 )  pH, Arterial: 7.47  pH, Blood: x     /  pCO2: 32    /  pO2: 143   / HCO3: 23    / Base Excess: 0.3   /  SaO2: 99.5                    I & O Summary:    01-17-23 @ 07:01  -  01-18-23 @ 07:00  --------------------------------------------------------  IN: 6.5 mL / OUT: 1166 mL / NET: -1159.5 mL    01-18-23 @ 07:01  - 01-18-23 @ 17:01  --------------------------------------------------------  IN: 50 mL / OUT: 405 mL / NET: -355 mL        Microbiology:      Culture - Blood (collected 15 Trevon 2023 17:23)  Source: .Blood Blood  Preliminary Report (17 Jan 2023 18:00):    No growth at 2 days.    Culture - Blood (collected 15 Trevon 2023 17:23)  Source: .Blood Blood  Preliminary Report (17 Jan 2023 18:00):    No growth at 2 days.        RADIOLOGY, EKG AND ADDITIONAL TESTS: Reviewed.      RADIOLOGY & ADDITIONAL TESTS:    Imaging Personally Reviewed:  [ ] YES  [ ] NO  cefTRIAXone   IVPB 2000 milliGRAM(s) IV Intermittent every 24 hours  chlorhexidine 2% Cloths 1 Application(s) Topical <User Schedule>  dextrose 5%. 1000 milliLiter(s) IV Continuous <Continuous>  dextrose 5%. 1000 milliLiter(s) IV Continuous <Continuous>  dextrose 50% Injectable 25 Gram(s) IV Push once  dextrose 50% Injectable 12.5 Gram(s) IV Push once  dextrose 50% Injectable 25 Gram(s) IV Push once  dextrose Oral Gel 15 Gram(s) Oral once PRN  enoxaparin Injectable 100 milliGRAM(s) SubCutaneous every 12 hours  glucagon  Injectable 1 milliGRAM(s) IntraMuscular once  influenza   Vaccine 0.5 milliLiter(s) IntraMuscular once  insulin lispro (ADMELOG) corrective regimen sliding scale   SubCutaneous Before meals and at bedtime  morphine  - Injectable 2 milliGRAM(s) IV Push every 6 hours PRN  polyethylene glycol 3350 17 Gram(s) Oral daily PRN  senna 2 Tablet(s) Oral at bedtime PRN      HEALTH ISSUES - PROBLEM Dx:  Pulmonary embolus    Acute right heart failure    Syncope    Fibroids    Asthma    Prophylactic measure    Pulmonary thromboembolism    Pulmonary embolism with acute cor pulmonale    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    Fever

## 2023-01-18 NOTE — PROGRESS NOTE ADULT - PROBLEM SELECTOR PLAN 5
She has been using nuvaring for contraception for >1 year but has been continuously replacing her nuvaring without allowing for withdrawal bleed since 09/22.    -Nuvaring removed 1/16  -Vaginal bleeding noted during pelvic exam. Patient now on menstrual cycle  -f/u pad count

## 2023-01-18 NOTE — CHART NOTE - NSCHARTNOTEFT_GEN_A_CORE
31 y/o  (LMP 2022, 2/2 continuous nuvaring) presenting to ED on  for cough, chest pain, and SOB found to have PE w/ large clot burden w/ mild pulmonary artery dilation w/ RV strain and cardiomegaly, admitted to CCU for concerns of R heart failure. GYN consulted for large multifibroid uterus and concern for mass effect. IVC filter placement aborted.     -No acute GYN interventions at this time. Pt to followup with her OBGYN as outpt for intervention regarding her fibroid uterus  -Please reconsult GYN as needed  d/w Dr. Telles  DC PGY2

## 2023-01-19 LAB
ALBUMIN SERPL ELPH-MCNC: 3.2 G/DL — LOW (ref 3.3–5)
ALP SERPL-CCNC: 112 U/L — SIGNIFICANT CHANGE UP (ref 40–120)
ALT FLD-CCNC: 26 U/L — SIGNIFICANT CHANGE UP (ref 10–45)
ANION GAP SERPL CALC-SCNC: 9 MMOL/L — SIGNIFICANT CHANGE UP (ref 5–17)
AST SERPL-CCNC: 29 U/L — SIGNIFICANT CHANGE UP (ref 10–40)
BILIRUB SERPL-MCNC: 0.3 MG/DL — SIGNIFICANT CHANGE UP (ref 0.2–1.2)
BUN SERPL-MCNC: 10 MG/DL — SIGNIFICANT CHANGE UP (ref 7–23)
CALCIUM SERPL-MCNC: 8.8 MG/DL — SIGNIFICANT CHANGE UP (ref 8.4–10.5)
CHLORIDE SERPL-SCNC: 102 MMOL/L — SIGNIFICANT CHANGE UP (ref 96–108)
CO2 SERPL-SCNC: 24 MMOL/L — SIGNIFICANT CHANGE UP (ref 22–31)
CREAT SERPL-MCNC: 1.02 MG/DL — SIGNIFICANT CHANGE UP (ref 0.5–1.3)
DNA PLOIDY SPEC FC-IMP: SIGNIFICANT CHANGE UP
EGFR: 75 ML/MIN/1.73M2 — SIGNIFICANT CHANGE UP
GLUCOSE BLDC GLUCOMTR-MCNC: 79 MG/DL — SIGNIFICANT CHANGE UP (ref 70–99)
GLUCOSE BLDC GLUCOMTR-MCNC: 88 MG/DL — SIGNIFICANT CHANGE UP (ref 70–99)
GLUCOSE BLDC GLUCOMTR-MCNC: 95 MG/DL — SIGNIFICANT CHANGE UP (ref 70–99)
GLUCOSE SERPL-MCNC: 101 MG/DL — HIGH (ref 70–99)
HCT VFR BLD CALC: 26.6 % — LOW (ref 34.5–45)
HGB BLD-MCNC: 8.2 G/DL — LOW (ref 11.5–15.5)
LACTATE SERPL-SCNC: 1 MMOL/L — SIGNIFICANT CHANGE UP (ref 0.5–2)
MAGNESIUM SERPL-MCNC: 2.3 MG/DL — SIGNIFICANT CHANGE UP (ref 1.6–2.6)
MCHC RBC-ENTMCNC: 24.8 PG — LOW (ref 27–34)
MCHC RBC-ENTMCNC: 30.8 GM/DL — LOW (ref 32–36)
MCV RBC AUTO: 80.4 FL — SIGNIFICANT CHANGE UP (ref 80–100)
NRBC # BLD: 0 /100 WBCS — SIGNIFICANT CHANGE UP (ref 0–0)
NT-PROBNP SERPL-SCNC: 1901 PG/ML — HIGH (ref 0–300)
PHOSPHATE SERPL-MCNC: 3.5 MG/DL — SIGNIFICANT CHANGE UP (ref 2.5–4.5)
PLATELET # BLD AUTO: 201 K/UL — SIGNIFICANT CHANGE UP (ref 150–400)
POTASSIUM SERPL-MCNC: 3.9 MMOL/L — SIGNIFICANT CHANGE UP (ref 3.5–5.3)
POTASSIUM SERPL-SCNC: 3.9 MMOL/L — SIGNIFICANT CHANGE UP (ref 3.5–5.3)
PROT SERPL-MCNC: 7.8 G/DL — SIGNIFICANT CHANGE UP (ref 6–8.3)
PTR INTERPRETATION: SIGNIFICANT CHANGE UP
RBC # BLD: 3.31 M/UL — LOW (ref 3.8–5.2)
RBC # FLD: 18.3 % — HIGH (ref 10.3–14.5)
SODIUM SERPL-SCNC: 135 MMOL/L — SIGNIFICANT CHANGE UP (ref 135–145)
SURGICAL PATHOLOGY STUDY: SIGNIFICANT CHANGE UP
TROPONIN T SERPL-MCNC: 0.01 NG/ML — SIGNIFICANT CHANGE UP (ref 0–0.01)
WBC # BLD: 6.51 K/UL — SIGNIFICANT CHANGE UP (ref 3.8–10.5)
WBC # FLD AUTO: 6.51 K/UL — SIGNIFICANT CHANGE UP (ref 3.8–10.5)

## 2023-01-19 PROCEDURE — 93308 TTE F-UP OR LMTD: CPT | Mod: 26

## 2023-01-19 PROCEDURE — 99233 SBSQ HOSP IP/OBS HIGH 50: CPT | Mod: GC

## 2023-01-19 PROCEDURE — 93321 DOPPLER ECHO F-UP/LMTD STD: CPT | Mod: 26

## 2023-01-19 RX ORDER — MORPHINE SULFATE 50 MG/1
4 CAPSULE, EXTENDED RELEASE ORAL EVERY 6 HOURS
Refills: 0 | Status: DISCONTINUED | OUTPATIENT
Start: 2023-01-19 | End: 2023-01-20

## 2023-01-19 RX ORDER — LANOLIN ALCOHOL/MO/W.PET/CERES
3 CREAM (GRAM) TOPICAL AT BEDTIME
Refills: 0 | Status: DISCONTINUED | OUTPATIENT
Start: 2023-01-19 | End: 2023-01-20

## 2023-01-19 RX ORDER — APIXABAN 2.5 MG/1
10 TABLET, FILM COATED ORAL EVERY 12 HOURS
Refills: 0 | Status: DISCONTINUED | OUTPATIENT
Start: 2023-01-19 | End: 2023-01-20

## 2023-01-19 RX ORDER — ACETAMINOPHEN 500 MG
975 TABLET ORAL EVERY 6 HOURS
Refills: 0 | Status: DISCONTINUED | OUTPATIENT
Start: 2023-01-19 | End: 2023-01-20

## 2023-01-19 RX ADMIN — ENOXAPARIN SODIUM 100 MILLIGRAM(S): 100 INJECTION SUBCUTANEOUS at 00:18

## 2023-01-19 RX ADMIN — Medication 3 MILLIGRAM(S): at 22:51

## 2023-01-19 RX ADMIN — CHLORHEXIDINE GLUCONATE 1 APPLICATION(S): 213 SOLUTION TOPICAL at 06:21

## 2023-01-19 RX ADMIN — MORPHINE SULFATE 2 MILLIGRAM(S): 50 CAPSULE, EXTENDED RELEASE ORAL at 04:48

## 2023-01-19 RX ADMIN — MORPHINE SULFATE 2 MILLIGRAM(S): 50 CAPSULE, EXTENDED RELEASE ORAL at 05:45

## 2023-01-19 RX ADMIN — APIXABAN 10 MILLIGRAM(S): 2.5 TABLET, FILM COATED ORAL at 19:09

## 2023-01-19 NOTE — PROGRESS NOTE ADULT - PROBLEM SELECTOR PLAN 6
Revealed extensive DVT in the left lower leg.  Vascular surgery was involved. Vascular surgery was unable to insert inferior vena cava filter about the renal arteries due to a large size inferior vena cava.  The patient has a large abdominal uterus which limits the insertion of inferior vena cava filter below the renal veins due to the size of the uterus and possible trauma to the vascular wall.  At this point continue anticoagulation.  The clots most likely is on the subacute or chronic phase at this point I discussed the case with the family and the pain
Patient febrile to 101.3. Most likely 2/2 SIRS, but septic workup initiated.   -febrile overnight to 102. Initially broadened to vanc/zosyn but with concern for allergy, was transitioned back to CTX   -f/u blood cx  -UA negative  -c/w CTX until blood cx NGTD x72 hours. If negative, d/c CTX
Patient febrile to 101.3. Most likely 2/2 SIRS, but septic workup initiated.   -febrile overnight to 102. Initially broadened to vanc/zosyn but with concern for allergy, was transitioned back to CTX   -f/u blood cx  -UA negative  -c/w CTX until blood cx NGTD x72 hours. If negative, d/c CTX
seen by vascular and will need filter.  For CT abdomen with venogram
Revealed extensive DVT in the left lower leg.  Vascular surgery was involved.  The patient would require IVC  filter
Revealed extensive DVT in the left lower leg.  Vascular surgery was involved.  The patient would require IVC  filter
F: PO  E: Replete K <4, Mg <2  N: Regular diet  DVT ppx: Lovenox 100 Q12  GI ppx: not needed  Code: Full code  Dispo: [email or web address]

## 2023-01-19 NOTE — CONSULT NOTE ADULT - SUBJECTIVE AND OBJECTIVE BOX
33 yo female with PMHx of asthma and anemia 2/2 uterine fibroids w/ estrogen ring in place (now removed by GYN) with planned myomectomy in Feb 2023 presented with SOB. She was originally admitted to MICU for acute PE, initially managed with IV heparin. Worsened clinically and underwent Inari thrombectomy on 1/13/23. Did well after that and worsened again on 1/15/23 with tachycardia and hypoxia. Returned to cath lab where pulmonary angiogram showed new thrombus in distal main R PA. RHC numbers consistent with cardiogenic shock. She is now s/p EKOS in the CCU. Was initially on Lay and milrinone which has since been weaned off. She has a significant thrombus burden (extensive bilateral external iliac and CFV thrombosis). Heart failure following for RV failure. Patient went for IVC filter placement 1/17/, however procedure aborted 2/2 anatomy. Patient will continue lovenox. IR consulted for IVC filter placement.     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    Fibroids    DVT, lower extremity    Acute on chronic respiratory failure with hypoxia    Acute respiratory failure with hypoxia    Cardiogenic shock    Fever    Acute hypoxemic respiratory failure    Vaginal bleeding    Urinary retention    Anemia    Pulmonary artery thrombectomy    Insertion of intravenous catheter with ultrasound guidance    Insertion, needle, vein    Insertion of arterial catheter with ultrasound guidance    Venipuncture in adult    IVC filter placement    No significant past surgical history    No significant past surgical history    MULTIPLE COMPLAINTS    90+    Room Service Assist    [email or web address]        Meds:chlorhexidine 2% Cloths 1 Application(s) Topical <User Schedule>  dextrose 5%. 1000 milliLiter(s) IV Continuous <Continuous>  dextrose 5%. 1000 milliLiter(s) IV Continuous <Continuous>  dextrose 50% Injectable 25 Gram(s) IV Push once  dextrose 50% Injectable 12.5 Gram(s) IV Push once  dextrose 50% Injectable 25 Gram(s) IV Push once  dextrose Oral Gel 15 Gram(s) Oral once PRN  glucagon  Injectable 1 milliGRAM(s) IntraMuscular once  influenza   Vaccine 0.5 milliLiter(s) IntraMuscular once  insulin lispro (ADMELOG) corrective regimen sliding scale   SubCutaneous Before meals and at bedtime  morphine  - Injectable 2 milliGRAM(s) IV Push every 6 hours PRN  polyethylene glycol 3350 17 Gram(s) Oral daily PRN  senna 2 Tablet(s) Oral at bedtime PRN      Allergies:No Known Allergies        Labs:                           8.2    6.51  )-----------( 201      ( 19 Jan 2023 05:30 )             26.6       01-19    135  |  102  |  10  ----------------------------<  101<H>  3.9   |  24  |  1.02    Ca    8.8      19 Jan 2023 05:30  Phos  3.5     01-19  Mg     2.3     01-19    TPro  7.8  /  Alb  3.2<L>  /  TBili  0.3  /  DBili  x   /  AST  29  /  ALT  26  /  AlkPhos  112  01-19          Imaging Findings: reviewed

## 2023-01-19 NOTE — PROGRESS NOTE ADULT - PROBLEM SELECTOR PLAN 8
Is increasing oxygen requirement.  Patient was started on oxygen.  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
Hb 9.8 upon arrival to CCU. No signs or hx of active bleed.  - maintain active type and screen  - transfuse Hb < 7
Hb 9.8 upon arrival to CCU. No signs or hx of active bleed.  - maintain active type and screen  - transfuse Hb < 7
on HFNC and weaned down the FIO2 and the flow CXR clear
Is increasing oxygen requirement.  Patient was started on oxygen.  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
Primary improving the patient is transitioned to nasal cannula

## 2023-01-19 NOTE — PROGRESS NOTE ADULT - PROBLEM SELECTOR PLAN 10
could be related to VTE.  On antibiotic and follow on cultures
1/14 early AM pt on bedside commode when she apparently synopsized. (See resident chart note 1/14/23) Patient examined at bedside, able to communicate, was moving her neck without any pain or limitations, neurological exam was benign. Fall precautions and bed rest ordered for patient. CTH, CT c-spine, CXR WNL. Pt re-evaluated multiple times during the day, mentating at baseline and physical exam unremarkable.   - Maintain fall precautions
1/14 early AM pt on bedside commode when she apparently synopsized. (See resident chart note 1/14/23) Patient examined at bedside, able to communicate, was moving her neck without any pain or limitations, neurological exam was benign. Fall precautions and bed rest ordered for patient. CTH, CT c-spine, CXR WNL. Pt re-evaluated multiple times during the day, mentating at baseline and physical exam unremarkable.   - Maintain fall precautions
Be related to thromboembolic disease.  The patient was pancultured and started on antibiotic.    Elevated Cr and could be related to the contrast and acute RHF with low CI  Follow
Be related to thromboembolic disease.  The patient was pancultured and started on antibiotic.    Elevated Cr and could be related to the contrast and acute RHF with low CI  Follow
Be related to thromboembolic disease.  The patient was pancultured and started on antibiotic. Possible DC antibiotics if cultures negative    Cr is normal

## 2023-01-19 NOTE — PROGRESS NOTE ADULT - PROBLEM SELECTOR PROBLEM 7
Acute on chronic respiratory failure with hypoxia
Urinary retention
Urinary retention
Acute on chronic respiratory failure with hypoxia

## 2023-01-19 NOTE — CHART NOTE - NSCHARTNOTEFT_GEN_A_CORE
Admitting Diagnosis:   Patient is a 32y old  Female who presents with a chief complaint of R heart failure (18 Jan 2023 07:28)      PAST MEDICAL & SURGICAL HISTORY:  Asthma      Fibroids      Anemia      No significant past surgical history    Current Nutrition Order: NPO    PO Intake: Good (%) [   ]  Fair (50-75%) [   ] Poor (<25%) [   ] [x] NPO    GI Issues: No nausea/vomiting documented at this time. No bowel movement documented since admission.    Pain: Notes tooth ache at time of assessment.    Skin Integrity: Generalized edema 1+. L groin incision per chart. Sixto score: 19.    Labs:   01-19    135  |  102  |  10  ----------------------------<  101<H>  3.9   |  24  |  1.02    Ca    8.8      19 Jan 2023 05:30  Phos  3.5     01-19  Mg     2.3     01-19    TPro  7.8  /  Alb  3.2<L>  /  TBili  0.3  /  DBili  x   /  AST  29  /  ALT  26  /  AlkPhos  112  01-19    CAPILLARY BLOOD GLUCOSE      POCT Blood Glucose.: 95 mg/dL (19 Jan 2023 06:40)  POCT Blood Glucose.: 97 mg/dL (18 Jan 2023 21:37)  POCT Blood Glucose.: 121 mg/dL (18 Jan 2023 17:20)  POCT Blood Glucose.: 102 mg/dL (18 Jan 2023 12:02)      Medications:  MEDICATIONS  (STANDING):  chlorhexidine 2% Cloths 1 Application(s) Topical <User Schedule>  dextrose 5%. 1000 milliLiter(s) (50 mL/Hr) IV Continuous <Continuous>  dextrose 5%. 1000 milliLiter(s) (100 mL/Hr) IV Continuous <Continuous>  dextrose 50% Injectable 25 Gram(s) IV Push once  dextrose 50% Injectable 12.5 Gram(s) IV Push once  dextrose 50% Injectable 25 Gram(s) IV Push once  glucagon  Injectable 1 milliGRAM(s) IntraMuscular once  influenza   Vaccine 0.5 milliLiter(s) IntraMuscular once  insulin lispro (ADMELOG) corrective regimen sliding scale   SubCutaneous Before meals and at bedtime    MEDICATIONS  (PRN):  dextrose Oral Gel 15 Gram(s) Oral once PRN Blood Glucose LESS THAN 70 milliGRAM(s)/deciliter  morphine  - Injectable 2 milliGRAM(s) IV Push every 6 hours PRN Severe Pain (7 - 10)  polyethylene glycol 3350 17 Gram(s) Oral daily PRN Constipation  senna 2 Tablet(s) Oral at bedtime PRN Constipation      Dosing Anthropometrics:  Height for BMI (FEET)	5 Feet  Height for BMI (INCHES)	10 Inch(s)  Height for BMI (CENTIMETERS)	177.8 Centimeter(s)  Weight for BMI (lbs)	237 lb  Weight for BMI (kg)	107.5 kg  Body Mass Index	34      Weight Change: No new documented weights in EMR. Obtain biweekly weights to assess changes/trends.    Estimated energy needs: 5'10''  pounds+-10%; %[id number]  IBW used to calculate energy needs due to pt's current body weight exceeding 120% of IBW  Adjust for age, HF, OR; Fluids per team  Estimated Energy Needs From (yifan/kg) 25  Estimated Energy Needs To (yifan/kg)	30  Estimated Energy Needs Calculated From (yifan/kg) 1757  Estimated Energy Needs Calculated To (yifan/kg)	2109    Estimated Protein Needs From (g/kg)	1  Estimated Protein Needs To (g/kg)	1.2  Estimated Protein Needs Calculated From (g/kg)	70.3  Estimated Protein Needs Calculated To (g/kg)	84.36    Subjective: 33 yo female, PMHx asthma and anemia 2/2 myomectomy, on birth control (Nuvaring) presented with 1 week of productive cough. Pt stating she began feeling symptoms of mild cough and chest tightness 3 weeks prior. She went to  where workup was negative for COVID and Influenza. CT PE showing thrombus with large burden and RV strain. Admitted for PE with RV strain and possible intervention. Underwent mechanical thrombectomy on 1/13 and transitioned to Lovenox SQ. On 1/14 early AM pt on bedside commode when she apparently syncopized. On 1/15, pt was stepped up to MICU due to worsening tachycardia and decreasing O2 saturation, with bedside POCUS showing persistent R-sided HF and uptrending pro-BNP, suggestive of progressive cardiac decompensation. On 1/16 Pt noted to be hypoxic to Spo2 75% with increased work of breathing on 6LNC. Prior to the hypoxic event she was noted to be febrile to 102 with MAPs in the low 60s. Vasopressin was added in addition to milrinone 0.2. [place]-katherine catheter was placed and repeat hemodynamics were obtained with bedside TTE showing dilated RV with less interventricular septal bowing. Nuvaring removed 1/16, Vaginal bleeding noted during pelvic exam, Pending w/u - noted No acute GYN intervention indicated at this time. 1/16: D/c'd vaso. 1/17: Milrinone stopped; weaned to nasal cannula. IVC not able to be placed 1/17. 1/18: Blood cx that have been negative for 72hrs.    Pt and visitor seen at bedside for follow up assessment- on room air. Labs reviewed 1/19; electrolytes within normal limits at this time. Fingersticks 1/18-1/19:  mg/dL; insulin regimen ordered. Pt currently NPO; reports good PO intake during hospital stay. Pt notes receiving food from outside of hospital (family bringing in [employer] and soups). Of note, pt reports due to fall during hospital stay, pt feels tooth ache causing somewhat decreased PO intake (only tolerating liquids/soups). RD offered trial of soft diet, however, pt declined at this time. Made aware RD remains available. RD to follow up. See nutrition recommendations below.     Previous Nutrition Diagnosis: Inadequate Energy Intake  RT intake<EER  AEB NPO    Active [  x ]  Resolved [   ]    If resolved, new PES:     Goal: Diet to be advanced in 24-48hrs as medically feasible    Recommendations:  1. Diet to be advanced in 24-48hrs as medically feasible; Resume Regular diet vs DASH TLC Pending %PO and BP. Pending %PO, consider need for oral nutrition supplements.  2. Monitor PO intake/appetite, GI distress, diet tolerance, labs, weights.  3. Honor pt food preferences as able.  4. RD to remain available for additional nutrition interventions as needed.    Education: Adequate PO intake; soft diet    Risk Level: High [ x  ] Moderate [   ] Low [   ]

## 2023-01-19 NOTE — PROGRESS NOTE ADULT - PROBLEM SELECTOR PROBLEM 6
DVT, lower extremity
Fever
Fever
DVT, lower extremity
Prophylactic measure
DVT, lower extremity
DVT, lower extremity

## 2023-01-19 NOTE — CONSULT NOTE ADULT - ASSESSMENT
Assessment: 33 yo female with PMHx of asthma and anemia 2/2 uterine fibroids w/ estrogen ring in place (now removed by GYN) with planned myomectomy in Feb 2023 presented with SOB. She was originally admitted to MICU for acute PE, initially managed with IV heparin. Worsened clinically and underwent Inari thrombectomy on 1/13/23. Did well after that and worsened again on 1/15/23 with tachycardia and hypoxia. Returned to cath lab where pulmonary angiogram showed new thrombus in distal main R PA. RHC numbers consistent with cardiogenic shock. She is now s/p EKOS in the CCU. Was initially on Lay and milrinone which has since been weaned off. She has a significant thrombus burden (extensive bilateral external iliac and CFV thrombosis). Heart failure following for RV failure. Patient went for IVC filter placement 1/17/, however procedure aborted 2/2 anatomy. Patient will continue lovenox. IR consulted for IVC filter placement. Case reviewed with Dr. Combs, plan for venogram possible IVC filter placement.     Recommendations: Maintain NPO x 8 hours prior to procedure. Please ensure Covid swab is up to date (within last 72 hours).    Communicated with: Dr. Bhakta primary team

## 2023-01-19 NOTE — CHART NOTE - NSCHARTNOTESELECT_GEN_ALL_CORE
Event Note
Structural heart disease/Off Service Note
Vascular surgery/Event Note
Event Note
Event Note
Nutrition Services
Post Procedure Update/Event Note

## 2023-01-19 NOTE — PROGRESS NOTE ADULT - PROBLEM SELECTOR PLAN 7
error
2/2 mass effect from uterine fibroids. Also with hydroureteronephrosis.  - valencia placed 1/16  - TOV today
2/2 mass effect from uterine fibroids. Also with hydroureteronephrosis.  - valencia placed 1/16  - Passed TOV 1/18
error

## 2023-01-19 NOTE — PROGRESS NOTE ADULT - PROBLEM SELECTOR PROBLEM 8
Acute respiratory failure with hypoxia
Anemia
Anemia
Acute respiratory failure with hypoxia

## 2023-01-19 NOTE — PROGRESS NOTE ADULT - PROBLEM SELECTOR PROBLEM 2
Acute right heart failure
Pulmonary embolus
Acute hypoxemic respiratory failure
Acute hypoxemic respiratory failure
Acute right heart failure

## 2023-01-19 NOTE — PROGRESS NOTE ADULT - SUBJECTIVE AND OBJECTIVE BOX
Patient is a 32y old  Female who presents with a chief complaint of R heart failure (18 Jan 2023 07:28)      INTERVAL HPI/OVERNIGHT EVENTS:  Patient seen and examined at bedside. No acute events overnight. This morning, pt lying in bed, in NAD, with no acute medical complaints aside from L thigh tenderness and swelling, unchanged from prior. Denies fever, chills, HA, dizziness, CP, SOB, palpitations, abdominal pain, n/v, changes in bowel/urinary habits, numbness, or tingling. 12pt ROS otherwise negative.      FAMILY HISTORY:      VITAL SIGNS:  T(C): 37.1 (01-19-23 @ 13:48), Max: 37.3 (01-19-23 @ 04:30)  HR: 96 (01-19-23 @ 12:10) (83 - 118)  BP: 134/82 (01-19-23 @ 12:10) (129/83 - 156/93)  RR: 20 (01-19-23 @ 12:10) (18 - 28)  SpO2: 100% (01-19-23 @ 12:10) (98% - 100%)  Wt(kg): --Vital Signs Last 24 Hrs  T(C): 37.1 (19 Jan 2023 13:48), Max: 37.3 (19 Jan 2023 04:30)  T(F): 98.7 (19 Jan 2023 13:48), Max: 99.2 (19 Jan 2023 04:30)  HR: 96 (19 Jan 2023 12:10) (83 - 118)  BP: 134/82 (19 Jan 2023 12:10) (129/83 - 156/93)  BP(mean): 102 (19 Jan 2023 12:10) (98 - 116)  RR: 20 (19 Jan 2023 12:10) (18 - 28)  SpO2: 100% (19 Jan 2023 12:10) (98% - 100%)    Parameters below as of 19 Jan 2023 12:10  Patient On (Oxygen Delivery Method): room air      No Known Allergies      PHYSICAL EXAM:  General: NAD, resting comfortably   HEENT: NC/AT; EOMI, PERRLA, anicteric sclera; moist mucosal membranes.  Neck: supple, trachea midline  Cardiovascular: RRR, +S1/S2; NO M/R/G  Respiratory: CTA B/L; no W/R/R  Gastrointestinal: soft, ND; +BSx4. Slightly TTP RLQ, LLQ   Extremities: WWP; no edema or cyanosis. LLE larger in size than RLE.   Vascular: 2+ radial, DP/PT pulses B/L  Neurological: AAOx3; no focal deficits    Consultant(s) Notes Reviewed:  [x ] YES  [ ] NO  Care Discussed with Consultants/Other Providers [ x] YES  [ ] NO    LABS:                        8.2    6.51  )-----------( 201      ( 19 Jan 2023 05:30 )             26.6     01-19    135  |  102  |  10  ----------------------------<  101<H>  3.9   |  24  |  1.02    Ca    8.8      19 Jan 2023 05:30  Phos  3.5     01-19  Mg     2.3     01-19    TPro  7.8  /  Alb  3.2<L>  /  TBili  0.3  /  DBili  x   /  AST  29  /  ALT  26  /  AlkPhos  112  01-19        CAPILLARY BLOOD GLUCOSE      POCT Blood Glucose.: 88 mg/dL (19 Jan 2023 11:41)      CARDIAC MARKERS ( 19 Jan 2023 05:30 )  x     / 0.01 ng/mL / x     / x     / x      CARDIAC MARKERS ( 18 Jan 2023 05:30 )  x     / 0.01 ng/mL / x     / x     / x                          I & O Summary:    01-18-23 @ 07:01 - 01-19-23 @ 07:00  --------------------------------------------------------  IN: 530 mL / OUT: 405 mL / NET: 125 mL    01-19-23 @ 07:01 - 01-19-23 @ 15:42  --------------------------------------------------------  IN: 380 mL / OUT: 0 mL / NET: 380 mL        Microbiology:        RADIOLOGY, EKG AND ADDITIONAL TESTS: Reviewed.      RADIOLOGY & ADDITIONAL TESTS:    Imaging Personally Reviewed:  [ ] YES  [ ] NO  chlorhexidine 2% Cloths 1 Application(s) Topical <User Schedule>  dextrose 5%. 1000 milliLiter(s) IV Continuous <Continuous>  dextrose 5%. 1000 milliLiter(s) IV Continuous <Continuous>  dextrose 50% Injectable 25 Gram(s) IV Push once  dextrose 50% Injectable 12.5 Gram(s) IV Push once  dextrose 50% Injectable 25 Gram(s) IV Push once  dextrose Oral Gel 15 Gram(s) Oral once PRN  glucagon  Injectable 1 milliGRAM(s) IntraMuscular once  influenza   Vaccine 0.5 milliLiter(s) IntraMuscular once  insulin lispro (ADMELOG) corrective regimen sliding scale   SubCutaneous Before meals and at bedtime  morphine  - Injectable 2 milliGRAM(s) IV Push every 6 hours PRN  polyethylene glycol 3350 17 Gram(s) Oral daily PRN  senna 2 Tablet(s) Oral at bedtime PRN      HEALTH ISSUES - PROBLEM Dx:  Pulmonary embolus    Acute hypoxemic respiratory failure    Acute right heart failure    Fibroids    Vaginal bleeding    Fever    Urinary retention    Anemia    Asthma    Syncope    Prophylactic measure    Pulmonary thromboembolism    Pulmonary embolism with acute cor pulmonale    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

## 2023-01-19 NOTE — PROGRESS NOTE ADULT - TIME BILLING
Patient seen and examined with house-staff during bedside rounds.  Resident note read, including vitals, physical findings, laboratory data, and radiological reports.   Revisions included below.  Direct personal management at bed side and extensive interpretation of the data.  Plan was outlined and discussed in details with the housestaff.  Decision making of high complexity  Action taken for acute disease activity to reflect the level of care provided:  - medication reconciliation  - review laboratory data  Patient clinically stable.  Discussed case with vascular and interventional radiology.  We will hold on the filter is in few vena cava or the iliacs as the size of the uterus is with the fibroids still larger my dislodged the filter and erode into the wall.  Patient is clinically stable.  Echocardiogram is unchanged.  The patient most likely has chronic thromboembolic pulmonary hypertension we will repeat the echocardiogram in 3 months and decide whether the patient required treatment for her pulmonary hypertension or not.  In the meantime changed to apixaban.  Increase activity monitor oxygen saturation with exertion on room air.  Hemoglobin is

## 2023-01-19 NOTE — PROGRESS NOTE ADULT - PROBLEM SELECTOR PROBLEM 3
Asthma
Syncope
Acute right heart failure
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
Asthma
Acute right heart failure
Acute right heart failure
Asthma

## 2023-01-19 NOTE — PROGRESS NOTE ADULT - PROBLEM SELECTOR PLAN 9
Holding steroids and albuterol as patient is asymptomatic  - No home asthma meds, as patient states she did not need any before onset of symptoms from Urgent Care.
Holding steroids and albuterol as patient is asymptomatic  - No home asthma meds, as patient states she did not need any before onset of symptoms from Urgent Care.
continue ionotrops and pressors BP is stable
Right heart catheterization confirmed worsening of her right heart function with cardiac index is down to 1.3 and requiring vasopressin and inotropes.  We will follow-up with the repeat echocardiogram and possible reinsertion of right heart catheterization.

## 2023-01-20 ENCOUNTER — TRANSCRIPTION ENCOUNTER (OUTPATIENT)
Age: 33
End: 2023-01-20

## 2023-01-20 VITALS — TEMPERATURE: 99 F

## 2023-01-20 LAB
ALBUMIN SERPL ELPH-MCNC: 3 G/DL — LOW (ref 3.3–5)
ALP SERPL-CCNC: 112 U/L — SIGNIFICANT CHANGE UP (ref 40–120)
ALT FLD-CCNC: 52 U/L — HIGH (ref 10–45)
ANION GAP SERPL CALC-SCNC: 12 MMOL/L — SIGNIFICANT CHANGE UP (ref 5–17)
AST SERPL-CCNC: 45 U/L — HIGH (ref 10–40)
BASOPHILS # BLD AUTO: 0.06 K/UL — SIGNIFICANT CHANGE UP (ref 0–0.2)
BASOPHILS NFR BLD AUTO: 1.1 % — SIGNIFICANT CHANGE UP (ref 0–2)
BILIRUB SERPL-MCNC: 0.3 MG/DL — SIGNIFICANT CHANGE UP (ref 0.2–1.2)
BUN SERPL-MCNC: 10 MG/DL — SIGNIFICANT CHANGE UP (ref 7–23)
CALCIUM SERPL-MCNC: 9.4 MG/DL — SIGNIFICANT CHANGE UP (ref 8.4–10.5)
CHLORIDE SERPL-SCNC: 101 MMOL/L — SIGNIFICANT CHANGE UP (ref 96–108)
CO2 SERPL-SCNC: 25 MMOL/L — SIGNIFICANT CHANGE UP (ref 22–31)
CREAT SERPL-MCNC: 0.81 MG/DL — SIGNIFICANT CHANGE UP (ref 0.5–1.3)
CULTURE RESULTS: SIGNIFICANT CHANGE UP
CULTURE RESULTS: SIGNIFICANT CHANGE UP
EGFR: 99 ML/MIN/1.73M2 — SIGNIFICANT CHANGE UP
EOSINOPHIL # BLD AUTO: 0.17 K/UL — SIGNIFICANT CHANGE UP (ref 0–0.5)
EOSINOPHIL NFR BLD AUTO: 3.1 % — SIGNIFICANT CHANGE UP (ref 0–6)
GLUCOSE BLDC GLUCOMTR-MCNC: 100 MG/DL — HIGH (ref 70–99)
GLUCOSE BLDC GLUCOMTR-MCNC: 106 MG/DL — HIGH (ref 70–99)
GLUCOSE SERPL-MCNC: 103 MG/DL — HIGH (ref 70–99)
HCT VFR BLD CALC: 28.4 % — LOW (ref 34.5–45)
HGB BLD-MCNC: 8.6 G/DL — LOW (ref 11.5–15.5)
IMM GRANULOCYTES NFR BLD AUTO: 1.1 % — HIGH (ref 0–0.9)
LACTATE SERPL-SCNC: 1.8 MMOL/L — SIGNIFICANT CHANGE UP (ref 0.5–2)
LYMPHOCYTES # BLD AUTO: 1.88 K/UL — SIGNIFICANT CHANGE UP (ref 1–3.3)
LYMPHOCYTES # BLD AUTO: 34.4 % — SIGNIFICANT CHANGE UP (ref 13–44)
MAGNESIUM SERPL-MCNC: 2.1 MG/DL — SIGNIFICANT CHANGE UP (ref 1.6–2.6)
MCHC RBC-ENTMCNC: 24.4 PG — LOW (ref 27–34)
MCHC RBC-ENTMCNC: 30.3 GM/DL — LOW (ref 32–36)
MCV RBC AUTO: 80.7 FL — SIGNIFICANT CHANGE UP (ref 80–100)
MONOCYTES # BLD AUTO: 0.49 K/UL — SIGNIFICANT CHANGE UP (ref 0–0.9)
MONOCYTES NFR BLD AUTO: 9 % — SIGNIFICANT CHANGE UP (ref 2–14)
NEUTROPHILS # BLD AUTO: 2.8 K/UL — SIGNIFICANT CHANGE UP (ref 1.8–7.4)
NEUTROPHILS NFR BLD AUTO: 51.3 % — SIGNIFICANT CHANGE UP (ref 43–77)
NRBC # BLD: 0 /100 WBCS — SIGNIFICANT CHANGE UP (ref 0–0)
NT-PROBNP SERPL-SCNC: 1040 PG/ML — HIGH (ref 0–300)
PHOSPHATE SERPL-MCNC: 5 MG/DL — HIGH (ref 2.5–4.5)
PLATELET # BLD AUTO: 261 K/UL — SIGNIFICANT CHANGE UP (ref 150–400)
POTASSIUM SERPL-MCNC: 4.1 MMOL/L — SIGNIFICANT CHANGE UP (ref 3.5–5.3)
POTASSIUM SERPL-SCNC: 4.1 MMOL/L — SIGNIFICANT CHANGE UP (ref 3.5–5.3)
PROT SERPL-MCNC: 8 G/DL — SIGNIFICANT CHANGE UP (ref 6–8.3)
RBC # BLD: 3.52 M/UL — LOW (ref 3.8–5.2)
RBC # FLD: 18.4 % — HIGH (ref 10.3–14.5)
SODIUM SERPL-SCNC: 138 MMOL/L — SIGNIFICANT CHANGE UP (ref 135–145)
SPECIMEN SOURCE: SIGNIFICANT CHANGE UP
SPECIMEN SOURCE: SIGNIFICANT CHANGE UP
TROPONIN T SERPL-MCNC: <0.01 NG/ML — SIGNIFICANT CHANGE UP (ref 0–0.01)
WBC # BLD: 5.46 K/UL — SIGNIFICANT CHANGE UP (ref 3.8–10.5)
WBC # FLD AUTO: 5.46 K/UL — SIGNIFICANT CHANGE UP (ref 3.8–10.5)

## 2023-01-20 PROCEDURE — 84436 ASSAY OF TOTAL THYROXINE: CPT

## 2023-01-20 PROCEDURE — 71045 X-RAY EXAM CHEST 1 VIEW: CPT

## 2023-01-20 PROCEDURE — 74177 CT ABD & PELVIS W/CONTRAST: CPT

## 2023-01-20 PROCEDURE — 99238 HOSP IP/OBS DSCHRG MGMT 30/<: CPT | Mod: GC

## 2023-01-20 PROCEDURE — C8929: CPT

## 2023-01-20 PROCEDURE — 82803 BLOOD GASES ANY COMBINATION: CPT

## 2023-01-20 PROCEDURE — 86850 RBC ANTIBODY SCREEN: CPT

## 2023-01-20 PROCEDURE — 76000 FLUOROSCOPY <1 HR PHYS/QHP: CPT

## 2023-01-20 PROCEDURE — 85379 FIBRIN DEGRADATION QUANT: CPT

## 2023-01-20 PROCEDURE — C1757: CPT

## 2023-01-20 PROCEDURE — C1887: CPT

## 2023-01-20 PROCEDURE — 84100 ASSAY OF PHOSPHORUS: CPT

## 2023-01-20 PROCEDURE — 99291 CRITICAL CARE FIRST HOUR: CPT | Mod: 25

## 2023-01-20 PROCEDURE — 83880 ASSAY OF NATRIURETIC PEPTIDE: CPT

## 2023-01-20 PROCEDURE — 96374 THER/PROPH/DIAG INJ IV PUSH: CPT

## 2023-01-20 PROCEDURE — 82962 GLUCOSE BLOOD TEST: CPT

## 2023-01-20 PROCEDURE — 82553 CREATINE MB FRACTION: CPT

## 2023-01-20 PROCEDURE — 80048 BASIC METABOLIC PNL TOTAL CA: CPT

## 2023-01-20 PROCEDURE — 86900 BLOOD TYPING SEROLOGIC ABO: CPT

## 2023-01-20 PROCEDURE — 85613 RUSSELL VIPER VENOM DILUTED: CPT

## 2023-01-20 PROCEDURE — 86923 COMPATIBILITY TEST ELECTRIC: CPT

## 2023-01-20 PROCEDURE — 84443 ASSAY THYROID STIM HORMONE: CPT

## 2023-01-20 PROCEDURE — C1880: CPT

## 2023-01-20 PROCEDURE — 93306 TTE W/DOPPLER COMPLETE: CPT

## 2023-01-20 PROCEDURE — 72125 CT NECK SPINE W/O DYE: CPT

## 2023-01-20 PROCEDURE — 83036 HEMOGLOBIN GLYCOSYLATED A1C: CPT

## 2023-01-20 PROCEDURE — 81001 URINALYSIS AUTO W/SCOPE: CPT

## 2023-01-20 PROCEDURE — 82330 ASSAY OF CALCIUM: CPT

## 2023-01-20 PROCEDURE — 88304 TISSUE EXAM BY PATHOLOGIST: CPT

## 2023-01-20 PROCEDURE — 96361 HYDRATE IV INFUSION ADD-ON: CPT

## 2023-01-20 PROCEDURE — 85018 HEMOGLOBIN: CPT

## 2023-01-20 PROCEDURE — 84484 ASSAY OF TROPONIN QUANT: CPT

## 2023-01-20 PROCEDURE — 83605 ASSAY OF LACTIC ACID: CPT

## 2023-01-20 PROCEDURE — 86146 BETA-2 GLYCOPROTEIN ANTIBODY: CPT

## 2023-01-20 PROCEDURE — C1769: CPT

## 2023-01-20 PROCEDURE — 86901 BLOOD TYPING SEROLOGIC RH(D): CPT

## 2023-01-20 PROCEDURE — C1889: CPT

## 2023-01-20 PROCEDURE — 87640 STAPH A DNA AMP PROBE: CPT

## 2023-01-20 PROCEDURE — 85610 PROTHROMBIN TIME: CPT

## 2023-01-20 PROCEDURE — 81025 URINE PREGNANCY TEST: CPT

## 2023-01-20 PROCEDURE — 84702 CHORIONIC GONADOTROPIN TEST: CPT

## 2023-01-20 PROCEDURE — 84295 ASSAY OF SERUM SODIUM: CPT

## 2023-01-20 PROCEDURE — 85598 HEXAGNAL PHOSPH PLTLT NEUTRL: CPT

## 2023-01-20 PROCEDURE — 93970 EXTREMITY STUDY: CPT

## 2023-01-20 PROCEDURE — 86022 PLATELET ANTIBODIES: CPT

## 2023-01-20 PROCEDURE — 85730 THROMBOPLASTIN TIME PARTIAL: CPT

## 2023-01-20 PROCEDURE — 70450 CT HEAD/BRAIN W/O DYE: CPT

## 2023-01-20 PROCEDURE — 83735 ASSAY OF MAGNESIUM: CPT

## 2023-01-20 PROCEDURE — 93321 DOPPLER ECHO F-UP/LMTD STD: CPT

## 2023-01-20 PROCEDURE — 93308 TTE F-UP OR LMTD: CPT

## 2023-01-20 PROCEDURE — 83090 ASSAY OF HOMOCYSTEINE: CPT

## 2023-01-20 PROCEDURE — 99232 SBSQ HOSP IP/OBS MODERATE 35: CPT

## 2023-01-20 PROCEDURE — C1894: CPT

## 2023-01-20 PROCEDURE — 80053 COMPREHEN METABOLIC PANEL: CPT

## 2023-01-20 PROCEDURE — 85027 COMPLETE CBC AUTOMATED: CPT

## 2023-01-20 PROCEDURE — 87040 BLOOD CULTURE FOR BACTERIA: CPT

## 2023-01-20 PROCEDURE — 84132 ASSAY OF SERUM POTASSIUM: CPT

## 2023-01-20 PROCEDURE — 85025 COMPLETE CBC W/AUTO DIFF WBC: CPT

## 2023-01-20 PROCEDURE — 0225U NFCT DS DNA&RNA 21 SARSCOV2: CPT

## 2023-01-20 PROCEDURE — 81240 F2 GENE: CPT

## 2023-01-20 PROCEDURE — 85307 ASSAY ACTIVATED PROTEIN C: CPT

## 2023-01-20 PROCEDURE — 93005 ELECTROCARDIOGRAM TRACING: CPT

## 2023-01-20 PROCEDURE — 71275 CT ANGIOGRAPHY CHEST: CPT | Mod: MC

## 2023-01-20 PROCEDURE — 87641 MR-STAPH DNA AMP PROBE: CPT

## 2023-01-20 PROCEDURE — 36415 COLL VENOUS BLD VENIPUNCTURE: CPT

## 2023-01-20 PROCEDURE — 81241 F5 GENE: CPT

## 2023-01-20 PROCEDURE — 82550 ASSAY OF CK (CPK): CPT

## 2023-01-20 PROCEDURE — 84439 ASSAY OF FREE THYROXINE: CPT

## 2023-01-20 RX ORDER — APIXABAN 2.5 MG/1
1 TABLET, FILM COATED ORAL
Qty: 120 | Refills: 0
Start: 2023-01-20 | End: 2023-03-20

## 2023-01-20 RX ADMIN — APIXABAN 10 MILLIGRAM(S): 2.5 TABLET, FILM COATED ORAL at 05:12

## 2023-01-20 RX ADMIN — Medication 975 MILLIGRAM(S): at 12:01

## 2023-01-20 RX ADMIN — Medication 975 MILLIGRAM(S): at 06:12

## 2023-01-20 RX ADMIN — Medication 975 MILLIGRAM(S): at 00:25

## 2023-01-20 RX ADMIN — Medication 975 MILLIGRAM(S): at 00:23

## 2023-01-20 RX ADMIN — CHLORHEXIDINE GLUCONATE 1 APPLICATION(S): 213 SOLUTION TOPICAL at 05:15

## 2023-01-20 RX ADMIN — Medication 975 MILLIGRAM(S): at 05:12

## 2023-01-20 NOTE — PROGRESS NOTE ADULT - ASSESSMENT
31 yo female with PMHx of asthma and anemia 2/2 uterine fibroids w/ estrogen ring in place( now removed by GYN) with planned myomectomy in Feb 2023 presented with SOB. She was originally admitted to MICU for acute PE, initially managed with IV heparin. Worsened clinically and underwent Inari thrombectomy on 1/13/23. Did well after that and worsened again on 1/15/23 with tachycardia and hypoxia. Returned to cath lab where pulmonary angiogram showed new thrombus in distal main R PA. RHC numbers consistent with cardiogenic shock. She is now s/p EKOS in the CCU. Was initially on Lay and milrinone which has since been weaned off. She has a significant thrombus burden ( extensive bilateral external iliac and CFV thrombosis). Was planned for IVC filter placement but procedure was unsuccessful. Heart failure following for RV failure.     #RV failure   In the setting of acute PE c/b shock    RHC on 1/15: RAP~13, PAP-60/20 with PA sat~21% (!) with CI-1.4. Patient was transferred to CCU on EKOS and started on milrinone, vasopressin and Lay which has since been weaned off  TTE 1/16:  normal LV size and function, RV dilated with reduced RV function, dilated RA, mild- mod TR   TTE: 1/20: moderately reduced RV function with mod dilated RV size. CO 6.25, CI 2.79     - RV failure in the setting of acute PE which is slowly improving   - c/w Eliquis for PE per Pulm team   - Still with significant thrombus burden and unsuccessful IVC filter placement. Appreciate further Vascular/ IR recs for further options   - FIbroids with significant mass effect on adjacent structures. No surgical plans as of now given acute PE. Her Nuvaring has now been removed by GYN     HF to sign off. Please reconsult if any questions

## 2023-01-20 NOTE — DISCHARGE NOTE NURSING/CASE MANAGEMENT/SOCIAL WORK - PATIENT PORTAL LINK FT
You can access the FollowMyHealth Patient Portal offered by [facility] by registering at the following website: [email or web address]. By joining [facility]’s FollowMyHealth portal, you will also be able to view your health information using other applications (apps) compatible with our system.

## 2023-01-20 NOTE — DISCHARGE NOTE PROVIDER - HOSPITAL COURSE
#Discharge: do not delete    Pt is a 33 yo F w/ PMH asthma who presents to the ED for worsening chest pain and cough, found to have large bilateral PE with RV strain on CT imaging.       Problem List/Main Diagnoses (system-based):  1. PE: On admission, CT PE showing large thrombus burden involving all lobar and multiple segmental branches bilaterally w/ probable evolving infarcts in lateral basal segment of LLL and mild pulmonary artery dilation w/ RV strain and cardiomegaly. TTE with persistently dilated RV size, mod-severely reduced RV function, pHTN. Started on heparin gtt and transitioned to Lovenox 100 mg SQ q12h. Etiology likely unprovoked PE in nature. Risk factors include Nuvaring contraceptive use and obesity. On 1/15, pt was stepped up to MICU -> CCU due to worsening tachycardia and decreasing O2 saturation, with bedside POCUS showing persistent R-sided HF and uptrending pro-BNP, indicating progressive cardiac decompensation. Cardiac function improved s/p milrinone gtt and inhaled NO, in addition to continued anticoagulation. IVC filter placement was not completed due to large IVC.          Inpatient treatment course:         Patient was discharged to: (home/[facility]/acute rehab/hospice, etc, and with what services – home health PT/RN? Home O2?)         New medications:    Changes to old medications:    Medications that were stopped:         Items to follow up as outpatient:         Physical exam at the time of discharge: #Discharge: do not delete    Pt is a 31 yo F w/ PMH asthma who presents to the ED for worsening chest pain and cough, found to have large bilateral PE with RV strain on CT imaging.       Problem List/Main Diagnoses (system-based):  1. PE: On admission, CT PE showing large thrombus burden involving all lobar and multiple segmental branches bilaterally w/ probable evolving infarcts in lateral basal segment of LLL and mild pulmonary artery dilation w/ RV strain and cardiomegaly. TTE with persistently dilated RV size, mod-severely reduced RV function, pHTN. Started on heparin gtt and transitioned to Lovenox 100 mg SQ q12h. Etiology likely unprovoked PE in nature. Risk factors include Nuvaring contraceptive use and obesity. On 1/15, pt was stepped up to MICU -> CCU due to worsening tachycardia and decreasing O2 saturation, with bedside POCUS showing persistent R-sided HF and uptrending pro-BNP, indicating progressive cardiac decompensation. Cardiac function improved s/p milrinone gtt and inhaled NO, in addition to continued anticoagulation. IVC filter placement was not completed due to large IVC.     2. Acute hypoxemic respiratory failure: Likely 2/2 V/Q mismatch in setting of massive PE. On discharge, was stable on RA.    3. Acute R heart failure: TTE on admission showed severely dilated RV size, mod-severely reduced RV function, mod TR, pHTN (PASP 57), normal LV size/function. Continued to have worsening R heart failure on subsequent TTEs, ultimately requiring CCU admission for close hemodynamic monitoring, milrinone gtt, inhaled NO, and continued anticoagulation. Pt's cardiac function improved on subsequent TTEs prior to discharge.    4. Fibroids: Pt has chronic lower abdominal pain 2/2 uterine fibroids. CTAP showed markedly bulky multi fibroid uterus with mass effect upon the external iliac veins, bilateral external iliac vein and common femoral vein thrombosis, no evidence of extension of thrombus into the IVC, and mild left hydroureteronephrosis secondary to mass effect of the bulky multi fibroid uterus upon the left ureter at the level of the pelvic sidewall. Concern for low-flow state in iliac veins 2/2 mass effect from uterus causing low-flow state predisposing pt to DVTs. Pt was originally planned for abdominal myomectomy with Dr. Telles in Feb 2023, will likely need myomectomy or hysterectomy in order to relieve uterine mass effect from iliac veins. Surgery was deferred while inpatient due to high cardiac risk.        Inpatient treatment course:         Patient was discharged to: (home/[facility]/acute rehab/hospice, etc, and with what services – home health PT/RN? Home O2?)         New medications:    Changes to old medications:    Medications that were stopped:         Items to follow up as outpatient:         Physical exam at the time of discharge: #Discharge: do not delete    Pt is a 33 yo F w/ PMH asthma who presents to the ED for worsening chest pain and cough, found to have large bilateral PE with RV strain on CT imaging.       Problem List/Main Diagnoses (system-based):  1. PE: On admission, CT PE showing large thrombus burden involving all lobar and multiple segmental branches bilaterally w/ probable evolving infarcts in lateral basal segment of LLL and mild pulmonary artery dilation w/ RV strain and cardiomegaly. TTE with persistently dilated RV size, mod-severely reduced RV function, pHTN. Started on heparin gtt and transitioned to Lovenox 100 mg SQ q12h. Etiology likely unprovoked PE in nature. Risk factors include Nuvaring contraceptive use and obesity. On 1/15, pt was stepped up to MICU -> CCU due to worsening tachycardia and decreasing O2 saturation, with bedside POCUS showing persistent R-sided HF and uptrending pro-BNP, indicating progressive cardiac decompensation. Cardiac function improved s/p milrinone gtt and inhaled NO, in addition to continued anticoagulation. IVC filter placement was not completed due to large IVC.     2. Acute hypoxemic respiratory failure: Likely 2/2 V/Q mismatch in setting of massive PE. On discharge, was stable on RA.    3. Acute R heart failure: TTE on admission showed severely dilated RV size, mod-severely reduced RV function, mod TR, pHTN (PASP 57), normal LV size/function. Continued to have worsening R heart failure on subsequent TTEs, ultimately requiring CCU admission for close hemodynamic monitoring, milrinone gtt, inhaled NO, and continued anticoagulation. Pt's cardiac function improved on subsequent TTEs prior to discharge.    4. Fibroids: Pt has chronic lower abdominal pain 2/2 uterine fibroids. CTAP showed markedly bulky multi fibroid uterus with mass effect upon the external iliac veins, bilateral external iliac vein and common femoral vein thrombosis, no evidence of extension of thrombus into the IVC, and mild left hydroureteronephrosis secondary to mass effect of the bulky multi fibroid uterus upon the left ureter at the level of the pelvic sidewall. Concern for low-flow state in iliac veins 2/2 mass effect from uterus causing low-flow state predisposing pt to DVTs. Pt was originally planned for abdominal myomectomy with Dr. Telles in Feb 2023, will likely need myomectomy or hysterectomy in order to relieve uterine mass effect from iliac veins. Surgery was deferred while inpatient due to high cardiac risk.    5. Vaginal bleeding: Pt has been using Nuvaring for contraception for >1 year but has been continuously replacing her Nuvaring without allowing for withdrawal bleed since 9/2022. Nuvaring removed 1/16/23. Pt now on menstrual cycle, vaginal bleeding noted during pelvic exam.    6. Fever: Pt febrile to 101.3, most likely 2/2 SIRS. Sepsis workup (BCx, UA) negative. Received 3 day course of CTX.     7. Urinary retention: 2/2 mass effect from uterine fibroids. Also with hydroureteronephrosis. Galan placed 1/16 and removed 1/18, passed TOV.    8. Asthma: Holding steroids and albuterol as pt asymptomatic.     9. Syncope: On 1/14 in early AM, pt on bedside commode when she apparently synopsized. Patient examined at bedside, able to communicate, was moving her neck without any pain or limitations, neurological exam was benign. CTH, CT C-spine, CXR WNL. Pt re-evaluated multiple times, mentating at baseline and physical exam unremarkable.       Inpatient treatment course: Heparin gtt -> Lovenox 100 mg SQ q12h, milrinone gtt, inhaled NO, ceftriaxone x3 days          Patient was discharged to: Home         New medications:    Changes to old medications:    Medications that were stopped:         Items to follow up as outpatient:         Physical exam at the time of discharge: #Discharge: do not delete    Pt is a 33 yo F w/ PMH asthma who presents to the ED for worsening chest pain and cough, found to have large bilateral PE with RV strain on CT imaging.       Problem List/Main Diagnoses (system-based):  1. PE: On admission, CT PE showing large thrombus burden involving all lobar and multiple segmental branches bilaterally w/ probable evolving infarcts in lateral basal segment of LLL and mild pulmonary artery dilation w/ RV strain and cardiomegaly. TTE with persistently dilated RV size, mod-severely reduced RV function, pHTN. Started on heparin gtt and transitioned to Lovenox 100 mg SQ q12h. Etiology likely unprovoked PE in nature. Risk factors include Nuvaring contraceptive use and obesity. On 1/15, pt was stepped up to MICU -> CCU due to worsening tachycardia and decreasing O2 saturation, with bedside POCUS showing persistent R-sided HF and uptrending pro-BNP, indicating progressive cardiac decompensation. Cardiac function improved s/p milrinone gtt and inhaled NO, in addition to continued anticoagulation. IVC filter placement was not completed due to large IVC.     2. Acute hypoxemic respiratory failure: Likely 2/2 V/Q mismatch in setting of massive PE. On discharge, was stable on RA.    3. Acute R heart failure: TTE on admission showed severely dilated RV size, mod-severely reduced RV function, mod TR, pHTN (PASP 57), normal LV size/function. Continued to have worsening R heart failure on subsequent TTEs, ultimately requiring CCU admission for close hemodynamic monitoring, milrinone gtt, inhaled NO, and continued anticoagulation. Pt's cardiac function improved on subsequent TTEs prior to discharge.    4. Fibroids: Pt has chronic lower abdominal pain 2/2 uterine fibroids. CTAP showed markedly bulky multi fibroid uterus with mass effect upon the external iliac veins, bilateral external iliac vein and common femoral vein thrombosis, no evidence of extension of thrombus into the IVC, and mild left hydroureteronephrosis secondary to mass effect of the bulky multi fibroid uterus upon the left ureter at the level of the pelvic sidewall. Concern for low-flow state in iliac veins 2/2 mass effect from uterus causing low-flow state predisposing pt to DVTs. Pt was originally planned for abdominal myomectomy with Dr. Telles in Feb 2023, will likely need myomectomy or hysterectomy in order to relieve uterine mass effect from iliac veins. Surgery was deferred while inpatient due to high cardiac risk.    5. Vaginal bleeding: Pt has been using Nuvaring for contraception for >1 year but has been continuously replacing her Nuvaring without allowing for withdrawal bleed since 9/2022. Nuvaring removed 1/16/23. Pt now on menstrual cycle, vaginal bleeding noted during pelvic exam.    6. Fever: Pt febrile to 101.3, most likely 2/2 SIRS. Sepsis workup (BCx, UA) negative. Received 3 day course of CTX.     7. Urinary retention: 2/2 mass effect from uterine fibroids. Also with hydroureteronephrosis. Galan placed 1/16 and removed 1/18, passed TOV.    8. Asthma: Holding steroids and albuterol as pt asymptomatic.     9. Syncope: On 1/14 in early AM, pt on bedside commode when she apparently synopsized. Patient examined at bedside, able to communicate, was moving her neck without any pain or limitations, neurological exam was benign. CTH, CT C-spine, CXR WNL. Pt re-evaluated multiple times, mentating at baseline and physical exam unremarkable.       Inpatient treatment course: Heparin gtt -> Lovenox 100 mg SQ q12h, milrinone gtt, inhaled NO, ceftriaxone x3 days          Patient was discharged to: Home         New medications: Eliquis 10 mg BID from 1/19-1/25, then 5 mg BID     Changes to old medications: None    Medications that were stopped: Nuvaring          Items to follow up as outpatient: PCP, Pulmonary, Vascular Surgery, OBGYN         Physical exam at the time of discharge:  General: NAD, resting comfortably   HEENT: NC/AT; EOMI, PERRLA, anicteric sclera; moist mucosal membranes.  Neck: supple, trachea midline  Cardiovascular: RRR, +S1/S2; NO M/R/G  Respiratory: CTA B/L; no W/R/R  Gastrointestinal: soft, ND; +BSx4. Slightly TTP RLQ, LLQ   Extremities: WWP; no edema or cyanosis. LLE larger in size than RLE.   Vascular: 2+ radial, DP/PT pulses B/L  Neurological: AAOx3; no focal deficits

## 2023-01-20 NOTE — PROGRESS NOTE ADULT - PROVIDER SPECIALTY LIST ADULT
Heart Failure
Internal Medicine
Pulmonology
Vascular Surgery
Internal Medicine
MICU
Vascular Surgery
Vascular Surgery
CCU
GYN
GYN
Structural Heart
Structural Heart
CCU
Pulmonology
Internal Medicine
Pulmonology

## 2023-01-20 NOTE — DISCHARGE NOTE NURSING/CASE MANAGEMENT/SOCIAL WORK - FLU SEASON?
How Severe Is Your Acne?: moderate
Is This A New Presentation, Or A Follow-Up?: Follow Up Acne
Yes...

## 2023-01-20 NOTE — DISCHARGE NOTE PROVIDER - CARE PROVIDERS DIRECT ADDRESSES
[email or web address]

## 2023-01-20 NOTE — PROGRESS NOTE ADULT - SUBJECTIVE AND OBJECTIVE BOX
INTERVAL EVENTS:    PAST MEDICAL & SURGICAL HISTORY:  Asthma    Fibroids    Anemia    No significant past surgical history        MEDICATIONS  (STANDING):  acetaminophen     Tablet .. 975 milliGRAM(s) Oral every 6 hours  apixaban 10 milliGRAM(s) Oral every 12 hours  chlorhexidine 2% Cloths 1 Application(s) Topical <User Schedule>  dextrose 5%. 1000 milliLiter(s) (50 mL/Hr) IV Continuous <Continuous>  dextrose 5%. 1000 milliLiter(s) (100 mL/Hr) IV Continuous <Continuous>  dextrose 50% Injectable 25 Gram(s) IV Push once  dextrose 50% Injectable 12.5 Gram(s) IV Push once  dextrose 50% Injectable 25 Gram(s) IV Push once  glucagon  Injectable 1 milliGRAM(s) IntraMuscular once  influenza   Vaccine 0.5 milliLiter(s) IntraMuscular once  insulin lispro (ADMELOG) corrective regimen sliding scale   SubCutaneous Before meals and at bedtime  melatonin 3 milliGRAM(s) Oral at bedtime    MEDICATIONS  (PRN):  dextrose Oral Gel 15 Gram(s) Oral once PRN Blood Glucose LESS THAN 70 milliGRAM(s)/deciliter  morphine  - Injectable 4 milliGRAM(s) IV Push every 6 hours PRN Severe Pain (7 - 10)  polyethylene glycol 3350 17 Gram(s) Oral daily PRN Constipation  senna 2 Tablet(s) Oral at bedtime PRN Constipation    Vital Signs Last 24 Hrs  T(C): 37.1 (20 Jan 2023 13:56), Max: 37.2 (20 Jan 2023 09:18)  T(F): 98.7 (20 Jan 2023 13:56), Max: 98.9 (20 Jan 2023 09:18)  HR: 87 (20 Jan 2023 12:05) (86 - 105)  BP: 136/83 (20 Jan 2023 12:05) (130/87 - 142/79)  BP(mean): 103 (20 Jan 2023 12:05) (98 - 106)  RR: 18 (20 Jan 2023 12:05) (17 - 20)  SpO2: 100% (20 Jan 2023 12:05) (99% - 100%)    Parameters below as of 20 Jan 2023 12:05  Patient On (Oxygen Delivery Method): room air        PHYSICAL EXAM:  GEN: Awake, alert. NAD.   HEENT: NCAT, PERRL, EOMI. Mucosa moist. No JVD.  RESP: CTA b/l  CV: RRR. Normal S1/S2. No m/r/g.  ABD: Soft. NT/ND. BS+  EXT: Warm. No edema, clubbing, or cyanosis.   NEURO: AAOx3. No focal deficits.     LABS:                        8.6    5.46  )-----------( 261      ( 20 Jan 2023 06:48 )             28.4     01-20    138  |  101  |  10  ----------------------------<  103<H>  4.1   |  25  |  0.81    Ca    9.4      20 Jan 2023 06:48  Phos  5.0     01-20  Mg     2.1     01-20    TPro  8.0  /  Alb  3.0<L>  /  TBili  0.3  /  DBili  x   /  AST  45<H>  /  ALT  52<H>  /  AlkPhos  112  01-20    CARDIAC MARKERS ( 20 Jan 2023 06:48 )  x     / <0.01 ng/mL / x     / x     / x      CARDIAC MARKERS ( 19 Jan 2023 05:30 )  x     / 0.01 ng/mL / x     / x     / x              I&O's Summary    19 Jan 2023 07:01  -  20 Jan 2023 07:00  --------------------------------------------------------  IN: 755 mL / OUT: 0 mL / NET: 755 mL    20 Jan 2023 07:01  -  20 Jan 2023 15:59  --------------------------------------------------------  IN: 620 mL / OUT: 500 mL / NET: 120 mL      RADIOLOGY & ADDITIONAL STUDIES:  TELEMETRY:  EKG:         INTERVAL EVENTS: IVC filter was cancelled yesterday. She is doing well. Walked around the unit without VIEIRA. She feels less SOB now.     PAST MEDICAL & SURGICAL HISTORY:  Asthma    Fibroids    Anemia    No significant past surgical history        MEDICATIONS  (STANDING):  acetaminophen     Tablet .. 975 milliGRAM(s) Oral every 6 hours  apixaban 10 milliGRAM(s) Oral every 12 hours  chlorhexidine 2% Cloths 1 Application(s) Topical <User Schedule>  dextrose 5%. 1000 milliLiter(s) (50 mL/Hr) IV Continuous <Continuous>  dextrose 5%. 1000 milliLiter(s) (100 mL/Hr) IV Continuous <Continuous>  dextrose 50% Injectable 25 Gram(s) IV Push once  dextrose 50% Injectable 12.5 Gram(s) IV Push once  dextrose 50% Injectable 25 Gram(s) IV Push once  glucagon  Injectable 1 milliGRAM(s) IntraMuscular once  influenza   Vaccine 0.5 milliLiter(s) IntraMuscular once  insulin lispro (ADMELOG) corrective regimen sliding scale   SubCutaneous Before meals and at bedtime  melatonin 3 milliGRAM(s) Oral at bedtime    MEDICATIONS  (PRN):  dextrose Oral Gel 15 Gram(s) Oral once PRN Blood Glucose LESS THAN 70 milliGRAM(s)/deciliter  morphine  - Injectable 4 milliGRAM(s) IV Push every 6 hours PRN Severe Pain (7 - 10)  polyethylene glycol 3350 17 Gram(s) Oral daily PRN Constipation  senna 2 Tablet(s) Oral at bedtime PRN Constipation    Vital Signs Last 24 Hrs  T(C): 37.1 (20 Jan 2023 13:56), Max: 37.2 (20 Jan 2023 09:18)  T(F): 98.7 (20 Jan 2023 13:56), Max: 98.9 (20 Jan 2023 09:18)  HR: 87 (20 Jan 2023 12:05) (86 - 105)  BP: 136/83 (20 Jan 2023 12:05) (130/87 - 142/79)  BP(mean): 103 (20 Jan 2023 12:05) (98 - 106)  RR: 18 (20 Jan 2023 12:05) (17 - 20)  SpO2: 100% (20 Jan 2023 12:05) (99% - 100%)    Parameters below as of 20 Jan 2023 12:05  Patient On (Oxygen Delivery Method): room air        PHYSICAL EXAM:  GEN: Awake, alert. NAD.   HEENT: NCAT, PERRL, EOMI. Mucosa moist. No JVD.  RESP: CTA b/l  CV: RRR. Normal S1/S2. No m/r/g.  ABD: Soft. NT/ND. BS+  EXT: Warm. L> R leg. No pitting edema     LABS:                        8.6    5.46  )-----------( 261      ( 20 Jan 2023 06:48 )             28.4     01-20    138  |  101  |  10  ----------------------------<  103<H>  4.1   |  25  |  0.81    Ca    9.4      20 Jan 2023 06:48  Phos  5.0     01-20  Mg     2.1     01-20    TPro  8.0  /  Alb  3.0<L>  /  TBili  0.3  /  DBili  x   /  AST  45<H>  /  ALT  52<H>  /  AlkPhos  112  01-20    CARDIAC MARKERS ( 20 Jan 2023 06:48 )  x     / <0.01 ng/mL / x     / x     / x      CARDIAC MARKERS ( 19 Jan 2023 05:30 )  x     / 0.01 ng/mL / x     / x     / x              I&O's Summary    19 Jan 2023 07:01  -  20 Jan 2023 07:00  --------------------------------------------------------  IN: 755 mL / OUT: 0 mL / NET: 755 mL    20 Jan 2023 07:01  -  20 Jan 2023 15:59  --------------------------------------------------------  IN: 620 mL / OUT: 500 mL / NET: 120 mL

## 2023-01-20 NOTE — CONSULT NOTE ADULT - CONSULT REQUESTED DATE/TIME
11-Jan-2023 13:32
16-Jan-2023 08:55
11-Jan-2023 13:31
15-Trevon-2023 17:55
20-Jan-2023 15:07
17-Jan-2023 13:01
13-Jan-2023 16:14
19-Jan-2023 09:48

## 2023-01-20 NOTE — CONSULT NOTE ADULT - ASSESSMENT
33 yo female with PMHx of asthma and anemia 2/2 myomectomy, on birth control (Nuvaring) presented with 1 week of productive cough. Vascular surgery consulted for L LE pain. Patient not c/o pain, only concerned because of swelling. Known multifibroid uterus causing compression of pelvic vessels likely causing L LE edema.    Recommendations:    -Keep left leg elevated  -Wrap left leg with kerlix and ace wrap  -Follow up with Dr. Rainey in 2 weeks in office at [phone]  -Rest of care per primary team  -Vascular surgery will sign off. Please reconsult as needed.

## 2023-01-20 NOTE — DISCHARGE NOTE PROVIDER - NSDCMRMEDTOKEN_GEN_ALL_CORE_FT
ibuprofen 600 mg oral tablet: 1 tab(s) orally every 8 hours   Macrobid macrocrystals-monohydrate 100 mg oral capsule: 1 cap(s) orally 2 times a day   apixaban 5 mg oral tablet: Please take Eliquis 10 mg (2 tabs) twice daily until 1/25/23, then 5 mg (1 tab) twice daily until your next appointment with Dr. Haro   Eliquis Starter Pack for Treatment of DVT and PE 5 mg oral tablet: Please take Eliquis 10 mg (2 tabs) twice daily until 1/25/23, then 5 mg (1 tab) twice daily until your next appointment with Dr. Haro

## 2023-01-20 NOTE — CONSULT NOTE ADULT - SUBJECTIVE AND OBJECTIVE BOX
Vascular surgery Consult    Consulting Surgical Team:   [ ] Team 1 - Colorectal/Surgical Oncology ([phone])    [ ] Team 2 - MIS/Bariatric Surgery ([phone])     [X] Team 3 - Vascular surgery Team [id number]   [ ] Team 4 - Acute Care Surgery ([phone])     [ ] Team 5 - General Surgery/Breast/Pediatric Surgery ([phone])    Consulting Attending: Dr. Rainey.    HPI:  33 yo female with PMHx of asthma and anemia 2/2 myomectomy, on birth control (Nuvaring) presented with 1 week of productive cough. Pt stating she began feeling symptoms of mild cough and chest tightness 3 weeks prior. She went to  where workup was negative for COVID and Influenza. Pt prescribed Azithromycin and Prednisone 20mg -- currently on day 5. Pt stating she awoke this AM w/ progressively worse chest tightness. She used her inhaler and nebulizer tx at home with minimal relief and had syncopal event at home. Pt with no hx of DVT, PE and also denies family history of clots, blood disorders.     ED Vitals:   ED Labs: WBC 9.87, Hgb 11.2, Plt 235, Na 139, K 3.6, Cl 103, Cr 0.82, AST 47, ALT 58, Troponin T 0.10, Pro-BNP 1359, D-dimer 1781  CT PE:  large thrombus burden involving all lobar and multiple segmental branches bilaterally w/ probable evolving infarcts in lateral basal segment of LLL and mild pulmonary A dilation w/ RV strain and cardiomegaly.  ED Interventions: Heparin 9000 IVP x1, NS 1000mL x1, Heparin gtt @ 19 mL/hr running      (11 Jan 2023 15:43)      Vascular surgery consulted for L LE edema.     32 F HPI as above. Pt s/p attempted IVC filter placement last week, aborted because of large IVC size. Vascular reconsulted because of L LE pain. Upon assessment patient indicates that she odes not have any pain, she is just concerned because her left leg is significantly larger compared to her right leg. Otherwise she does not have any other complaints.      PAST MEDICAL HISTORY:  Asthma    Fibroids    Anemia        PAST SURGICAL HISTORY:  No significant past surgical history        MEDICATIONS:      ALLERGIES:  No Known Allergies      SOCHX:   Social History:      FAMHX:   FAMILY HISTORY:        Vitals:  Vital Signs Last 24 Hrs  T(C): 37.1 (20 Jan 2023 13:56), Max: 37.2 (20 Jan 2023 09:18)  T(F): 98.7 (20 Jan 2023 13:56), Max: 98.9 (20 Jan 2023 09:18)  HR: 87 (20 Jan 2023 12:05) (86 - 105)  BP: 136/83 (20 Jan 2023 12:05) (130/87 - 142/79)  BP(mean): 103 (20 Jan 2023 12:05) (98 - 106)  RR: 18 (20 Jan 2023 12:05) (17 - 20)  SpO2: 100% (20 Jan 2023 12:05) (99% - 100%)    Parameters below as of 20 Jan 2023 12:05  Patient On (Oxygen Delivery Method): room air          PHYSICAL EXAM:    General: NAD, resting comfortably in bed  Pulm: Nonlabored breathing, no respiratory distress  Abd: Soft, NTND  Extrem: WWP, markedly enlarged left leg compared to right leg, palpable pop/dp/pt, no obvious wounds or ulcers noted; left leg wrapped with kerlix and ace wrap  Neuro: AAO x 3, CNs II-XII grossly intact, no focal deficits, normal sensation      I&o's:  I&O's Summary    19 Jan 2023 07:01  -  20 Jan 2023 07:00  --------------------------------------------------------  IN: 755 mL / OUT: 0 mL / NET: 755 mL    20 Jan 2023 07:01  -  20 Jan 2023 15:07  --------------------------------------------------------  IN: 620 mL / OUT: 500 mL / NET: 120 mL        LABS:                        8.6    5.46  )-----------( 261      ( 20 Jan 2023 06:48 )             28.4     01-20    138  |  101  |  10  ----------------------------<  103<H>  4.1   |  25  |  0.81    Ca    9.4      20 Jan 2023 06:48  Phos  5.0     01-20  Mg     2.1     01-20    TPro  8.0  /  Alb  3.0<L>  /  TBili  0.3  /  DBili  x   /  AST  45<H>  /  ALT  52<H>  /  AlkPhos  112  01-20    Lactate: Lactate, Blood: 1.8 mmol/L (01-20 @ 06:48)         CARDIAC MARKERS ( 20 Jan 2023 06:48 )  x     / <0.01 ng/mL / x     / x     / x      CARDIAC MARKERS ( 19 Jan 2023 05:30 )  x     / 0.01 ng/mL / x     / x     / x                MICRO:     IMAGING:

## 2023-01-20 NOTE — DISCHARGE NOTE PROVIDER - NSDCCPCAREPLAN_GEN_ALL_CORE_FT
PRINCIPAL DISCHARGE DIAGNOSIS  Diagnosis: Pulmonary embolism  Assessment and Plan of Treatment: You were admitted for shortness of breath and chest pain, and were found to have pulmonary embolisms (blood clots) in both of your lungs, which put a significant strain on your heart. You were treated with multiple different blood thinners to help control the blood clots and prevent them from worsening. On discharge, you will be prescribed an oral blood thinner called Eliquis (apixaban). Please take Eliquis 10 mg (2 tabs) TWICE daily until 1/25/23. Afterwards, please continue to take Eliquis 5 mg (1 tab) TWICE daily until your follow-up appointment with Dr. Haro. Additionally, please schedule a follow-up appointment with Dr. Rainey (Vascular Surgery) in 2 weeks to evaluate your leg swelling.      SECONDARY DISCHARGE DIAGNOSES  Diagnosis: Fibroids  Assessment and Plan of Treatment: You have a history of large uterine fibroids, which are compressing the veins in your legs and predisposing you to blood clots, which can travel to your lungs. The OB/GYN team evaluated you in the hospital and determined that you would benefit from a myomectomy or hysterectomy in order to remove the fibroids in your uterus or your uterus itself. This should lower your risk for blood clots in the future. Please follow-up with your OB/GYN shortly after discharge to discuss your hospital course and begin surgical planning for a hysterectomy or myomectomy in the near future.     PRINCIPAL DISCHARGE DIAGNOSIS  Diagnosis: Pulmonary embolism  Assessment and Plan of Treatment: You were admitted for shortness of breath and chest pain, and were found to have pulmonary embolisms (blood clots) in both of your lungs, which put a significant strain on your heart. You were treated with multiple different blood thinners to help control the blood clots and prevent them from worsening. On discharge, you will be prescribed an oral blood thinner called Eliquis (apixaban). Please take Eliquis 10 mg (2 tabs) TWICE daily until 1/25/23. Afterwards, please continue to take Eliquis 5 mg (1 tab) TWICE daily until your follow-up appointment with Dr. Haro. Additionally, please schedule a follow-up appointment with Dr. Rainey (Vascular Surgery, [phone]) in 2 weeks to evaluate your leg swelling.      SECONDARY DISCHARGE DIAGNOSES  Diagnosis: Fibroids  Assessment and Plan of Treatment: You have a history of large uterine fibroids, which are compressing the veins in your legs and predisposing you to blood clots, which can travel to your lungs. The OB/GYN team evaluated you in the hospital and determined that you would benefit from a myomectomy or hysterectomy in order to remove the fibroids in your uterus or your uterus itself. This should lower your risk for blood clots in the future. Please follow-up with your OB/GYN shortly after discharge to discuss your hospital course and begin surgical planning for a hysterectomy or myomectomy in the near future.

## 2023-01-20 NOTE — DISCHARGE NOTE PROVIDER - NSDCCAREPROVSEEN_GEN_ALL_CORE_FT
Rosie Haro Rosie Haro A  Patient seen and examined with house-staff during bedside rounds.  Resident note read, including vitals, physical findings, laboratory data, and radiological reports.   Revisions included below.  Direct personal management at bed side and extensive interpretation of the data.  Plan was outlined and discussed in details with the housestaff.  Decision making of high complexity  Action taken for acute disease activity to reflect the level of care provided:  - medication reconciliation  - review laboratory data  Clinically stable.  Patient is complaining of pain on the left leg.  Discussed with the patient in details the decision about not putting iliac filters and related to the possibility of migration of the filter with the large size fibroids.  Patient has no evidence of failure of therapy nor bleeding at this point.  I ambulated the patient she has no dyspnea on exertion and also her heart rate maintained between  with oxygen saturation between 97 to 100% on room air.  Patient will require ANDREW stockings.  Patient will require rest sequela.  Patient most likely has chronic thromboembolic disease have ordered PT echocardiogram in 3 months.

## 2023-01-20 NOTE — CONSULT NOTE ADULT - PROVIDER SPECIALTY LIST ADULT
Critical Care
Vascular Surgery
Structural Heart
Vascular Surgery
GYN
Heart Failure
Pulmonology
Intervent Radiology

## 2023-01-20 NOTE — DISCHARGE NOTE PROVIDER - CARE PROVIDER_API CALL
Rosie Haro)  Critical Care Medicine; Pulmonary Disease  [address] [place] [address]  Phone: [phone]  Fax: [phone]  Established Patient  Follow Up Time:    Rosie Haro)  Critical Care Medicine; Pulmonary Disease  [address], [address] [place] [address]  Phone: [phone]  Fax: [phone]  Established Patient  Follow Up Time:     Huong Rainey)  Surgery; Vascular Surgery  [address], [address] [address]  Phone: [phone]  Fax: [phone]  Follow Up Time: 2 weeks

## 2023-01-20 NOTE — DISCHARGE NOTE PROVIDER - PROVIDER TOKENS
PROVIDER:[TOKEN:[[id number]],ESTABLISHEDPATIENT:[T]] PROVIDER:[TOKEN:[[id number]],ESTABLISHEDPATIENT:[T]],PROVIDER:[TOKEN:[[id number]],FOLLOWUP:[2 weeks]]

## 2023-01-21 RX ORDER — APIXABAN 2.5 MG/1
2 TABLET, FILM COATED ORAL
Qty: 68 | Refills: 0
Start: 2023-01-21

## 2023-01-24 PROBLEM — D64.9 ANEMIA, UNSPECIFIED: Chronic | Status: ACTIVE | Noted: 2023-01-12

## 2023-01-24 NOTE — PROGRESS NOTE ADULT - PROBLEM SELECTOR PROBLEM 1
Patient given Rx for glasses.
Syncope
Syncope
Pulmonary embolus
Syncope
Pulmonary embolus
Pulmonary embolus
Syncope
Pulmonary embolus
Syncope

## 2023-01-25 DIAGNOSIS — I27.20 PULMONARY HYPERTENSION, UNSPECIFIED: ICD-10-CM

## 2023-01-25 DIAGNOSIS — I82.412 ACUTE EMBOLISM AND THROMBOSIS OF LEFT FEMORAL VEIN: ICD-10-CM

## 2023-01-25 DIAGNOSIS — I26.09 OTHER PULMONARY EMBOLISM WITH ACUTE COR PULMONALE: ICD-10-CM

## 2023-01-25 DIAGNOSIS — J45.909 UNSPECIFIED ASTHMA, UNCOMPLICATED: ICD-10-CM

## 2023-01-25 DIAGNOSIS — J96.01 ACUTE RESPIRATORY FAILURE WITH HYPOXIA: ICD-10-CM

## 2023-01-25 DIAGNOSIS — E66.9 OBESITY, UNSPECIFIED: ICD-10-CM

## 2023-01-25 DIAGNOSIS — I82.422 ACUTE EMBOLISM AND THROMBOSIS OF LEFT ILIAC VEIN: ICD-10-CM

## 2023-01-25 DIAGNOSIS — I50.811 ACUTE RIGHT HEART FAILURE: ICD-10-CM

## 2023-01-25 DIAGNOSIS — D25.9 LEIOMYOMA OF UTERUS, UNSPECIFIED: ICD-10-CM

## 2023-01-25 DIAGNOSIS — R57.0 CARDIOGENIC SHOCK: ICD-10-CM

## 2023-01-31 PROBLEM — Z00.00 ENCOUNTER FOR PREVENTIVE HEALTH EXAMINATION: Noted: 2023-01-31

## 2023-02-06 ENCOUNTER — FORM ENCOUNTER (OUTPATIENT)
Age: 33
End: 2023-02-06

## 2023-02-07 ENCOUNTER — APPOINTMENT (OUTPATIENT)
Dept: VASCULAR SURGERY | Facility: CLINIC | Age: 33
End: 2023-02-07
Payer: COMMERCIAL

## 2023-02-07 ENCOUNTER — NON-APPOINTMENT (OUTPATIENT)
Age: 33
End: 2023-02-07

## 2023-02-07 ENCOUNTER — TRANSCRIPTION ENCOUNTER (OUTPATIENT)
Age: 33
End: 2023-02-07

## 2023-02-07 ENCOUNTER — OUTPATIENT (OUTPATIENT)
Dept: OUTPATIENT SERVICES | Facility: HOSPITAL | Age: 33
LOS: 1 days | End: 2023-02-07
Payer: SUBSIDIZED

## 2023-02-07 ENCOUNTER — APPOINTMENT (OUTPATIENT)
Dept: PULMONOLOGY | Facility: CLINIC | Age: 33
End: 2023-02-07
Payer: COMMERCIAL

## 2023-02-07 VITALS
BODY MASS INDEX: 32.64 KG/M2 | SYSTOLIC BLOOD PRESSURE: 135 MMHG | HEIGHT: 70 IN | WEIGHT: 228 LBS | HEART RATE: 77 BPM | DIASTOLIC BLOOD PRESSURE: 77 MMHG

## 2023-02-07 VITALS
SYSTOLIC BLOOD PRESSURE: 117 MMHG | HEART RATE: 74 BPM | WEIGHT: 233 LBS | DIASTOLIC BLOOD PRESSURE: 79 MMHG | OXYGEN SATURATION: 100 %

## 2023-02-07 DIAGNOSIS — Z78.9 OTHER SPECIFIED HEALTH STATUS: ICD-10-CM

## 2023-02-07 DIAGNOSIS — Z86.018 PERSONAL HISTORY OF OTHER BENIGN NEOPLASM: ICD-10-CM

## 2023-02-07 DIAGNOSIS — D21.9 BENIGN NEOPLASM OF CONNECTIVE AND OTHER SOFT TISSUE, UNSPECIFIED: ICD-10-CM

## 2023-02-07 DIAGNOSIS — I26.99 OTHER PULMONARY EMBOLISM W/OUT ACUTE COR PULMONALE: ICD-10-CM

## 2023-02-07 DIAGNOSIS — Z00.6 ENCOUNTER FOR EXAMINATION FOR NORMAL COMPARISON AND CONTROL IN CLINICAL RESEARCH PROGRAM: ICD-10-CM

## 2023-02-07 DIAGNOSIS — I26.99 OTHER PULMONARY EMBOLISM WITHOUT ACUTE COR PULMONALE: ICD-10-CM

## 2023-02-07 PROCEDURE — 93970 EXTREMITY STUDY: CPT

## 2023-02-07 PROCEDURE — 93306 TTE W/DOPPLER COMPLETE: CPT

## 2023-02-07 PROCEDURE — 99214 OFFICE O/P EST MOD 30 MIN: CPT | Mod: 25

## 2023-02-07 PROCEDURE — 93308 TTE F-UP OR LMTD: CPT | Mod: 26

## 2023-02-07 PROCEDURE — 99204 OFFICE O/P NEW MOD 45 MIN: CPT

## 2023-02-07 PROCEDURE — 93319 3D ECHO IMG CGEN CAR ANOMAL: CPT | Mod: 26

## 2023-02-07 PROCEDURE — 93321 DOPPLER ECHO F-UP/LMTD STD: CPT | Mod: 26

## 2023-02-07 PROCEDURE — 94618 PULMONARY STRESS TESTING: CPT

## 2023-02-07 RX ORDER — ALBUTEROL SULFATE 90 UG/1
108 (90 BASE) INHALANT RESPIRATORY (INHALATION)
Refills: 0 | Status: ACTIVE | COMMUNITY

## 2023-02-08 PROBLEM — Z86.018 HISTORY OF FIBROID: Status: RESOLVED | Noted: 2023-02-08 | Resolved: 2023-02-08

## 2023-02-08 RX ORDER — AZITHROMYCIN 250 MG/1
250 TABLET, FILM COATED ORAL
Qty: 6 | Refills: 0 | Status: ACTIVE | COMMUNITY
Start: 2023-01-07

## 2023-02-08 RX ORDER — MEDROXYPROGESTERONE ACETATE 10 MG/1
10 TABLET ORAL
Qty: 10 | Refills: 0 | Status: ACTIVE | COMMUNITY
Start: 2022-11-01

## 2023-02-08 RX ORDER — APIXABAN 5 MG/1
5 TABLET, FILM COATED ORAL
Refills: 0 | Status: ACTIVE | COMMUNITY

## 2023-02-08 RX ORDER — NAFTIFINE HYDROCHLORIDE 2 G/100G
2 GEL TOPICAL
Qty: 45 | Refills: 0 | Status: ACTIVE | COMMUNITY
Start: 2022-09-07

## 2023-02-08 RX ORDER — KETOROLAC TROMETHAMINE 10 MG/1
10 TABLET, FILM COATED ORAL
Qty: 20 | Refills: 0 | Status: ACTIVE | COMMUNITY
Start: 2022-11-18

## 2023-02-08 RX ORDER — PREDNISONE 20 MG/1
20 TABLET ORAL
Qty: 5 | Refills: 0 | Status: ACTIVE | COMMUNITY
Start: 2023-01-07

## 2023-02-08 RX ORDER — SPIRONOLACTONE 100 MG/1
100 TABLET ORAL
Qty: 60 | Refills: 0 | Status: ACTIVE | COMMUNITY
Start: 2022-09-15

## 2023-02-08 RX ORDER — ALBUTEROL SULFATE 90 UG/1
108 (90 BASE) INHALANT RESPIRATORY (INHALATION)
Qty: 6 | Refills: 0 | Status: ACTIVE | COMMUNITY
Start: 2023-01-07

## 2023-02-08 RX ORDER — FLUCONAZOLE 150 MG/1
150 TABLET ORAL
Qty: 1 | Refills: 0 | Status: ACTIVE | COMMUNITY
Start: 2022-07-11

## 2023-02-08 RX ORDER — ETONOGESTREL AND ETHINYL ESTRADIOL .12; .015 MG/D; MG/D
0.12-0.015 RING VAGINAL
Qty: 3 | Refills: 0 | Status: ACTIVE | COMMUNITY
Start: 2022-12-14

## 2023-02-08 RX ORDER — RELUGOLIX, ESTRADIOL HEMIHYDRATE, AND NORETHINDRONE ACETATE 40; 1; .5 MG/1; MG/1; MG/1
40-1-0.5 TABLET, FILM COATED ORAL
Qty: 28 | Refills: 0 | Status: ACTIVE | COMMUNITY
Start: 2022-10-31

## 2023-02-08 RX ORDER — ALBUTEROL SULFATE 2.5 MG/3ML
(2.5 MG/3ML) SOLUTION RESPIRATORY (INHALATION)
Qty: 75 | Refills: 0 | Status: ACTIVE | COMMUNITY
Start: 2023-01-07

## 2023-02-08 RX ORDER — ERGOCALCIFEROL 1.25 MG/1
1.25 MG CAPSULE, LIQUID FILLED ORAL
Qty: 4 | Refills: 0 | Status: ACTIVE | COMMUNITY
Start: 2022-07-21

## 2023-02-08 RX ORDER — TRANEXAMIC ACID 650 MG/1
650 TABLET ORAL
Qty: 30 | Refills: 0 | Status: ACTIVE | COMMUNITY
Start: 2022-10-31

## 2023-02-09 NOTE — END OF VISIT
[Time Spent: ___ minutes] : I have spent [unfilled] minutes of time on the encounter. [>50% of the face to face encounter time was spent on counseling and/or coordination of care for ___] : Greater than 50% of the face to face encounter time was spent on counseling and/or coordination of care for [unfilled] [FreeTextEntry3] : Pt seen with SARTHAK brennan and agree on the above plan of care.

## 2023-02-09 NOTE — ASSESSMENT
[FreeTextEntry1] :  Pulmonary embolus with right heart failure\par \par CTA PE protocol indicating large thrombus burden involving all lobar and multiple segmental branches bilaterally w/ probable evolving infarcts in lateral basal segment of LLL and mild pulmonary artery dilation w/ RV strain\par and cardiomegaly. TTE with persistently dilated RV size, mod-severely reduced RV function, pHTN. PT was started on heparin gtt, transitioned to Lovenox 100 mg SQ q12h. Etiology likely unprovoked PE in nature -- risk factors: Nuvaring contraceptive use, fibroids and obesity. Denies recent travel and family hx. \par \par On 1/15, pt was stepped up to MICU -> CCU due to worsening tachycardia and decreasing O2\par saturation, with bedside POCUS showing persistent R-sided HF and uptrending pro-BNP, suggestive of progressive cardiac decompensation.  s/p ekos catheter removal, s/p heparin gtt.  IVC filter placement not completed (IVC too large)\par \par Pt is currently on Eliquis without any bleeding or signs of reoccurrence.  She has clinically improved and 6 min walk is negative.  Pt did echo today will follow on the results but preliminary report it has improved.  Will follow with pt when obtain the official report.\par  \par \par Fibroids.\par \par Markedly bulky multi fibroid uterus with mass effect upon the external iliac veins. Bilateral\par external iliac vein and common femoral vein thrombosis. GYN following; planned for abdominal myomectomy with Dr. Telles.  Will determine if pt is cleared for surgery depending on echo.  Discussed bridging with lovenx but pt is not comfortable with self injecting.  Discussed stopping the Eliquis for one day being admitted the next day for Heparin and then going for surgery the following day.  Will evaluate echo to determine clearance. \par \par  \par Asthma.\par \par Stable currently no requiring inhalers.\par \par

## 2023-02-09 NOTE — HISTORY OF PRESENT ILLNESS
[Never] : never [TextBox_4] : 32 yr old female with PMH of asthma, recent hospitalization for PE with right heart strain.  Pt risk factors for PE Nuvaring, obesity and large fibroids.  She some times has leg intermittently.  Pt had complication on hep gtt with worsening right heart failure pt underwent thrombectomy and EKOS. \par \par Pt currently on Eliquis bId without any bleeding or signs of failure.  She denies SOB and able to go up flights of stairs.  She sometimes has left ankle pain.  She has dramatically improved since hospitalization. \par  [ESS] : 0

## 2023-02-14 NOTE — ADDENDUM
[FreeTextEntry1] : This note was written by Reggie Clement, acting as a scribe for Dr. Huong Rainey.  I, Dr. Huong Rainey, have read and attest that all the information, medical decision-making, and discharge instructions within are true and accurate.\par \par I, Dr. Huong Rainey, personally performed the evaluation and management (E/M) services for this new patient.  That E/M includes conducting the initial examination, assessing all conditions, and establishing the plan of care.  Today, my ACP, Reggie Clement, was here to observe my evaluation and management services for this patient to be followed going forward.

## 2023-02-14 NOTE — DISCUSSION/SUMMARY
[FreeTextEntry1] : 32yoF recently admitted to [facility] for worsening chest pain/cough, noted to have a large b/l PE w/RV strain, taken for pulmonary thrombectomy w/Dr. John and later for LLE venogram/cavagram, mechanical thrombectomy of the L iliac avi/CFA w/Yoel ClotTriever device w/Dr. Pearson.  Upon further work-up, pt was noted to have multiple bulky fibroids of the uterus w/mass effect upon the EIVs/IVC, possibly the cause of her DVT/PE.  Pt returns today w/improved respiratory symptoms, no significant complaints in the LEs.  She is to be scheduled for myomectomy w/gyn, but will likely require an IVC filter to be placed preoperatively.\par \par Normal exam noted today, and venous duplex of both LEs reveals CFV/FV pDVT w/some flow, stenosis of the b/l iliac veins noted.  As pt is expecting to undergo myomectomy in the future, until the iliac veins can be decompressed, we will discuss options w/gyn to place an IVC filter prior to surgery which will be retrieved as soon as possible afterwards.  Will discuss this further if pt is to proceed w/surgery; until that time, pt will continue on full DOAC.

## 2023-02-14 NOTE — PHYSICAL EXAM
[Normal Thyroid] : the thyroid was normal [Normal Breath Sounds] : Normal breath sounds [Respiratory Effort] : normal respiratory effort [Normal Heart Sounds] : normal heart sounds [Normal Rate and Rhythm] : normal rate and rhythm [2+] : left 2+ [Ankle Swelling (On Exam)] : present [Ankle Swelling Bilaterally] : bilaterally  [Ankle Swelling On The Right] : mild [No Rash or Lesion] : No rash or lesion [Alert] : alert [Calm] : calm [JVD] : no jugular venous distention  [Varicose Veins Of Lower Extremities] : not present [] : not present [Abdomen Masses] : No abdominal masses [Abdomen Tenderness] : ~T ~M No abdominal tenderness [Purpura] : no purpura  [Petechiae] : no petechiae [Skin Ulcer] : no ulcer [Skin Induration] : no induration [de-identified] : Healthy, NAD [de-identified] : NC/AT, anicteric [de-identified] : FROM throughot, strength 5/5x4, no palpable cords in LEs

## 2023-02-14 NOTE — REASON FOR VISIT
[de-identified] : LLE venogram/cavagram, mechanical thrombectomy of the L iliac avi/CFA w/Inari ClotTriever device [de-identified] : 01/17/2023 [de-identified] : 21 [de-identified] : 3 [de-identified] : 32yoF recently admitted to [facility] for worsening chest pain/cough, noted to have a large b/l PE w/RV strain, taken for pulmonary thrombectomy w/Dr. John and later for LLE venogram/cavagram, mechanical thrombectomy of the L iliac avi/CFA w/Yoel ClotTriever device w/Dr. Pearson.  Upon further work-up, pt was noted to have multiple bulky fibroids of the uterus w/mass effect upon the EIVs/IVC, possibly the cause of her DVT/PE.  Pt returns today w/improved respiratory symptoms, no significant complaints in the LEs.  She is to be scheduled for myomectomy w/gyn, but will likely require an IVC filter to be placed preoperatively.

## 2023-06-13 ENCOUNTER — APPOINTMENT (OUTPATIENT)
Dept: PULMONOLOGY | Facility: CLINIC | Age: 33
End: 2023-06-13
Payer: COMMERCIAL

## 2023-06-13 VITALS
OXYGEN SATURATION: 100 % | RESPIRATION RATE: 12 BRPM | SYSTOLIC BLOOD PRESSURE: 134 MMHG | TEMPERATURE: 97.9 F | HEART RATE: 77 BPM | HEIGHT: 70 IN | WEIGHT: 237 LBS | BODY MASS INDEX: 33.93 KG/M2 | DIASTOLIC BLOOD PRESSURE: 82 MMHG

## 2023-06-13 DIAGNOSIS — I26.99 OTHER PULMONARY EMBOLISM W/OUT ACUTE COR PULMONALE: ICD-10-CM

## 2023-06-13 DIAGNOSIS — J45.909 UNSPECIFIED ASTHMA, UNCOMPLICATED: ICD-10-CM

## 2023-06-13 DIAGNOSIS — Z01.811 ENCOUNTER FOR PREPROCEDURAL RESPIRATORY EXAMINATION: ICD-10-CM

## 2023-06-13 DIAGNOSIS — I82.4Y2 ACUTE EMBOLISM AND THROMBOSIS OF UNSPECIFIED DEEP VEINS OF LEFT PROXIMAL LOWER EXTREMITY: ICD-10-CM

## 2023-06-13 PROCEDURE — 99214 OFFICE O/P EST MOD 30 MIN: CPT | Mod: 25

## 2023-06-13 PROCEDURE — 94010 BREATHING CAPACITY TEST: CPT

## 2023-06-13 PROCEDURE — ZZZZZ: CPT

## 2023-06-13 PROCEDURE — 94727 GAS DIL/WSHOT DETER LNG VOL: CPT

## 2023-06-13 PROCEDURE — 94729 DIFFUSING CAPACITY: CPT

## 2023-06-13 NOTE — HISTORY OF PRESENT ILLNESS
[Never] : never [TextBox_4] : She is doing very well.  She is walking around.  She can go 1 flight of stairs with no shortness of breath.  No swelling of the legs she does not feel dizzy or lightheaded when she exert herself. [ESS] : 0

## 2023-06-13 NOTE — ASSESSMENT
[FreeTextEntry1] : High risk intermediate pulmonary emboli with acute right heart failure, pulmonary hypertension, and deep vein thromboses\par \par The patient underwent ultrasound-guided thrombolytics.  The patient is clinically stable with no clinical evidence neither of failure of therapy nor bruit bleeding complication.  The patient is ambulating with difficulty and her exercise capacity dramatically improved.  Patient able to go 1 flight of stairs with no dyspnea on exertion.  The PFT at the patient was normal with normal diffusion capacity about 101%.  The last 6-minute walk test with which done in February the patient was able to walk 365 m with no desaturation that had a dyspnea score of 0-5 at the end of the study.\par \par Await repeat venous Doppler and repeat echocardiogram.  Continue anticoagulation.  See below the preoperative evaluation\par \par Bronchial asthma\par \par Stable and patient rarely used the albuterol.  The PFT was normal\par \par Pulmonary clearance\par \par The patient is clinically stable at this point with no limitation of exercise capacity.  The patient did not desaturate with exertion and the 6-minute walk test done in February.  Await repeat echocardiogram before the clearance.\par \par My recommendation regarding anticoagulation\par \par 1.  Patient is to discontinue Eliquis 2 days prior to the surgery.\par \par 2. No indication for bridging as that might increase the bleeding complication\par \par 3. Patient will require cardiac anesthesia\par \par 4. Patient will require monitored bed postoperatively\par \par 5. Patient need to be started on Lovenox 40 mg daily the surgery\par \par 6. Start full anticoagulation next day postoperatively\par \par

## 2023-06-14 ENCOUNTER — FORM ENCOUNTER (OUTPATIENT)
Age: 33
End: 2023-06-14

## 2023-06-15 ENCOUNTER — OUTPATIENT (OUTPATIENT)
Dept: OUTPATIENT SERVICES | Facility: HOSPITAL | Age: 33
LOS: 1 days | End: 2023-06-15
Payer: COMMERCIAL

## 2023-06-15 DIAGNOSIS — I26.99 OTHER PULMONARY EMBOLISM WITHOUT ACUTE COR PULMONALE: ICD-10-CM

## 2023-06-15 PROCEDURE — 93306 TTE W/DOPPLER COMPLETE: CPT | Mod: 26

## 2023-06-15 PROCEDURE — 93306 TTE W/DOPPLER COMPLETE: CPT

## 2023-06-16 ENCOUNTER — NON-APPOINTMENT (OUTPATIENT)
Age: 33
End: 2023-06-16

## 2023-06-19 ENCOUNTER — TRANSCRIPTION ENCOUNTER (OUTPATIENT)
Age: 33
End: 2023-06-19

## 2023-06-19 RX ORDER — ALBUTEROL 90 UG/1
2 AEROSOL, METERED ORAL
Refills: 0 | DISCHARGE

## 2023-06-19 RX ORDER — APIXABAN 2.5 MG/1
1 TABLET, FILM COATED ORAL
Refills: 0 | DISCHARGE

## 2023-06-19 NOTE — PATIENT PROFILE ADULT - NSPRONUTRITIONRISK_GEN_A_NUR
Health Maintenance Due   Topic Date Due   • DTaP/Tdap/Td Vaccine (1 - Tdap) 07/04/2011   • Influenza Vaccine (1) 09/01/2019       Patient is due for topics as listed above but is not proceeding with Immunization(s) Dtap/Tdap/Td and Influenza at this time. Education provided for Immunization(s) Dtap/Tdap/Td and Influenza.     Pt will be receiving her flu vaccine next month from the  per pt report.         No indicators present

## 2023-06-20 ENCOUNTER — INPATIENT (INPATIENT)
Facility: HOSPITAL | Age: 33
LOS: 6 days | Discharge: HOME | DRG: 742 | End: 2023-06-27
Attending: OBSTETRICS & GYNECOLOGY | Admitting: OBSTETRICS & GYNECOLOGY
Payer: COMMERCIAL

## 2023-06-20 ENCOUNTER — TRANSCRIPTION ENCOUNTER (OUTPATIENT)
Age: 33
End: 2023-06-20

## 2023-06-20 VITALS
TEMPERATURE: 98 F | HEART RATE: 80 BPM | OXYGEN SATURATION: 100 % | WEIGHT: 241.85 LBS | RESPIRATION RATE: 18 BRPM | HEIGHT: 70 IN | DIASTOLIC BLOOD PRESSURE: 82 MMHG | SYSTOLIC BLOOD PRESSURE: 149 MMHG

## 2023-06-20 DIAGNOSIS — I26.99 OTHER PULMONARY EMBOLISM WITHOUT ACUTE COR PULMONALE: ICD-10-CM

## 2023-06-20 LAB
ACANTHOCYTES BLD QL SMEAR: SLIGHT — SIGNIFICANT CHANGE UP
ANION GAP SERPL CALC-SCNC: 7 MMOL/L — SIGNIFICANT CHANGE UP (ref 5–17)
ANISOCYTOSIS BLD QL: SLIGHT — SIGNIFICANT CHANGE UP
BASE EXCESS BLDA CALC-SCNC: -1.3 MMOL/L — SIGNIFICANT CHANGE UP (ref -2–3)
BASE EXCESS BLDA CALC-SCNC: -2.2 MMOL/L — LOW (ref -2–3)
BASE EXCESS BLDA CALC-SCNC: -2.5 MMOL/L — LOW (ref -2–3)
BASE EXCESS BLDA CALC-SCNC: -3.4 MMOL/L — LOW (ref -2–3)
BASE EXCESS BLDA CALC-SCNC: -5.6 MMOL/L — LOW (ref -2–3)
BASOPHILS # BLD AUTO: 0 K/UL — SIGNIFICANT CHANGE UP (ref 0–0.2)
BASOPHILS NFR BLD AUTO: 0 % — SIGNIFICANT CHANGE UP (ref 0–2)
BLD GP AB SCN SERPL QL: NEGATIVE — SIGNIFICANT CHANGE UP
BUN SERPL-MCNC: 9 MG/DL — SIGNIFICANT CHANGE UP (ref 7–23)
BURR CELLS BLD QL SMEAR: PRESENT — SIGNIFICANT CHANGE UP
CA-I BLDA-SCNC: 1.17 MMOL/L — SIGNIFICANT CHANGE UP (ref 1.15–1.33)
CA-I BLDA-SCNC: 1.22 MMOL/L — SIGNIFICANT CHANGE UP (ref 1.15–1.33)
CA-I BLDA-SCNC: 1.26 MMOL/L — SIGNIFICANT CHANGE UP (ref 1.15–1.33)
CA-I BLDA-SCNC: 1.27 MMOL/L — SIGNIFICANT CHANGE UP (ref 1.15–1.33)
CA-I BLDA-SCNC: 1.3 MMOL/L — SIGNIFICANT CHANGE UP (ref 1.15–1.33)
CALCIUM SERPL-MCNC: 7.6 MG/DL — LOW (ref 8.4–10.5)
CHLORIDE SERPL-SCNC: 107 MMOL/L — SIGNIFICANT CHANGE UP (ref 96–108)
CO2 BLDA-SCNC: 21 MMOL/L — SIGNIFICANT CHANGE UP (ref 19–24)
CO2 BLDA-SCNC: 22 MMOL/L — SIGNIFICANT CHANGE UP (ref 19–24)
CO2 BLDA-SCNC: 24 MMOL/L — SIGNIFICANT CHANGE UP (ref 19–24)
CO2 SERPL-SCNC: 18 MMOL/L — LOW (ref 22–31)
COHGB MFR BLDA: 1.1 % — SIGNIFICANT CHANGE UP
COHGB MFR BLDA: 1.1 % — SIGNIFICANT CHANGE UP
COHGB MFR BLDA: 1.4 % — SIGNIFICANT CHANGE UP
COHGB MFR BLDA: 1.6 % — SIGNIFICANT CHANGE UP
COHGB MFR BLDA: 1.8 % — SIGNIFICANT CHANGE UP
CREAT SERPL-MCNC: 0.66 MG/DL — SIGNIFICANT CHANGE UP (ref 0.5–1.3)
DACRYOCYTES BLD QL SMEAR: SLIGHT — SIGNIFICANT CHANGE UP
EGFR: 119 ML/MIN/1.73M2 — SIGNIFICANT CHANGE UP
EOSINOPHIL # BLD AUTO: 0 K/UL — SIGNIFICANT CHANGE UP (ref 0–0.5)
EOSINOPHIL NFR BLD AUTO: 0 % — SIGNIFICANT CHANGE UP (ref 0–6)
GLUCOSE BLDA-MCNC: 117 MG/DL — HIGH (ref 70–99)
GLUCOSE BLDA-MCNC: 121 MG/DL — HIGH (ref 70–99)
GLUCOSE BLDA-MCNC: 133 MG/DL — HIGH (ref 70–99)
GLUCOSE BLDA-MCNC: 171 MG/DL — HIGH (ref 70–99)
GLUCOSE BLDA-MCNC: 97 MG/DL — SIGNIFICANT CHANGE UP (ref 70–99)
GLUCOSE SERPL-MCNC: 188 MG/DL — HIGH (ref 70–99)
HCO3 BLDA-SCNC: 20 MMOL/L — LOW (ref 21–28)
HCO3 BLDA-SCNC: 21 MMOL/L — SIGNIFICANT CHANGE UP (ref 21–28)
HCO3 BLDA-SCNC: 22 MMOL/L — SIGNIFICANT CHANGE UP (ref 21–28)
HCO3 BLDA-SCNC: 22 MMOL/L — SIGNIFICANT CHANGE UP (ref 21–28)
HCO3 BLDA-SCNC: 23 MMOL/L — SIGNIFICANT CHANGE UP (ref 21–28)
HCT VFR BLD CALC: 39.5 % — SIGNIFICANT CHANGE UP (ref 34.5–45)
HGB BLD-MCNC: 13 G/DL — SIGNIFICANT CHANGE UP (ref 11.5–15.5)
HGB BLDA-MCNC: 10 G/DL — LOW (ref 11.7–16.1)
HGB BLDA-MCNC: 10.6 G/DL — LOW (ref 11.7–16.1)
HGB BLDA-MCNC: 11 G/DL — LOW (ref 11.7–16.1)
HGB BLDA-MCNC: 11.8 G/DL — SIGNIFICANT CHANGE UP (ref 11.7–16.1)
HGB BLDA-MCNC: 9.8 G/DL — LOW (ref 11.7–16.1)
LYMPHOCYTES # BLD AUTO: 0.5 K/UL — LOW (ref 1–3.3)
LYMPHOCYTES # BLD AUTO: 1.7 % — LOW (ref 13–44)
MACROCYTES BLD QL: SLIGHT — SIGNIFICANT CHANGE UP
MANUAL SMEAR VERIFICATION: SIGNIFICANT CHANGE UP
MCHC RBC-ENTMCNC: 29.7 PG — SIGNIFICANT CHANGE UP (ref 27–34)
MCHC RBC-ENTMCNC: 32.9 GM/DL — SIGNIFICANT CHANGE UP (ref 32–36)
MCV RBC AUTO: 90.2 FL — SIGNIFICANT CHANGE UP (ref 80–100)
METHGB MFR BLDA: 0.4 % — SIGNIFICANT CHANGE UP
METHGB MFR BLDA: 0.7 % — SIGNIFICANT CHANGE UP
METHGB MFR BLDA: 0.7 % — SIGNIFICANT CHANGE UP
METHGB MFR BLDA: 0.8 % — SIGNIFICANT CHANGE UP
METHGB MFR BLDA: 1.1 % — SIGNIFICANT CHANGE UP
MONOCYTES # BLD AUTO: 2.56 K/UL — HIGH (ref 0–0.9)
MONOCYTES NFR BLD AUTO: 8.7 % — SIGNIFICANT CHANGE UP (ref 2–14)
NEUTROPHILS # BLD AUTO: 26.35 K/UL — HIGH (ref 1.8–7.4)
NEUTROPHILS NFR BLD AUTO: 80 % — HIGH (ref 43–77)
NEUTS BAND # BLD: 9.6 % — HIGH (ref 0–8)
OXYHGB MFR BLDA: 97.3 % — HIGH (ref 90–95)
OXYHGB MFR BLDA: 97.5 % — HIGH (ref 90–95)
OXYHGB MFR BLDA: 97.6 % — HIGH (ref 90–95)
OXYHGB MFR BLDA: 97.7 % — HIGH (ref 90–95)
OXYHGB MFR BLDA: 97.7 % — HIGH (ref 90–95)
PCO2 BLDA: 34 MMHG — SIGNIFICANT CHANGE UP (ref 32–45)
PCO2 BLDA: 36 MMHG — SIGNIFICANT CHANGE UP (ref 32–45)
PCO2 BLDA: 37 MMHG — SIGNIFICANT CHANGE UP (ref 32–45)
PCO2 BLDA: 39 MMHG — SIGNIFICANT CHANGE UP (ref 32–45)
PCO2 BLDA: 39 MMHG — SIGNIFICANT CHANGE UP (ref 32–45)
PH BLDA: 7.32 — LOW (ref 7.35–7.45)
PH BLDA: 7.37 — SIGNIFICANT CHANGE UP (ref 7.35–7.45)
PH BLDA: 7.38 — SIGNIFICANT CHANGE UP (ref 7.35–7.45)
PH BLDA: 7.39 — SIGNIFICANT CHANGE UP (ref 7.35–7.45)
PH BLDA: 7.43 — SIGNIFICANT CHANGE UP (ref 7.35–7.45)
PLAT MORPH BLD: NORMAL — SIGNIFICANT CHANGE UP
PLATELET # BLD AUTO: 199 K/UL — SIGNIFICANT CHANGE UP (ref 150–400)
PO2 BLDA: 343 MMHG — HIGH (ref 83–108)
PO2 BLDA: 349 MMHG — HIGH (ref 83–108)
PO2 BLDA: 351 MMHG — HIGH (ref 83–108)
PO2 BLDA: 364 MMHG — HIGH (ref 83–108)
PO2 BLDA: 378 MMHG — HIGH (ref 83–108)
POIKILOCYTOSIS BLD QL AUTO: SLIGHT — SIGNIFICANT CHANGE UP
POTASSIUM BLDA-SCNC: 3.6 MMOL/L — SIGNIFICANT CHANGE UP (ref 3.5–5.1)
POTASSIUM BLDA-SCNC: 3.8 MMOL/L — SIGNIFICANT CHANGE UP (ref 3.5–5.1)
POTASSIUM BLDA-SCNC: 3.9 MMOL/L — SIGNIFICANT CHANGE UP (ref 3.5–5.1)
POTASSIUM BLDA-SCNC: 3.9 MMOL/L — SIGNIFICANT CHANGE UP (ref 3.5–5.1)
POTASSIUM BLDA-SCNC: 4.8 MMOL/L — SIGNIFICANT CHANGE UP (ref 3.5–5.1)
POTASSIUM SERPL-MCNC: 6 MMOL/L — HIGH (ref 3.5–5.3)
POTASSIUM SERPL-SCNC: 6 MMOL/L — HIGH (ref 3.5–5.3)
RBC # BLD: 4.38 M/UL — SIGNIFICANT CHANGE UP (ref 3.8–5.2)
RBC # FLD: 17.8 % — HIGH (ref 10.3–14.5)
RBC BLD AUTO: ABNORMAL
RH IG SCN BLD-IMP: POSITIVE — SIGNIFICANT CHANGE UP
SAO2 % BLDA: 100 % — HIGH (ref 94–98)
SAO2 % BLDA: 99.5 % — HIGH (ref 94–98)
SAO2 % BLDA: 99.5 % — HIGH (ref 94–98)
SAO2 % BLDA: 99.7 % — HIGH (ref 94–98)
SAO2 % BLDA: 99.8 % — HIGH (ref 94–98)
SCHISTOCYTES BLD QL AUTO: SLIGHT — SIGNIFICANT CHANGE UP
SODIUM BLDA-SCNC: 129 MMOL/L — LOW (ref 136–145)
SODIUM BLDA-SCNC: 131 MMOL/L — LOW (ref 136–145)
SODIUM BLDA-SCNC: 132 MMOL/L — LOW (ref 136–145)
SODIUM BLDA-SCNC: 133 MMOL/L — LOW (ref 136–145)
SODIUM BLDA-SCNC: 135 MMOL/L — LOW (ref 136–145)
SODIUM SERPL-SCNC: 132 MMOL/L — LOW (ref 135–145)
WBC # BLD: 29.41 K/UL — HIGH (ref 3.8–10.5)
WBC # FLD AUTO: 29.41 K/UL — HIGH (ref 3.8–10.5)

## 2023-06-20 PROCEDURE — 88305 TISSUE EXAM BY PATHOLOGIST: CPT | Mod: 26

## 2023-06-20 DEVICE — ARISTA 3GR: Type: IMPLANTABLE DEVICE | Status: FUNCTIONAL

## 2023-06-20 DEVICE — INTERCEED 3 X 4": Type: IMPLANTABLE DEVICE | Status: FUNCTIONAL

## 2023-06-20 RX ORDER — PANTOPRAZOLE SODIUM 20 MG/1
40 TABLET, DELAYED RELEASE ORAL
Refills: 0 | Status: DISCONTINUED | OUTPATIENT
Start: 2023-06-20 | End: 2023-06-23

## 2023-06-20 RX ORDER — ACETAMINOPHEN 500 MG
1000 TABLET ORAL EVERY 6 HOURS
Refills: 0 | Status: DISCONTINUED | OUTPATIENT
Start: 2023-06-20 | End: 2023-06-25

## 2023-06-20 RX ORDER — SODIUM CHLORIDE 9 MG/ML
500 INJECTION, SOLUTION INTRAVENOUS ONCE
Refills: 0 | Status: COMPLETED | OUTPATIENT
Start: 2023-06-20 | End: 2023-06-20

## 2023-06-20 RX ORDER — OXYCODONE HYDROCHLORIDE 5 MG/1
5 TABLET ORAL EVERY 4 HOURS
Refills: 0 | Status: DISCONTINUED | OUTPATIENT
Start: 2023-06-20 | End: 2023-06-25

## 2023-06-20 RX ORDER — HEPARIN SODIUM 5000 [USP'U]/ML
5000 INJECTION INTRAVENOUS; SUBCUTANEOUS ONCE
Refills: 0 | Status: DISCONTINUED | OUTPATIENT
Start: 2023-06-21 | End: 2023-06-21

## 2023-06-20 RX ORDER — OXYCODONE HYDROCHLORIDE 5 MG/1
5 TABLET ORAL EVERY 6 HOURS
Refills: 0 | Status: DISCONTINUED | OUTPATIENT
Start: 2023-06-20 | End: 2023-06-20

## 2023-06-20 RX ORDER — METOCLOPRAMIDE HCL 10 MG
10 TABLET ORAL EVERY 6 HOURS
Refills: 0 | Status: DISCONTINUED | OUTPATIENT
Start: 2023-06-20 | End: 2023-06-23

## 2023-06-20 RX ORDER — SODIUM CHLORIDE 9 MG/ML
1000 INJECTION, SOLUTION INTRAVENOUS
Refills: 0 | Status: DISCONTINUED | OUTPATIENT
Start: 2023-06-20 | End: 2023-06-20

## 2023-06-20 RX ORDER — KETOROLAC TROMETHAMINE 30 MG/ML
30 SYRINGE (ML) INJECTION EVERY 6 HOURS
Refills: 0 | Status: DISCONTINUED | OUTPATIENT
Start: 2023-06-20 | End: 2023-06-20

## 2023-06-20 RX ORDER — ONDANSETRON 8 MG/1
8 TABLET, FILM COATED ORAL EVERY 6 HOURS
Refills: 0 | Status: DISCONTINUED | OUTPATIENT
Start: 2023-06-20 | End: 2023-06-27

## 2023-06-20 RX ORDER — GABAPENTIN 400 MG/1
300 CAPSULE ORAL ONCE
Refills: 0 | Status: COMPLETED | OUTPATIENT
Start: 2023-06-20 | End: 2023-06-20

## 2023-06-20 RX ORDER — IRON SUCROSE 20 MG/ML
300 INJECTION, SOLUTION INTRAVENOUS EVERY 24 HOURS
Refills: 0 | Status: COMPLETED | OUTPATIENT
Start: 2023-06-21 | End: 2023-06-23

## 2023-06-20 RX ORDER — ACETAMINOPHEN 500 MG
1000 TABLET ORAL ONCE
Refills: 0 | Status: COMPLETED | OUTPATIENT
Start: 2023-06-20 | End: 2023-06-20

## 2023-06-20 RX ORDER — ENOXAPARIN SODIUM 100 MG/ML
40 INJECTION SUBCUTANEOUS ONCE
Refills: 0 | Status: DISCONTINUED | OUTPATIENT
Start: 2023-06-20 | End: 2023-06-20

## 2023-06-20 RX ORDER — KETOROLAC TROMETHAMINE 30 MG/ML
15 SYRINGE (ML) INJECTION EVERY 6 HOURS
Refills: 0 | Status: DISCONTINUED | OUTPATIENT
Start: 2023-06-20 | End: 2023-06-20

## 2023-06-20 RX ORDER — CELECOXIB 200 MG/1
400 CAPSULE ORAL ONCE
Refills: 0 | Status: COMPLETED | OUTPATIENT
Start: 2023-06-20 | End: 2023-06-20

## 2023-06-20 RX ORDER — ONDANSETRON 8 MG/1
8 TABLET, FILM COATED ORAL EVERY 8 HOURS
Refills: 0 | Status: DISCONTINUED | OUTPATIENT
Start: 2023-06-20 | End: 2023-06-20

## 2023-06-20 RX ORDER — SIMETHICONE 80 MG/1
80 TABLET, CHEWABLE ORAL EVERY 6 HOURS
Refills: 0 | Status: DISCONTINUED | OUTPATIENT
Start: 2023-06-20 | End: 2023-06-20

## 2023-06-20 RX ORDER — HEPARIN SODIUM 5000 [USP'U]/ML
5000 INJECTION INTRAVENOUS; SUBCUTANEOUS ONCE
Refills: 0 | Status: COMPLETED | OUTPATIENT
Start: 2023-06-20 | End: 2023-06-20

## 2023-06-20 RX ORDER — SODIUM CHLORIDE 9 MG/ML
1000 INJECTION INTRAMUSCULAR; INTRAVENOUS; SUBCUTANEOUS
Refills: 0 | Status: DISCONTINUED | OUTPATIENT
Start: 2023-06-20 | End: 2023-06-20

## 2023-06-20 RX ORDER — SIMETHICONE 80 MG/1
80 TABLET, CHEWABLE ORAL EVERY 6 HOURS
Refills: 0 | Status: DISCONTINUED | OUTPATIENT
Start: 2023-06-20 | End: 2023-06-23

## 2023-06-20 RX ORDER — HYDROMORPHONE HYDROCHLORIDE 2 MG/ML
0.5 INJECTION INTRAMUSCULAR; INTRAVENOUS; SUBCUTANEOUS
Refills: 0 | Status: DISCONTINUED | OUTPATIENT
Start: 2023-06-20 | End: 2023-06-20

## 2023-06-20 RX ORDER — HYDROMORPHONE HYDROCHLORIDE 2 MG/ML
0.2 INJECTION INTRAMUSCULAR; INTRAVENOUS; SUBCUTANEOUS
Refills: 0 | Status: DISCONTINUED | OUTPATIENT
Start: 2023-06-20 | End: 2023-06-20

## 2023-06-20 RX ORDER — ACETAMINOPHEN 500 MG
1000 TABLET ORAL EVERY 6 HOURS
Refills: 0 | Status: DISCONTINUED | OUTPATIENT
Start: 2023-06-20 | End: 2023-06-21

## 2023-06-20 RX ORDER — SODIUM CHLORIDE 9 MG/ML
1000 INJECTION, SOLUTION INTRAVENOUS
Refills: 0 | Status: DISCONTINUED | OUTPATIENT
Start: 2023-06-20 | End: 2023-06-21

## 2023-06-20 RX ORDER — HYDROMORPHONE HYDROCHLORIDE 2 MG/ML
0.5 INJECTION INTRAMUSCULAR; INTRAVENOUS; SUBCUTANEOUS ONCE
Refills: 0 | Status: DISCONTINUED | OUTPATIENT
Start: 2023-06-20 | End: 2023-06-20

## 2023-06-20 RX ADMIN — HYDROMORPHONE HYDROCHLORIDE 0.5 MILLIGRAM(S): 2 INJECTION INTRAMUSCULAR; INTRAVENOUS; SUBCUTANEOUS at 21:17

## 2023-06-20 RX ADMIN — SIMETHICONE 80 MILLIGRAM(S): 80 TABLET, CHEWABLE ORAL at 23:44

## 2023-06-20 RX ADMIN — HYDROMORPHONE HYDROCHLORIDE 0.5 MILLIGRAM(S): 2 INJECTION INTRAMUSCULAR; INTRAVENOUS; SUBCUTANEOUS at 20:57

## 2023-06-20 RX ADMIN — SODIUM CHLORIDE 1000 MILLILITER(S): 9 INJECTION, SOLUTION INTRAVENOUS at 22:00

## 2023-06-20 RX ADMIN — HYDROMORPHONE HYDROCHLORIDE 0.5 MILLIGRAM(S): 2 INJECTION INTRAMUSCULAR; INTRAVENOUS; SUBCUTANEOUS at 23:46

## 2023-06-20 RX ADMIN — Medication 400 MILLIGRAM(S): at 22:30

## 2023-06-20 RX ADMIN — SODIUM CHLORIDE 1000 MILLILITER(S): 9 INJECTION, SOLUTION INTRAVENOUS at 22:05

## 2023-06-20 RX ADMIN — SODIUM CHLORIDE 30 MILLILITER(S): 9 INJECTION INTRAMUSCULAR; INTRAVENOUS; SUBCUTANEOUS at 11:53

## 2023-06-20 NOTE — CHART NOTE - NSCHARTNOTEFT_GEN_A_CORE
Pt evaluated at bedside. UOP last hour 50cc still appears concentrated. Pt denies any SOB, CP and palpitation    -135  /68  Sat % on 3LNC    no inc wob  abd soft, appropriately tender and soft, no r/g, non distended    pt given 0.5mg IVP dilaudid    pt encouraged to continue po hydration w/ LR 150cc/hr    continue to monitor uop and HR plan for possible additional CBC pending uop and tachycardia in 1 hr

## 2023-06-20 NOTE — BRIEF OPERATIVE NOTE - OPERATION/FINDINGS
Ancef given for infection ppx  Galan placed  Pfannensteil incision  Uterus delivered through pfannensteil  50 fibroids removed from uterus  Uterine cavities closed with stratafix and 2-0 vicryl suture, excellent hemostasis  intercede adhesion barrier and lance applied to uterus  Peritoneum closed with vicryl suture  Fascia closed with 0 vicryl suture  Subcutaneous layer closed with plain suture in 3 layers   Skin closed with 3-0 monocryl  EBL 2L  IVF 3L, 2u pRBC given intraop, 1178cc cell saver   Ancef given for infection ppx  Galan placed  Pfannensteil incision  Uterus delivered through pfannensteil  50 fibroids removed from uterus  Uterine cavities closed with stratafix and 2-0 vicryl suture, excellent hemostasis  intercede adhesion barrier and lance applied to uterus  Peritoneum closed with vicryl suture  Fascia closed with 0 vicryl suture  Subcutaneous layer closed with plain suture in 3 layers   Skin closed with 3-0 monocryl  EBL 2L  IVF 3L, 2u pRBC given intraop, 1178cc cell saver    Dictation number: [id number] Ancef given for infection ppx  Galan placed  Pfannensteil incision  Uterus delivered through pfannensteil 24+ week size  50 fibroids removed from uterus  Uterine cavities closed with stratafix and 2-0 vicryl suture, excellent hemostasis  intercede adhesion barrier and lance applied to uterus  Peritoneum closed with vicryl suture  Fascia closed with 0 vicryl suture  Subcutaneous layer closed with plain suture in 3 layers   Skin closed with 3-0 monocryl  EBL 2L  IVF 3L, 2u pRBC given intraop, 1178cc cell saver    Dictation number: [id number]

## 2023-06-20 NOTE — H&P ADULT - HISTORY OF PRESENT ILLNESS
33 yo  presenting for abdominal myomectomy for symptoms of menorrhagia, dysmenorrhea, and b/l DVT thought to be secondary to mass effect of fibroid uterus on iliac veins.     OBHx  G1-2 VTOP D+C  G3  20-11    GYN  Fibroid uterus  Denies cysts, abnormal pap smears    PMH b/l DVT and PE currently on eliquis; mild asthma (never hospitalized)  PSH Denies  Meds Eliquis 5mg qd  NKDA

## 2023-06-20 NOTE — CHART NOTE - NSCHARTNOTEFT_GEN_A_CORE
Pt known to Dr. Haro from prior admission for PE/DVT requiring mechanical thrombectomy and EKOS with acute R heart failure and shock in Jan 2023. VTEs thought to be 2/2 to extensive fibroids causing mass effect and contraceptive use (NuvaRing) at the time. Has since followed up with Dr Haro recently 6/13 just prior to planned 6/20 myomectomy and improved echo.  Called by GYN tonight as pt just post-op from myomectomy to clarify AC plan. Per Dr. Haro's [employer] note from 6/13 pt was to start dvt ppx after surgery and restart full AC day after post-op.  GYN plans to start DVT ppx 8 hrs post-op and discussed can give heparin subq ppx and full AC tomorrow if stable from surgical perspective.

## 2023-06-20 NOTE — PRE-ANESTHESIA EVALUATION ADULT - NSANTHOSAYNRD_GEN_A_CORE
No. JERRELL screening performed.  STOP BANG Legend: 0-2 = LOW Risk; 3-4 = INTERMEDIATE Risk; 5-8 = HIGH Risk

## 2023-06-20 NOTE — H&P ADULT - ATTENDING COMMENTS
H&P reviewed and accepted. Pt cleared for surgery by hematology and pulmonology.   Eliquis discontinued with written instructions as to when to restart.   RBA reviewed including but not limited to pain, infection, damage to surrounding organs including risk of hystrectomy. Reviewed risk of Blood transfusion, etc.

## 2023-06-20 NOTE — H&P ADULT - NSICDXPASTMEDICALHX_GEN_ALL_CORE_FT
PAST MEDICAL HISTORY:  Anemia     Asthma     DVT (deep venous thrombosis)     Fibroids     Pulmonary embolism January 2023

## 2023-06-20 NOTE — PRE-ANESTHESIA EVALUATION ADULT - NSANTHPMHFT_GEN_ALL_CORE
PSH: thrombectomy of PE, LLE angio with thrombectomy, EKOS of PE in CCU 32F PMH asthma, multifibroid uterus, anemia,  PE s/p thrombectomy (1/13/23, currently on eliquis) c/b right heart strain and cardiogenic shock requiring ICU care in January 2023, bilateral DVTs s/p LLE angiography with L iliac vein and L common femoral vein thrombectomy (1/17/23), and recurrence of PE s/p EKOS while in ICU care. Pt's cardiac function has significantly improved since her right heart failure/cardiogenic shock in January 2023, with her most recent echo on 6/15/23 showing normal RV function.     During pt's hospital admission in January 2023, an IVC filter was attempted to be placed but was aborted/unsuccessful, reportedly due to  pt's anatomy. Pt's enlarged multifibroid uterus has mass effect on her bilateral external iliac veins, bilateral common femoral veins, and her IVC. Pt also had a known extensive DVT burden in her bilateral lower extremities (last reported in 2/2023 by vascular surgery)  which may be due to mass effect from her uterus.     Pt has been deemed a high risk for perioperative PE. Her last dose of eliquis was the evening of 6/17/23. Pulmonology has been following and their recommendations for perioperative anticoagulation are appreciated. The plan is for postoperative lovenox 40mg and resume full dose anticoagulation on POD 1. 32F PMH asthma, multifibroid uterus, anemia,  PE s/p thrombectomy (1/13/23, currently on eliquis) c/b right heart strain and cardiogenic shock requiring ICU care in January 2023, bilateral LE DVTs s/p LLE angiography with L iliac vein and L common femoral vein thrombectomy (1/17/23), and recurrence of PE s/p EKOS while in ICU care. Pt's cardiac function has significantly improved since her right heart failure/cardiogenic shock in January 2023, with her most recent echo on 6/15/23 showing normal RV function.     During pt's hospital admission in January 2023, an IVC filter was attempted to be placed but was aborted/unsuccessful, reportedly due to  pt's anatomy. Pt's enlarged multifibroid uterus has mass effect on her bilateral external iliac veins, bilateral common femoral veins, and her IVC. Pt also has a known extensive DVT burden in her bilateral lower extremities (last reported in 2/2023 by vascular surgery)  which may be due to mass effect from her uterus.     Pt has been deemed a high risk for perioperative PE. Her last dose of eliquis was the evening of 6/17/23. Pulmonology has been following and their recommendations for perioperative anticoagulation are appreciated. The plan is for postoperative lovenox 40mg and resume full dose anticoagulation on POD 1.

## 2023-06-20 NOTE — PROVIDER CONTACT NOTE (CHANGE IN STATUS NOTIFICATION) - ASSESSMENT
axox4. no c/o of chest pain. was given Hydromorphone 0.5mg IVP at 2100. abd pain was relieved 0/10 pain scale.

## 2023-06-20 NOTE — PRE-ANESTHESIA EVALUATION ADULT - LAST ECHOCARDIOGRAM
6/15/23: EF 65%; improvement in RV function to probably normal 6/15/23: EF 65%; improvement in RV function to which is now probably normal

## 2023-06-21 LAB
ALBUMIN SERPL ELPH-MCNC: 2.8 G/DL — LOW (ref 3.3–5)
ALP SERPL-CCNC: 51 U/L — SIGNIFICANT CHANGE UP (ref 40–120)
ALT FLD-CCNC: 5 U/L — LOW (ref 10–45)
ANION GAP SERPL CALC-SCNC: 11 MMOL/L — SIGNIFICANT CHANGE UP (ref 5–17)
ANION GAP SERPL CALC-SCNC: 9 MMOL/L — SIGNIFICANT CHANGE UP (ref 5–17)
APPEARANCE UR: ABNORMAL
APTT BLD: 23.7 SEC — LOW (ref 27.5–35.5)
APTT BLD: 24.8 SEC — LOW (ref 27.5–35.5)
APTT BLD: 30.5 SEC — SIGNIFICANT CHANGE UP (ref 27.5–35.5)
AST SERPL-CCNC: 21 U/L — SIGNIFICANT CHANGE UP (ref 10–40)
BACTERIA # UR AUTO: SIGNIFICANT CHANGE UP /HPF
BASOPHILS # BLD AUTO: 0.03 K/UL — SIGNIFICANT CHANGE UP (ref 0–0.2)
BASOPHILS # BLD AUTO: 0.04 K/UL — SIGNIFICANT CHANGE UP (ref 0–0.2)
BASOPHILS # BLD AUTO: 0.04 K/UL — SIGNIFICANT CHANGE UP (ref 0–0.2)
BASOPHILS # BLD AUTO: 0.05 K/UL — SIGNIFICANT CHANGE UP (ref 0–0.2)
BASOPHILS NFR BLD AUTO: 0.2 % — SIGNIFICANT CHANGE UP (ref 0–2)
BASOPHILS NFR BLD AUTO: 0.2 % — SIGNIFICANT CHANGE UP (ref 0–2)
BASOPHILS NFR BLD AUTO: 0.3 % — SIGNIFICANT CHANGE UP (ref 0–2)
BASOPHILS NFR BLD AUTO: 0.3 % — SIGNIFICANT CHANGE UP (ref 0–2)
BILIRUB SERPL-MCNC: 0.6 MG/DL — SIGNIFICANT CHANGE UP (ref 0.2–1.2)
BILIRUB UR-MCNC: NEGATIVE — SIGNIFICANT CHANGE UP
BLD GP AB SCN SERPL QL: NEGATIVE — SIGNIFICANT CHANGE UP
BUN SERPL-MCNC: 10 MG/DL — SIGNIFICANT CHANGE UP (ref 7–23)
BUN SERPL-MCNC: 11 MG/DL — SIGNIFICANT CHANGE UP (ref 7–23)
CALCIUM SERPL-MCNC: 7.4 MG/DL — LOW (ref 8.4–10.5)
CALCIUM SERPL-MCNC: 8.1 MG/DL — LOW (ref 8.4–10.5)
CHLORIDE SERPL-SCNC: 101 MMOL/L — SIGNIFICANT CHANGE UP (ref 96–108)
CHLORIDE SERPL-SCNC: 103 MMOL/L — SIGNIFICANT CHANGE UP (ref 96–108)
CO2 SERPL-SCNC: 18 MMOL/L — LOW (ref 22–31)
CO2 SERPL-SCNC: 20 MMOL/L — LOW (ref 22–31)
COLOR SPEC: YELLOW — SIGNIFICANT CHANGE UP
CREAT ?TM UR-MCNC: 64 MG/DL — SIGNIFICANT CHANGE UP
CREAT SERPL-MCNC: 0.82 MG/DL — SIGNIFICANT CHANGE UP (ref 0.5–1.3)
CREAT SERPL-MCNC: 0.82 MG/DL — SIGNIFICANT CHANGE UP (ref 0.5–1.3)
DIFF PNL FLD: ABNORMAL
EGFR: 97 ML/MIN/1.73M2 — SIGNIFICANT CHANGE UP
EGFR: 97 ML/MIN/1.73M2 — SIGNIFICANT CHANGE UP
EOSINOPHIL # BLD AUTO: 0 K/UL — SIGNIFICANT CHANGE UP (ref 0–0.5)
EOSINOPHIL # BLD AUTO: 0 K/UL — SIGNIFICANT CHANGE UP (ref 0–0.5)
EOSINOPHIL # BLD AUTO: 0.01 K/UL — SIGNIFICANT CHANGE UP (ref 0–0.5)
EOSINOPHIL # BLD AUTO: 0.01 K/UL — SIGNIFICANT CHANGE UP (ref 0–0.5)
EOSINOPHIL NFR BLD AUTO: 0 % — SIGNIFICANT CHANGE UP (ref 0–6)
EOSINOPHIL NFR BLD AUTO: 0 % — SIGNIFICANT CHANGE UP (ref 0–6)
EOSINOPHIL NFR BLD AUTO: 0.1 % — SIGNIFICANT CHANGE UP (ref 0–6)
EOSINOPHIL NFR BLD AUTO: 0.1 % — SIGNIFICANT CHANGE UP (ref 0–6)
EPI CELLS # UR: SIGNIFICANT CHANGE UP /HPF (ref 0–5)
FIBRINOGEN PPP-MCNC: 212 MG/DL — SIGNIFICANT CHANGE UP (ref 200–445)
FIBRINOGEN PPP-MCNC: 274 MG/DL — SIGNIFICANT CHANGE UP (ref 200–445)
GLUCOSE BLDC GLUCOMTR-MCNC: 115 MG/DL — HIGH (ref 70–99)
GLUCOSE BLDC GLUCOMTR-MCNC: 161 MG/DL — HIGH (ref 70–99)
GLUCOSE SERPL-MCNC: 149 MG/DL — HIGH (ref 70–99)
GLUCOSE SERPL-MCNC: 150 MG/DL — HIGH (ref 70–99)
GLUCOSE UR QL: NEGATIVE — SIGNIFICANT CHANGE UP
HCT VFR BLD CALC: 24.5 % — LOW (ref 34.5–45)
HCT VFR BLD CALC: 26.5 % — LOW (ref 34.5–45)
HCT VFR BLD CALC: 28 % — LOW (ref 34.5–45)
HCT VFR BLD CALC: 29.2 % — LOW (ref 34.5–45)
HCT VFR BLD CALC: 34 % — LOW (ref 34.5–45)
HGB BLD-MCNC: 11.2 G/DL — LOW (ref 11.5–15.5)
HGB BLD-MCNC: 8 G/DL — LOW (ref 11.5–15.5)
HGB BLD-MCNC: 8.5 G/DL — LOW (ref 11.5–15.5)
HGB BLD-MCNC: 8.7 G/DL — LOW (ref 11.5–15.5)
HGB BLD-MCNC: 9.6 G/DL — LOW (ref 11.5–15.5)
IMM GRANULOCYTES NFR BLD AUTO: 0.4 % — SIGNIFICANT CHANGE UP (ref 0–0.9)
IMM GRANULOCYTES NFR BLD AUTO: 0.6 % — SIGNIFICANT CHANGE UP (ref 0–0.9)
IMM GRANULOCYTES NFR BLD AUTO: 0.6 % — SIGNIFICANT CHANGE UP (ref 0–0.9)
IMM GRANULOCYTES NFR BLD AUTO: 0.7 % — SIGNIFICANT CHANGE UP (ref 0–0.9)
INR BLD: 1.15 — SIGNIFICANT CHANGE UP (ref 0.88–1.16)
INR BLD: 1.17 — HIGH (ref 0.88–1.16)
INR BLD: 1.23 — HIGH (ref 0.88–1.16)
KETONES UR-MCNC: NEGATIVE — SIGNIFICANT CHANGE UP
LACTATE SERPL-SCNC: 17 MMOL/L — CRITICAL HIGH (ref 0.5–2)
LACTATE SERPL-SCNC: 3.4 MMOL/L — HIGH (ref 0.5–2)
LEUKOCYTE ESTERASE UR-ACNC: NEGATIVE — SIGNIFICANT CHANGE UP
LYMPHOCYTES # BLD AUTO: 11.5 % — LOW (ref 13–44)
LYMPHOCYTES # BLD AUTO: 14.6 % — SIGNIFICANT CHANGE UP (ref 13–44)
LYMPHOCYTES # BLD AUTO: 17.5 % — SIGNIFICANT CHANGE UP (ref 13–44)
LYMPHOCYTES # BLD AUTO: 19.1 % — SIGNIFICANT CHANGE UP (ref 13–44)
LYMPHOCYTES # BLD AUTO: 2.16 K/UL — SIGNIFICANT CHANGE UP (ref 1–3.3)
LYMPHOCYTES # BLD AUTO: 2.56 K/UL — SIGNIFICANT CHANGE UP (ref 1–3.3)
LYMPHOCYTES # BLD AUTO: 2.78 K/UL — SIGNIFICANT CHANGE UP (ref 1–3.3)
LYMPHOCYTES # BLD AUTO: 3.66 K/UL — HIGH (ref 1–3.3)
MAGNESIUM SERPL-MCNC: 1.2 MG/DL — LOW (ref 1.6–2.6)
MCHC RBC-ENTMCNC: 29.4 PG — SIGNIFICANT CHANGE UP (ref 27–34)
MCHC RBC-ENTMCNC: 29.7 PG — SIGNIFICANT CHANGE UP (ref 27–34)
MCHC RBC-ENTMCNC: 29.9 PG — SIGNIFICANT CHANGE UP (ref 27–34)
MCHC RBC-ENTMCNC: 29.9 PG — SIGNIFICANT CHANGE UP (ref 27–34)
MCHC RBC-ENTMCNC: 30 PG — SIGNIFICANT CHANGE UP (ref 27–34)
MCHC RBC-ENTMCNC: 31.1 GM/DL — LOW (ref 32–36)
MCHC RBC-ENTMCNC: 32.1 GM/DL — SIGNIFICANT CHANGE UP (ref 32–36)
MCHC RBC-ENTMCNC: 32.7 GM/DL — SIGNIFICANT CHANGE UP (ref 32–36)
MCHC RBC-ENTMCNC: 32.9 GM/DL — SIGNIFICANT CHANGE UP (ref 32–36)
MCHC RBC-ENTMCNC: 32.9 GM/DL — SIGNIFICANT CHANGE UP (ref 32–36)
MCV RBC AUTO: 91 FL — SIGNIFICANT CHANGE UP (ref 80–100)
MCV RBC AUTO: 91.1 FL — SIGNIFICANT CHANGE UP (ref 80–100)
MCV RBC AUTO: 91.2 FL — SIGNIFICANT CHANGE UP (ref 80–100)
MCV RBC AUTO: 93.3 FL — SIGNIFICANT CHANGE UP (ref 80–100)
MCV RBC AUTO: 94.6 FL — SIGNIFICANT CHANGE UP (ref 80–100)
MONOCYTES # BLD AUTO: 1.67 K/UL — HIGH (ref 0–0.9)
MONOCYTES # BLD AUTO: 1.7 K/UL — HIGH (ref 0–0.9)
MONOCYTES # BLD AUTO: 1.8 K/UL — HIGH (ref 0–0.9)
MONOCYTES # BLD AUTO: 1.92 K/UL — HIGH (ref 0–0.9)
MONOCYTES NFR BLD AUTO: 10.7 % — SIGNIFICANT CHANGE UP (ref 2–14)
MONOCYTES NFR BLD AUTO: 10.9 % — SIGNIFICANT CHANGE UP (ref 2–14)
MONOCYTES NFR BLD AUTO: 8.9 % — SIGNIFICANT CHANGE UP (ref 2–14)
MONOCYTES NFR BLD AUTO: 9.4 % — SIGNIFICANT CHANGE UP (ref 2–14)
NEUTROPHILS # BLD AUTO: 11.26 K/UL — HIGH (ref 1.8–7.4)
NEUTROPHILS # BLD AUTO: 12.91 K/UL — HIGH (ref 1.8–7.4)
NEUTROPHILS # BLD AUTO: 13.55 K/UL — HIGH (ref 1.8–7.4)
NEUTROPHILS # BLD AUTO: 14.77 K/UL — HIGH (ref 1.8–7.4)
NEUTROPHILS NFR BLD AUTO: 70.5 % — SIGNIFICANT CHANGE UP (ref 43–77)
NEUTROPHILS NFR BLD AUTO: 70.8 % — SIGNIFICANT CHANGE UP (ref 43–77)
NEUTROPHILS NFR BLD AUTO: 73.6 % — SIGNIFICANT CHANGE UP (ref 43–77)
NEUTROPHILS NFR BLD AUTO: 79 % — HIGH (ref 43–77)
NITRITE UR-MCNC: NEGATIVE — SIGNIFICANT CHANGE UP
NRBC # BLD: 0 /100 WBCS — SIGNIFICANT CHANGE UP (ref 0–0)
OSMOLALITY UR: 423 MOSM/KG — SIGNIFICANT CHANGE UP (ref 300–900)
PH UR: 5.5 — SIGNIFICANT CHANGE UP (ref 5–8)
PHOSPHATE SERPL-MCNC: 2.4 MG/DL — LOW (ref 2.5–4.5)
PLATELET # BLD AUTO: 144 K/UL — LOW (ref 150–400)
PLATELET # BLD AUTO: 145 K/UL — LOW (ref 150–400)
PLATELET # BLD AUTO: 145 K/UL — LOW (ref 150–400)
PLATELET # BLD AUTO: 167 K/UL — SIGNIFICANT CHANGE UP (ref 150–400)
PLATELET # BLD AUTO: 193 K/UL — SIGNIFICANT CHANGE UP (ref 150–400)
POTASSIUM SERPL-MCNC: 4.8 MMOL/L — SIGNIFICANT CHANGE UP (ref 3.5–5.3)
POTASSIUM SERPL-MCNC: 5.2 MMOL/L — SIGNIFICANT CHANGE UP (ref 3.5–5.3)
POTASSIUM SERPL-SCNC: 4.8 MMOL/L — SIGNIFICANT CHANGE UP (ref 3.5–5.3)
POTASSIUM SERPL-SCNC: 5.2 MMOL/L — SIGNIFICANT CHANGE UP (ref 3.5–5.3)
PROT SERPL-MCNC: 5.3 G/DL — LOW (ref 6–8.3)
PROT UR-MCNC: NEGATIVE MG/DL — SIGNIFICANT CHANGE UP
PROTHROM AB SERPL-ACNC: 13.7 SEC — HIGH (ref 10.5–13.4)
PROTHROM AB SERPL-ACNC: 13.9 SEC — HIGH (ref 10.5–13.4)
PROTHROM AB SERPL-ACNC: 14.7 SEC — HIGH (ref 10.5–13.4)
RBC # BLD: 2.69 M/UL — LOW (ref 3.8–5.2)
RBC # BLD: 2.84 M/UL — LOW (ref 3.8–5.2)
RBC # BLD: 2.96 M/UL — LOW (ref 3.8–5.2)
RBC # BLD: 3.21 M/UL — LOW (ref 3.8–5.2)
RBC # BLD: 3.73 M/UL — LOW (ref 3.8–5.2)
RBC # FLD: 17.8 % — HIGH (ref 10.3–14.5)
RBC # FLD: 18 % — HIGH (ref 10.3–14.5)
RBC # FLD: 18 % — HIGH (ref 10.3–14.5)
RBC # FLD: 18.1 % — HIGH (ref 10.3–14.5)
RBC # FLD: 18.2 % — HIGH (ref 10.3–14.5)
RBC CASTS # UR COMP ASSIST: ABNORMAL /HPF
RH IG SCN BLD-IMP: POSITIVE — SIGNIFICANT CHANGE UP
SODIUM SERPL-SCNC: 130 MMOL/L — LOW (ref 135–145)
SODIUM SERPL-SCNC: 132 MMOL/L — LOW (ref 135–145)
SODIUM UR-SCNC: 30 MMOL/L — SIGNIFICANT CHANGE UP
SP GR SPEC: 1.02 — SIGNIFICANT CHANGE UP (ref 1–1.03)
UROBILINOGEN FLD QL: 0.2 E.U./DL — SIGNIFICANT CHANGE UP
UUN UR-MCNC: 360 MG/DL — SIGNIFICANT CHANGE UP
WBC # BLD: 15.88 K/UL — HIGH (ref 3.8–10.5)
WBC # BLD: 17.55 K/UL — HIGH (ref 3.8–10.5)
WBC # BLD: 18.71 K/UL — HIGH (ref 3.8–10.5)
WBC # BLD: 19.18 K/UL — HIGH (ref 3.8–10.5)
WBC # BLD: 25.64 K/UL — HIGH (ref 3.8–10.5)
WBC # FLD AUTO: 15.88 K/UL — HIGH (ref 3.8–10.5)
WBC # FLD AUTO: 17.55 K/UL — HIGH (ref 3.8–10.5)
WBC # FLD AUTO: 18.71 K/UL — HIGH (ref 3.8–10.5)
WBC # FLD AUTO: 19.18 K/UL — HIGH (ref 3.8–10.5)
WBC # FLD AUTO: 25.64 K/UL — HIGH (ref 3.8–10.5)
WBC UR QL: < 5 /HPF — SIGNIFICANT CHANGE UP

## 2023-06-21 PROCEDURE — 74174 CTA ABD&PLVS W/CONTRAST: CPT | Mod: 26

## 2023-06-21 PROCEDURE — 71275 CT ANGIOGRAPHY CHEST: CPT | Mod: 26

## 2023-06-21 PROCEDURE — 74018 RADEX ABDOMEN 1 VIEW: CPT | Mod: 26

## 2023-06-21 PROCEDURE — 99223 1ST HOSP IP/OBS HIGH 75: CPT | Mod: GC

## 2023-06-21 RX ORDER — PIPERACILLIN AND TAZOBACTAM 4; .5 G/20ML; G/20ML
3.38 INJECTION, POWDER, LYOPHILIZED, FOR SOLUTION INTRAVENOUS ONCE
Refills: 0 | Status: COMPLETED | OUTPATIENT
Start: 2023-06-21 | End: 2023-06-21

## 2023-06-21 RX ORDER — HEPARIN SODIUM 5000 [USP'U]/ML
5000 INJECTION INTRAVENOUS; SUBCUTANEOUS ONCE
Refills: 0 | Status: DISCONTINUED | OUTPATIENT
Start: 2023-06-21 | End: 2023-06-21

## 2023-06-21 RX ORDER — POTASSIUM PHOSPHATE, MONOBASIC POTASSIUM PHOSPHATE, DIBASIC 236; 224 MG/ML; MG/ML
15 INJECTION, SOLUTION INTRAVENOUS ONCE
Refills: 0 | Status: COMPLETED | OUTPATIENT
Start: 2023-06-21 | End: 2023-06-21

## 2023-06-21 RX ORDER — DEXTROSE 50 % IN WATER 50 %
25 SYRINGE (ML) INTRAVENOUS ONCE
Refills: 0 | Status: DISCONTINUED | OUTPATIENT
Start: 2023-06-21 | End: 2023-06-25

## 2023-06-21 RX ORDER — MAGNESIUM SULFATE 500 MG/ML
2 VIAL (ML) INJECTION ONCE
Refills: 0 | Status: COMPLETED | OUTPATIENT
Start: 2023-06-21 | End: 2023-06-21

## 2023-06-21 RX ORDER — SODIUM CHLORIDE 9 MG/ML
500 INJECTION, SOLUTION INTRAVENOUS ONCE
Refills: 0 | Status: COMPLETED | OUTPATIENT
Start: 2023-06-21 | End: 2023-06-21

## 2023-06-21 RX ORDER — BENZOCAINE AND MENTHOL 5; 1 G/100ML; G/100ML
1 LIQUID ORAL ONCE
Refills: 0 | Status: COMPLETED | OUTPATIENT
Start: 2023-06-21 | End: 2023-06-21

## 2023-06-21 RX ORDER — HYDROMORPHONE HYDROCHLORIDE 2 MG/ML
0.5 INJECTION INTRAMUSCULAR; INTRAVENOUS; SUBCUTANEOUS ONCE
Refills: 0 | Status: DISCONTINUED | OUTPATIENT
Start: 2023-06-21 | End: 2023-06-21

## 2023-06-21 RX ORDER — PIPERACILLIN AND TAZOBACTAM 4; .5 G/20ML; G/20ML
3.38 INJECTION, POWDER, LYOPHILIZED, FOR SOLUTION INTRAVENOUS EVERY 8 HOURS
Refills: 0 | Status: DISCONTINUED | OUTPATIENT
Start: 2023-06-22 | End: 2023-06-27

## 2023-06-21 RX ORDER — HEPARIN SODIUM 5000 [USP'U]/ML
5000 INJECTION INTRAVENOUS; SUBCUTANEOUS EVERY 8 HOURS
Refills: 0 | Status: DISCONTINUED | OUTPATIENT
Start: 2023-06-21 | End: 2023-06-22

## 2023-06-21 RX ORDER — CHLORHEXIDINE GLUCONATE 213 G/1000ML
1 SOLUTION TOPICAL
Refills: 0 | Status: DISCONTINUED | OUTPATIENT
Start: 2023-06-21 | End: 2023-06-25

## 2023-06-21 RX ORDER — GLUCAGON INJECTION, SOLUTION 0.5 MG/.1ML
1 INJECTION, SOLUTION SUBCUTANEOUS ONCE
Refills: 0 | Status: DISCONTINUED | OUTPATIENT
Start: 2023-06-21 | End: 2023-06-25

## 2023-06-21 RX ORDER — DEXTROSE 50 % IN WATER 50 %
15 SYRINGE (ML) INTRAVENOUS ONCE
Refills: 0 | Status: DISCONTINUED | OUTPATIENT
Start: 2023-06-21 | End: 2023-06-25

## 2023-06-21 RX ORDER — ALBUTEROL 90 UG/1
2 AEROSOL, METERED ORAL EVERY 6 HOURS
Refills: 0 | Status: DISCONTINUED | OUTPATIENT
Start: 2023-06-21 | End: 2023-06-27

## 2023-06-21 RX ORDER — LABETALOL HCL 100 MG
200 TABLET ORAL EVERY 8 HOURS
Refills: 0 | Status: DISCONTINUED | OUTPATIENT
Start: 2023-06-21 | End: 2023-06-21

## 2023-06-21 RX ORDER — ACETAMINOPHEN 500 MG
1000 TABLET ORAL ONCE
Refills: 0 | Status: COMPLETED | OUTPATIENT
Start: 2023-06-21 | End: 2023-06-21

## 2023-06-21 RX ORDER — SODIUM CHLORIDE 9 MG/ML
1000 INJECTION, SOLUTION INTRAVENOUS
Refills: 0 | Status: DISCONTINUED | OUTPATIENT
Start: 2023-06-21 | End: 2023-06-25

## 2023-06-21 RX ORDER — HEPARIN SODIUM 5000 [USP'U]/ML
5000 INJECTION INTRAVENOUS; SUBCUTANEOUS ONCE
Refills: 0 | Status: COMPLETED | OUTPATIENT
Start: 2023-06-21 | End: 2023-06-21

## 2023-06-21 RX ORDER — INSULIN LISPRO 100/ML
VIAL (ML) SUBCUTANEOUS
Refills: 0 | Status: DISCONTINUED | OUTPATIENT
Start: 2023-06-21 | End: 2023-06-25

## 2023-06-21 RX ORDER — HEPARIN SODIUM 5000 [USP'U]/ML
5000 INJECTION INTRAVENOUS; SUBCUTANEOUS EVERY 8 HOURS
Refills: 0 | Status: DISCONTINUED | OUTPATIENT
Start: 2023-06-21 | End: 2023-06-21

## 2023-06-21 RX ADMIN — SODIUM CHLORIDE 1000 MILLILITER(S): 9 INJECTION, SOLUTION INTRAVENOUS at 01:02

## 2023-06-21 RX ADMIN — BENZOCAINE AND MENTHOL 1 LOZENGE: 5; 1 LIQUID ORAL at 04:02

## 2023-06-21 RX ADMIN — PIPERACILLIN AND TAZOBACTAM 200 GRAM(S): 4; .5 INJECTION, POWDER, LYOPHILIZED, FOR SOLUTION INTRAVENOUS at 19:46

## 2023-06-21 RX ADMIN — SIMETHICONE 80 MILLIGRAM(S): 80 TABLET, CHEWABLE ORAL at 06:39

## 2023-06-21 RX ADMIN — OXYCODONE HYDROCHLORIDE 5 MILLIGRAM(S): 5 TABLET ORAL at 14:50

## 2023-06-21 RX ADMIN — Medication 400 MILLIGRAM(S): at 09:41

## 2023-06-21 RX ADMIN — HEPARIN SODIUM 5000 UNIT(S): 5000 INJECTION INTRAVENOUS; SUBCUTANEOUS at 03:11

## 2023-06-21 RX ADMIN — OXYCODONE HYDROCHLORIDE 5 MILLIGRAM(S): 5 TABLET ORAL at 10:00

## 2023-06-21 RX ADMIN — OXYCODONE HYDROCHLORIDE 5 MILLIGRAM(S): 5 TABLET ORAL at 05:16

## 2023-06-21 RX ADMIN — HYDROMORPHONE HYDROCHLORIDE 0.5 MILLIGRAM(S): 2 INJECTION INTRAMUSCULAR; INTRAVENOUS; SUBCUTANEOUS at 20:10

## 2023-06-21 RX ADMIN — SIMETHICONE 80 MILLIGRAM(S): 80 TABLET, CHEWABLE ORAL at 18:08

## 2023-06-21 RX ADMIN — OXYCODONE HYDROCHLORIDE 5 MILLIGRAM(S): 5 TABLET ORAL at 03:58

## 2023-06-21 RX ADMIN — PIPERACILLIN AND TAZOBACTAM 25 GRAM(S): 4; .5 INJECTION, POWDER, LYOPHILIZED, FOR SOLUTION INTRAVENOUS at 22:48

## 2023-06-21 RX ADMIN — OXYCODONE HYDROCHLORIDE 5 MILLIGRAM(S): 5 TABLET ORAL at 09:06

## 2023-06-21 RX ADMIN — ONDANSETRON 8 MILLIGRAM(S): 8 TABLET, FILM COATED ORAL at 04:13

## 2023-06-21 RX ADMIN — Medication 1000 MILLIGRAM(S): at 10:00

## 2023-06-21 RX ADMIN — Medication 25 GRAM(S): at 10:45

## 2023-06-21 RX ADMIN — HEPARIN SODIUM 5000 UNIT(S): 5000 INJECTION INTRAVENOUS; SUBCUTANEOUS at 22:48

## 2023-06-21 RX ADMIN — IRON SUCROSE 176.67 MILLIGRAM(S): 20 INJECTION, SOLUTION INTRAVENOUS at 11:58

## 2023-06-21 RX ADMIN — OXYCODONE HYDROCHLORIDE 5 MILLIGRAM(S): 5 TABLET ORAL at 15:50

## 2023-06-21 RX ADMIN — SODIUM CHLORIDE 1000 MILLILITER(S): 9 INJECTION, SOLUTION INTRAVENOUS at 02:39

## 2023-06-21 RX ADMIN — POTASSIUM PHOSPHATE, MONOBASIC POTASSIUM PHOSPHATE, DIBASIC 62.5 MILLIMOLE(S): 236; 224 INJECTION, SOLUTION INTRAVENOUS at 11:58

## 2023-06-21 RX ADMIN — HYDROMORPHONE HYDROCHLORIDE 0.5 MILLIGRAM(S): 2 INJECTION INTRAMUSCULAR; INTRAVENOUS; SUBCUTANEOUS at 00:10

## 2023-06-21 RX ADMIN — HEPARIN SODIUM 5000 UNIT(S): 5000 INJECTION INTRAVENOUS; SUBCUTANEOUS at 14:37

## 2023-06-21 RX ADMIN — Medication 400 MILLIGRAM(S): at 16:35

## 2023-06-21 RX ADMIN — SODIUM CHLORIDE 500 MILLILITER(S): 9 INJECTION, SOLUTION INTRAVENOUS at 23:12

## 2023-06-21 RX ADMIN — HYDROMORPHONE HYDROCHLORIDE 0.5 MILLIGRAM(S): 2 INJECTION INTRAMUSCULAR; INTRAVENOUS; SUBCUTANEOUS at 19:46

## 2023-06-21 RX ADMIN — SIMETHICONE 80 MILLIGRAM(S): 80 TABLET, CHEWABLE ORAL at 12:57

## 2023-06-21 NOTE — CHART NOTE - NSCHARTNOTEFT_GEN_A_CORE
Sepsis Flow Sheet  MUST BE COMPLETED BY PROVIDER ACCORDING TO CMS GUIDELINES  ALL SECTIONS MUST BE COMPLETED    [ ] Source of Infection: unknown 					  -----------------------------------------------------------------------------------------------------------------------------------  [ ] 2 SIRS Criteria Met:    [X] Temp > 38.3 C or < 36.0 C (>100.9 F or < 96.8 F)  [X] HR> 90 bpm  [ ] RR >20 rpm  [X] WBC > 12K or WBC <4K or > 10% bands  -----------------------------------------------------------------------------------------------------------------------------------  [ ] Organ Dysfunction (evidenced by any of the following):     [ ] SBP < 90 mmHg  [ ] MAP < 65 mmHg  [ ] Decrease in SBP of more than 40mmHg  [ ] New need for invasive or non-invasive mechanical ventilation  [ ] Creatinine > 2.0  or Urine Output  <0.5 ml/kg/hr for 2 consecutive hours  [ ] Total Bilirubin > 2 mg/dL (34.2 mmol/L)  [ ] Platelets <100K  [ ] INR > 1.5 or aPTT >60 sec  [X] Lactate > 2, then [ ] Repeat Lactate    Severe Sepsis Criteria = Suspected Infection + 2 or more SIRS Criteria + Evidence of Organ Dysfunction (all within 6 hours)    Septic Shock Criteria = Severe Sepsis + (Persistent hypotension or new onset hypotension)  or Tissue hypoperfusion aeb initial Lactate >4  -----------------------------------------------------------------------------------------------------------------------------------  [X] Blood cultures drawn from 2 sites:     [X] Drawn before abx admin    [X] Site #1, Time drawn: 1920 during rapid response			    [X] Site #2, Time drawn: 1920 during rapid response			  -----------------------------------------------------------------------------------------------------------------------------------   [ ] IVF - If IBW is used to calculate target ordered volume, specify calculated IBW: ______    [ ] 30 cc/kg administered     OR    [X] Less than 30cc/kg. Target volume _______, due to:             [ ] BP responded to lesser volume             [ ] CHF             [ ] Pulm edema             [ ] LVAD             [ ] ESRD             [X] Concern for fluid overload: please see pulmonary consult note from this morning. The patient has a hx of PE and RV dysfxn. Also, the patient has received 2 units pRBC. On exam, she is warm and well-perfused. Would recommend a bladder scan and if retaining, place an indwelling catheter for strict I&Os.             [ ] Portion of fluid volume administered as colloids    AND I have re-evaluated the patient’s fluid status and reviewed vital signs. Clinical perfusion assessment was performed after administration of fluids.  			                                -----------------------------------------------------------------------------------------------------------------------------------  [X] Antibiotics: broadened to Zosyn    -----------------------------------------------------------------------------------------------------------------------------------

## 2023-06-21 NOTE — CONSULT NOTE ADULT - ASSESSMENT
33 yo  presenting for abdominal myomectomy for symptoms of menorrhagia, dysmenorrhea, and b/l DVT/PE thought to be secondary to mass effect of fibroid uterus on iliac veins. Now s/p abd myomectomy 18 wks size uterus, 50 fibroids, enetered cavity,EBL 2L, , 2u pRBC, 1179 cellsaver, 3L IVF       Neuro: Tylenol/Oxy q4 ATC **NO NSAIDS**; zofran prn for nausea  CV: MAP goal > 65  Pulm: saturating well on RA, IS; Hx PE: holding eliquis  GI: NPO, PPI  : failed TOV, replaced valencia for monitoring   ID: Zosyn (-)// Ancef x 1, periop  Endo: ISS   Heme: 11.1> (2uPRBC+1178 cell saver intraop)>13.0>11.2>9.6>8.7, venofer 300mg qd stop after 3 days  PPx: NO SCD, hold SQH   Lines: PIV   Wounds: Phannenstiel incision  PT/OT:  not ordered  Dispo: SICU   31 yo  presenting for abdominal myomectomy for symptoms of menorrhagia, dysmenorrhea, and b/l DVT/PE thought to be secondary to mass effect of fibroid uterus on iliac veins. Now s/p abd myomectomy 18 wks size uterus, 50 fibroids, enetered cavity,EBL 2L, , 2u pRBC, 1179 cellsaver, 3L IVF. Will follow up final imaging studies. Does not appear to have a PE or signs of active bleeding      Neuro: Tylenol/Oxy q4 ATC **NO NSAIDS**; zofran prn for nausea  CV: MAP goal > 65  Pulm: saturating well on RA, IS; Hx PE: holding eliquis  GI: NPO, PPI  : failed TOV, replaced valencia for monitoring   ID: Zosyn (-)// Ancef x 1, periop  Endo: ISS   Heme: 11.1> (2uPRBC+1178 cell saver intraop)>13.0>11.2>9.6>8.7, venofer 300mg qd stop after 3 days  PPx: NO SCD, hold SQH   Lines: PIV   Wounds: Phannenstiel incision  PT/OT:  not ordered  Dispo: SICU

## 2023-06-21 NOTE — CONSULT NOTE ADULT - SUBJECTIVE AND OBJECTIVE BOX
Surgery Consult Note    HPI:  31 yo  presenting for abdominal myomectomy for symptoms of menorrhagia, dysmenorrhea, and b/l DVT thought to be secondary to mass effect of fibroid uterus on iliac veins.     OBHx  G1-2 VTOP D+C  G3  20-    GYN  Fibroid uterus  Denies cysts, abnormal pap smears    PMH b/l DVT and PE currently on eliquis; mild asthma (never hospitalized)  PSH Denies  Meds Eliquis 5mg qd  NKDA (2023 12:01)      Attending:  Dr. Jovel    Surgery Addendum:  31 yo  presenting for abdominal myomectomy for symptoms of menorrhagia, dysmenorrhea, and b/l DVT/PE thought to be secondary to mass effect of fibroid uterus on iliac veins. Now s/p abd myomectomy 18 wks size uterus, 50 fibroids, enetered cavity,EBL 2L, , 2u pRBC, 1179 cellsaver, 3L IVF. Rapid response called after patient vasovagaled when sitting up. Patient was tachycardic to 130s. Per primary team patient had been tachycardic since the OR. Pulmonary team consult in the morning and low concern for underresuscitation. Temp 101 - cultures drawn. Lactate 3.4 and Hgb downtrending. Given history and tachycardia patient transferred to SICU for further management with differential including sepsis vs post op bleeding.       PAST MEDICAL & SURGICAL HISTORY:  Asthma      Fibroids      Anemia      Pulmonary embolism  2023      DVT (deep venous thrombosis)      No significant past surgical history          MEDICATIONS  (STANDING):  chlorhexidine 2% Cloths 1 Application(s) Topical <User Schedule>  dextrose 5%. 1000 milliLiter(s) (50 mL/Hr) IV Continuous <Continuous>  dextrose 50% Injectable 25 Gram(s) IV Push once  glucagon  Injectable 1 milliGRAM(s) IntraMuscular once  insulin lispro (ADMELOG) corrective regimen sliding scale   SubCutaneous Before meals and at bedtime  iron sucrose IVPB 300 milliGRAM(s) IV Intermittent every 24 hours  pantoprazole    Tablet 40 milliGRAM(s) Oral before breakfast  piperacillin/tazobactam IVPB.. 3.375 Gram(s) IV Intermittent every 8 hours  simethicone 80 milliGRAM(s) Chew every 6 hours    MEDICATIONS  (PRN):  acetaminophen     Tablet .. 1000 milliGRAM(s) Oral every 6 hours PRN Mild Pain (1 - 3)  albuterol    90 MICROgram(s) HFA Inhaler 2 Puff(s) Inhalation every 6 hours PRN Shortness of Breath and/or Wheezing  dextrose Oral Gel 15 Gram(s) Oral once PRN Blood Glucose LESS THAN 70 milliGRAM(s)/deciliter  metoclopramide Injectable 10 milliGRAM(s) IV Push every 6 hours PRN nausea  ondansetron Injectable 8 milliGRAM(s) IV Push every 6 hours PRN Nausea and/or Vomiting  oxyCODONE    IR 5 milliGRAM(s) Oral every 4 hours PRN Moderate Pain (4 - 6)      Allergies    No Known Allergies    Intolerances        SOCIAL HISTORY:    FAMILY HISTORY:      Vital Signs Last 24 Hrs  T(C): 37.9 (2023 00:33), Max: 38.3 (2023 19:03)  T(F): 100.3 (2023 00:33), Max: 101 (2023 19:03)  HR: 135 (2023 03:00) (125 - 146)  BP: 153/65 (2023 03:00) (126/75 - 160/72)  BP(mean): 94 (2023 03:00) (90 - 111)  RR: 24 (2023 03:00) (17 - 26)  SpO2: 97% (2023 03:00) (96% - 100%)    Parameters below as of 2023 03:00  Patient On (Oxygen Delivery Method): room air        I&O's Summary    2023 07:01  -  2023 07:00  --------------------------------------------------------  IN: 4275 mL / OUT: 450 mL / NET: 3825 mL    2023 07:01  -  2023 04:07  --------------------------------------------------------  IN: 2650 mL / OUT: 3245 mL / NET: -595 mL        Physical Exam:  General: NAD, resting comfortably  HEENT: NC/AT, EOMI, normal hearing, no oral lesions, no LAD, neck supple  Pulmonary: normal resp effort, CTA-B, POCUS - jessica predominant, no effusions  Cardiovascular: NSR, no murmurs  Abdominal: soft, mildly distended, appropriately tender to palpation by the incision site, no organomegaly  Extremities: WWP, normal strength, no clubbing/cyanosis/edema  Neuro: A/O x 3, CNs II-XII grossly intact, normal sensation, no focal deficits    Lines/drains/tubes:    LABS:                        6.9    19.75 )-----------( 129      ( 2023 23:44 )             21.0     06-21    131<L>  |  101  |  8   ----------------------------<  137<H>  4.7   |  21<L>  |  0.71    Ca    8.1<L>      2023 23:44  Phos  1.7     06-21  Mg     1.8     06-21    TPro  5.2<L>  /  Alb  2.7<L>  /  TBili  0.5  /  DBili  x   /  AST  22  /  ALT  <5<L>  /  AlkPhos  48  06-21    PT/INR - ( 2023 19:39 )   PT: 14.7 sec;   INR: 1.23          PTT - ( 2023 19:39 )  PTT:30.5 sec  Urinalysis Basic - ( 2023 23:44 )    Color: x / Appearance: x / SG: x / pH: x  Gluc: 137 mg/dL / Ketone: x  / Bili: x / Urobili: x   Blood: x / Protein: x / Nitrite: x   Leuk Esterase: x / RBC: x / WBC x   Sq Epi: x / Non Sq Epi: x / Bacteria: x      CAPILLARY BLOOD GLUCOSE      POCT Blood Glucose.: 115 mg/dL (2023 22:52)  POCT Blood Glucose.: 161 mg/dL (2023 19:06)    LIVER FUNCTIONS - ( 2023 23:44 )  Alb: 2.7 g/dL / Pro: 5.2 g/dL / ALK PHOS: 48 U/L / ALT: <5 U/L / AST: 22 U/L / GGT: x             Cultures:      RADIOLOGY & ADDITIONAL STUDIES:         Surgery Consult Note    HPI:  33 yo  presenting for abdominal myomectomy for symptoms of menorrhagia, dysmenorrhea, and b/l DVT thought to be secondary to mass effect of fibroid uterus on iliac veins.     OBHx  G1-2 VTOP D+C  G3  20-11    GYN  Fibroid uterus  Denies cysts, abnormal pap smears    PMH b/l DVT and PE currently on eliquis; mild asthma (never hospitalized)  PSH Denies  Meds Eliquis 5mg qd  NKDA (2023 12:01)      Attending:  Dr. Jovel    Surgery Addendum:  33 yo  presenting for abdominal myomectomy for symptoms of menorrhagia, dysmenorrhea, and b/l DVT/PE thought to be secondary to mass effect of fibroid uterus on iliac veins. Now s/p abd myomectomy 18 wks size uterus, 50 fibroids, enetered cavity,EBL 2L, , 2u pRBC, 1179 cellsaver, 3L IVF. Rapid response called after patient vasovagaled when sitting up. Patient was tachycardic to 130s. Per primary team patient had been tachycardic since the OR. Pulmonary team consult in the morning and low concern for underresuscitation. Temp 101 - cultures drawn. Lactate 3.4 and Hgb downtrending. Given history and tachycardia patient transferred to SICU for further management with differential including sepsis vs post op bleeding. Patient will go for CTA chest/abd/pelvis      PAST MEDICAL & SURGICAL HISTORY:  Asthma      Fibroids      Anemia      Pulmonary embolism  2023      DVT (deep venous thrombosis)      No significant past surgical history          MEDICATIONS  (STANDING):  chlorhexidine 2% Cloths 1 Application(s) Topical <User Schedule>  dextrose 5%. 1000 milliLiter(s) (50 mL/Hr) IV Continuous <Continuous>  dextrose 50% Injectable 25 Gram(s) IV Push once  glucagon  Injectable 1 milliGRAM(s) IntraMuscular once  insulin lispro (ADMELOG) corrective regimen sliding scale   SubCutaneous Before meals and at bedtime  iron sucrose IVPB 300 milliGRAM(s) IV Intermittent every 24 hours  pantoprazole    Tablet 40 milliGRAM(s) Oral before breakfast  piperacillin/tazobactam IVPB.. 3.375 Gram(s) IV Intermittent every 8 hours  simethicone 80 milliGRAM(s) Chew every 6 hours    MEDICATIONS  (PRN):  acetaminophen     Tablet .. 1000 milliGRAM(s) Oral every 6 hours PRN Mild Pain (1 - 3)  albuterol    90 MICROgram(s) HFA Inhaler 2 Puff(s) Inhalation every 6 hours PRN Shortness of Breath and/or Wheezing  dextrose Oral Gel 15 Gram(s) Oral once PRN Blood Glucose LESS THAN 70 milliGRAM(s)/deciliter  metoclopramide Injectable 10 milliGRAM(s) IV Push every 6 hours PRN nausea  ondansetron Injectable 8 milliGRAM(s) IV Push every 6 hours PRN Nausea and/or Vomiting  oxyCODONE    IR 5 milliGRAM(s) Oral every 4 hours PRN Moderate Pain (4 - 6)      Allergies    No Known Allergies    Intolerances        SOCIAL HISTORY:    FAMILY HISTORY:      Vital Signs Last 24 Hrs  T(C): 37.9 (2023 00:33), Max: 38.3 (2023 19:03)  T(F): 100.3 (2023 00:33), Max: 101 (2023 19:03)  HR: 135 (2023 03:00) (125 - 146)  BP: 153/65 (2023 03:00) (126/75 - 160/72)  BP(mean): 94 (2023 03:00) (90 - 111)  RR: 24 (2023 03:00) (17 - 26)  SpO2: 97% (2023 03:00) (96% - 100%)    Parameters below as of 2023 03:00  Patient On (Oxygen Delivery Method): room air        I&O's Summary    2023 07:01  -  2023 07:00  --------------------------------------------------------  IN: 4275 mL / OUT: 450 mL / NET: 3825 mL    2023 07:01  -  2023 04:07  --------------------------------------------------------  IN: 2650 mL / OUT: 3245 mL / NET: -595 mL        Physical Exam:  General: NAD, resting comfortably  HEENT: NC/AT, EOMI, normal hearing, no oral lesions, no LAD, neck supple  Pulmonary: normal resp effort, CTA-B, POCUS - jessica predominant, no effusions  Cardiovascular: NSR, no murmurs  Abdominal: soft, mildly distended, appropriately tender to palpation by the incision site, no organomegaly  Extremities: WWP, normal strength, no clubbing/cyanosis/edema  Neuro: A/O x 3, CNs II-XII grossly intact, normal sensation, no focal deficits    Lines/drains/tubes:    LABS:                        6.9    19.75 )-----------( 129      ( 2023 23:44 )             21.0     06-21    131<L>  |  101  |  8   ----------------------------<  137<H>  4.7   |  21<L>  |  0.71    Ca    8.1<L>      2023 23:44  Phos  1.7     06-21  Mg     1.8     06-21    TPro  5.2<L>  /  Alb  2.7<L>  /  TBili  0.5  /  DBili  x   /  AST  22  /  ALT  <5<L>  /  AlkPhos  48  06-21    PT/INR - ( 2023 19:39 )   PT: 14.7 sec;   INR: 1.23          PTT - ( 2023 19:39 )  PTT:30.5 sec  Urinalysis Basic - ( 2023 23:44 )    Color: x / Appearance: x / SG: x / pH: x  Gluc: 137 mg/dL / Ketone: x  / Bili: x / Urobili: x   Blood: x / Protein: x / Nitrite: x   Leuk Esterase: x / RBC: x / WBC x   Sq Epi: x / Non Sq Epi: x / Bacteria: x      CAPILLARY BLOOD GLUCOSE      POCT Blood Glucose.: 115 mg/dL (2023 22:52)  POCT Blood Glucose.: 161 mg/dL (2023 19:06)    LIVER FUNCTIONS - ( 2023 23:44 )  Alb: 2.7 g/dL / Pro: 5.2 g/dL / ALK PHOS: 48 U/L / ALT: <5 U/L / AST: 22 U/L / GGT: x             Cultures:      RADIOLOGY & ADDITIONAL STUDIES:

## 2023-06-21 NOTE — CHART NOTE - NSCHARTNOTEFT_GEN_A_CORE
Patient evaluated at bedside for continued tachycardia to 135. Pt reports that her pain is well controlled. Denies lightheadedness, dizziness, SOB, chest pain, palpitations. Pt reports that the last time she was tachycardic she was being treated for her bilateral PEs in January. Otherwise states that typically she has a normal heartrate, as far as she knows. EKG read as sinus tachycardia by anesthesia    Vitals:   /60    O2 sat 97% on RA   RR 18 AF   UOP for last hour 30cc, appears very concentrated   General: pt appears well, sitting up in bed, conversational  Abdomen: soft, nondistended, appropriately tender, no rebound or guarding  Ext: WNL, no SCDs    A/P: 33yo POD1 s/p abdominal myomectomy for 50 fibroids removed, c/b high EBL of 2L    I: 1200 cell saver, 3L IVF, 2u pRBCs = 4700 in  (+1L bolus in PACU)   O: 2L, 5L insensible losses = 7L out                            13.0   29.41 )-----------( 199      ( 20 Jun 2023 21:34 )             39.5     Patient appears clinically well despite tachycardia. Discuccsed with patient concern for ongoing bleeding vs hypovolemia vs possible, though low likelihood of PE given pt's hx. Heart rate and UOP temporarily responded to 1L fluid bolus with decrease in HR to 105 and UOP increase to 50cc. Hb 13.0, benign abdominal exam, low suspicion for ongoing bleeding. Will repeat CBC to confirm. Based on I's and O's, suspect that patient is still approximately 2L behind and elevated HR could be attributed to hypovolemia. Will give another 500cc bolus to start and see how patient responds. Will continue to monitor closely. Patient evaluated at bedside for continued tachycardia to 135. Pt reports that her pain is well controlled. Denies lightheadedness, dizziness, SOB, chest pain, palpitations. Pt reports that the last time she was tachycardic she was being treated for her bilateral PEs in January. Otherwise states that typically she has a normal heartrate, as far as she knows. EKG read as sinus tachycardia by anesthesia    Vitals:   /60    O2 sat 97% on RA   RR 18 AF   UOP for last hour 30cc, appears very concentrated   General: pt appears well, sitting up in bed, conversational  Abdomen: soft, nondistended, appropriately tender, no rebound or guarding  Ext: WNL, no SCDs    A/P: 33yo POD1 s/p abdominal myomectomy for 50 fibroids removed, c/b high EBL of 2L    I: 1200 cell saver, 3L IVF, 2u pRBCs = 4700 in  (+1L bolus in PACU)   O: 2L, 5L insensible losses = 7L out                            13.0   29.41 )-----------( 199      ( 20 Jun 2023 21:34 )             39.5     Patient appears clinically well despite tachycardia. Discuccsed with patient concern for ongoing bleeding vs hypovolemia vs possible, though low likelihood of PE given pt's hx. Heart rate and UOP temporarily responded to 1L fluid bolus with decrease in HR to 105 and UOP increase to 50cc. Hb 13.0, benign abdominal exam, low suspicion for ongoing bleeding. Will repeat CBC to confirm. Based on I's and O's, suspect that patient is still approximately 2L behind and elevated HR could be attributed to hypovolemia. Will give another 500cc bolus to start and see how patient responds. Will continue to monitor closely.    Will continue to follow closely.   Suspect hypovolemia and will continue to follow closely   Strict I/O reviewed.   Dr. Telles

## 2023-06-21 NOTE — CHART NOTE - NSCHARTNOTEFT_GEN_A_CORE
Pt was being evaluated at bedside. Pt was sat up to ambulate for the first time since surgery to go to the restroom to void. Nurse was going to get abd binder and resident was placing dilaudid order for pain. Shouting was then heard and pt was visualized to be slumped at the edge of the bed being help up by her mother screaming. Rapid response team called @ 1904 for syncope. pt was placed back in bed, and connected by to tele monitor. Pt had pulse and was breathing. Upon lifting pt back into bed pt awakened and was confused.  's, 's, O2 sat 93-95% on room air, RR 18-21. Blood glucose 161 mg/dL.  Pt was found to be febrile to 101F  BCx, lactate, Type and CBC were collected  MICU performed bedside u/s   Patient denies chest pain or SOB, does endorse abdominal pain consistent from prior exams, appropriately tender postoperatively.  Upon further discussion with team and due to the history of DVT/PE and recent procedure, patient will be stepped up to SICU for closer monitoring and will have CTPE Protocol and CT Angio of Abd of Pelvis to evaluate for possible PE vs intra-abdominal bleeding.  d/w Dr. Telles Pt was being evaluated at bedside. Pt was sat up to ambulate for the first time since surgery to go to the restroom to void. Nurse was going to get abd binder and resident was placing dilaudid order for pain. Shouting was then heard and pt was visualized to be slumped at the edge of the bed being help up by her mother screaming. Rapid response team called @ 1904 for syncope. pt was placed back in bed, and connected by to tele monitor. Pt had pulse and was breathing. Upon lifting pt back into bed pt awakened and was confused.  's, 's, O2 sat 93-95% on room air, RR 18-21. Blood glucose 161 mg/dL.  Pt was found to be febrile to 101F  BCx, lactate, Type and CBC were collected  MICU performed bedside u/s   Patient denies chest pain or SOB, does endorse abdominal pain consistent from prior exams, appropriately tender postoperatively.  Upon further discussion with team and due to the history of DVT/PE and recent procedure, patient will be stepped up to SICU for closer monitoring and will have CTPE Protocol and CT Angio of Abd of Pelvis to evaluate for possible PE vs intra-abdominal bleeding.  d/w Dr. Telles      Aware of episode and instructed team to transfer to SICU for closer monitoring  When stable will plan for CT of abdomen and pelvic  Need to consider and rule out repeat PE .  Will follow closely with team

## 2023-06-21 NOTE — CHART NOTE - [email or web address]_GEN_A_CORE
***Rapid Response Clinical Impact Nurse Practitioner Note***    HPI/Chart Review:  31 yo  presenting for abdominal myomectomy for symptoms of menorrhagia, dysmenorrhea, and b/l DVT thought to be secondary to mass effect of fibroid uterus on iliac veins.     OBHx  G1-2 VTOP D+C  G3  20-    GYN  Fibroid uterus  Denies cysts, abnormal pap smears    PMH b/l DVT and PE currently on eliquis; mild asthma (never hospitalized)  PSH Denies  Meds Eliquis 5mg qd  NKDA (2023 12:01)    **********  Rapid Response Team Summary:  [[ INCOMPLETE TEMPLATE ]]    Rapid response team called @ 1904 for syncope.    Patient was seen and examined at the bedside by the rapid response team.    Allergies  No Known Allergies    PAST MEDICAL & SURGICAL HISTORY:  Asthma  Fibroids  Anemia  Pulmonary embolism 2023  DVT (deep venous thrombosis)  No significant past surgical history    REVIEW OF SYSTEMS:   + incisional pain, no chest pain, no SOB     TEMPLATE ONLY:  General: no weight loss, fatigue, fever or chills, sleep changes  HEENT:  no headache, hearing change, vision changes, vertigo, congestion, rhinorrhea, epistaxis, sore throat  Respiratory: no cough/sputum, hemoptysis, SOB, wheezing, pleuritic pain  CV: no palpitation, dyspnea, diaphoresis, orthopnea, edema, pain,  no past clots in veins; no swelling in calves, legs or feet; no color change in fingertips or toes during cold weather; no swelling with redness or tenderness  GI: no N/V/D, constipation, appetite change, dysphagia, melena, abdominal pain, hemorrhoids  : no urinary frequency, urgency, dysuria, hematuria  Skin: no rashes, itching, dryness  MSK: no joint pain, myalgia, joint swelling, leg cramps  Endocrine: no hot/cold intolerance, polyuria, polydipsia, abnormal bleeding  Neurologic: no weakness, dizziness/syncope, seizures, tremor, paresthesias, depression, anxiety, confusion    Vital Signs Last 24 Hrs  T(C): 36.9 (2023 17:06), Max: 37.3 (2023 12:51)  T(F): 98.5 (2023 17:06), Max: 99.1 (2023 12:51)  HR: 130 (2023 17:06) (101 - 146)  BP: 160/72 (2023 17:06) (103/59 - 160/72)  BP(mean): 94 (2023 06:00) (71 - 94)  RR: 17 (2023 17:06) (17 - 26)  SpO2: 98% (2023 17:06) (96% - 100%)    Parameters below as of 2023 17:06  Patient On (Oxygen Delivery Method): room air    06-20 @ 07:01  -   @ 07:00  --------------------------------------------------------  IN: 4275 mL / OUT: 450 mL / NET: 3825 mL     @ 07:01  -   @ 19:59  --------------------------------------------------------  IN: 1150 mL / OUT: 1490 mL / NET: -340 mL    Physical Exam *NORMAL*  GENERAL: The patient is awake and alert in no apparent distress.   HEENT: Head is normocephalic and atraumatic. Extraocular muscles are intact. Mucous membranes are moist. No throat erythema/exudates no lymphadenopathy, no JVD,   NECK: Supple.  LUNGS: Clear to auscultation BL without wheezing, rales or rhonchi; respirations unlabored  HEART: Regular rate and rhythm ,+S1/+S2, no murmurs, rubs, gallops  ABDOMEN: Soft, nontender, and nondistended, no rebound, guarding rigidity, bowel sounds in all 4 quadrants  EXTREMITIES: Without any cyanosis, clubbing, rash, lesions or edema.  SKIN: No new rashes or lesions.  MSK: strength equal BL  VASCULAR: Radial and Dorsal pedal pulses palpable BL  NEUROLOGIC: Grossly intact.  PSYCH: No new changes.    PHYSICAL EXAM: *TEMPLATE ONLY*  GENERAL: NAD. WD/WN. Sedated? well appearing vs ill appearing?   Alert, interactive, lethargic?     SKIN/HAIR/NAILS: Skin warm and dry. Nails without clubbing or cyanosis. CR<2 secs. No lesions, rash, petechiae, erythema or ecchymoses. No decubiti. Anicteric.   *if any wounds present: how do they look?     HEAD AND NECK: The skull is NC/AT. B/L Sclera white. X mm, PERRL. Nasal mucous membrane with no erythema or drainage. Trachea midline, neck supple. No LAD. Normal neck ROM.     THORAX/LUNGS: Thorax is symmetric with good expansion, assisted by mechanical ventilation? Normal inspiratory effort?  ETT # @  cm line. CTAB, diminished at bases? wheezing/rhonchi/rales? No tactile fremitus.     CARDIOVASCULAR: No JVD. Carotid upstrokes are brisk, without bruits. +S1 and S2, RRR; no extra heart sounds or M/R/G.     ABDOMEN/: Abdomen is flat with +BS normoactive x 4. It is soft, ND; no palpable masses or HSM. No rebound or guarding. No BRBPR, no melena. *valencia? Date? Inserted by*     PERIPHERAL VASCULAR: Extremities are warm and with + edema?. No varicosities or stasis changes. Calves are supple and nontender. No femoral or abdominal bruits. Radial and dorsalis pedis pulses + and symmetric. No clubbing, no cyanosis.     MUSCULOSKELETAL: Normal tone/TERRI, strength and sensation intact? No active ROM at this time, sedated?. No evidence of swelling or deformity.     NEUROLOGICAL: AxOx4, sensation normal, gait normal?   Unable to assess neuro status at this time, sedated on propofol.     INCISIONS:    DRAINS: none    LINES (DATES):  sites, dates, how site looks    LABS:                        8.0    19.18 )-----------( 145      ( 2023 19:39 )             24.5      06-21    130<L>  |  103  |  11  ----------------------------<  149<H>  5.2   |  18<L>  |  0.82    Ca    7.4<L>      2023 05:16  Phos  2.4     06-21  Mg     1.2     06-21       ABG - ( 2023 17:36 )  pH, Arterial: 7.32  pH, Blood: x     /  pCO2: 39    /  pO2: 378   / HCO3: 20    / Base Excess: -5.6  /  SaO2: x           PT/INR - ( 2023 19:39 )   PT: 14.7 sec;   INR: 1.23     PTT - ( 2023 19:39 )  PTT:30.5 sec    Urinalysis Basic - ( 2023 05:16 )    Color: x / Appearance: x / SG: x / pH: x  Gluc: 149 mg/dL / Ketone: x  / Bili: x / Urobili: x   Blood: x / Protein: x / Nitrite: x   Leuk Esterase: x / RBC: x / WBC x   Sq Epi: x / Non Sq Epi: x / Bacteria: x      IMAGING    Chest Xray? ____________  EKG? _________________    MEDICATIONS  MEDICATIONS  (STANDING):  heparin   Injectable 5000 Unit(s) SubCutaneous every 8 hours  iron sucrose IVPB 300 milliGRAM(s) IV Intermittent every 24 hours  pantoprazole    Tablet 40 milliGRAM(s) Oral before breakfast  piperacillin/tazobactam IVPB.- 3.375 Gram(s) IV Intermittent once  simethicone 80 milliGRAM(s) Chew every 6 hours    MEDICATIONS  (PRN):  acetaminophen     Tablet .. 1000 milliGRAM(s) Oral every 6 hours PRN Mild Pain (1 - 3)  albuterol    90 MICROgram(s) HFA Inhaler 2 Puff(s) Inhalation every 6 hours PRN Shortness of Breath and/or Wheezing  metoclopramide Injectable 10 milliGRAM(s) IV Push every 6 hours PRN nausea  ondansetron Injectable 8 milliGRAM(s) IV Push every 6 hours PRN Nausea and/or Vomiting  oxyCODONE    IR 5 milliGRAM(s) Oral every 4 hours PRN Moderate Pain (4 - 6)      ASSESSMENT & PLAN    Assessment- Rapid Response called for 32y year old Female with a past medical history of     Plan-  STAT labs sent  EKG with sinus tach, no signs of ischemia  Pain control per primary team        I have personally and independently provided 31 minutes of critical care services.  This excludes any time spent on separate procedures or teaching. ***Rapid Response Clinical Impact Nurse Practitioner Note***    HPI/Chart Review:  33 yo  presenting for abdominal myomectomy for symptoms of menorrhagia, dysmenorrhea, and b/l DVT thought to be secondary to mass effect of fibroid uterus on iliac veins.     OBHx  G1-2 VTOP D+C  G3  20-11    GYN  Fibroid uterus  Denies cysts, abnormal pap smears    NKDA     PAST MEDICAL & SURGICAL HISTORY:  Asthma (mild, never hospitalized)  Fibroids  Anemia  Pulmonary embolism 2023 and DVT (deep venous thrombosis) on Eliquis 5 mg daily  No significant past surgical history  **********    Rapid Response Team Summary:  [[ INCOMPLETE TEMPLATE ]]    Rapid response team called @ 1904 for syncope.    Patient was seen and examined at the bedside by the rapid response team.    REVIEW OF SYSTEMS:   + incisional/abdominal pain   no chills, no vision changes, no chest pain, no palpitations, no SOB, no swelling in calves, no numbness/tingling    Vital Signs Last 24 Hrs  T(C): 36.9 (2023 17:06), Max: 37.3 (2023 12:51)  T(F): 98.5 (2023 17:06), Max: 99.1 (2023 12:51)  HR: 130 (2023 17:06) (101 - 146)  BP: 160/72 (2023 17:06) (103/59 - 160/72)  BP(mean): 94 (2023 06:00) (71 - 94)  RR: 17 (2023 17:06) (17 - 26)  SpO2: 98% (2023 17:06) (96% - 100%)    Parameters below as of 2023 17:06  Patient On (Oxygen Delivery Method): room air     @ 07:  -   @ 07:00  --------------------------------------------------------  IN: 4275 mL / OUT: 450 mL / NET: 3825 mL     @ 07:  -   @ 19:59  --------------------------------------------------------  IN: 1150 mL / OUT: 1490 mL / NET: -340 mL    Physical Exam  GENERAL: The patient is awake and alert, able to answer all questions and follow commands.   HEENT: Head is normocephalic and atraumatic. Extraocular muscles are intact. Mucous membranes are moist. No JVD.  NECK: Supple.  LUNGS: Clear to auscultation BL without wheezing, rales or rhonchi; respirations unlabored. Mild splinting noted.  HEART: Tachycardic, regular rate and rhythm ,+S1/+S2, no murmurs, rubs, gallops.  ABDOMEN: Soft, tender in the lower quadrants, and distended.  EXTREMITIES: Without any cyanosis, clubbing, rash, or lesions. Trace dependent edema in lower extremities.   SKIN: No new rashes or lesions.  MSK: strength equal BL.  VASCULAR: +2 Radial and Dorsal pedal pulses palpable BL.  NEUROLOGIC: Grossly intact.  PSYCH: No new changes.  INCISIONS:     DRAINS: none    LINES (DATES):  sites, dates, how site looks    LABS:                        8.0    19.18 )-----------( 145      ( 2023 19:39 )             24.5      06-21    130<L>  |  103  |  11  ----------------------------<  149<H>  5.2   |  18<L>  |  0.82    Ca    7.4<L>      2023 05:16  Phos  2.4     06-  Mg     1.2     06-21       ABG - ( 2023 17:36 )  pH, Arterial: 7.32  pH, Blood: x     /  pCO2: 39    /  pO2: 378   / HCO3: 20    / Base Excess: -5.6  /  SaO2: x           PT/INR - ( 2023 19:39 )   PT: 14.7 sec;   INR: 1.23     PTT - ( 2023 19:39 )  PTT:30.5 sec    Urinalysis Basic - ( 2023 05:16 )    Color: x / Appearance: x / SG: x / pH: x  Gluc: 149 mg/dL / Ketone: x  / Bili: x / Urobili: x   Blood: x / Protein: x / Nitrite: x   Leuk Esterase: x / RBC: x / WBC x   Sq Epi: x / Non Sq Epi: x / Bacteria: x      IMAGING    Chest Xray? ____________  EKG? _________________    MEDICATIONS  MEDICATIONS  (STANDING):  heparin   Injectable 5000 Unit(s) SubCutaneous every 8 hours  iron sucrose IVPB 300 milliGRAM(s) IV Intermittent every 24 hours  pantoprazole    Tablet 40 milliGRAM(s) Oral before breakfast  piperacillin/tazobactam IVPB.- 3.375 Gram(s) IV Intermittent once  simethicone 80 milliGRAM(s) Chew every 6 hours    MEDICATIONS  (PRN):  acetaminophen     Tablet .. 1000 milliGRAM(s) Oral every 6 hours PRN Mild Pain (1 - 3)  albuterol    90 MICROgram(s) HFA Inhaler 2 Puff(s) Inhalation every 6 hours PRN Shortness of Breath and/or Wheezing  metoclopramide Injectable 10 milliGRAM(s) IV Push every 6 hours PRN nausea  ondansetron Injectable 8 milliGRAM(s) IV Push every 6 hours PRN Nausea and/or Vomiting  oxyCODONE    IR 5 milliGRAM(s) Oral every 4 hours PRN Moderate Pain (4 - 6)      ASSESSMENT & PLAN    Assessment- Rapid Response called for 32y year old Female with a past medical history of     Plan-  STAT labs sent  EKG with sinus tach, no signs of ischemia  Pain control per primary team        I have personally and independently provided 31 minutes of critical care services.  This excludes any time spent on separate procedures or teaching. ***Rapid Response Clinical Impact Nurse Practitioner Note***    HPI/Chart Review:  33 yo  presenting for abdominal myomectomy for symptoms of menorrhagia, dysmenorrhea, and b/l DVT thought to be secondary to mass effect of fibroid uterus on iliac veins.     OBHx  G1-2 VTOP D+C  G3  20-11    GYN  Fibroid uterus  Denies cysts, abnormal pap smears    NKDA     PAST MEDICAL & SURGICAL HISTORY:  Asthma (mild, never hospitalized)  Fibroids  Anemia  Pulmonary embolism 2023 and DVT (deep venous thrombosis) on Eliquis 5 mg daily  No significant past surgical history  **********    Rapid Response Team Summary:    Rapid response team called @ 1904 for syncope. As per OB team at bedside, the patient was getting re-positioned, complained of feeling "hot" and had episode of unresponsiveness. VItals were as follows: 's, 's, O2 sat 93-95% on room air, RR 18-21. Blood glucose 161 mg/dL. Upon arrival by writer, PCCM Fellow at bedside performing POCUS/FAST Exam and patient back to baseline as per primary team and RN. EKG and labs were obtained, patient with two patent PIVs. No fluid was administered as patient had a pulmonary consult earlier and there was concern for fluid overload. The patient has been persistently tachycardic throughout the day which has been attributed to pain vs hypovolemia. Patient denies chest pain or SOB, does endorse abdominal pain likely incisional. Some vaginal bleeding was noted on bed pad and as per OB team, this is expected. Upon further discussion with team and due to the history of DVT/PE and recent procedure, patient will be stepped up to SICU for closer monitoring and will have CTPE Protocol and CT Angio of Abd of Pelvis to evaluate for possible PE vs intra-abdominal bleeding.     REVIEW OF SYSTEMS:   + incisional/abdominal pain   no chills, no vision changes, no chest pain, no palpitations, no SOB, no swelling in calves, no numbness/tingling    Vital Signs Last 24 Hrs  T(C): 36.9 (2023 17:06), Max: 37.3 (2023 12:51)  T(F): 98.5 (2023 17:06), Max: 99.1 (2023 12:51)  HR: 130 (2023 17:06) (101 - 146)  BP: 160/72 (2023 17:06) (103/59 - 160/72)  BP(mean): 94 (2023 06:00) (71 - 94)  RR: 17 (2023 17:06) (17 - 26)  SpO2: 98% (2023 17:06) (96% - 100%)    Parameters below as of 2023 17:06  Patient On (Oxygen Delivery Method): room air    06-20 @ 07:01  -  - @ 07:00  --------------------------------------------------------  IN: 4275 mL / OUT: 450 mL / NET: 3825 mL     @ 07:01  -   @ 19:59  --------------------------------------------------------  IN: 1150 mL / OUT: 1490 mL / NET: -340 mL    Physical Exam  GENERAL: The patient is awake and alert, able to answer all questions and follow commands.   HEENT: Head is normocephalic and atraumatic. Extraocular muscles are intact. Mucous membranes are moist. No JVD.  NECK: Supple.  LUNGS: Clear to auscultation BL without wheezing, rales or rhonchi; respirations unlabored. Mild splinting noted.  HEART: Tachycardic, regular rate and rhythm ,+S1/+S2, no murmurs, rubs, gallops.  ABDOMEN: Soft, tender in the lower quadrants, and distended.  EXTREMITIES: Without any cyanosis, clubbing, rash, or lesions. Trace dependent edema in lower extremities.   SKIN: No new rashes or lesions.  MSK: strength equal BL.  VASCULAR: +2 Radial and Dorsal pedal pulses palpable BL.  NEUROLOGIC: Grossly intact.  PSYCH: No new changes.  INCISIONS:     DRAINS: none    LINES (DATES):  sites, dates, how site looks    LABS:                        8.0    19.18 )-----------( 145      ( 2023 19:39 )             24.5      06-21    130<L>  |  103  |  11  ----------------------------<  149<H>  5.2   |  18<L>  |  0.82    Ca    7.4<L>      2023 05:16  Phos  2.4     06-21  Mg     1.2     06-21       ABG - ( 2023 17:36 )  pH, Arterial: 7.32  pH, Blood: x     /  pCO2: 39    /  pO2: 378   / HCO3: 20    / Base Excess: -5.6  /  SaO2: x           PT/INR - ( 2023 19:39 )   PT: 14.7 sec;   INR: 1.23     PTT - ( 2023 19:39 )  PTT:30.5 sec    Urinalysis Basic - ( 2023 05:16 )    Color: x / Appearance: x / SG: x / pH: x  Gluc: 149 mg/dL / Ketone: x  / Bili: x / Urobili: x   Blood: x / Protein: x / Nitrite: x   Leuk Esterase: x / RBC: x / WBC x   Sq Epi: x / Non Sq Epi: x / Bacteria: x      IMAGING    Chest Xray? ____________  EKG? _________________    MEDICATIONS  MEDICATIONS  (STANDING):  heparin   Injectable 5000 Unit(s) SubCutaneous every 8 hours  iron sucrose IVPB 300 milliGRAM(s) IV Intermittent every 24 hours  pantoprazole    Tablet 40 milliGRAM(s) Oral before breakfast  piperacillin/tazobactam IVPB.- 3.375 Gram(s) IV Intermittent once  simethicone 80 milliGRAM(s) Chew every 6 hours    MEDICATIONS  (PRN):  acetaminophen     Tablet .. 1000 milliGRAM(s) Oral every 6 hours PRN Mild Pain (1 - 3)  albuterol    90 MICROgram(s) HFA Inhaler 2 Puff(s) Inhalation every 6 hours PRN Shortness of Breath and/or Wheezing  metoclopramide Injectable 10 milliGRAM(s) IV Push every 6 hours PRN nausea  ondansetron Injectable 8 milliGRAM(s) IV Push every 6 hours PRN Nausea and/or Vomiting  oxyCODONE    IR 5 milliGRAM(s) Oral every 4 hours PRN Moderate Pain (4 - 6)      ASSESSMENT & PLAN    Assessment- Patient is a 33 yo F with PMHx of Asthma, DVT/PE (on Eliquis), uterine fibroids, anemia 2/2 menorrhagia who is POD1 from uterine myomectomy on  now s/p RRT for syncope, unclear etiology. Differentials include vasovagal in the setting of acute blood loss vs pain or pulmonary embolism given hx.     Plan-  STAT labs sent: CBC, CMP, mag, phos, cardiac enzymes, pBNP, lactate  Please repeat lactate and perform clinical perfusion assessment and fluid status  EKG with sinus tach, no signs of ischemia  Bedside Lung POCUS by PCCM Fellow with A-line pattern throughout, lung sliding, and L sided consolidation  FAST Exam with ?hepato-renal and splenic-renal free fluid  Recommend CTPE to r/o PE and CT AP to r/o intraabdominal bleeding  Recommend official lower extremity dopplers   Recommend formal TTE as cardiac windows on POCUS had limited views   Broaden abx coverage to Zosyn  Bladder scan and if patient retaining, would recommend Galan placement for strict I&Os  Pain control per primary team to prevent splinting and possible vasovagal  Maintain active Type and Screen  Maintain large bore IV access x 2    Dispo: discussed with OB Team and ICU Consult, patient would benefit from higher level of care; will be transferred to SICU service.    I have personally and independently provided 31 minutes of critical care services.  This excludes any time spent on separate procedures or teaching. ***Rapid Response Clinical Impact Nurse Practitioner Note***    HPI/Chart Review:  31 yo  presenting for abdominal myomectomy for symptoms of menorrhagia, dysmenorrhea, and b/l DVT thought to be secondary to mass effect of fibroid uterus on iliac veins.     OBHx  G1-2 VTOP D+C  G3  20-11    GYN  Fibroid uterus  Denies cysts, abnormal pap smears    NKDA     PAST MEDICAL & SURGICAL HISTORY:  Asthma (mild, never hospitalized)  Fibroids  Anemia  Pulmonary embolism 2023 and DVT (deep venous thrombosis) on Eliquis 5 mg daily  No significant past surgical history  **********    Rapid Response Team Summary:    Rapid response team called @ 1904 for syncope. As per OB team at bedside, the patient was getting re-positioned, complained of feeling "hot" and had episode of unresponsiveness. VItals were as follows: 's, 's, O2 sat 93-95% on room air, RR 18-21. Blood glucose 161 mg/dL. Upon arrival by writer, PCCM Fellow at bedside performing POCUS/FAST Exam and patient back to baseline as per primary team and RN. EKG and labs were obtained, patient with two patent PIVs. No fluid was administered as patient had a pulmonary consult earlier and there was concern for fluid overload. The patient has been persistently tachycardic throughout the day which has been attributed to pain vs hypovolemia (EBL in OR was about 2L). Patient denies chest pain or SOB, does endorse abdominal pain likely incisional. Some vaginal bleeding was noted on bed pad and as per OB team, this is expected. Upon further discussion with team and due to the history of DVT/PE and recent procedure, patient will be stepped up to SICU for closer monitoring and will have CTPE Protocol and CT Angio of Abd of Pelvis to evaluate for possible PE vs intra-abdominal bleeding.     REVIEW OF SYSTEMS:   + incisional/abdominal pain   no chills, no vision changes, no chest pain, no palpitations, no SOB, no swelling in calves, no numbness/tingling    Vital Signs Last 24 Hrs  T(C): 36.9 (2023 17:06), Max: 37.3 (2023 12:51)  T(F): 98.5 (2023 17:06), Max: 99.1 (2023 12:51)  HR: 130 (2023 17:06) (101 - 146)  BP: 160/72 (2023 17:06) (103/59 - 160/72)  BP(mean): 94 (2023 06:00) (71 - 94)  RR: 17 (2023 17:06) (17 - 26)  SpO2: 98% (2023 17:06) (96% - 100%)    Parameters below as of 2023 17:06  Patient On (Oxygen Delivery Method): room air    06-20 @ 07:01  -   @ 07:00  --------------------------------------------------------  IN: 4275 mL / OUT: 450 mL / NET: 3825 mL     @ 07:  -   @ 19:59  --------------------------------------------------------  IN: 1150 mL / OUT: 1490 mL / NET: -340 mL    Physical Exam  GENERAL: The patient is awake and alert, able to answer all questions and follow commands.   HEENT: Head is normocephalic and atraumatic. Extraocular muscles are intact. Mucous membranes are moist. No JVD.  NECK: Supple.  LUNGS: Clear to auscultation BL without wheezing, rales or rhonchi; respirations unlabored. Mild splinting noted.  HEART: Tachycardic, regular rate and rhythm ,+S1/+S2, no murmurs, rubs, gallops.  ABDOMEN: Soft, tender in the lower quadrants, and distended.  EXTREMITIES: Without any cyanosis, clubbing, rash, or lesions. Trace dependent edema in lower extremities.   SKIN: No new rashes or lesions.  MSK: strength equal BL.  VASCULAR: +2 Radial and Dorsal pedal pulses palpable BL.  NEUROLOGIC: Grossly intact.  PSYCH: No new changes.  INCISIONS:     DRAINS: none    LINES (DATES):  sites, dates, how site looks    LABS:                        8.0    19.18 )-----------( 145      ( 2023 19:39 )             24.5      06-21    130<L>  |  103  |  11  ----------------------------<  149<H>  5.2   |  18<L>  |  0.82    Ca    7.4<L>      2023 05:16  Phos  2.4     06-  Mg     1.2     06-21       ABG - ( 2023 17:36 )  pH, Arterial: 7.32  pH, Blood: x     /  pCO2: 39    /  pO2: 378   / HCO3: 20    / Base Excess: -5.6  /  SaO2: x           PT/INR - ( 2023 19:39 )   PT: 14.7 sec;   INR: 1.23     PTT - ( 2023 19:39 )  PTT:30.5 sec    Urinalysis Basic - ( 2023 05:16 )    Color: x / Appearance: x / SG: x / pH: x  Gluc: 149 mg/dL / Ketone: x  / Bili: x / Urobili: x   Blood: x / Protein: x / Nitrite: x   Leuk Esterase: x / RBC: x / WBC x   Sq Epi: x / Non Sq Epi: x / Bacteria: x      IMAGING    Chest Xray? ____________  EKG? _________________    MEDICATIONS  MEDICATIONS  (STANDING):  heparin   Injectable 5000 Unit(s) SubCutaneous every 8 hours  iron sucrose IVPB 300 milliGRAM(s) IV Intermittent every 24 hours  pantoprazole    Tablet 40 milliGRAM(s) Oral before breakfast  piperacillin/tazobactam IVPB.- 3.375 Gram(s) IV Intermittent once  simethicone 80 milliGRAM(s) Chew every 6 hours    MEDICATIONS  (PRN):  acetaminophen     Tablet .. 1000 milliGRAM(s) Oral every 6 hours PRN Mild Pain (1 - 3)  albuterol    90 MICROgram(s) HFA Inhaler 2 Puff(s) Inhalation every 6 hours PRN Shortness of Breath and/or Wheezing  metoclopramide Injectable 10 milliGRAM(s) IV Push every 6 hours PRN nausea  ondansetron Injectable 8 milliGRAM(s) IV Push every 6 hours PRN Nausea and/or Vomiting  oxyCODONE    IR 5 milliGRAM(s) Oral every 4 hours PRN Moderate Pain (4 - 6)      ASSESSMENT & PLAN    Assessment- Patient is a 31 yo F with PMHx of Asthma, DVT/PE (on Eliquis), uterine fibroids, anemia 2/2 menorrhagia who is POD1 from uterine myomectomy on  now s/p RRT for syncope, unclear etiology. Differentials include vasovagal in the setting of acute blood loss vs pain or pulmonary embolism given hx.     Plan-  STAT labs sent: CBC, CMP, mag, phos, cardiac enzymes, pBNP, lactate  Please repeat lactate and perform clinical perfusion assessment and fluid status  EKG with sinus tach, no signs of ischemia  Bedside Lung POCUS by PCCM Fellow with A-line pattern throughout, lung sliding, and L sided consolidation  FAST Exam with ?hepato-renal and splenic-renal free fluid  Recommend CTPE to r/o PE and CT AP to r/o intraabdominal bleeding  Recommend official lower extremity dopplers   Recommend formal TTE as cardiac windows on POCUS had limited views   Broaden abx coverage to Zosyn  Bladder scan and if patient retaining, would recommend Galan placement for strict I&Os  Pain control per primary team to prevent splinting and possible vasovagal  Maintain active Type and Screen  Maintain large bore IV access x 2    Dispo: discussed with OB Team and ICU Consult, patient would benefit from higher level of care; will be transferred to SICU service.    I have personally and independently provided 31 minutes of critical care services.  This excludes any time spent on separate procedures or teaching. ***Rapid Response Clinical Impact Nurse Practitioner Note***    HPI/Chart Review:  31 yo  presenting for abdominal myomectomy for symptoms of menorrhagia, dysmenorrhea, and b/l DVT thought to be secondary to mass effect of fibroid uterus on iliac veins.     OBHx  G1-2 VTOP D+C  G3  20-11    GYN  Fibroid uterus  Denies cysts, abnormal pap smears    NKDA     PAST MEDICAL & SURGICAL HISTORY:  Asthma (mild, never hospitalized)  Fibroids  Anemia  Pulmonary embolism 2023 and DVT (deep venous thrombosis) on Eliquis 5 mg daily  No significant past surgical history  **********    Rapid Response Team Summary:    Rapid response team called @ 1904 for syncope. As per OB team at bedside, the patient was getting re-positioned, complained of feeling "hot" and had episode of unresponsiveness. VItals were as follows: 's, 's, O2 sat 93-95% on room air, RR 18-21. Blood glucose 161 mg/dL. Upon arrival by writer, PCCM Fellow at bedside performing POCUS/FAST Exam and patient back to baseline as per primary team and RN. EKG and labs were obtained, patient with two patent PIVs. No fluid was administered as patient had a pulmonary consult earlier and there was concern for fluid overload. The patient has been persistently tachycardic throughout the day which has been attributed to pain vs hypovolemia (EBL in OR was about 2L). Patient denies chest pain or SOB, does endorse abdominal pain likely incisional. Some vaginal bleeding was noted on bed pad and as per OB team, this is expected. Upon further discussion with team and due to the history of DVT/PE and recent procedure, patient will be stepped up to SICU for closer monitoring and will have CTPE Protocol and CT Angio of Abd of Pelvis to evaluate for possible PE vs intra-abdominal bleeding.     REVIEW OF SYSTEMS:   + incisional/abdominal pain   no chills, no vision changes, no chest pain, no palpitations, no SOB, no swelling in calves, no numbness/tingling    Vital Signs Last 24 Hrs  T(C): 36.9 (2023 17:06), Max: 37.3 (2023 12:51)  T(F): 98.5 (2023 17:06), Max: 99.1 (2023 12:51)  HR: 130 (2023 17:06) (101 - 146)  BP: 160/72 (2023 17:06) (103/59 - 160/72)  BP(mean): 94 (2023 06:00) (71 - 94)  RR: 17 (2023 17:06) (17 - 26)  SpO2: 98% (2023 17:06) (96% - 100%)    Parameters below as of 2023 17:06  Patient On (Oxygen Delivery Method): room air    06-20 @ 07:01  -   @ 07:00  --------------------------------------------------------  IN: 4275 mL / OUT: 450 mL / NET: 3825 mL     @ 07:  -   @ 19:59  --------------------------------------------------------  IN: 1150 mL / OUT: 1490 mL / NET: -340 mL    Physical Exam  GENERAL: The patient is awake and alert, able to answer all questions and follow commands.   HEENT: Head is normocephalic and atraumatic. Extraocular muscles are intact. Mucous membranes are moist. No JVD.  NECK: Supple.  LUNGS: Clear to auscultation BL without wheezing, rales or rhonchi; respirations unlabored. Mild splinting noted.  HEART: Tachycardic, regular rate and rhythm ,+S1/+S2, no murmurs, rubs, gallops.  ABDOMEN: Soft, tender in the lower quadrants, and distended.  EXTREMITIES: Without any cyanosis, clubbing, rash, or lesions. Trace dependent edema in lower extremities.   SKIN: No new rashes or lesions.  MSK: strength equal BL.  VASCULAR: +2 Radial and Dorsal pedal pulses palpable BL.  NEUROLOGIC: Grossly intact.  PSYCH: No new changes.  INCISIONS:     DRAINS: none    LINES (DATES):  sites, dates, how site looks    LABS:                        8.0    19.18 )-----------( 145      ( 2023 19:39 )             24.5      -    130<L>  |  103  |  11  ----------------------------<  149<H>  5.2   |  18<L>  |  0.82    Ca    7.4<L>      2023 05:16  Phos  2.4       Mg     1.2     -       ABG - ( 2023 17:36 )  pH, Arterial: 7.32  pH, Blood: x     /  pCO2: 39    /  pO2: 378   / HCO3: 20    / Base Excess: -5.6  /  SaO2: x           PT/INR - ( 2023 19:39 )   PT: 14.7 sec;   INR: 1.23     PTT - ( 2023 19:39 )  PTT:30.5 sec    Urinalysis Basic - ( 2023 05:16 )    Color: x / Appearance: x / SG: x / pH: x  Gluc: 149 mg/dL / Ketone: x  / Bili: x / Urobili: x   Blood: x / Protein: x / Nitrite: x   Leuk Esterase: x / RBC: x / WBC x   Sq Epi: x / Non Sq Epi: x / Bacteria: x    < from: Xray Abdomen 1 View PORTABLE -Urgent (Xray Abdomen 1 View PORTABLE -Urgent .) (23 @ 10:17) >    IMPRESSION: No dilated gas-filled bowel loops to suggest obstruction.   Technique limits evaluation for free air. Unremarkable visualized osseous   structures.      MEDICATIONS  MEDICATIONS  (STANDING):  heparin   Injectable 5000 Unit(s) SubCutaneous every 8 hours  iron sucrose IVPB 300 milliGRAM(s) IV Intermittent every 24 hours  pantoprazole    Tablet 40 milliGRAM(s) Oral before breakfast  piperacillin/tazobactam IVPB.- 3.375 Gram(s) IV Intermittent once  simethicone 80 milliGRAM(s) Chew every 6 hours    MEDICATIONS  (PRN):  acetaminophen     Tablet .. 1000 milliGRAM(s) Oral every 6 hours PRN Mild Pain (1 - 3)  albuterol    90 MICROgram(s) HFA Inhaler 2 Puff(s) Inhalation every 6 hours PRN Shortness of Breath and/or Wheezing  metoclopramide Injectable 10 milliGRAM(s) IV Push every 6 hours PRN nausea  ondansetron Injectable 8 milliGRAM(s) IV Push every 6 hours PRN Nausea and/or Vomiting  oxyCODONE    IR 5 milliGRAM(s) Oral every 4 hours PRN Moderate Pain (4 - 6)      ASSESSMENT & PLAN    Assessment- Patient is a 31 yo F with PMHx of Asthma, DVT/PE (on Eliquis), uterine fibroids, anemia 2/2 menorrhagia who is POD1 from uterine myomectomy on  now s/p RRT for syncope, unclear etiology. Differentials include vasovagal in the setting of acute blood loss vs pain or pulmonary embolism given hx.     Plan-  STAT labs sent: CBC, CMP, mag, phos, cardiac enzymes, pBNP, lactate  Please repeat lactate and perform clinical perfusion assessment and fluid status  EKG with sinus tach, no signs of ischemia  Bedside Lung POCUS by PCCM Fellow with A-line pattern throughout, lung sliding, and L sided consolidation  FAST Exam with ?hepato-renal and splenic-renal free fluid  Recommend CTPE to r/o PE and CT AP to r/o intraabdominal bleeding  Recommend official lower extremity dopplers   Recommend formal TTE as cardiac windows on POCUS had limited views   Broaden abx coverage to Zosyn  Bladder scan and if patient retaining, would recommend Galan placement for strict I&Os  Pain control per primary team to prevent splinting and possible vasovagal  Maintain active Type and Screen  Maintain large bore IV access x 2    Dispo: discussed with OB Team and ICU Consult, patient would benefit from higher level of care; will be transferred to SICU service.    I have personally and independently provided 31 minutes of critical care services.  This excludes any time spent on separate procedures or teaching.

## 2023-06-21 NOTE — PROGRESS NOTE ADULT - SUBJECTIVE AND OBJECTIVE BOX
Pt seen and examined at bedside. Reports abdominal pain present but overall well controlled on tylenol/oxycodone. She has not yet ambulated, valencia in place draining blood-tinged/dark urine. She is tolerating PO water overnight without nausea/vomiting. Has not yet passed flatus.  Persistently tachycardic, denies chest pain/palpitations/shortness of breath/lightheadedness.    T(F): 98.2 (06-21-23 @ 06:30), Max: 98.4 (06-20-23 @ 21:56)  HR: 128 (06-21-23 @ 06:30) (80 - 146)  BP: 133/76 (06-21-23 @ 06:30) (103/59 - 149/82)  RR: 18 (06-21-23 @ 06:30) (17 - 26)  SpO2: 98% (06-21-23 @ 06:30) (96% - 100%)  Wt(kg): --  I&O's Summary    20 Jun 2023 07:01  -  21 Jun 2023 07:00  --------------------------------------------------------  IN: 4275 mL / OUT: 450 mL / NET: 3825 mL        MEDICATIONS  (STANDING):  acetaminophen     Tablet .. 1000 milliGRAM(s) Oral every 6 hours  iron sucrose IVPB 300 milliGRAM(s) IV Intermittent every 24 hours  lactated ringers. 1000 milliLiter(s) (250 mL/Hr) IV Continuous <Continuous>  pantoprazole    Tablet 40 milliGRAM(s) Oral before breakfast  simethicone 80 milliGRAM(s) Chew every 6 hours    MEDICATIONS  (PRN):  acetaminophen     Tablet .. 1000 milliGRAM(s) Oral every 6 hours PRN Mild Pain (1 - 3)  albuterol    90 MICROgram(s) HFA Inhaler 2 Puff(s) Inhalation every 6 hours PRN Shortness of Breath and/or Wheezing  metoclopramide Injectable 10 milliGRAM(s) IV Push every 6 hours PRN nausea  ondansetron Injectable 8 milliGRAM(s) IV Push every 6 hours PRN Nausea and/or Vomiting  oxyCODONE    IR 5 milliGRAM(s) Oral every 4 hours PRN Moderate Pain (4 - 6)      Physical Exam:  Constitutional: Sleepy, no acute distress  Pulmonary: No increased work of breathing, lungs clear to auscultation anteriorly  CV: Tachycardic, regular rhythm, no murmurs appreciated  Abdomen: Incision clean/dry/intact. Soft, appropriately tender for post-op state. No distension, rebound, guarding. Hypoactive bowel sounds  Extremities: no lower extremity edema or calf tenderness    LABS:                        9.6    18.71 )-----------( 167      ( 21 Jun 2023 05:16 )             29.2     06-21    130<L>  |  103  |  11  ----------------------------<  149<H>  5.2   |  18<L>  |  0.82    Ca    7.4<L>      21 Jun 2023 05:16  Phos  2.4     06-21  Mg     1.2     06-21      PT/INR - ( 21 Jun 2023 05:16 )   PT: 13.9 sec;   INR: 1.17          PTT - ( 21 Jun 2023 05:16 )  PTT:24.8 sec  Urinalysis Basic - ( 21 Jun 2023 05:16 )    Color: x / Appearance: x / SG: x / pH: x  Gluc: 149 mg/dL / Ketone: x  / Bili: x / Urobili: x   Blood: x / Protein: x / Nitrite: x   Leuk Esterase: x / RBC: x / WBC x   Sq Epi: x / Non Sq Epi: x / Bacteria: x        RADIOLOGY & ADDITIONAL TESTS:

## 2023-06-21 NOTE — CONSULT NOTE ADULT - SUBJECTIVE AND OBJECTIVE BOX
PULMONARY SERVICE INITIAL CONSULT NOTE    HPI:  33 yo  presenting for abdominal myomectomy for symptoms of menorrhagia, dysmenorrhea, and b/l DVT thought to be secondary to mass effect of fibroid uterus on iliac veins.     OBHx  G1-2 VTOP D+C  G3  20-    GYN  Fibroid uterus  Denies cysts, abnormal pap smears    PMH b/l DVT and PE currently on eliquis; mild asthma (never hospitalized)  PSH Denies  Meds Eliquis 5mg qd  NKDA (2023 12:01)    Additional pulmonary history: Pulmonary consulted for history of DVT with complicated hospital course in January. Known outpatient of Dr. Haro. History as above. Surgery was prolonged with a high estimated EBL. Otherwise she has been in good health, compliant with her eliquis, and recently followed up with Dr. Haro for pre op optimization. No toxic habits.     REVIEW OF SYSTEMS:  Constitutional: No fever, weight loss or fatigue  Eyes: No eye pain, visual disturbances, or discharge  ENMT:  No difficulty hearing, tinnitus, vertigo; No sinus or throat pain  Neck: No pain, stiffness or neck swelling  Respiratory: see HPI  Cardiovascular: No chest pain, palpitations, dizziness or leg swelling  Gastrointestinal: No abdominal or epigastric pain. No nausea, vomiting or hematemesis; No diarrhea or constipation. No melena or hematochezia.  Genitourinary: No dysuria, frequency, hematuria or incontinence  Neurological: No headaches, memory loss, loss of strength, numbness or tremors  Skin: No itching, burning, rashes or lesions   Lymph Nodes: No enlarged glands  Endocrine: No heat or cold intolerance; No hair loss  Musculoskeletal: No joint pain or swelling; No muscle, back or extremity pain  Psychiatric: No depression, anxiety, mood swings or difficulty sleeping  Heme/Lymph: No easy bruising or bleeding gums  Allergy and Immunologic: No hives or eczema    PAST MEDICAL & SURGICAL HISTORY:  Asthma      Fibroids      Anemia      Pulmonary embolism  2023      DVT (deep venous thrombosis)      No significant past surgical history          FAMILY HISTORY:      SOCIAL HISTORY:  Smoking Status: [ ] Current, [ ] Former, [ x ] Never  Pack Years:    MEDICATIONS:  Pulmonary:  albuterol    90 MICROgram(s) HFA Inhaler 2 Puff(s) Inhalation every 6 hours PRN    Antimicrobials:    Anticoagulants:    Onc:    GI/:  pantoprazole    Tablet 40 milliGRAM(s) Oral before breakfast  simethicone 80 milliGRAM(s) Chew every 6 hours    Endocrine:    Cardiac:    Other Medications:  acetaminophen     Tablet .. 1000 milliGRAM(s) Oral every 6 hours PRN  acetaminophen     Tablet .. 1000 milliGRAM(s) Oral every 6 hours  iron sucrose IVPB 300 milliGRAM(s) IV Intermittent every 24 hours  metoclopramide Injectable 10 milliGRAM(s) IV Push every 6 hours PRN  ondansetron Injectable 8 milliGRAM(s) IV Push every 6 hours PRN  oxyCODONE    IR 5 milliGRAM(s) Oral every 4 hours PRN      Allergies    No Known Allergies    Intolerances        Vital Signs Last 24 Hrs  T(C): 36.8 (2023 06:30), Max: 36.9 (2023 21:56)  T(F): 98.2 (2023 06:30), Max: 98.4 (2023 21:56)  HR: 128 (2023 06:30) (80 - 146)  BP: 133/76 (2023 06:30) (103/59 - 149/82)  BP(mean): 94 (2023 06:00) (71 - 97)  RR: 18 (2023 06:30) (17 - 26)  SpO2: 98% (2023 06:30) (96% - 100%)    Parameters below as of 2023 06:30  Patient On (Oxygen Delivery Method): room air        -20 @ 07:  -   @ 07:00  --------------------------------------------------------  IN: 4275 mL / OUT: 450 mL / NET: 3825 mL     @ 07:01  -   @ 09:09  --------------------------------------------------------  IN: 0 mL / OUT: 265 mL / NET: -265 mL          PHYSICAL EXAM:  Constitutional: well-appearing  Head: NC/AT  EENT: PERRL, anicteric sclera; oropharynx clear, MMM  Neck: supple, no appreciable JVD  Respiratory: CTA B/L; no W/R/R  Cardiovascular: +S1/S2, RRR, tachycardic   Gastrointestinal: soft, distended with some pain   Extremities: WWP; no edema, clubbing or cyanosis  Vascular: 2+ radial pulses B/L  Neurological: awake and alert; ALARCON    LABS:  ABG - ( 2023 17:36 )  pH, Arterial: 7.32  pH, Blood: x     /  pCO2: 39    /  pO2: 378   / HCO3: 20    / Base Excess: -5.6  /  SaO2: x                   CBC Full  -  ( 2023 05:16 )  WBC Count : 18.71 K/uL  RBC Count : 3.21 M/uL  Hemoglobin : 9.6 g/dL  Hematocrit : 29.2 %  Platelet Count - Automated : 167 K/uL  Mean Cell Volume : 91.0 fl  Mean Cell Hemoglobin : 29.9 pg  Mean Cell Hemoglobin Concentration : 32.9 gm/dL  Auto Neutrophil # : 14.77 K/uL  Auto Lymphocyte # : 2.16 K/uL  Auto Monocyte # : 1.67 K/uL  Auto Eosinophil # : 0.00 K/uL  Auto Basophil # : 0.03 K/uL  Auto Neutrophil % : 79.0 %  Auto Lymphocyte % : 11.5 %  Auto Monocyte % : 8.9 %  Auto Eosinophil % : 0.0 %  Auto Basophil % : 0.2 %    06-21    130<L>  |  103  |  11  ----------------------------<  149<H>  5.2   |  18<L>  |  0.82    Ca    7.4<L>      2023 05:16  Phos  2.4     06-21  Mg     1.2     06-21      PT/INR - ( 2023 05:16 )   PT: 13.9 sec;   INR: 1.17          PTT - ( 2023 05:16 )  PTT:24.8 sec      Urinalysis Basic - ( 2023 05:16 )    Color: x / Appearance: x / SG: x / pH: x  Gluc: 149 mg/dL / Ketone: x  / Bili: x / Urobili: x   Blood: x / Protein: x / Nitrite: x   Leuk Esterase: x / RBC: x / WBC x   Sq Epi: x / Non Sq Epi: x / Bacteria: x                RADIOLOGY & ADDITIONAL STUDIES:

## 2023-06-21 NOTE — PROGRESS NOTE ADULT - ASSESSMENT
33 yo s/p abd myomectomy 18 wks size uterus, 50 fibroids, enetered cavity, w/  PMH of DVT/PE currently on eliquis. Plan to restart eliquis pending AM CBC. Will have pulmonlogy evaluate patient this AM and provide recommendations for anticoagulation and fluid management. Consider CTPE if tachycardia does not improve.   Neuro: Tylenol/Oxy q4 ATC   Lung: PE: h/o b/l PE 01/23, albutetol PRN, sat 100% RA. Consider restarting eliquis pending AM CBC per pulmonology  Card:  tachycardic- EKG 6/20 sinus tach due to hypovolemia, LR 250cc/hr s/p 1L bolus @ poc+500cc, s/p 2uPRBC and 1178 cell savere intraop.   GI: CLD, zofran/reglan PRN, simethicone, pantoprazole qd  GYN: S/p Abdominal myomectomy  : Galan in place, strict ins/outs  Heme: 11.1> (2uPRBC+1178 cell saver intraop)> 13.0>11.2>6.9, venofer 300mg qd stop after 3 days. F/u AM CBC.   VTE: h/o DVT, no SCDs     33 yo s/p abd myomectomy 24+ wks size uterus, 50 fibroids, enetered cavity, w/  PMH of DVT/PE currently on eliquis. Plan to restart eliquis pending AM CBC. Will have pulmonlogy evaluate patient this AM and provide recommendations for anticoagulation and fluid management. Consider CTPE if tachycardia does not improve.   Neuro: Tylenol/Oxy q4 ATC   Lung: PE: h/o b/l PE 01/23, albutetol PRN, sat 100% RA. Consider restarting eliquis pending AM CBC per pulmonology  Card:  tachycardic- EKG 6/20 sinus tach due to hypovolemia, LR 250cc/hr s/p 1L bolus @ poc+500cc, s/p 2uPRBC and 1178 cell saver intraop.   GI: CLD, zofran/reglan PRN, simethicone, pantoprazole qd  GYN: S/p Abdominal myomectomy  : Galan in place, strict ins/outs  Heme: 11.1> (2uPRBC+1178 cell saver intraop)> 13.0>11.2>6.9, venofer 300mg qd stop after 3 days. F/u AM CBC.   VTE: h/o DVT, no SCDs

## 2023-06-22 LAB
A1C WITH ESTIMATED AVERAGE GLUCOSE RESULT: 4.6 % — SIGNIFICANT CHANGE UP (ref 4–5.6)
ALBUMIN SERPL ELPH-MCNC: 2.6 G/DL — LOW (ref 3.3–5)
ALBUMIN SERPL ELPH-MCNC: 2.7 G/DL — LOW (ref 3.3–5)
ALP SERPL-CCNC: 48 U/L — SIGNIFICANT CHANGE UP (ref 40–120)
ALP SERPL-CCNC: 52 U/L — SIGNIFICANT CHANGE UP (ref 40–120)
ALT FLD-CCNC: 6 U/L — LOW (ref 10–45)
ALT FLD-CCNC: <5 U/L — LOW (ref 10–45)
ANION GAP SERPL CALC-SCNC: 7 MMOL/L — SIGNIFICANT CHANGE UP (ref 5–17)
ANION GAP SERPL CALC-SCNC: 9 MMOL/L — SIGNIFICANT CHANGE UP (ref 5–17)
APPEARANCE UR: CLEAR — SIGNIFICANT CHANGE UP
AST SERPL-CCNC: 19 U/L — SIGNIFICANT CHANGE UP (ref 10–40)
AST SERPL-CCNC: 22 U/L — SIGNIFICANT CHANGE UP (ref 10–40)
BACTERIA # UR AUTO: SIGNIFICANT CHANGE UP /HPF
BASOPHILS # BLD AUTO: 0.05 K/UL — SIGNIFICANT CHANGE UP (ref 0–0.2)
BASOPHILS NFR BLD AUTO: 0.2 % — SIGNIFICANT CHANGE UP (ref 0–2)
BILIRUB DIRECT SERPL-MCNC: 0.4 MG/DL — HIGH (ref 0–0.3)
BILIRUB INDIRECT FLD-MCNC: 1.4 MG/DL — HIGH (ref 0.2–1)
BILIRUB SERPL-MCNC: 0.5 MG/DL — SIGNIFICANT CHANGE UP (ref 0.2–1.2)
BILIRUB SERPL-MCNC: 1.8 MG/DL — HIGH (ref 0.2–1.2)
BILIRUB UR-MCNC: NEGATIVE — SIGNIFICANT CHANGE UP
BUN SERPL-MCNC: 5 MG/DL — LOW (ref 7–23)
BUN SERPL-MCNC: 7 MG/DL — SIGNIFICANT CHANGE UP (ref 7–23)
BUN SERPL-MCNC: 8 MG/DL — SIGNIFICANT CHANGE UP (ref 7–23)
CALCIUM SERPL-MCNC: 8.1 MG/DL — LOW (ref 8.4–10.5)
CALCIUM SERPL-MCNC: 8.1 MG/DL — LOW (ref 8.4–10.5)
CALCIUM SERPL-MCNC: 8.2 MG/DL — LOW (ref 8.4–10.5)
CHLORIDE SERPL-SCNC: 101 MMOL/L — SIGNIFICANT CHANGE UP (ref 96–108)
CHLORIDE SERPL-SCNC: 101 MMOL/L — SIGNIFICANT CHANGE UP (ref 96–108)
CHLORIDE SERPL-SCNC: 103 MMOL/L — SIGNIFICANT CHANGE UP (ref 96–108)
CO2 SERPL-SCNC: 21 MMOL/L — LOW (ref 22–31)
CO2 SERPL-SCNC: 24 MMOL/L — SIGNIFICANT CHANGE UP (ref 22–31)
CO2 SERPL-SCNC: 26 MMOL/L — SIGNIFICANT CHANGE UP (ref 22–31)
COLOR SPEC: YELLOW — SIGNIFICANT CHANGE UP
CREAT ?TM UR-MCNC: 99 MG/DL — SIGNIFICANT CHANGE UP
CREAT SERPL-MCNC: 0.71 MG/DL — SIGNIFICANT CHANGE UP (ref 0.5–1.3)
CREAT SERPL-MCNC: 0.72 MG/DL — SIGNIFICANT CHANGE UP (ref 0.5–1.3)
CREAT SERPL-MCNC: 0.75 MG/DL — SIGNIFICANT CHANGE UP (ref 0.5–1.3)
DIFF PNL FLD: ABNORMAL
EGFR: 108 ML/MIN/1.73M2 — SIGNIFICANT CHANGE UP
EGFR: 114 ML/MIN/1.73M2 — SIGNIFICANT CHANGE UP
EGFR: 116 ML/MIN/1.73M2 — SIGNIFICANT CHANGE UP
EOSINOPHIL # BLD AUTO: 0.04 K/UL — SIGNIFICANT CHANGE UP (ref 0–0.5)
EOSINOPHIL NFR BLD AUTO: 0.2 % — SIGNIFICANT CHANGE UP (ref 0–6)
EPI CELLS # UR: SIGNIFICANT CHANGE UP /HPF (ref 0–5)
ESTIMATED AVERAGE GLUCOSE: 85 MG/DL — SIGNIFICANT CHANGE UP (ref 68–114)
GLUCOSE BLDC GLUCOMTR-MCNC: 104 MG/DL — HIGH (ref 70–99)
GLUCOSE BLDC GLUCOMTR-MCNC: 122 MG/DL — HIGH (ref 70–99)
GLUCOSE BLDC GLUCOMTR-MCNC: 139 MG/DL — HIGH (ref 70–99)
GLUCOSE BLDC GLUCOMTR-MCNC: 89 MG/DL — SIGNIFICANT CHANGE UP (ref 70–99)
GLUCOSE SERPL-MCNC: 107 MG/DL — HIGH (ref 70–99)
GLUCOSE SERPL-MCNC: 131 MG/DL — HIGH (ref 70–99)
GLUCOSE SERPL-MCNC: 137 MG/DL — HIGH (ref 70–99)
GLUCOSE UR QL: NEGATIVE — SIGNIFICANT CHANGE UP
HCT VFR BLD CALC: 21 % — CRITICAL LOW (ref 34.5–45)
HCT VFR BLD CALC: 21.3 % — LOW (ref 34.5–45)
HCT VFR BLD CALC: 21.6 % — LOW (ref 34.5–45)
HCT VFR BLD CALC: 24.4 % — LOW (ref 34.5–45)
HGB BLD-MCNC: 6.9 G/DL — CRITICAL LOW (ref 11.5–15.5)
HGB BLD-MCNC: 7.1 G/DL — LOW (ref 11.5–15.5)
HGB BLD-MCNC: 7.4 G/DL — LOW (ref 11.5–15.5)
HGB BLD-MCNC: 7.9 G/DL — LOW (ref 11.5–15.5)
IMM GRANULOCYTES NFR BLD AUTO: 1.1 % — HIGH (ref 0–0.9)
KETONES UR-MCNC: NEGATIVE — SIGNIFICANT CHANGE UP
LACTATE SERPL-SCNC: 2 MMOL/L — SIGNIFICANT CHANGE UP (ref 0.5–2)
LEUKOCYTE ESTERASE UR-ACNC: NEGATIVE — SIGNIFICANT CHANGE UP
LYMPHOCYTES # BLD AUTO: 1.61 K/UL — SIGNIFICANT CHANGE UP (ref 1–3.3)
LYMPHOCYTES # BLD AUTO: 6.3 % — LOW (ref 13–44)
MAGNESIUM SERPL-MCNC: 1.8 MG/DL — SIGNIFICANT CHANGE UP (ref 1.6–2.6)
MAGNESIUM SERPL-MCNC: 1.8 MG/DL — SIGNIFICANT CHANGE UP (ref 1.6–2.6)
MAGNESIUM SERPL-MCNC: 2 MG/DL — SIGNIFICANT CHANGE UP (ref 1.6–2.6)
MCHC RBC-ENTMCNC: 29.6 PG — SIGNIFICANT CHANGE UP (ref 27–34)
MCHC RBC-ENTMCNC: 29.9 PG — SIGNIFICANT CHANGE UP (ref 27–34)
MCHC RBC-ENTMCNC: 30.5 PG — SIGNIFICANT CHANGE UP (ref 27–34)
MCHC RBC-ENTMCNC: 30.8 PG — SIGNIFICANT CHANGE UP (ref 27–34)
MCHC RBC-ENTMCNC: 32.4 GM/DL — SIGNIFICANT CHANGE UP (ref 32–36)
MCHC RBC-ENTMCNC: 32.9 GM/DL — SIGNIFICANT CHANGE UP (ref 32–36)
MCHC RBC-ENTMCNC: 33.3 GM/DL — SIGNIFICANT CHANGE UP (ref 32–36)
MCHC RBC-ENTMCNC: 34.3 GM/DL — SIGNIFICANT CHANGE UP (ref 32–36)
MCV RBC AUTO: 90 FL — SIGNIFICANT CHANGE UP (ref 80–100)
MCV RBC AUTO: 90.9 FL — SIGNIFICANT CHANGE UP (ref 80–100)
MCV RBC AUTO: 91.4 FL — SIGNIFICANT CHANGE UP (ref 80–100)
MCV RBC AUTO: 91.4 FL — SIGNIFICANT CHANGE UP (ref 80–100)
MONOCYTES # BLD AUTO: 2.4 K/UL — HIGH (ref 0–0.9)
MONOCYTES NFR BLD AUTO: 9.4 % — SIGNIFICANT CHANGE UP (ref 2–14)
NEUTROPHILS # BLD AUTO: 21.2 K/UL — HIGH (ref 1.8–7.4)
NEUTROPHILS NFR BLD AUTO: 82.8 % — HIGH (ref 43–77)
NITRITE UR-MCNC: NEGATIVE — SIGNIFICANT CHANGE UP
NRBC # BLD: 0 /100 WBCS — SIGNIFICANT CHANGE UP (ref 0–0)
OSMOLALITY UR: 520 MOSM/KG — SIGNIFICANT CHANGE UP (ref 300–900)
PH UR: 6 — SIGNIFICANT CHANGE UP (ref 5–8)
PHOSPHATE SERPL-MCNC: 1.4 MG/DL — LOW (ref 2.5–4.5)
PHOSPHATE SERPL-MCNC: 1.7 MG/DL — LOW (ref 2.5–4.5)
PHOSPHATE SERPL-MCNC: 2.2 MG/DL — LOW (ref 2.5–4.5)
PLATELET # BLD AUTO: 109 K/UL — LOW (ref 150–400)
PLATELET # BLD AUTO: 118 K/UL — LOW (ref 150–400)
PLATELET # BLD AUTO: 125 K/UL — LOW (ref 150–400)
PLATELET # BLD AUTO: 129 K/UL — LOW (ref 150–400)
POTASSIUM SERPL-MCNC: 4 MMOL/L — SIGNIFICANT CHANGE UP (ref 3.5–5.3)
POTASSIUM SERPL-MCNC: 4.4 MMOL/L — SIGNIFICANT CHANGE UP (ref 3.5–5.3)
POTASSIUM SERPL-MCNC: 4.7 MMOL/L — SIGNIFICANT CHANGE UP (ref 3.5–5.3)
POTASSIUM SERPL-SCNC: 4 MMOL/L — SIGNIFICANT CHANGE UP (ref 3.5–5.3)
POTASSIUM SERPL-SCNC: 4.4 MMOL/L — SIGNIFICANT CHANGE UP (ref 3.5–5.3)
POTASSIUM SERPL-SCNC: 4.7 MMOL/L — SIGNIFICANT CHANGE UP (ref 3.5–5.3)
POTASSIUM UR-SCNC: 53 MMOL/L — SIGNIFICANT CHANGE UP
PROCALCITONIN SERPL-MCNC: 0.92 NG/ML — HIGH (ref 0.02–0.1)
PROT ?TM UR-MCNC: 32 MG/DL — HIGH (ref 0–12)
PROT SERPL-MCNC: 5.2 G/DL — LOW (ref 6–8.3)
PROT SERPL-MCNC: 5.2 G/DL — LOW (ref 6–8.3)
PROT UR-MCNC: NEGATIVE MG/DL — SIGNIFICANT CHANGE UP
PROT/CREAT UR-RTO: 0.3 RATIO — HIGH (ref 0–0.2)
RBC # BLD: 2.31 M/UL — LOW (ref 3.8–5.2)
RBC # BLD: 2.33 M/UL — LOW (ref 3.8–5.2)
RBC # BLD: 2.4 M/UL — LOW (ref 3.8–5.2)
RBC # BLD: 2.67 M/UL — LOW (ref 3.8–5.2)
RBC # FLD: 17.4 % — HIGH (ref 10.3–14.5)
RBC # FLD: 17.6 % — HIGH (ref 10.3–14.5)
RBC # FLD: 18.1 % — HIGH (ref 10.3–14.5)
RBC # FLD: 18.1 % — HIGH (ref 10.3–14.5)
RBC CASTS # UR COMP ASSIST: ABNORMAL /HPF
SODIUM SERPL-SCNC: 131 MMOL/L — LOW (ref 135–145)
SODIUM SERPL-SCNC: 132 MMOL/L — LOW (ref 135–145)
SODIUM SERPL-SCNC: 138 MMOL/L — SIGNIFICANT CHANGE UP (ref 135–145)
SODIUM UR-SCNC: 51 MMOL/L — SIGNIFICANT CHANGE UP
SP GR SPEC: 1.02 — SIGNIFICANT CHANGE UP (ref 1–1.03)
UROBILINOGEN FLD QL: 0.2 E.U./DL — SIGNIFICANT CHANGE UP
UUN UR-MCNC: 465 MG/DL — SIGNIFICANT CHANGE UP
WBC # BLD: 19.75 K/UL — HIGH (ref 3.8–10.5)
WBC # BLD: 22.15 K/UL — HIGH (ref 3.8–10.5)
WBC # BLD: 24.65 K/UL — HIGH (ref 3.8–10.5)
WBC # BLD: 25.59 K/UL — HIGH (ref 3.8–10.5)
WBC # FLD AUTO: 19.75 K/UL — HIGH (ref 3.8–10.5)
WBC # FLD AUTO: 22.15 K/UL — HIGH (ref 3.8–10.5)
WBC # FLD AUTO: 24.65 K/UL — HIGH (ref 3.8–10.5)
WBC # FLD AUTO: 25.59 K/UL — HIGH (ref 3.8–10.5)
WBC UR QL: < 5 /HPF — SIGNIFICANT CHANGE UP

## 2023-06-22 PROCEDURE — 93970 EXTREMITY STUDY: CPT | Mod: 26

## 2023-06-22 PROCEDURE — 99233 SBSQ HOSP IP/OBS HIGH 50: CPT | Mod: GC

## 2023-06-22 PROCEDURE — 99291 CRITICAL CARE FIRST HOUR: CPT | Mod: GC

## 2023-06-22 PROCEDURE — 76705 ECHO EXAM OF ABDOMEN: CPT | Mod: 26

## 2023-06-22 RX ORDER — SODIUM CHLORIDE 9 MG/ML
1000 INJECTION INTRAMUSCULAR; INTRAVENOUS; SUBCUTANEOUS
Refills: 0 | Status: DISCONTINUED | OUTPATIENT
Start: 2023-06-22 | End: 2023-06-23

## 2023-06-22 RX ORDER — VANCOMYCIN HCL 1 G
1250 VIAL (EA) INTRAVENOUS ONCE
Refills: 0 | Status: COMPLETED | OUTPATIENT
Start: 2023-06-22 | End: 2023-06-22

## 2023-06-22 RX ORDER — KETOROLAC TROMETHAMINE 30 MG/ML
15 SYRINGE (ML) INJECTION EVERY 6 HOURS
Refills: 0 | Status: DISCONTINUED | OUTPATIENT
Start: 2023-06-22 | End: 2023-06-23

## 2023-06-22 RX ORDER — HEPARIN SODIUM 5000 [USP'U]/ML
5000 INJECTION INTRAVENOUS; SUBCUTANEOUS EVERY 8 HOURS
Refills: 0 | Status: DISCONTINUED | OUTPATIENT
Start: 2023-06-22 | End: 2023-06-23

## 2023-06-22 RX ORDER — ACETAMINOPHEN 500 MG
1000 TABLET ORAL ONCE
Refills: 0 | Status: COMPLETED | OUTPATIENT
Start: 2023-06-22 | End: 2023-06-22

## 2023-06-22 RX ADMIN — OXYCODONE HYDROCHLORIDE 5 MILLIGRAM(S): 5 TABLET ORAL at 09:58

## 2023-06-22 RX ADMIN — PIPERACILLIN AND TAZOBACTAM 25 GRAM(S): 4; .5 INJECTION, POWDER, LYOPHILIZED, FOR SOLUTION INTRAVENOUS at 21:00

## 2023-06-22 RX ADMIN — Medication 1000 MILLIGRAM(S): at 14:25

## 2023-06-22 RX ADMIN — HEPARIN SODIUM 5000 UNIT(S): 5000 INJECTION INTRAVENOUS; SUBCUTANEOUS at 21:00

## 2023-06-22 RX ADMIN — OXYCODONE HYDROCHLORIDE 5 MILLIGRAM(S): 5 TABLET ORAL at 21:00

## 2023-06-22 RX ADMIN — Medication 1000 MILLIGRAM(S): at 23:34

## 2023-06-22 RX ADMIN — OXYCODONE HYDROCHLORIDE 5 MILLIGRAM(S): 5 TABLET ORAL at 10:00

## 2023-06-22 RX ADMIN — OXYCODONE HYDROCHLORIDE 5 MILLIGRAM(S): 5 TABLET ORAL at 22:45

## 2023-06-22 RX ADMIN — Medication 15 MILLIGRAM(S): at 22:35

## 2023-06-22 RX ADMIN — Medication 10 MILLIGRAM(S): at 23:29

## 2023-06-22 RX ADMIN — Medication 85 MILLIMOLE(S): at 07:12

## 2023-06-22 RX ADMIN — PIPERACILLIN AND TAZOBACTAM 25 GRAM(S): 4; .5 INJECTION, POWDER, LYOPHILIZED, FOR SOLUTION INTRAVENOUS at 13:01

## 2023-06-22 RX ADMIN — Medication 166.67 MILLIGRAM(S): at 18:52

## 2023-06-22 RX ADMIN — Medication 15 MILLIGRAM(S): at 16:28

## 2023-06-22 RX ADMIN — Medication 15 MILLIGRAM(S): at 12:36

## 2023-06-22 RX ADMIN — PIPERACILLIN AND TAZOBACTAM 25 GRAM(S): 4; .5 INJECTION, POWDER, LYOPHILIZED, FOR SOLUTION INTRAVENOUS at 05:41

## 2023-06-22 RX ADMIN — IRON SUCROSE 176.67 MILLIGRAM(S): 20 INJECTION, SOLUTION INTRAVENOUS at 11:21

## 2023-06-22 RX ADMIN — Medication 15 MILLIGRAM(S): at 23:34

## 2023-06-22 RX ADMIN — Medication 15 MILLIGRAM(S): at 17:00

## 2023-06-22 RX ADMIN — CHLORHEXIDINE GLUCONATE 1 APPLICATION(S): 213 SOLUTION TOPICAL at 05:40

## 2023-06-22 RX ADMIN — ONDANSETRON 8 MILLIGRAM(S): 8 TABLET, FILM COATED ORAL at 22:48

## 2023-06-22 RX ADMIN — Medication 15 MILLIGRAM(S): at 10:46

## 2023-06-22 RX ADMIN — Medication 1000 MILLIGRAM(S): at 06:58

## 2023-06-22 RX ADMIN — Medication 400 MILLIGRAM(S): at 22:48

## 2023-06-22 RX ADMIN — Medication 400 MILLIGRAM(S): at 05:41

## 2023-06-22 RX ADMIN — Medication 400 MILLIGRAM(S): at 13:42

## 2023-06-22 NOTE — DIETITIAN INITIAL EVALUATION ADULT - OTHER CALCULATIONS
Ideal body weight used for calculations as pt >120% of IBW (161% IBW). Needs estimated for age and adjusted for post-op/wound healing, sepsis

## 2023-06-22 NOTE — DIETITIAN INITIAL EVALUATION ADULT - ADD RECOMMEND
1. As tolerated, advance to Full liquids >> Low Fiber   2. Obtain nutrition hx at f/u and provide edu as appropriate   3. Monitor lytes and replete prn. POC BG q6hrs   4. Pain and bowel regimens per team

## 2023-06-22 NOTE — PROGRESS NOTE ADULT - ASSESSMENT
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     Neuro: tordadol PRN pain (unlikely active bleeding)   CV: persistent sinus tachycardia 's. never hypotensive. suspect related to sepsis given concurrent fever.   Pulm: Hx PE: holding eliquis, no resp distress on nasal canula. Chest CT negative for PE.   GI: NPO, PPI, LR@120. check RUQ eval for cholecysitis.   : failed TOV, replaced valencia for strict I&O   ID: sepsis unclear origin, UA negative, Chest CT no specific consolidation, ?intraabdominal infx? on Zosyn (-), bld cx sent last night. // Ancef x 1, periop  Endo: ISS   Heme: high EBL s/p PRBC and cellsaver intraop, Hgb 6.9 with sinus tachycardia last night, s/p 1U PRBC last night. cont venofer 300mg x3d  PPx: SCDs, holding SQH.   Lines: PIV   Wounds: Phannenstiel incision  PT/OT: encourage OOB to chair   Dispo: SICU

## 2023-06-22 NOTE — PROGRESS NOTE ADULT - ATTENDING COMMENTS
pt seen and evaluated with team   Will review findings when ICU team is rounding.   Guarded, but stable condition .

## 2023-06-22 NOTE — DIETITIAN INITIAL EVALUATION ADULT - PERTINENT MEDS FT
MEDICATIONS  (STANDING):  chlorhexidine 2% Cloths 1 Application(s) Topical <User Schedule>  dextrose 5%. 1000 milliLiter(s) (50 mL/Hr) IV Continuous <Continuous>  dextrose 50% Injectable 25 Gram(s) IV Push once  glucagon  Injectable 1 milliGRAM(s) IntraMuscular once  heparin   Injectable 5000 Unit(s) SubCutaneous every 8 hours  insulin lispro (ADMELOG) corrective regimen sliding scale   SubCutaneous Before meals and at bedtime  iron sucrose IVPB 300 milliGRAM(s) IV Intermittent every 24 hours  pantoprazole    Tablet 40 milliGRAM(s) Oral before breakfast  piperacillin/tazobactam IVPB.. 3.375 Gram(s) IV Intermittent every 8 hours  simethicone 80 milliGRAM(s) Chew every 6 hours  sodium chloride 0.9%. 1000 milliLiter(s) (120 mL/Hr) IV Continuous <Continuous>    MEDICATIONS  (PRN):  acetaminophen     Tablet .. 1000 milliGRAM(s) Oral every 6 hours PRN Mild Pain (1 - 3)  albuterol    90 MICROgram(s) HFA Inhaler 2 Puff(s) Inhalation every 6 hours PRN Shortness of Breath and/or Wheezing  dextrose Oral Gel 15 Gram(s) Oral once PRN Blood Glucose LESS THAN 70 milliGRAM(s)/deciliter  ketorolac   Injectable 15 milliGRAM(s) IV Push every 6 hours PRN Severe Pain (7 - 10)  metoclopramide Injectable 10 milliGRAM(s) IV Push every 6 hours PRN nausea  ondansetron Injectable 8 milliGRAM(s) IV Push every 6 hours PRN Nausea and/or Vomiting  oxyCODONE    IR 5 milliGRAM(s) Oral every 4 hours PRN Moderate Pain (4 - 6)

## 2023-06-22 NOTE — PROGRESS NOTE ADULT - ASSESSMENT
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 Now admitted to SICU given persistent hypertension and tachycardia post-op. Started on zosyn after fever during vasovagal episode overnight.   - Encourage ambulation given has not yet passed flatus and hypoactive bowel sounds  - Recommend considering thyroid studies in the setting of new onset tachycardia  Neuro: Tylenol/Oxy q4 ATC **NO NSAIDS**; zofran prn for nausea  CV: MAP goal > 65. Tachycardia.  Pulm: saturating well on RA, IS  GI: NPO, PPI  : TOV @11  ID: Zosyn (-)// Ancef x 1, periop  Endo: ISS   PPx: NO SCD, hold? SQH   Lines: PIV   Wounds:   PT/OT:    Dispo: SICU   31 yo  presenting for abdominal myomectomy for symptoms of menorrhagia, dysmenorrhea, and b/l DVT/PE thought to be secondary to mass effect of fibroid uterus on iliac veins. Now s/p abd myomectomy 18 wks size uterus, 50 fibroids, enetered cavity,EBL 2L, , 2u pRBC, 1179 cellsaver, 3L IVF. Now admitted to SICU given persistent hypertension and tachycardia post-op. Started on zosyn after fever during vasovagal episode overnight.  Does not appear to have a PE or signs of active bleeding  - Encourage ambulation given has not yet passed flatus and hypoactive bowel sounds  - Recommend considering thyroid studies in the setting of new onset tachycardia  Neuro: Tylenol/Oxy q4 ATC **NO NSAIDS**; zofran prn for nausea  CV: MAP goal > 65. Tachycardia.  Pulm: saturating well on RA, IS  GI: NPO, PPI  : TOV @11  ID: Zosyn (-)// Ancef x 1, periop  Endo: ISS   PPx: NO SCD, hold? SQH   Lines: PIV   Wounds:   PT/OT:    Dispo: SICU

## 2023-06-22 NOTE — CHART NOTE - NSCHARTNOTEFT_GEN_A_CORE
Delayed entry due to patient care:    Went urgently to bedside in response to rapid response called Delayed entry due to patient care:    Went urgently to bedside in response to rapid response called. Patient had attempted to sit on the edge of the bed for the first time, became diaphoretic, and syncopized for several seconds in the bed. When arrived at bedside, pt awake, alert, laying in bed.     Vitals:   /76    RR 18 O2 sat 97% RA  Temp 101     Rapid response team at bedside assessing patient, full report given. POCUS echo and modified FAST performed as well as LE dopplers. Some free fluid noted in the abdomen, no obvious blood clots or signs of right heart strain noted, per MICU fellow. Blood cultures drawn as well as full labs. Hb relatively stable at 8.0 from 8.5 from 8.7. Zosyn started in the setting of meeting SIRS criteria -  WBC of 19 with new temperature in the setting of tachycardia and lactate of 3.4. CT angio A/P/chest ordered per MICU recommendations to evaluate for any ongoing abdominal bleeding or PE. Patient transferred to higher level of care, accepted by SICU. GYN will continue to follow closely. Dr. Elfego johnson. Delayed entry due to patient care:    Went urgently to bedside in response to rapid response called. Patient had attempted to sit on the edge of the bed for the first time, became diaphoretic, and synopsized for several seconds in the bed. When arrived at bedside, pt awake, alert, laying in bed.     Vitals:   /76    RR 18 O2 sat 97% RA  Temp 101     Rapid response team at bedside assessing patient, full report given. POCUS echo and modified FAST performed as well as LE dopplers. Some free fluid noted in the abdomen, no obvious blood clots or signs of right heart strain noted, per MICU fellow. Blood cultures drawn as well as full labs. Hb relatively stable at 8.0 from 8.5 from 8.7. Zosyn started in the setting of meeting SIRS criteria -  WBC of 19 with new temperature in the setting of tachycardia and lactate of 3.4. CT angio A/P/chest ordered per MICU recommendations to evaluate for any ongoing abdominal bleeding or PE. Patient transferred to higher level of care, accepted by SICU. GYN will continue to follow closely. Dr. Telles aware.      Agree with note and aware of situation

## 2023-06-22 NOTE — DIETITIAN INITIAL EVALUATION ADULT - PERTINENT LABORATORY DATA
06-22    138  |  103  |  5<L>  ----------------------------<  107<H>  4.0   |  26  |  0.72    Ca    8.2<L>      22 Jun 2023 16:21  Phos  2.2     06-22  Mg     2.0     06-22    TPro  5.2<L>  /  Alb  2.6<L>  /  TBili  1.8<H>  /  DBili  0.4<H>  /  AST  19  /  ALT  6<L>  /  AlkPhos  52  06-22  POCT Blood Glucose.: 104 mg/dL (06-22-23 @ 16:59)  A1C with Estimated Average Glucose Result: 4.6 % (06-22-23 @ 05:25)  A1C with Estimated Average Glucose Result: 5.3 % (01-14-23 @ 18:09)

## 2023-06-22 NOTE — PROGRESS NOTE ADULT - SUBJECTIVE AND OBJECTIVE BOX
S: c/o diffuse abd pain on palpation.   No new issues/events overnight, no new med c/o    O: ICU Vital Signs Last 24 Hrs  T(F): 101.1 (23 @ 10:00), Max: 102.4 (23 @ 05:28)  HR: 123 (23 @ 11:00) (123 - 146)  BP: 136/61 (23 @ 11:00) (136/61 - 160/72)  BP(mean): 88 (23 @ 11:00) (88 - 111)  ABP: --  RR: 18 (23 @ 11:00) (17 - 26)  SpO2: 97% (23 @ 11:00) (96% - 100%)    PHYSICAL EXAM:   Neurological: AAOx3, CNII-XII intact,  strength 5/5 b/l  ENT: mucus membrane moist  Cardiovascular: RRR  Respiratory: CTA  Gastrointestinal: soft, ND, BS+, lower abd/pelvic incision clean, no bleeding, no pus. tender on palp all 4 quads.   Extremities: warm, no dependent edema  Vascular: no cyanosis/erythema  Skin: no rashes  MSK: no joint swelling.     LABS:        132<L>  |  101  |  7   ----------------------------<  131<H>  4.4   |  24  |  0.75    Ca    8.1<L>      2023 05:25  Phos  1.4       Mg     1.8         TPro  5.2<L>  /  Alb  2.6<L>  /  TBili  1.8<H>  /  DBili  0.4<H>  /  AST  19  /  ALT  6<L>  /  AlkPhos  52    LIVER FUNCTIONS - ( 2023 05:25 )  Alb: 2.6 g/dL / Pro: 5.2 g/dL / ALK PHOS: 52 U/L / ALT: 6 U/L / AST: 19 U/L / GGT: x                               7.4    22.15 )-----------( 109      ( 2023 10:52 )             21.6   PT/INR - ( 2023 19:39 )   PT: 14.7 sec;   INR: 1.23          PTT - ( 2023 19:39 )  PTT:30.5 sec  ABG - ( 2023 17:36 )  pH, Arterial: 7.32  pH, Blood: x     /  pCO2: 39    /  pO2: 378   / HCO3: 20    / Base Excess: -5.6  /  SaO2: x               Urinalysis Basic - ( 2023 06:34 )    Color: Yellow / Appearance: Clear / S.020 / pH: x  Gluc: x / Ketone: NEGATIVE  / Bili: Negative / Urobili: 0.2 E.U./dL   Blood: x / Protein: NEGATIVE mg/dL / Nitrite: NEGATIVE   Leuk Esterase: NEGATIVE / RBC: Many /HPF / WBC < 5 /HPF   Sq Epi: x / Non Sq Epi: x / Bacteria: None /HPF    CAPILLARY BLOOD GLUCOSE      POCT Blood Glucose.: 139 mg/dL (2023 06:27)  POCT Blood Glucose.: 115 mg/dL (2023 22:52)  POCT Blood Glucose.: 161 mg/dL (2023 19:06)    MEDICATIONS  (STANDING):  chlorhexidine 2% Cloths 1 Application(s) Topical <User Schedule>  dextrose 5%. 1000 milliLiter(s) (50 mL/Hr) IV Continuous <Continuous>  dextrose 50% Injectable 25 Gram(s) IV Push once  glucagon  Injectable 1 milliGRAM(s) IntraMuscular once  insulin lispro (ADMELOG) corrective regimen sliding scale   SubCutaneous Before meals and at bedtime  iron sucrose IVPB 300 milliGRAM(s) IV Intermittent every 24 hours  pantoprazole    Tablet 40 milliGRAM(s) Oral before breakfast  piperacillin/tazobactam IVPB.. 3.375 Gram(s) IV Intermittent every 8 hours  simethicone 80 milliGRAM(s) Chew every 6 hours  sodium chloride 0.9%. 1000 milliLiter(s) (120 mL/Hr) IV Continuous <Continuous>    MEDICATIONS  (PRN):  acetaminophen     Tablet .. 1000 milliGRAM(s) Oral every 6 hours PRN Mild Pain (1 - 3)  albuterol    90 MICROgram(s) HFA Inhaler 2 Puff(s) Inhalation every 6 hours PRN Shortness of Breath and/or Wheezing  dextrose Oral Gel 15 Gram(s) Oral once PRN Blood Glucose LESS THAN 70 milliGRAM(s)/deciliter  ketorolac   Injectable 15 milliGRAM(s) IV Push every 6 hours PRN Severe Pain (7 - 10)  metoclopramide Injectable 10 milliGRAM(s) IV Push every 6 hours PRN nausea  ondansetron Injectable 8 milliGRAM(s) IV Push every 6 hours PRN Nausea and/or Vomiting  oxyCODONE    IR 5 milliGRAM(s) Oral every 4 hours PRN Moderate Pain (4 - 6)      Galan:	  [ ] None	[ x] Daily Galan Order Placed	   Indication:	  [x ] Strict I and O's    [ ] Obstruction     [ ] Incontinence + Stage 3 or 4 Decubitus  Central Line:  [ x] None	   [ ]  Medication / TPN Administration     [ ] No Peripheral IV

## 2023-06-22 NOTE — DIETITIAN INITIAL EVALUATION ADULT - OTHER INFO
31 yo  presenting for abdominal myomectomy for symptoms of menorrhagia, dysmenorrhea, and b/l DVT/PE thought to be secondary to mass effect of fibroid uterus on iliac veins on xarelto at home, admitted on  for abd myomectomy of 18 wks size uterus, 50 fibroids, EBL 2L, , given 2u pRBC, 1179 cellsaver, 3L IVF, post-op c/b persistent sinus tachycardia, transferred to SICU  night for further eval after RR called for syncopal event. Attempted to speak with pt in room, but she was being visited by multiple family members, and RN reported that she was very uncomfortably in pain at time of visit. Left to rest. She was advanced to CLD earlier in day but has yet to pass gas per RN, and is taking very minimal PO at this time. NKFA in chart. Receiving NS @ 120cc/hr. No episodes of emesis. Currently being worked up for sepsis. Tmax 101.1F. Skin noted with abdominal surgical wound, no edema. Patient with maintained weight from admission in 2023. Will continue to follow per RD protocol.

## 2023-06-22 NOTE — PROGRESS NOTE ADULT - SUBJECTIVE AND OBJECTIVE BOX
Pt seen and examined at bedside. Patient states pain is improved since yesterday. She has not yet ambulated since the procedure. Galan in place. Has not yet passed flatus. Is tolerating clear liquid diet, with appetite. Denies nausea/vomiting. Denies chest pain, lightheadedness, dizziness, shortness of breath.     T(F): 102.4 (06-22-23 @ 05:28), Max: 102.4 (06-22-23 @ 05:28)  HR: 136 (06-22-23 @ 06:00) (125 - 146)  BP: 153/70 (06-22-23 @ 06:00) (141/71 - 160/72)  RR: 24 (06-22-23 @ 06:00) (17 - 26)  SpO2: 96% (06-22-23 @ 06:00) (96% - 100%)  Wt(kg): --  I&O's Summary    20 Jun 2023 07:01  -  21 Jun 2023 07:00  --------------------------------------------------------  IN: 4275 mL / OUT: 450 mL / NET: 3825 mL    21 Jun 2023 07:01  -  22 Jun 2023 06:58  --------------------------------------------------------  IN: 3090 mL / OUT: 3495 mL / NET: -405 mL        MEDICATIONS  (STANDING):  chlorhexidine 2% Cloths 1 Application(s) Topical <User Schedule>  dextrose 5%. 1000 milliLiter(s) (50 mL/Hr) IV Continuous <Continuous>  dextrose 50% Injectable 25 Gram(s) IV Push once  glucagon  Injectable 1 milliGRAM(s) IntraMuscular once  insulin lispro (ADMELOG) corrective regimen sliding scale   SubCutaneous Before meals and at bedtime  iron sucrose IVPB 300 milliGRAM(s) IV Intermittent every 24 hours  pantoprazole    Tablet 40 milliGRAM(s) Oral before breakfast  piperacillin/tazobactam IVPB.. 3.375 Gram(s) IV Intermittent every 8 hours  simethicone 80 milliGRAM(s) Chew every 6 hours  sodium chloride 0.9%. 1000 milliLiter(s) (120 mL/Hr) IV Continuous <Continuous>  sodium phosphate 30 milliMole(s)/500 mL IVPB 30 milliMole(s) IV Intermittent once    MEDICATIONS  (PRN):  acetaminophen     Tablet .. 1000 milliGRAM(s) Oral every 6 hours PRN Mild Pain (1 - 3)  albuterol    90 MICROgram(s) HFA Inhaler 2 Puff(s) Inhalation every 6 hours PRN Shortness of Breath and/or Wheezing  dextrose Oral Gel 15 Gram(s) Oral once PRN Blood Glucose LESS THAN 70 milliGRAM(s)/deciliter  metoclopramide Injectable 10 milliGRAM(s) IV Push every 6 hours PRN nausea  ondansetron Injectable 8 milliGRAM(s) IV Push every 6 hours PRN Nausea and/or Vomiting  oxyCODONE    IR 5 milliGRAM(s) Oral every 4 hours PRN Moderate Pain (4 - 6)      Physical Exam:  Constitutional: NAD  Pulmonary: no increased work of breathing, satting well on room air  Abd: Soft, appropriately tender to post op state. Hypoactive bowel sounds  Ext: No calf tenderness or swelling    LABS:                        7.9    25.59 )-----------( 125      ( 22 Jun 2023 05:25 )             24.4     06-22    132<L>  |  101  |  7   ----------------------------<  131<H>  4.4   |  24  |  0.75    Ca    8.1<L>      22 Jun 2023 05:25  Phos  1.4     06-22  Mg     1.8     06-22    TPro  5.2<L>  /  Alb  2.7<L>  /  TBili  0.5  /  DBili  x   /  AST  22  /  ALT  <5<L>  /  AlkPhos  48  06-21    PT/INR - ( 21 Jun 2023 19:39 )   PT: 14.7 sec;   INR: 1.23          PTT - ( 21 Jun 2023 19:39 )  PTT:30.5 sec  Urinalysis Basic - ( 22 Jun 2023 05:25 )    Color: x / Appearance: x / SG: x / pH: x  Gluc: 131 mg/dL / Ketone: x  / Bili: x / Urobili: x   Blood: x / Protein: x / Nitrite: x   Leuk Esterase: x / RBC: x / WBC x   Sq Epi: x / Non Sq Epi: x / Bacteria: x        RADIOLOGY & ADDITIONAL TESTS:

## 2023-06-22 NOTE — PROGRESS NOTE ADULT - ATTENDING COMMENTS
History of recent PE admitted for myomectomy now with acute respiratory failure, sepsis and RV strain. Avoid fluids; fluid status should be net even or negative. Anticoagulation as per above. Rest of plan as per above.

## 2023-06-22 NOTE — PROGRESS NOTE ADULT - SUBJECTIVE AND OBJECTIVE BOX
PULMONARY CONSULT SERVICE FOLLOW-UP NOTE    INTERVAL HPI:  Reviewed chart and overnight events; patient seen and examined at bedside. Overnight events reviewed - rapid response last night for syncopal episode, foudn to be febrile with dropping H/H. Recieved PRBC and started on antibiotics. CTA without thormbus, evidence of peritoneal free fluid/air. Still febrile and tachycardic.     MEDICATIONS:  Pulmonary:  albuterol    90 MICROgram(s) HFA Inhaler 2 Puff(s) Inhalation every 6 hours PRN    Antimicrobials:  piperacillin/tazobactam IVPB.. 3.375 Gram(s) IV Intermittent every 8 hours    Anticoagulants:    Cardiac:      Allergies    No Known Allergies    Intolerances        Vital Signs Last 24 Hrs  T(C): 39.1 (2023 05:28), Max: 39.1 (2023 05:28)  T(F): 102.4 (2023 05:28), Max: 102.4 (2023 05:28)  HR: 123 (2023 10:00) (123 - 146)  BP: 140/63 (2023 10:00) (140/63 - 160/72)  BP(mean): 90 (2023 10:00) (90 - 111)  RR: 20 (2023 10:00) (17 - 26)  SpO2: 100% (2023 10:00) (96% - 100%)    Parameters below as of 2023 10:00  Patient On (Oxygen Delivery Method): room air         @ : @ 07:00  --------------------------------------------------------  IN: 3173 mL / OUT: 3745 mL / NET: -572 mL     @ 07:  -   @ 10:18  --------------------------------------------------------  IN: 443 mL / OUT: 500 mL / NET: -57 mL          PHYSICAL EXAM:  Constitutional: well-appearing  HEENT: NC/AT; PERRL, anicteric sclera; MMM  Neck: supple  Cardiovascular: +S1/S2, RRR  Respiratory: CTA B/L; no W/R/R  Gastrointestinal: soft, NT/ND  Extremities: WWP; no edema, clubbing or cyanosis  Vascular: 2+ radial pulses B/L  Neurological: awake and alert; ALARCON    LABS:  ABG - ( 2023 17:36 )  pH, Arterial: 7.32  pH, Blood: x     /  pCO2: 39    /  pO2: 378   / HCO3: 20    / Base Excess: -5.6  /  SaO2: x                   CBC Full  -  ( 2023 05:25 )  WBC Count : 25.59 K/uL  RBC Count : 2.67 M/uL  Hemoglobin : 7.9 g/dL  Hematocrit : 24.4 %  Platelet Count - Automated : 125 K/uL  Mean Cell Volume : 91.4 fl  Mean Cell Hemoglobin : 29.6 pg  Mean Cell Hemoglobin Concentration : 32.4 gm/dL  Auto Neutrophil # : 21.20 K/uL  Auto Lymphocyte # : 1.61 K/uL  Auto Monocyte # : 2.40 K/uL  Auto Eosinophil # : 0.04 K/uL  Auto Basophil # : 0.05 K/uL  Auto Neutrophil % : 82.8 %  Auto Lymphocyte % : 6.3 %  Auto Monocyte % : 9.4 %  Auto Eosinophil % : 0.2 %  Auto Basophil % : 0.2 %    06-    132<L>  |  101  |  7   ----------------------------<  131<H>  4.4   |  24  |  0.75    Ca    8.1<L>      2023 05:25  Phos  1.4     06-  Mg     1.8     06-    TPro  5.2<L>  /  Alb  2.7<L>  /  TBili  0.5  /  DBili  x   /  AST  22  /  ALT  <5<L>  /  AlkPhos  48  06-21    PT/INR - ( 2023 19:39 )   PT: 14.7 sec;   INR: 1.23          PTT - ( 2023 19:39 )  PTT:30.5 sec      Urinalysis Basic - ( 2023 06:34 )    Color: Yellow / Appearance: Clear / S.020 / pH: x  Gluc: x / Ketone: NEGATIVE  / Bili: Negative / Urobili: 0.2 E.U./dL   Blood: x / Protein: NEGATIVE mg/dL / Nitrite: NEGATIVE   Leuk Esterase: NEGATIVE / RBC: Many /HPF / WBC < 5 /HPF   Sq Epi: x / Non Sq Epi: x / Bacteria: None /HPF                RADIOLOGY & ADDITIONAL STUDIES:

## 2023-06-22 NOTE — PROVIDER CONTACT NOTE (CHANGE IN STATUS NOTIFICATION) - SITUATION
Pt temperature 101.5 rectally despite tylenol, toradol, and ice packs given.
GYN MD on call paged for HR sustained to 130's. patient asleep but arousable. no complaints of chest pain or pain anywhere.

## 2023-06-22 NOTE — PROGRESS NOTE ADULT - ASSESSMENT
Ms. Kemp is a 31 yo F with PMH of Asthma, DVT/PE, uterine fibroids, anemia 2/2 menorrhagia who presented for uterine myomectomy. Pulmonary consulted for history of PE.     #Mild intermittent Asthma   #Hx of DVT/PE     Data Reviewed:   Outpatient Pulmonologist: Dr. Haro   TTE 6/15/23: normal BiV function, PASP 21   Bedside TTE 6/21: RV dilation, likely preserved function, some septal flattening     Presenting for planned myomectomy, elevated EBL in OR, pulmonary consulted for history of PE and AC recs. Her PE from January was believed to be multifactorial and partially provoked from iliac stasis due to compression from her fibroids and nuvaring placing her in a somewhat prothrombotic state. Post op, she remains tachycardic with adequate urine output. Still in significant pain, and has severely distended stomach on ultrasound assessment.    Course complicated by rapid response and new sepsis, for which she has been started on zosyn. Recieved additional unit of PRBC overnight with appropriate response. No clot on CTA. CT abdomen with free fluid/air, post op changes. CT Chest also notable for bibasilar consolidations - with the presence of air bronchograms, favor developing pneumonia over atelectasis although may be multifactorial.     Recommend:   - would hold further fluids at this time; if she requires resuscitation, would opt for transfusion of blood products given concern for bleed   - lasix PRN given RV dysfunction/dilation   - pain control    - agree with holding blood thinners for now; when stable from primary team perspective, similar to prior plan of 24 hr of HSQ followed by transition back to [facility]   - agree with abx and continued infectious w/u

## 2023-06-23 LAB
ANION GAP SERPL CALC-SCNC: 7 MMOL/L — SIGNIFICANT CHANGE UP (ref 5–17)
ANISOCYTOSIS BLD QL: SIGNIFICANT CHANGE UP
APPEARANCE UR: ABNORMAL
APPEARANCE UR: CLEAR — SIGNIFICANT CHANGE UP
BACTERIA # UR AUTO: PRESENT /HPF
BACTERIA # UR AUTO: PRESENT /HPF
BASOPHILS # BLD AUTO: 0 K/UL — SIGNIFICANT CHANGE UP (ref 0–0.2)
BASOPHILS NFR BLD AUTO: 0 % — SIGNIFICANT CHANGE UP (ref 0–2)
BILIRUB UR-MCNC: ABNORMAL
BILIRUB UR-MCNC: NEGATIVE — SIGNIFICANT CHANGE UP
BUN SERPL-MCNC: 7 MG/DL — SIGNIFICANT CHANGE UP (ref 7–23)
BURR CELLS BLD QL SMEAR: PRESENT — SIGNIFICANT CHANGE UP
CALCIUM SERPL-MCNC: 8.1 MG/DL — LOW (ref 8.4–10.5)
CHLORIDE SERPL-SCNC: 100 MMOL/L — SIGNIFICANT CHANGE UP (ref 96–108)
CO2 SERPL-SCNC: 27 MMOL/L — SIGNIFICANT CHANGE UP (ref 22–31)
COLOR SPEC: YELLOW — SIGNIFICANT CHANGE UP
COLOR SPEC: YELLOW — SIGNIFICANT CHANGE UP
COMMENT - URINE: SIGNIFICANT CHANGE UP
CREAT ?TM UR-MCNC: 186 MG/DL — SIGNIFICANT CHANGE UP
CREAT ?TM UR-MCNC: 194 MG/DL — SIGNIFICANT CHANGE UP
CREAT ?TM UR-MCNC: 235 MG/DL — SIGNIFICANT CHANGE UP
CREAT SERPL-MCNC: 0.57 MG/DL — SIGNIFICANT CHANGE UP (ref 0.5–1.3)
DACRYOCYTES BLD QL SMEAR: SLIGHT — SIGNIFICANT CHANGE UP
DIFF PNL FLD: ABNORMAL
DIFF PNL FLD: ABNORMAL
EGFR: 124 ML/MIN/1.73M2 — SIGNIFICANT CHANGE UP
EOSINOPHIL # BLD AUTO: 0 K/UL — SIGNIFICANT CHANGE UP (ref 0–0.5)
EOSINOPHIL NFR BLD AUTO: 0 % — SIGNIFICANT CHANGE UP (ref 0–6)
EPI CELLS # UR: SIGNIFICANT CHANGE UP /HPF (ref 0–5)
EPI CELLS # UR: SIGNIFICANT CHANGE UP /HPF (ref 0–5)
GLUCOSE BLDC GLUCOMTR-MCNC: 101 MG/DL — HIGH (ref 70–99)
GLUCOSE BLDC GLUCOMTR-MCNC: 112 MG/DL — HIGH (ref 70–99)
GLUCOSE BLDC GLUCOMTR-MCNC: 113 MG/DL — HIGH (ref 70–99)
GLUCOSE BLDC GLUCOMTR-MCNC: 130 MG/DL — HIGH (ref 70–99)
GLUCOSE SERPL-MCNC: 121 MG/DL — HIGH (ref 70–99)
GLUCOSE UR QL: NEGATIVE — SIGNIFICANT CHANGE UP
GLUCOSE UR QL: NEGATIVE — SIGNIFICANT CHANGE UP
HCT VFR BLD CALC: 22.7 % — LOW (ref 34.5–45)
HGB BLD-MCNC: 7.4 G/DL — LOW (ref 11.5–15.5)
HYALINE CASTS # UR AUTO: SIGNIFICANT CHANGE UP /LPF (ref 0–2)
HYPOCHROMIA BLD QL: SLIGHT — SIGNIFICANT CHANGE UP
KETONES UR-MCNC: 40 MG/DL
KETONES UR-MCNC: ABNORMAL MG/DL
LACTATE SERPL-SCNC: 1.5 MMOL/L — SIGNIFICANT CHANGE UP (ref 0.5–2)
LEUKOCYTE ESTERASE UR-ACNC: NEGATIVE — SIGNIFICANT CHANGE UP
LEUKOCYTE ESTERASE UR-ACNC: NEGATIVE — SIGNIFICANT CHANGE UP
LYMPHOCYTES # BLD AUTO: 1.77 K/UL — SIGNIFICANT CHANGE UP (ref 1–3.3)
LYMPHOCYTES # BLD AUTO: 6.1 % — LOW (ref 13–44)
MACROCYTES BLD QL: SLIGHT — SIGNIFICANT CHANGE UP
MAGNESIUM SERPL-MCNC: 2 MG/DL — SIGNIFICANT CHANGE UP (ref 1.6–2.6)
MANUAL SMEAR VERIFICATION: SIGNIFICANT CHANGE UP
MCHC RBC-ENTMCNC: 29.8 PG — SIGNIFICANT CHANGE UP (ref 27–34)
MCHC RBC-ENTMCNC: 32.6 GM/DL — SIGNIFICANT CHANGE UP (ref 32–36)
MCV RBC AUTO: 91.5 FL — SIGNIFICANT CHANGE UP (ref 80–100)
MICROCYTES BLD QL: SLIGHT — SIGNIFICANT CHANGE UP
MONOCYTES # BLD AUTO: 1.77 K/UL — HIGH (ref 0–0.9)
MONOCYTES NFR BLD AUTO: 6.1 % — SIGNIFICANT CHANGE UP (ref 2–14)
NEUTROPHILS # BLD AUTO: 25.25 K/UL — HIGH (ref 1.8–7.4)
NEUTROPHILS NFR BLD AUTO: 86.9 % — HIGH (ref 43–77)
NITRITE UR-MCNC: NEGATIVE — SIGNIFICANT CHANGE UP
NITRITE UR-MCNC: NEGATIVE — SIGNIFICANT CHANGE UP
NRBC # BLD: 0 /100 WBCS — SIGNIFICANT CHANGE UP (ref 0–0)
OSMOLALITY UR: 823 MOSM/KG — SIGNIFICANT CHANGE UP (ref 300–900)
OSMOLALITY UR: 894 MOSM/KG — SIGNIFICANT CHANGE UP (ref 300–900)
OVALOCYTES BLD QL SMEAR: SLIGHT — SIGNIFICANT CHANGE UP
PH UR: 5.5 — SIGNIFICANT CHANGE UP (ref 5–8)
PH UR: 6.5 — SIGNIFICANT CHANGE UP (ref 5–8)
PHOSPHATE SERPL-MCNC: 2.3 MG/DL — LOW (ref 2.5–4.5)
PLAT MORPH BLD: NORMAL — SIGNIFICANT CHANGE UP
PLATELET # BLD AUTO: 143 K/UL — LOW (ref 150–400)
POIKILOCYTOSIS BLD QL AUTO: SLIGHT — SIGNIFICANT CHANGE UP
POLYCHROMASIA BLD QL SMEAR: SLIGHT — SIGNIFICANT CHANGE UP
POTASSIUM SERPL-MCNC: 3.8 MMOL/L — SIGNIFICANT CHANGE UP (ref 3.5–5.3)
POTASSIUM SERPL-SCNC: 3.8 MMOL/L — SIGNIFICANT CHANGE UP (ref 3.5–5.3)
POTASSIUM UR-SCNC: 49 MMOL/L — SIGNIFICANT CHANGE UP
PROT ?TM UR-MCNC: 103 MG/DL — HIGH (ref 0–12)
PROT ?TM UR-MCNC: 108 MG/DL — HIGH (ref 0–12)
PROT UR-MCNC: 100 MG/DL
PROT UR-MCNC: 30 MG/DL
PROT/CREAT UR-RTO: 0.5 RATIO — HIGH (ref 0–0.2)
PROT/CREAT UR-RTO: 0.5 RATIO — HIGH (ref 0–0.2)
RBC # BLD: 2.48 M/UL — LOW (ref 3.8–5.2)
RBC # FLD: 17.6 % — HIGH (ref 10.3–14.5)
RBC BLD AUTO: ABNORMAL
RBC CASTS # UR COMP ASSIST: > 10 /HPF
RBC CASTS # UR COMP ASSIST: > 10 /HPF
SODIUM SERPL-SCNC: 134 MMOL/L — LOW (ref 135–145)
SODIUM UR-SCNC: 134 MMOL/L — SIGNIFICANT CHANGE UP
SODIUM UR-SCNC: 46 MMOL/L — SIGNIFICANT CHANGE UP
SP GR SPEC: 1.02 — SIGNIFICANT CHANGE UP (ref 1–1.03)
SP GR SPEC: >=1.03 — SIGNIFICANT CHANGE UP (ref 1–1.03)
SPHEROCYTES BLD QL SMEAR: SLIGHT — SIGNIFICANT CHANGE UP
UROBILINOGEN FLD QL: 0.2 E.U./DL — SIGNIFICANT CHANGE UP
UROBILINOGEN FLD QL: 1 E.U./DL — SIGNIFICANT CHANGE UP
UUN UR-MCNC: 854 MG/DL — SIGNIFICANT CHANGE UP
UUN UR-MCNC: 903 MG/DL — SIGNIFICANT CHANGE UP
VARIANT LYMPHS # BLD: 0.9 % — SIGNIFICANT CHANGE UP (ref 0–6)
WBC # BLD: 29.06 K/UL — HIGH (ref 3.8–10.5)
WBC # FLD AUTO: 29.06 K/UL — HIGH (ref 3.8–10.5)
WBC UR QL: < 5 /HPF — SIGNIFICANT CHANGE UP
WBC UR QL: ABNORMAL /HPF

## 2023-06-23 PROCEDURE — 71045 X-RAY EXAM CHEST 1 VIEW: CPT | Mod: 26

## 2023-06-23 PROCEDURE — 74177 CT ABD & PELVIS W/CONTRAST: CPT | Mod: 26

## 2023-06-23 PROCEDURE — 99254 IP/OBS CNSLTJ NEW/EST MOD 60: CPT | Mod: GC

## 2023-06-23 PROCEDURE — 99291 CRITICAL CARE FIRST HOUR: CPT | Mod: GC

## 2023-06-23 RX ORDER — HYDROMORPHONE HYDROCHLORIDE 2 MG/ML
0.2 INJECTION INTRAMUSCULAR; INTRAVENOUS; SUBCUTANEOUS
Refills: 0 | Status: DISCONTINUED | OUTPATIENT
Start: 2023-06-23 | End: 2023-06-27

## 2023-06-23 RX ORDER — ACETAMINOPHEN 500 MG
1000 TABLET ORAL ONCE
Refills: 0 | Status: COMPLETED | OUTPATIENT
Start: 2023-06-23 | End: 2023-06-23

## 2023-06-23 RX ORDER — SODIUM CHLORIDE 9 MG/ML
500 INJECTION, SOLUTION INTRAVENOUS ONCE
Refills: 0 | Status: COMPLETED | OUTPATIENT
Start: 2023-06-23 | End: 2023-06-23

## 2023-06-23 RX ORDER — HEPARIN SODIUM 5000 [USP'U]/ML
5000 INJECTION INTRAVENOUS; SUBCUTANEOUS EVERY 8 HOURS
Refills: 0 | Status: DISCONTINUED | OUTPATIENT
Start: 2023-06-23 | End: 2023-06-24

## 2023-06-23 RX ORDER — SODIUM CHLORIDE 9 MG/ML
1000 INJECTION, SOLUTION INTRAVENOUS
Refills: 0 | Status: DISCONTINUED | OUTPATIENT
Start: 2023-06-23 | End: 2023-06-27

## 2023-06-23 RX ORDER — IOHEXOL 300 MG/ML
30 INJECTION, SOLUTION INTRAVENOUS ONCE
Refills: 0 | Status: COMPLETED | OUTPATIENT
Start: 2023-06-23 | End: 2023-06-23

## 2023-06-23 RX ORDER — PANTOPRAZOLE SODIUM 20 MG/1
40 TABLET, DELAYED RELEASE ORAL EVERY 12 HOURS
Refills: 0 | Status: DISCONTINUED | OUTPATIENT
Start: 2023-06-23 | End: 2023-06-24

## 2023-06-23 RX ORDER — HYDROMORPHONE HYDROCHLORIDE 2 MG/ML
0.5 INJECTION INTRAMUSCULAR; INTRAVENOUS; SUBCUTANEOUS
Refills: 0 | Status: DISCONTINUED | OUTPATIENT
Start: 2023-06-23 | End: 2023-06-27

## 2023-06-23 RX ADMIN — PIPERACILLIN AND TAZOBACTAM 25 GRAM(S): 4; .5 INJECTION, POWDER, LYOPHILIZED, FOR SOLUTION INTRAVENOUS at 22:39

## 2023-06-23 RX ADMIN — SODIUM CHLORIDE 500 MILLILITER(S): 9 INJECTION, SOLUTION INTRAVENOUS at 06:14

## 2023-06-23 RX ADMIN — SODIUM CHLORIDE 100 MILLILITER(S): 9 INJECTION, SOLUTION INTRAVENOUS at 10:04

## 2023-06-23 RX ADMIN — PIPERACILLIN AND TAZOBACTAM 25 GRAM(S): 4; .5 INJECTION, POWDER, LYOPHILIZED, FOR SOLUTION INTRAVENOUS at 06:11

## 2023-06-23 RX ADMIN — IOHEXOL 30 MILLILITER(S): 300 INJECTION, SOLUTION INTRAVENOUS at 06:13

## 2023-06-23 RX ADMIN — IRON SUCROSE 176.67 MILLIGRAM(S): 20 INJECTION, SOLUTION INTRAVENOUS at 12:04

## 2023-06-23 RX ADMIN — HEPARIN SODIUM 5000 UNIT(S): 5000 INJECTION INTRAVENOUS; SUBCUTANEOUS at 18:25

## 2023-06-23 RX ADMIN — Medication 85 MILLIMOLE(S): at 19:35

## 2023-06-23 RX ADMIN — HYDROMORPHONE HYDROCHLORIDE 0.5 MILLIGRAM(S): 2 INJECTION INTRAMUSCULAR; INTRAVENOUS; SUBCUTANEOUS at 18:26

## 2023-06-23 RX ADMIN — PIPERACILLIN AND TAZOBACTAM 25 GRAM(S): 4; .5 INJECTION, POWDER, LYOPHILIZED, FOR SOLUTION INTRAVENOUS at 14:53

## 2023-06-23 RX ADMIN — ONDANSETRON 8 MILLIGRAM(S): 8 TABLET, FILM COATED ORAL at 17:00

## 2023-06-23 RX ADMIN — Medication 1000 MILLIGRAM(S): at 19:41

## 2023-06-23 RX ADMIN — SIMETHICONE 80 MILLIGRAM(S): 80 TABLET, CHEWABLE ORAL at 00:09

## 2023-06-23 RX ADMIN — SODIUM CHLORIDE 9999 MILLILITER(S): 9 INJECTION, SOLUTION INTRAVENOUS at 21:05

## 2023-06-23 RX ADMIN — Medication 1000 MILLIGRAM(S): at 06:44

## 2023-06-23 RX ADMIN — HYDROMORPHONE HYDROCHLORIDE 0.2 MILLIGRAM(S): 2 INJECTION INTRAMUSCULAR; INTRAVENOUS; SUBCUTANEOUS at 21:33

## 2023-06-23 RX ADMIN — Medication 400 MILLIGRAM(S): at 04:48

## 2023-06-23 RX ADMIN — SODIUM CHLORIDE 2000 MILLILITER(S): 9 INJECTION, SOLUTION INTRAVENOUS at 10:03

## 2023-06-23 RX ADMIN — PANTOPRAZOLE SODIUM 40 MILLIGRAM(S): 20 TABLET, DELAYED RELEASE ORAL at 06:10

## 2023-06-23 RX ADMIN — Medication 400 MILLIGRAM(S): at 19:00

## 2023-06-23 RX ADMIN — CHLORHEXIDINE GLUCONATE 1 APPLICATION(S): 213 SOLUTION TOPICAL at 06:13

## 2023-06-23 RX ADMIN — ONDANSETRON 8 MILLIGRAM(S): 8 TABLET, FILM COATED ORAL at 04:48

## 2023-06-23 RX ADMIN — HYDROMORPHONE HYDROCHLORIDE 0.5 MILLIGRAM(S): 2 INJECTION INTRAMUSCULAR; INTRAVENOUS; SUBCUTANEOUS at 18:33

## 2023-06-23 RX ADMIN — HYDROMORPHONE HYDROCHLORIDE 0.2 MILLIGRAM(S): 2 INJECTION INTRAMUSCULAR; INTRAVENOUS; SUBCUTANEOUS at 21:05

## 2023-06-23 RX ADMIN — PANTOPRAZOLE SODIUM 40 MILLIGRAM(S): 20 TABLET, DELAYED RELEASE ORAL at 18:25

## 2023-06-23 RX ADMIN — Medication 1000 MILLIGRAM(S): at 13:30

## 2023-06-23 RX ADMIN — Medication 400 MILLIGRAM(S): at 13:00

## 2023-06-23 NOTE — PROGRESS NOTE ADULT - ATTENDING COMMENTS
patient seen and examined with SICU and residents today.   plan per sicu note.   Discussed course with family.   Will continue to follow closely and appreciate recs.

## 2023-06-23 NOTE — CONSULT NOTE ADULT - SUBJECTIVE AND OBJECTIVE BOX
INFECTIOUS DISEASES INITIAL CONSULT NOTE    HPI:  33 y/o F PMH PE and DVT 2/2 mass effect of fibroid uterus on iliac veins, PSHx myomectomy for symptoms of menorrhagia, dysmenorrhea, presenting for myomectomy. Her course is c/b post op ileus and severe sepsis with unclear source for which ID was consulted. Imaging shows fluid and gas collection in uterus, can't r/o abscess and endometriitis per read. She was started on zosyn w uptrending leukocytosis and fevers.     OBHx  G1-2 VTOP D+C  G3  20-11    PMH b/l DVT and PE currently on eliquis; mild asthma (never hospitalized)  PSH Denies  Meds Eliquis 5mg qd  NKDA (2023 12:01)    PAST MEDICAL & SURGICAL HISTORY:  Asthma  Fibroids  Anemia  Pulmonary embolism  2023  DVT (deep venous thrombosis)  No significant past surgical history    Review of Systems:   otherwise negative other than noted above       ANTIBIOTICS:  MEDICATIONS  (STANDING):  chlorhexidine 2% Cloths 1 Application(s) Topical <User Schedule>  dextrose 5%. 1000 milliLiter(s) (50 mL/Hr) IV Continuous <Continuous>  dextrose 50% Injectable 25 Gram(s) IV Push once  glucagon  Injectable 1 milliGRAM(s) IntraMuscular once  heparin   Injectable 5000 Unit(s) SubCutaneous every 8 hours  insulin lispro (ADMELOG) corrective regimen sliding scale   SubCutaneous Before meals and at bedtime  lactated ringers. 1000 milliLiter(s) (100 mL/Hr) IV Continuous <Continuous>  pantoprazole  Injectable 40 milliGRAM(s) IV Push every 12 hours  piperacillin/tazobactam IVPB.. 3.375 Gram(s) IV Intermittent every 8 hours  sodium phosphate 30 milliMole(s)/500 mL IVPB 30 milliMole(s) IV Intermittent once    MEDICATIONS  (PRN):  acetaminophen     Tablet .. 1000 milliGRAM(s) Oral every 6 hours PRN Mild Pain (1 - 3)  albuterol    90 MICROgram(s) HFA Inhaler 2 Puff(s) Inhalation every 6 hours PRN Shortness of Breath and/or Wheezing  dextrose Oral Gel 15 Gram(s) Oral once PRN Blood Glucose LESS THAN 70 milliGRAM(s)/deciliter  HYDROmorphone  Injectable 0.2 milliGRAM(s) IV Push every 3 hours PRN Moderate Pain (4 - 6)  HYDROmorphone  Injectable 0.5 milliGRAM(s) IV Push every 3 hours PRN Severe Pain (7 - 10)  ondansetron Injectable 8 milliGRAM(s) IV Push every 6 hours PRN Nausea and/or Vomiting  oxyCODONE    IR 5 milliGRAM(s) Oral every 4 hours PRN Moderate Pain (4 - 6)      Allergies    No Known Allergies    Intolerances        SOCIAL HISTORY:    FAMILY HISTORY:   no FH leading to current infection    Vital Signs Last 24 Hrs  T(C): 38.3 (2023 17:49), Max: 38.5 (2023 05:02)  T(F): 101 (2023 17:49), Max: 101.3 (2023 05:02)  HR: 105 (2023 16:00) (105 - 128)  BP: 157/82 (2023 16:00) (132/62 - 159/79)  BP(mean): 111 (2023 16:00) (89 - 124)  RR: 14 (2023 13:00) (14 - 19)  SpO2: 96% (2023 16:00) (94% - 100%)    Parameters below as of 2023 16:00  Patient On (Oxygen Delivery Method): room air        23 @ 07:  -  23 @ 07:00  --------------------------------------------------------  IN: 4213 mL / OUT: 2778 mL / NET: 1435 mL    23 @ 07:01  -  23 @ 19:24  --------------------------------------------------------  IN: 1420 mL / OUT: 1300 mL / NET: 120 mL        PHYSICAL EXAM:  Constitutional: young F in bed w head of bed raised uncomfortable appearing  Eyes: no scleral icterus or conjunctival pallor  ENT: sump in place set to suction  Neck: the appearance of the neck was normal and the neck was supple.   Pulmonary: no increased work of breathing, CTA  Heart: tachycardic, regular, no murmur  Vascular: no BRISA, WWP  Abdomen: soft, tenderness to palpation in RUQ and RLQ, no rebound or guarding  Neurological: AOx3, lethargic  Skin: no rash, warm  Psychiatric: AOx3, normal affect      LABS:                        7.4    29.06 )-----------( 143      ( 2023 04:40 )             22.7     06-23    134<L>  |  100  |  7   ----------------------------<  121<H>  3.8   |  27  |  0.57    Ca    8.1<L>      2023 04:40  Phos  2.3     06-23  Mg     2.0     06-23    TPro  5.2<L>  /  Alb  2.6<L>  /  TBili  1.8<H>  /  DBili  0.4<H>  /  AST  19  /  ALT  6<L>  /  AlkPhos  52  06-22    PT/INR - ( 2023 19:39 )   PT: 14.7 sec;   INR: 1.23          PTT - ( 2023 19:39 )  PTT:30.5 sec  Urinalysis Basic - ( 2023 04:40 )    Color: Yellow / Appearance: Turbid / SG: >=1.030 / pH: x  Gluc: 121 mg/dL / Ketone: Trace mg/dL  / Bili: Small / Urobili: 0.2 E.U./dL   Blood: x / Protein: 100 mg/dL / Nitrite: NEGATIVE   Leuk Esterase: NEGATIVE / RBC: > 10 /HPF / WBC < 5 /HPF   Sq Epi: x / Non Sq Epi: x / Bacteria: Present /HPF        MICROBIOLOGY:    RADIOLOGY & ADDITIONAL STUDIES:

## 2023-06-23 NOTE — PROGRESS NOTE ADULT - ASSESSMENT
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     Neuro: tordadol PRN pain (unlikely active bleeding)   CV: persistent sinus tachycardia 's. never hypotensive. suspect related to sepsis given concurrent fever.   Pulm: Hx PE: holding eliquis, no resp distress on nasal canula. Chest CT negative for PE.   GI: NGT LIWS. NPO, PPI BID, LR@100.  : failed TOV, replaced valencia for strict I&O   ID: f/u ID consult sepsis unclear origin, UA negative, Chest CT no specific consolidation, ?intraabdominal infx? on Zosyn (-), bld cx sent last night. // Ancef x 1, periop  Endo: ISS   Heme: high EBL s/p PRBC and cellsaver intraop, Hgb 6.9 with sinus tachycardia last night, s/p 1U PRBC last night. cont venofer 300mg x3d  PPx: SCDs, holding SQH.   Lines: PIV   Wounds: Phannenstiel incision  PT/OT: encourage OOB to chair   Dispo: SICU

## 2023-06-23 NOTE — PROGRESS NOTE ADULT - ASSESSMENT
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  Now admitted to SICU for persistent fevers and tachycardia post-op, r/o sepsis. Currently has ileus with absent bowel sounds and dark brown emesis. SICU concerned for enterotomy - will proceed with CT C/A/P today with IV and PO contrast to evaluate. Persistently febrile, on IV zosyn standing with one dose of vancomycin overnight. Hemodynamically stable.   Neuro: Tylenol/Toradol/Oxy q4 ATC   Lung: PE: h/o b/l PE  on eliquis, albutetol PRN, sat 100% RA   Card:  tachycardic- EKG  sinus tach due to hypovolemia,NS at 120, s/p 1L bolus @ poc+500cc, s/p 2uPRBC and 1178 cell saver intraop, labetalol 200 TID (- holding in SICU)   GI: CLD,  zofran/reglan PRN, simethicone, pantoprazole qd  : failed TOV, replaced valencia for monitoring    ID: Zosyn (-)// Ancef x 1, periop, s/p 1 dose Vancomycin overnight   Heme: 11.1> (2uPRBC+1178 cell saver intraop)>13.0>11.2>> 6.9>1uPRBC>7.9>>7.1 venofer 300mg qd stop after 3 days   VTE: Heparin q8h, SCDs recommended

## 2023-06-23 NOTE — CONSULT NOTE ADULT - ASSESSMENT
33 y/o F PMH PE and DVT 2/2 mass effect of fibroid uterus on iliac veins, PSHx myomectomy for symptoms of menorrhagia, dysmenorrhea, presenting for myomectomy. Her course is c/b post op ileus and severe sepsis with unclear source for which ID was consulted. Imaging shows fluid and gas collection in uterus, can't r/o abscess and endometriitis per read. Case discussed with SICU team and fluid seen on CT not impressive, and we would expect her to improve on broad spectrum abx for endometriitis    Recommendations:  - repeat blood cultures  - c/w zosyn 3.375g IV Q8H  - if decompensates overnight would broaden to meropenem    Team 1 will continue to follow

## 2023-06-23 NOTE — PROGRESS NOTE ADULT - SUBJECTIVE AND OBJECTIVE BOX
SUBJECTIVE: Patient seen and evaluated.        MEDICATIONS  (STANDING):  chlorhexidine 2% Cloths 1 Application(s) Topical <User Schedule>  dextrose 5%. 1000 milliLiter(s) (50 mL/Hr) IV Continuous <Continuous>  dextrose 50% Injectable 25 Gram(s) IV Push once  glucagon  Injectable 1 milliGRAM(s) IntraMuscular once  insulin lispro (ADMELOG) corrective regimen sliding scale   SubCutaneous Before meals and at bedtime  lactated ringers. 1000 milliLiter(s) (100 mL/Hr) IV Continuous <Continuous>  pantoprazole  Injectable 40 milliGRAM(s) IV Push every 12 hours  piperacillin/tazobactam IVPB.. 3.375 Gram(s) IV Intermittent every 8 hours    MEDICATIONS  (PRN):  acetaminophen     Tablet .. 1000 milliGRAM(s) Oral every 6 hours PRN Mild Pain (1 - 3)  albuterol    90 MICROgram(s) HFA Inhaler 2 Puff(s) Inhalation every 6 hours PRN Shortness of Breath and/or Wheezing  dextrose Oral Gel 15 Gram(s) Oral once PRN Blood Glucose LESS THAN 70 milliGRAM(s)/deciliter  ondansetron Injectable 8 milliGRAM(s) IV Push every 6 hours PRN Nausea and/or Vomiting  oxyCODONE    IR 5 milliGRAM(s) Oral every 4 hours PRN Moderate Pain (4 - 6)      Vital Signs Last 24 Hrs  T(C): 38.3 (23 Jun 2023 14:13), Max: 38.6 (22 Jun 2023 17:57)  T(F): 100.9 (23 Jun 2023 14:13), Max: 101.5 (22 Jun 2023 17:57)  HR: 115 (23 Jun 2023 13:00) (106 - 128)  BP: 148/80 (23 Jun 2023 13:00) (132/62 - 159/79)  BP(mean): 103 (23 Jun 2023 13:00) (88 - 124)  RR: 14 (23 Jun 2023 13:00) (14 - 19)  SpO2: 95% (23 Jun 2023 13:00) (94% - 100%)    Parameters below as of 23 Jun 2023 13:00  Patient On (Oxygen Delivery Method): room air        Physical Exam:  Neurological: AAOx3, CNII-XII intact,  strength 5/5 b/l  ENT: mucus membrane moist  Cardiovascular: RRR  Respiratory: CTA  Gastrointestinal: soft, ND, BS+, lower abd/pelvic incision clean, no bleeding, no pus. tender on palp all 4 quads.   Extremities: warm, no dependent edema  Vascular: no cyanosis/erythema  Skin: no rashes  MSK: no joint swelling.   I&O's Summary    22 Jun 2023 07:01  -  23 Jun 2023 07:00  --------------------------------------------------------  IN: 4213 mL / OUT: 2778 mL / NET: 1435 mL    23 Jun 2023 07:01  -  23 Jun 2023 14:59  --------------------------------------------------------  IN: 1020 mL / OUT: 450 mL / NET: 570 mL        LABS:                        7.4    29.06 )-----------( 143      ( 23 Jun 2023 04:40 )             22.7     06-23    134<L>  |  100  |  7   ----------------------------<  121<H>  3.8   |  27  |  0.57    Ca    8.1<L>      23 Jun 2023 04:40  Phos  2.3     06-23  Mg     2.0     06-23    TPro  5.2<L>  /  Alb  2.6<L>  /  TBili  1.8<H>  /  DBili  0.4<H>  /  AST  19  /  ALT  6<L>  /  AlkPhos  52  06-22    PT/INR - ( 21 Jun 2023 19:39 )   PT: 14.7 sec;   INR: 1.23          PTT - ( 21 Jun 2023 19:39 )  PTT:30.5 sec  Urinalysis Basic - ( 23 Jun 2023 04:40 )    Color: Yellow / Appearance: Turbid / SG: >=1.030 / pH: x  Gluc: 121 mg/dL / Ketone: Trace mg/dL  / Bili: Small / Urobili: 0.2 E.U./dL   Blood: x / Protein: 100 mg/dL / Nitrite: NEGATIVE   Leuk Esterase: NEGATIVE / RBC: > 10 /HPF / WBC < 5 /HPF   Sq Epi: x / Non Sq Epi: x / Bacteria: Present /HPF      CAPILLARY BLOOD GLUCOSE      POCT Blood Glucose.: 112 mg/dL (23 Jun 2023 12:06)  POCT Blood Glucose.: 130 mg/dL (23 Jun 2023 06:38)  POCT Blood Glucose.: 89 mg/dL (22 Jun 2023 21:08)  POCT Blood Glucose.: 104 mg/dL (22 Jun 2023 16:59)    LIVER FUNCTIONS - ( 22 Jun 2023 05:25 )  Alb: 2.6 g/dL / Pro: 5.2 g/dL / ALK PHOS: 52 U/L / ALT: 6 U/L / AST: 19 U/L / GGT: x             RADIOLOGY & ADDITIONAL STUDIES:

## 2023-06-23 NOTE — CONSULT NOTE ADULT - ATTENDING COMMENTS
32F h/o CVT/PE 2/2 mass effect of fibroid uterus on illiac veins s/p myomectomy p/w elective myomectoy. Patient underwent abdominal myomectomy on 6/20 and was found to have 24wk size, 50 fibroids removed from uterus.  POst-op course c/b RRT for near syncope episode, Hgb drop from 13 to 6.9 requiring RBC transfusion, ileus and severe sepsis and transferred to SICU on 6/21. She became febrile to 101, tachy, BCx ngtd, UA neg, CXR neg. CT a/p 6/23 showed possible endometritis/possible abscess.  _er GYN, unimpressive. WBC uptrending now to 29. She has been on zosyn since. ID was consulted for rec. Patient actively vomiting billous fluid during encounter, uncomfortable. Unclear cause of severe sepsis.  Cont zosyn 3.375g IV q8h. If patient becomes unstable, then switch to meropenem 1g IV q8h and can also add vanco.  f/u BCx. Consider drainage of fluid collection of uterus.      Team 1 will follow you.  Case d/w primary team.    Laverne Fontanez MD, MS  Infectious Disease attending  work cell [phone]   For any questions during evening/weekend/holiday, please page ID on call
History of recent PE admitted for myomectomy now with acute respiratory failure, sepsis and RV strain. Avoid fluids; fluid status should be net even or negative. Workup for sepsis is indicated. Anticoagulation as per above. Rest of plan as per above.

## 2023-06-23 NOTE — PROGRESS NOTE ADULT - SUBJECTIVE AND OBJECTIVE BOX
Pt seen and examined at bedside with Dr. Telles. Patient had episode of dark brown emesis overnight, for which NGT was placed. NG currently draining dark brown fluid.     T(F): 101.3 (06-23-23 @ 05:02), Max: 101.5 (06-22-23 @ 17:57)  HR: 121 (06-23-23 @ 04:00) (109 - 138)  BP: 159/79 (06-23-23 @ 04:00) (132/62 - 159/79)  RR: 19 (06-23-23 @ 04:00) (16 - 22)  SpO2: 97% (06-23-23 @ 04:00) (96% - 100%)  Wt(kg): --  I&O's Summary    21 Jun 2023 07:01  -  22 Jun 2023 07:00  --------------------------------------------------------  IN: 3173 mL / OUT: 3745 mL / NET: -572 mL    22 Jun 2023 07:01  -  23 Jun 2023 06:57  --------------------------------------------------------  IN: 3193 mL / OUT: 2188 mL / NET: 1005 mL        MEDICATIONS  (STANDING):  chlorhexidine 2% Cloths 1 Application(s) Topical <User Schedule>  dextrose 5%. 1000 milliLiter(s) (50 mL/Hr) IV Continuous <Continuous>  dextrose 50% Injectable 25 Gram(s) IV Push once  glucagon  Injectable 1 milliGRAM(s) IntraMuscular once  insulin lispro (ADMELOG) corrective regimen sliding scale   SubCutaneous Before meals and at bedtime  iron sucrose IVPB 300 milliGRAM(s) IV Intermittent every 24 hours  pantoprazole  Injectable 40 milliGRAM(s) IV Push every 12 hours  piperacillin/tazobactam IVPB.. 3.375 Gram(s) IV Intermittent every 8 hours  simethicone 80 milliGRAM(s) Chew every 6 hours  sodium chloride 0.9%. 1000 milliLiter(s) (120 mL/Hr) IV Continuous <Continuous>    MEDICATIONS  (PRN):  acetaminophen     Tablet .. 1000 milliGRAM(s) Oral every 6 hours PRN Mild Pain (1 - 3)  albuterol    90 MICROgram(s) HFA Inhaler 2 Puff(s) Inhalation every 6 hours PRN Shortness of Breath and/or Wheezing  dextrose Oral Gel 15 Gram(s) Oral once PRN Blood Glucose LESS THAN 70 milliGRAM(s)/deciliter  metoclopramide Injectable 10 milliGRAM(s) IV Push every 6 hours PRN nausea  ondansetron Injectable 8 milliGRAM(s) IV Push every 6 hours PRN Nausea and/or Vomiting  oxyCODONE    IR 5 milliGRAM(s) Oral every 4 hours PRN Moderate Pain (4 - 6)      Physical Exam:  Constitutional: NAD  Pulmonary: no increased work of breathing  Abdomen: Soft, appropriately tender for post op state, no rebound, guarding, or notable distension  Ext: No swelling or calf tenderness    LABS:                        7.4    29.06 )-----------( 143      ( 23 Jun 2023 04:40 )             22.7     06-23    134<L>  |  100  |  7   ----------------------------<  121<H>  3.8   |  27  |  0.57    Ca    8.1<L>      23 Jun 2023 04:40  Phos  2.3     06-23  Mg     2.0     06-23    TPro  5.2<L>  /  Alb  2.6<L>  /  TBili  1.8<H>  /  DBili  0.4<H>  /  AST  19  /  ALT  6<L>  /  AlkPhos  52  06-22    PT/INR - ( 21 Jun 2023 19:39 )   PT: 14.7 sec;   INR: 1.23          PTT - ( 21 Jun 2023 19:39 )  PTT:30.5 sec  Urinalysis Basic - ( 23 Jun 2023 04:40 )    Color: Yellow / Appearance: Turbid / SG: >=1.030 / pH: x  Gluc: 121 mg/dL / Ketone: Trace mg/dL  / Bili: Small / Urobili: 0.2 E.U./dL   Blood: x / Protein: 100 mg/dL / Nitrite: NEGATIVE   Leuk Esterase: NEGATIVE / RBC: > 10 /HPF / WBC < 5 /HPF   Sq Epi: x / Non Sq Epi: x / Bacteria: Present /HPF        RADIOLOGY & ADDITIONAL TESTS:

## 2023-06-24 LAB
ANION GAP SERPL CALC-SCNC: 6 MMOL/L — SIGNIFICANT CHANGE UP (ref 5–17)
APTT BLD: 28.1 SEC — SIGNIFICANT CHANGE UP (ref 27.5–35.5)
APTT BLD: 40.2 SEC — HIGH (ref 27.5–35.5)
BASOPHILS # BLD AUTO: 0.06 K/UL — SIGNIFICANT CHANGE UP (ref 0–0.2)
BASOPHILS NFR BLD AUTO: 0.3 % — SIGNIFICANT CHANGE UP (ref 0–2)
BUN SERPL-MCNC: 6 MG/DL — LOW (ref 7–23)
CALCIUM SERPL-MCNC: 8.1 MG/DL — LOW (ref 8.4–10.5)
CHLORIDE SERPL-SCNC: 100 MMOL/L — SIGNIFICANT CHANGE UP (ref 96–108)
CO2 SERPL-SCNC: 28 MMOL/L — SIGNIFICANT CHANGE UP (ref 22–31)
CREAT SERPL-MCNC: 0.55 MG/DL — SIGNIFICANT CHANGE UP (ref 0.5–1.3)
EGFR: 125 ML/MIN/1.73M2 — SIGNIFICANT CHANGE UP
EOSINOPHIL # BLD AUTO: 0.05 K/UL — SIGNIFICANT CHANGE UP (ref 0–0.5)
EOSINOPHIL NFR BLD AUTO: 0.2 % — SIGNIFICANT CHANGE UP (ref 0–6)
GLUCOSE BLDC GLUCOMTR-MCNC: 102 MG/DL — HIGH (ref 70–99)
GLUCOSE BLDC GLUCOMTR-MCNC: 103 MG/DL — HIGH (ref 70–99)
GLUCOSE BLDC GLUCOMTR-MCNC: 94 MG/DL — SIGNIFICANT CHANGE UP (ref 70–99)
GLUCOSE BLDC GLUCOMTR-MCNC: 95 MG/DL — SIGNIFICANT CHANGE UP (ref 70–99)
GLUCOSE BLDC GLUCOMTR-MCNC: 99 MG/DL — SIGNIFICANT CHANGE UP (ref 70–99)
GLUCOSE SERPL-MCNC: 97 MG/DL — SIGNIFICANT CHANGE UP (ref 70–99)
HCT VFR BLD CALC: 22.2 % — LOW (ref 34.5–45)
HCT VFR BLD CALC: 22.4 % — LOW (ref 34.5–45)
HGB BLD-MCNC: 7.3 G/DL — LOW (ref 11.5–15.5)
HGB BLD-MCNC: 7.4 G/DL — LOW (ref 11.5–15.5)
IMM GRANULOCYTES NFR BLD AUTO: 2.8 % — HIGH (ref 0–0.9)
INR BLD: 1.11 — SIGNIFICANT CHANGE UP (ref 0.88–1.16)
LYMPHOCYTES # BLD AUTO: 1.99 K/UL — SIGNIFICANT CHANGE UP (ref 1–3.3)
LYMPHOCYTES # BLD AUTO: 9.4 % — LOW (ref 13–44)
MAGNESIUM SERPL-MCNC: 2 MG/DL — SIGNIFICANT CHANGE UP (ref 1.6–2.6)
MCHC RBC-ENTMCNC: 30.3 PG — SIGNIFICANT CHANGE UP (ref 27–34)
MCHC RBC-ENTMCNC: 31 PG — SIGNIFICANT CHANGE UP (ref 27–34)
MCHC RBC-ENTMCNC: 32.6 GM/DL — SIGNIFICANT CHANGE UP (ref 32–36)
MCHC RBC-ENTMCNC: 33.3 GM/DL — SIGNIFICANT CHANGE UP (ref 32–36)
MCV RBC AUTO: 92.9 FL — SIGNIFICANT CHANGE UP (ref 80–100)
MCV RBC AUTO: 92.9 FL — SIGNIFICANT CHANGE UP (ref 80–100)
MONOCYTES # BLD AUTO: 1.15 K/UL — HIGH (ref 0–0.9)
MONOCYTES NFR BLD AUTO: 5.4 % — SIGNIFICANT CHANGE UP (ref 2–14)
NEUTROPHILS # BLD AUTO: 17.29 K/UL — HIGH (ref 1.8–7.4)
NEUTROPHILS NFR BLD AUTO: 81.9 % — HIGH (ref 43–77)
NRBC # BLD: 0 /100 WBCS — SIGNIFICANT CHANGE UP (ref 0–0)
NRBC # BLD: 0 /100 WBCS — SIGNIFICANT CHANGE UP (ref 0–0)
PHOSPHATE SERPL-MCNC: 2.2 MG/DL — LOW (ref 2.5–4.5)
PLATELET # BLD AUTO: 148 K/UL — LOW (ref 150–400)
PLATELET # BLD AUTO: 161 K/UL — SIGNIFICANT CHANGE UP (ref 150–400)
POTASSIUM SERPL-MCNC: 3.5 MMOL/L — SIGNIFICANT CHANGE UP (ref 3.5–5.3)
POTASSIUM SERPL-SCNC: 3.5 MMOL/L — SIGNIFICANT CHANGE UP (ref 3.5–5.3)
PROCALCITONIN SERPL-MCNC: 0.5 NG/ML — HIGH (ref 0.02–0.1)
PROTHROM AB SERPL-ACNC: 13.2 SEC — SIGNIFICANT CHANGE UP (ref 10.5–13.4)
RBC # BLD: 2.39 M/UL — LOW (ref 3.8–5.2)
RBC # BLD: 2.41 M/UL — LOW (ref 3.8–5.2)
RBC # FLD: 17.7 % — HIGH (ref 10.3–14.5)
RBC # FLD: 17.8 % — HIGH (ref 10.3–14.5)
SODIUM SERPL-SCNC: 134 MMOL/L — LOW (ref 135–145)
T3 SERPL-MCNC: 59 NG/DL — LOW (ref 80–200)
T3FREE SERPL-MCNC: 1.36 PG/ML — LOW (ref 2–4.4)
T4 AB SER-ACNC: 5.26 UG/DL — SIGNIFICANT CHANGE UP (ref 4.5–11.7)
T4 FREE SERPL-MCNC: 0.97 NG/DL — SIGNIFICANT CHANGE UP (ref 0.93–1.7)
TSH SERPL-MCNC: 1.31 UIU/ML — SIGNIFICANT CHANGE UP (ref 0.27–4.2)
WBC # BLD: 21.13 K/UL — HIGH (ref 3.8–10.5)
WBC # BLD: 24.08 K/UL — HIGH (ref 3.8–10.5)
WBC # FLD AUTO: 21.13 K/UL — HIGH (ref 3.8–10.5)
WBC # FLD AUTO: 24.08 K/UL — HIGH (ref 3.8–10.5)

## 2023-06-24 PROCEDURE — 99232 SBSQ HOSP IP/OBS MODERATE 35: CPT

## 2023-06-24 PROCEDURE — 99233 SBSQ HOSP IP/OBS HIGH 50: CPT | Mod: GC

## 2023-06-24 PROCEDURE — 74018 RADEX ABDOMEN 1 VIEW: CPT | Mod: 26

## 2023-06-24 RX ORDER — POTASSIUM PHOSPHATE, MONOBASIC POTASSIUM PHOSPHATE, DIBASIC 236; 224 MG/ML; MG/ML
30 INJECTION, SOLUTION INTRAVENOUS ONCE
Refills: 0 | Status: COMPLETED | OUTPATIENT
Start: 2023-06-24 | End: 2023-06-24

## 2023-06-24 RX ORDER — METOCLOPRAMIDE HCL 10 MG
10 TABLET ORAL ONCE
Refills: 0 | Status: COMPLETED | OUTPATIENT
Start: 2023-06-24 | End: 2023-06-24

## 2023-06-24 RX ORDER — ACETAMINOPHEN 500 MG
1000 TABLET ORAL ONCE
Refills: 0 | Status: COMPLETED | OUTPATIENT
Start: 2023-06-24 | End: 2023-06-24

## 2023-06-24 RX ORDER — PANTOPRAZOLE SODIUM 20 MG/1
40 TABLET, DELAYED RELEASE ORAL DAILY
Refills: 0 | Status: DISCONTINUED | OUTPATIENT
Start: 2023-06-24 | End: 2023-06-27

## 2023-06-24 RX ORDER — HEPARIN SODIUM 5000 [USP'U]/ML
1900 INJECTION INTRAVENOUS; SUBCUTANEOUS
Qty: 25000 | Refills: 0 | Status: DISCONTINUED | OUTPATIENT
Start: 2023-06-24 | End: 2023-06-25

## 2023-06-24 RX ORDER — METOCLOPRAMIDE HCL 10 MG
5 TABLET ORAL ONCE
Refills: 0 | Status: COMPLETED | OUTPATIENT
Start: 2023-06-24 | End: 2023-06-24

## 2023-06-24 RX ADMIN — CHLORHEXIDINE GLUCONATE 1 APPLICATION(S): 213 SOLUTION TOPICAL at 07:02

## 2023-06-24 RX ADMIN — SODIUM CHLORIDE 100 MILLILITER(S): 9 INJECTION, SOLUTION INTRAVENOUS at 10:39

## 2023-06-24 RX ADMIN — HYDROMORPHONE HYDROCHLORIDE 0.5 MILLIGRAM(S): 2 INJECTION INTRAMUSCULAR; INTRAVENOUS; SUBCUTANEOUS at 22:11

## 2023-06-24 RX ADMIN — Medication 1000 MILLIGRAM(S): at 09:07

## 2023-06-24 RX ADMIN — Medication 1000 MILLIGRAM(S): at 22:00

## 2023-06-24 RX ADMIN — ONDANSETRON 8 MILLIGRAM(S): 8 TABLET, FILM COATED ORAL at 08:58

## 2023-06-24 RX ADMIN — PANTOPRAZOLE SODIUM 40 MILLIGRAM(S): 20 TABLET, DELAYED RELEASE ORAL at 06:43

## 2023-06-24 RX ADMIN — Medication 1000 MILLIGRAM(S): at 02:00

## 2023-06-24 RX ADMIN — ONDANSETRON 8 MILLIGRAM(S): 8 TABLET, FILM COATED ORAL at 18:32

## 2023-06-24 RX ADMIN — Medication 10 MILLIGRAM(S): at 12:10

## 2023-06-24 RX ADMIN — Medication 400 MILLIGRAM(S): at 01:31

## 2023-06-24 RX ADMIN — PIPERACILLIN AND TAZOBACTAM 25 GRAM(S): 4; .5 INJECTION, POWDER, LYOPHILIZED, FOR SOLUTION INTRAVENOUS at 14:27

## 2023-06-24 RX ADMIN — POTASSIUM PHOSPHATE, MONOBASIC POTASSIUM PHOSPHATE, DIBASIC 83.33 MILLIMOLE(S): 236; 224 INJECTION, SOLUTION INTRAVENOUS at 06:43

## 2023-06-24 RX ADMIN — Medication 400 MILLIGRAM(S): at 08:36

## 2023-06-24 RX ADMIN — HEPARIN SODIUM 20 UNIT(S)/HR: 5000 INJECTION INTRAVENOUS; SUBCUTANEOUS at 18:32

## 2023-06-24 RX ADMIN — Medication 1000 MILLIGRAM(S): at 14:15

## 2023-06-24 RX ADMIN — PANTOPRAZOLE SODIUM 40 MILLIGRAM(S): 20 TABLET, DELAYED RELEASE ORAL at 14:41

## 2023-06-24 RX ADMIN — HEPARIN SODIUM 5000 UNIT(S): 5000 INJECTION INTRAVENOUS; SUBCUTANEOUS at 02:38

## 2023-06-24 RX ADMIN — HEPARIN SODIUM 19 UNIT(S)/HR: 5000 INJECTION INTRAVENOUS; SUBCUTANEOUS at 10:39

## 2023-06-24 RX ADMIN — ONDANSETRON 8 MILLIGRAM(S): 8 TABLET, FILM COATED ORAL at 00:50

## 2023-06-24 RX ADMIN — Medication 400 MILLIGRAM(S): at 21:30

## 2023-06-24 RX ADMIN — Medication 400 MILLIGRAM(S): at 14:27

## 2023-06-24 RX ADMIN — PIPERACILLIN AND TAZOBACTAM 25 GRAM(S): 4; .5 INJECTION, POWDER, LYOPHILIZED, FOR SOLUTION INTRAVENOUS at 21:12

## 2023-06-24 RX ADMIN — PIPERACILLIN AND TAZOBACTAM 25 GRAM(S): 4; .5 INJECTION, POWDER, LYOPHILIZED, FOR SOLUTION INTRAVENOUS at 06:43

## 2023-06-24 RX ADMIN — HYDROMORPHONE HYDROCHLORIDE 0.5 MILLIGRAM(S): 2 INJECTION INTRAMUSCULAR; INTRAVENOUS; SUBCUTANEOUS at 21:56

## 2023-06-24 RX ADMIN — SODIUM CHLORIDE 100 MILLILITER(S): 9 INJECTION, SOLUTION INTRAVENOUS at 21:12

## 2023-06-24 RX ADMIN — Medication 10 MILLIGRAM(S): at 14:41

## 2023-06-24 NOTE — PROGRESS NOTE ADULT - SUBJECTIVE AND OBJECTIVE BOX
INFECTIOUS DISEASES CONSULT FOLLOW-UP NOTE    INTERVAL HPI/OVERNIGHT EVENTS:  Febrile to 101  patient had emesis this AM  resting comfortably     ROS:   unable to obtain       ANTIBIOTICS/RELEVANT:    MEDICATIONS  (STANDING):  bisacodyl Suppository 10 milliGRAM(s) Rectal daily  chlorhexidine 2% Cloths 1 Application(s) Topical <User Schedule>  dextrose 5%. 1000 milliLiter(s) (50 mL/Hr) IV Continuous <Continuous>  dextrose 50% Injectable 25 Gram(s) IV Push once  glucagon  Injectable 1 milliGRAM(s) IntraMuscular once  heparin  Infusion 1900 Unit(s)/Hr (19 mL/Hr) IV Continuous <Continuous>  insulin lispro (ADMELOG) corrective regimen sliding scale   SubCutaneous Before meals and at bedtime  lactated ringers. 1000 milliLiter(s) (100 mL/Hr) IV Continuous <Continuous>  pantoprazole  Injectable 40 milliGRAM(s) IV Push daily  piperacillin/tazobactam IVPB.. 3.375 Gram(s) IV Intermittent every 8 hours    MEDICATIONS  (PRN):  acetaminophen     Tablet .. 1000 milliGRAM(s) Oral every 6 hours PRN Mild Pain (1 - 3)  albuterol    90 MICROgram(s) HFA Inhaler 2 Puff(s) Inhalation every 6 hours PRN Shortness of Breath and/or Wheezing  dextrose Oral Gel 15 Gram(s) Oral once PRN Blood Glucose LESS THAN 70 milliGRAM(s)/deciliter  HYDROmorphone  Injectable 0.2 milliGRAM(s) IV Push every 3 hours PRN Moderate Pain (4 - 6)  HYDROmorphone  Injectable 0.5 milliGRAM(s) IV Push every 3 hours PRN Severe Pain (7 - 10)  ondansetron Injectable 8 milliGRAM(s) IV Push every 6 hours PRN Nausea and/or Vomiting  oxyCODONE    IR 5 milliGRAM(s) Oral every 4 hours PRN Moderate Pain (4 - 6)        Vital Signs Last 24 Hrs  T(C): 38.2 (24 Jun 2023 14:00), Max: 38.3 (23 Jun 2023 17:49)  T(F): 100.7 (24 Jun 2023 14:00), Max: 101 (23 Jun 2023 17:49)  HR: 101 (24 Jun 2023 14:00) (81 - 108)  BP: 159/86 (24 Jun 2023 14:00) (140/82 - 172/94)  BP(mean): 114 (24 Jun 2023 14:00) (99 - 127)  RR: 19 (24 Jun 2023 14:00) (16 - 20)  SpO2: 95% (24 Jun 2023 14:00) (92% - 98%)    Parameters below as of 24 Jun 2023 14:00  Patient On (Oxygen Delivery Method): room air        06-23-23 @ 07:01  -  06-24-23 @ 07:00  --------------------------------------------------------  IN: 3967 mL / OUT: 2962 mL / NET: 1005 mL    06-24-23 @ 07:01  -  06-24-23 @ 14:41  --------------------------------------------------------  IN: 1203 mL / OUT: 725 mL / NET: 478 mL      PHYSICAL EXAM:  Constitutional: NAD  Eyes: the sclera and conjunctiva were normal.   ENT: the ears and nose were normal in appearance.   Neck: the appearance of the neck was normal and the neck was supple.   Pulmonary: no respiratory distress and lungs were clear to auscultation bilaterally.   Heart: heart rate was normal and rhythm regular, normal S1 and S2  Abdomen: hypoactive BS        LABS:                        7.4    21.13 )-----------( 161      ( 24 Jun 2023 05:15 )             22.2     06-24    134<L>  |  100  |  6<L>  ----------------------------<  97  3.5   |  28  |  0.55    Ca    8.1<L>      24 Jun 2023 05:15  Phos  2.2     06-24  Mg     2.0     06-24      PT/INR - ( 24 Jun 2023 09:40 )   PT: 13.2 sec;   INR: 1.11          PTT - ( 24 Jun 2023 09:40 )  PTT:28.1 sec  Urinalysis Basic - ( 24 Jun 2023 05:15 )    Color: x / Appearance: x / SG: x / pH: x  Gluc: 97 mg/dL / Ketone: x  / Bili: x / Urobili: x   Blood: x / Protein: x / Nitrite: x   Leuk Esterase: x / RBC: x / WBC x   Sq Epi: x / Non Sq Epi: x / Bacteria: x        MICROBIOLOGY:      RADIOLOGY & ADDITIONAL STUDIES:  Reviewed

## 2023-06-24 NOTE — PROGRESS NOTE ADULT - SUBJECTIVE AND OBJECTIVE BOX
Interval Events:  UO low therefore given 500cc bolus. C/o Nausea NGT flushed and Zofran.     Patient seen and examined at bedside.      Allergies    No Known Allergies    Intolerances        Vital Signs Last 24 Hrs  T(C): 37.9 (2023 09:02), Max: 38.3 (2023 14:13)  T(F): 100.2 (2023 09:02), Max: 101 (2023 17:49)  HR: 91 (2023 12:00) (81 - 108)  BP: 157/90 (2023 12:00) (140/82 - 172/94)  BP(mean): 118 (2023 12:00) (99 - 127)  RR: 18 (2023 12:00) (16 - 20)  SpO2: 96% (2023 12:00) (92% - 98%)    Parameters below as of 2023 12:00  Patient On (Oxygen Delivery Method): room air         @ 07:  -   @ 07:00  --------------------------------------------------------  IN: 3967 mL / OUT: 2962 mL / NET: 1005 mL     @ 07: @ 13:40  --------------------------------------------------------  IN: 765 mL / OUT: 600 mL / NET: 165 mL       @ 07: @ 07:00  --------------------------------------------------------  IN: 3967 mL / OUT: 2962 mL / NET: 1005 mL     @ 07: @ 13:40  --------------------------------------------------------  IN: 765 mL / OUT: 600 mL / NET: 165 mL        Physical Exam:     Gen: NAD well nourished  Neuro: A&OX3 No deficits  CV:RRR Reg s1s2 noM  Pulm: CTA b/l No w/r/r  Abd: Soft NT ND + BS  Ext: No C/C/E   Vasc: + DP b/l   Skin: no rashes noted  MSK: No joint swelling  Psych: No signs of anxiety or depression      LABS:      CBC Full  -  ( 2023 05:15 )  WBC Count : 21.13 K/uL  RBC Count : 2.39 M/uL  Hemoglobin : 7.4 g/dL  Hematocrit : 22.2 %  Platelet Count - Automated : 161 K/uL  Mean Cell Volume : 92.9 fl  Mean Cell Hemoglobin : 31.0 pg  Mean Cell Hemoglobin Concentration : 33.3 gm/dL  Auto Neutrophil # : 17.29 K/uL  Auto Lymphocyte # : 1.99 K/uL  Auto Monocyte # : 1.15 K/uL  Auto Eosinophil # : 0.05 K/uL  Auto Basophil # : 0.06 K/uL  Auto Neutrophil % : 81.9 %  Auto Lymphocyte % : 9.4 %  Auto Monocyte % : 5.4 %  Auto Eosinophil % : 0.2 %  Auto Basophil % : 0.3 %    06-24    134<L>  |  100  |  6<L>  ----------------------------<  97  3.5   |  28  |  0.55    Ca    8.1<L>      2023 05:15  Phos  2.2     06-24  Mg     2.0     06-24      PT/INR - ( 2023 09:40 )   PT: 13.2 sec;   INR: 1.11          PTT - ( 2023 09:40 )  PTT:28.1 sec      Urinalysis Basic - ( 2023 05:15 )    Color: x / Appearance: x / SG: x / pH: x  Gluc: 97 mg/dL / Ketone: x  / Bili: x / Urobili: x   Blood: x / Protein: x / Nitrite: x   Leuk Esterase: x / RBC: x / WBC x   Sq Epi: x / Non Sq Epi: x / Bacteria: x              RADIOLOGY & ADDITIONAL STUDIES (The following images were personally reviewed):          A/p: 33 yo  presenting for abdominal myomectomy for symptoms of menorrhagia, dysmenorrhea, and b/l DVT/PE thought to be secondary to mass effect of fibroid uterus on iliac veins on xarelto at home, admitted on  for abd myomectomy of 18 wks size uterus, 50 fibroids, EBL 2L, , given 2u pRBC, 1179 cellsaver, 3L IVF, post-op persistent sinus tachycardia, transferred to SICU  night for further eval.     Neuro: Oxycodone prn  Dilaudid prn Zofran Tylenol  CV: persistent sinus tachycardia 's. never hypotensive. suspect related to sepsis given concurrent fever.   Pulm: Hx PE: holding eliquis, no resp distress on nasal canula. Chest CT negative for PE. Cont Heparin   GI: NGT LIWS. NPO, PPI QD, LR@100. Ileus: Cont Dulcolax suppository  : failed TOV in past Will attempt again today if OOB   ID: f/u ID consult sepsis unclear origin, UA negative, Chest CT no specific consolidation, ?intraabdominal infx? on Zosyn (-), : bld cx NGTD// Ancef x 1, periop  Endo: ISS   Heme: high EBL s/p PRBC and cellsaver intraop, Hgb 6.9 with sinus tachycardia last night, s/p 1U PRBC last night. Anemia: cont venofer 300mg x3d  PPx: SCDs, Restart AC with Heparin gtt   Lines: PIV   Wounds: Phannenstiel incision  PT/OT: encourage OOB to chair PT ordered.   Dispo: SICU

## 2023-06-24 NOTE — PROGRESS NOTE ADULT - NS ATTEND AMEND GEN_ALL_CORE FT
FANNY De Souza has acted as my scribe. Pt appears to be defervescing. Hb stable. Hr down and temps down. Resume anticoagulation per surgery.

## 2023-06-24 NOTE — PROGRESS NOTE ADULT - SUBJECTIVE AND OBJECTIVE BOX
Pt seen and examined at bedside. Pt states mild abdominal pain. Pt is not ambulating, NPO, no flatus, urinating adequately with a valencia. She had one episode of emesis around the NG tube this morning.   Pt denies fever, chills, chest pain, SOB, lightheadedness, dizziness.      T(F): 99.9 (06-24-23 @ 05:52), Max: 101 (06-23-23 @ 17:49)  HR: 93 (06-24-23 @ 08:00) (81 - 115)  BP: 155/94 (06-24-23 @ 08:00) (141/76 - 172/94)  RR: 16 (06-24-23 @ 08:00) (14 - 20)  SpO2: 95% (06-24-23 @ 08:00) (92% - 98%)  I&O's Summary    23 Jun 2023 07:01  -  24 Jun 2023 07:00  --------------------------------------------------------  IN: 3967 mL / OUT: 2962 mL / NET: 1005 mL    24 Jun 2023 07:01  -  24 Jun 2023 08:42  --------------------------------------------------------  IN: 108 mL / OUT: 0 mL / NET: 108 mL        MEDICATIONS  (STANDING):  chlorhexidine 2% Cloths 1 Application(s) Topical <User Schedule>  dextrose 5%. 1000 milliLiter(s) (50 mL/Hr) IV Continuous <Continuous>  dextrose 50% Injectable 25 Gram(s) IV Push once  glucagon  Injectable 1 milliGRAM(s) IntraMuscular once  heparin   Injectable 5000 Unit(s) SubCutaneous every 8 hours  insulin lispro (ADMELOG) corrective regimen sliding scale   SubCutaneous Before meals and at bedtime  lactated ringers. 1000 milliLiter(s) (100 mL/Hr) IV Continuous <Continuous>  pantoprazole  Injectable 40 milliGRAM(s) IV Push every 12 hours  piperacillin/tazobactam IVPB.. 3.375 Gram(s) IV Intermittent every 8 hours    MEDICATIONS  (PRN):  acetaminophen     Tablet .. 1000 milliGRAM(s) Oral every 6 hours PRN Mild Pain (1 - 3)  albuterol    90 MICROgram(s) HFA Inhaler 2 Puff(s) Inhalation every 6 hours PRN Shortness of Breath and/or Wheezing  dextrose Oral Gel 15 Gram(s) Oral once PRN Blood Glucose LESS THAN 70 milliGRAM(s)/deciliter  HYDROmorphone  Injectable 0.5 milliGRAM(s) IV Push every 3 hours PRN Severe Pain (7 - 10)  HYDROmorphone  Injectable 0.2 milliGRAM(s) IV Push every 3 hours PRN Moderate Pain (4 - 6)  ondansetron Injectable 8 milliGRAM(s) IV Push every 6 hours PRN Nausea and/or Vomiting  oxyCODONE    IR 5 milliGRAM(s) Oral every 4 hours PRN Moderate Pain (4 - 6)      Physical Exam:  Constitutional: NAD  Pulmonary: comfortable on RA  Abdomen: incision site clean, dry, intact. Soft, mildly tender,  distended, no guarding, no rebound, decreased bowel sounds  Extremities: no lower extremity edema or calve tenderness.     LABS:                        7.4    21.13 )-----------( 161      ( 24 Jun 2023 05:15 )             22.2     06-24    134<L>  |  100  |  6<L>  ----------------------------<  97  3.5   |  28  |  0.55    Ca    8.1<L>      24 Jun 2023 05:15  Phos  2.2     06-24  Mg     2.0     06-24        Urinalysis Basic - ( 24 Jun 2023 05:15 )    Color: x / Appearance: x / SG: x / pH: x  Gluc: 97 mg/dL / Ketone: x  / Bili: x / Urobili: x   Blood: x / Protein: x / Nitrite: x   Leuk Esterase: x / RBC: x / WBC x   Sq Epi: x / Non Sq Epi: x / Bacteria: x        RADIOLOGY & ADDITIONAL TESTS:

## 2023-06-24 NOTE — PROGRESS NOTE ADULT - ASSESSMENT
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 Course c/b severe sepsis and ileus, suspect GI/GYN source.  leukocytosis downtrending on empiric abx for now.    - cont zosyn 3.375g IV q8h over 4h  - f/u BCx  - if patient decompensate, then broaden to mayra 1g IV q8h     Team 1 will follow you.  Case d/w primary team.    Laverne Fontanez MD, MS  Infectious Disease attending  work cell [phone]   For any questions during evening/weekend/holiday, please page ID on call

## 2023-06-24 NOTE — PROGRESS NOTE ADULT - ASSESSMENT
31 yo POD4 s/p abdominal myomectomy of 50 fibroids. she has hx of DVT/PE thought to be secondary to mass effect of fibroid uterus on iliac veins. No active PE/DVT. Now admitted to SICU for persistent fevers and tachycardia post-op. Currently has ileus with absent bowel sounds . SICU was concerned for possible enterotomy - CT C/A/P today with IV and PO contrast  didn't showed extravasation of contrast. Persistently febrile, on IV zosyn standing with one dose of vancomycin. Hemodynamically stable.   Neuro: Tylenol/Oxy q4 ATC   Lung: PE: h/o b/l PE 01/23 on eliquis, albutetol PRN, sat 100% RA   Card:  tachycardic- EKG 6/20 sinus tach    GI: NPO, NG (6/23-)  zofran/reglan PRN, simethicone, pantoprazole bid   : valencia   ID: Zosyn (06/21-)// s/p 1x Vanc 6/22, Ancef x 1, periop   Heme: 11.1> (2uPRBC+1178 cell saver intraop)>13.0>11.2>> 6.9>1uPRBC>7.9>>7.4>1uPRBC >7.3>7.4     s/p venofer 300mg 6/21-23   VTE: Heparin q8h, SCDs recommended      Care per primary team. Gyn continue to follow

## 2023-06-24 NOTE — CHART NOTE - NSCHARTNOTEFT_GEN_A_CORE
To Whom it may concern,     Please be advised Yosvany Kemp was present at [facility] on 6/24/23 to accompany the above family member. To whom it may concern,     Please be advised Yosvany Kemp was present at [facility] on 6/24/23 to accompany the above family member.

## 2023-06-25 LAB
ANION GAP SERPL CALC-SCNC: 9 MMOL/L — SIGNIFICANT CHANGE UP (ref 5–17)
APTT BLD: 56.1 SEC — HIGH (ref 27.5–35.5)
APTT BLD: 65.8 SEC — HIGH (ref 27.5–35.5)
BLD GP AB SCN SERPL QL: NEGATIVE — SIGNIFICANT CHANGE UP
BUN SERPL-MCNC: 5 MG/DL — LOW (ref 7–23)
CALCIUM SERPL-MCNC: 7.9 MG/DL — LOW (ref 8.4–10.5)
CHLORIDE SERPL-SCNC: 103 MMOL/L — SIGNIFICANT CHANGE UP (ref 96–108)
CO2 SERPL-SCNC: 26 MMOL/L — SIGNIFICANT CHANGE UP (ref 22–31)
CREAT SERPL-MCNC: 0.56 MG/DL — SIGNIFICANT CHANGE UP (ref 0.5–1.3)
EGFR: 124 ML/MIN/1.73M2 — SIGNIFICANT CHANGE UP
GLUCOSE BLDC GLUCOMTR-MCNC: 101 MG/DL — HIGH (ref 70–99)
GLUCOSE BLDC GLUCOMTR-MCNC: 103 MG/DL — HIGH (ref 70–99)
GLUCOSE SERPL-MCNC: 97 MG/DL — SIGNIFICANT CHANGE UP (ref 70–99)
HCT VFR BLD CALC: 22.9 % — LOW (ref 34.5–45)
HGB BLD-MCNC: 7.2 G/DL — LOW (ref 11.5–15.5)
INR BLD: 1.1 — SIGNIFICANT CHANGE UP (ref 0.88–1.16)
MAGNESIUM SERPL-MCNC: 1.9 MG/DL — SIGNIFICANT CHANGE UP (ref 1.6–2.6)
MCHC RBC-ENTMCNC: 30.1 PG — SIGNIFICANT CHANGE UP (ref 27–34)
MCHC RBC-ENTMCNC: 31.4 GM/DL — LOW (ref 32–36)
MCV RBC AUTO: 95.8 FL — SIGNIFICANT CHANGE UP (ref 80–100)
NRBC # BLD: 0 /100 WBCS — SIGNIFICANT CHANGE UP (ref 0–0)
PHOSPHATE SERPL-MCNC: 1.3 MG/DL — LOW (ref 2.5–4.5)
PLATELET # BLD AUTO: 207 K/UL — SIGNIFICANT CHANGE UP (ref 150–400)
POTASSIUM SERPL-MCNC: 3.6 MMOL/L — SIGNIFICANT CHANGE UP (ref 3.5–5.3)
POTASSIUM SERPL-SCNC: 3.6 MMOL/L — SIGNIFICANT CHANGE UP (ref 3.5–5.3)
PROTHROM AB SERPL-ACNC: 13.1 SEC — SIGNIFICANT CHANGE UP (ref 10.5–13.4)
RBC # BLD: 2.39 M/UL — LOW (ref 3.8–5.2)
RBC # FLD: 18.5 % — HIGH (ref 10.3–14.5)
RH IG SCN BLD-IMP: POSITIVE — SIGNIFICANT CHANGE UP
SODIUM SERPL-SCNC: 138 MMOL/L — SIGNIFICANT CHANGE UP (ref 135–145)
WBC # BLD: 14.95 K/UL — HIGH (ref 3.8–10.5)
WBC # FLD AUTO: 14.95 K/UL — HIGH (ref 3.8–10.5)

## 2023-06-25 PROCEDURE — 74018 RADEX ABDOMEN 1 VIEW: CPT | Mod: 26

## 2023-06-25 PROCEDURE — 99232 SBSQ HOSP IP/OBS MODERATE 35: CPT

## 2023-06-25 PROCEDURE — 99233 SBSQ HOSP IP/OBS HIGH 50: CPT | Mod: GC

## 2023-06-25 RX ORDER — APIXABAN 2.5 MG/1
5 TABLET, FILM COATED ORAL
Refills: 0 | Status: DISCONTINUED | OUTPATIENT
Start: 2023-06-25 | End: 2023-06-25

## 2023-06-25 RX ORDER — APIXABAN 2.5 MG/1
5 TABLET, FILM COATED ORAL EVERY 12 HOURS
Refills: 0 | Status: DISCONTINUED | OUTPATIENT
Start: 2023-06-25 | End: 2023-06-27

## 2023-06-25 RX ORDER — BENZOCAINE AND MENTHOL 5; 1 G/100ML; G/100ML
1 LIQUID ORAL EVERY 4 HOURS
Refills: 0 | Status: DISCONTINUED | OUTPATIENT
Start: 2023-06-25 | End: 2023-06-27

## 2023-06-25 RX ADMIN — HYDROMORPHONE HYDROCHLORIDE 0.5 MILLIGRAM(S): 2 INJECTION INTRAMUSCULAR; INTRAVENOUS; SUBCUTANEOUS at 01:25

## 2023-06-25 RX ADMIN — SODIUM CHLORIDE 100 MILLILITER(S): 9 INJECTION, SOLUTION INTRAVENOUS at 21:16

## 2023-06-25 RX ADMIN — PIPERACILLIN AND TAZOBACTAM 25 GRAM(S): 4; .5 INJECTION, POWDER, LYOPHILIZED, FOR SOLUTION INTRAVENOUS at 21:17

## 2023-06-25 RX ADMIN — HYDROMORPHONE HYDROCHLORIDE 0.5 MILLIGRAM(S): 2 INJECTION INTRAMUSCULAR; INTRAVENOUS; SUBCUTANEOUS at 21:35

## 2023-06-25 RX ADMIN — SODIUM CHLORIDE 100 MILLILITER(S): 9 INJECTION, SOLUTION INTRAVENOUS at 05:54

## 2023-06-25 RX ADMIN — HYDROMORPHONE HYDROCHLORIDE 0.5 MILLIGRAM(S): 2 INJECTION INTRAMUSCULAR; INTRAVENOUS; SUBCUTANEOUS at 06:37

## 2023-06-25 RX ADMIN — HYDROMORPHONE HYDROCHLORIDE 0.5 MILLIGRAM(S): 2 INJECTION INTRAMUSCULAR; INTRAVENOUS; SUBCUTANEOUS at 01:40

## 2023-06-25 RX ADMIN — PIPERACILLIN AND TAZOBACTAM 25 GRAM(S): 4; .5 INJECTION, POWDER, LYOPHILIZED, FOR SOLUTION INTRAVENOUS at 15:58

## 2023-06-25 RX ADMIN — Medication 10 MILLIGRAM(S): at 12:11

## 2023-06-25 RX ADMIN — APIXABAN 5 MILLIGRAM(S): 2.5 TABLET, FILM COATED ORAL at 23:34

## 2023-06-25 RX ADMIN — Medication 5 MILLIGRAM(S): at 01:07

## 2023-06-25 RX ADMIN — ONDANSETRON 8 MILLIGRAM(S): 8 TABLET, FILM COATED ORAL at 05:02

## 2023-06-25 RX ADMIN — HEPARIN SODIUM 21 UNIT(S)/HR: 5000 INJECTION INTRAVENOUS; SUBCUTANEOUS at 01:21

## 2023-06-25 RX ADMIN — HYDROMORPHONE HYDROCHLORIDE 0.5 MILLIGRAM(S): 2 INJECTION INTRAMUSCULAR; INTRAVENOUS; SUBCUTANEOUS at 21:18

## 2023-06-25 RX ADMIN — PIPERACILLIN AND TAZOBACTAM 25 GRAM(S): 4; .5 INJECTION, POWDER, LYOPHILIZED, FOR SOLUTION INTRAVENOUS at 05:53

## 2023-06-25 RX ADMIN — HYDROMORPHONE HYDROCHLORIDE 0.5 MILLIGRAM(S): 2 INJECTION INTRAMUSCULAR; INTRAVENOUS; SUBCUTANEOUS at 10:41

## 2023-06-25 RX ADMIN — HYDROMORPHONE HYDROCHLORIDE 0.5 MILLIGRAM(S): 2 INJECTION INTRAMUSCULAR; INTRAVENOUS; SUBCUTANEOUS at 10:15

## 2023-06-25 RX ADMIN — CHLORHEXIDINE GLUCONATE 1 APPLICATION(S): 213 SOLUTION TOPICAL at 05:53

## 2023-06-25 RX ADMIN — HEPARIN SODIUM 21 UNIT(S)/HR: 5000 INJECTION INTRAVENOUS; SUBCUTANEOUS at 06:22

## 2023-06-25 RX ADMIN — PANTOPRAZOLE SODIUM 40 MILLIGRAM(S): 20 TABLET, DELAYED RELEASE ORAL at 12:11

## 2023-06-25 RX ADMIN — HYDROMORPHONE HYDROCHLORIDE 0.5 MILLIGRAM(S): 2 INJECTION INTRAMUSCULAR; INTRAVENOUS; SUBCUTANEOUS at 06:22

## 2023-06-25 RX ADMIN — APIXABAN 5 MILLIGRAM(S): 2.5 TABLET, FILM COATED ORAL at 15:58

## 2023-06-25 RX ADMIN — ONDANSETRON 8 MILLIGRAM(S): 8 TABLET, FILM COATED ORAL at 14:28

## 2023-06-25 NOTE — PHYSICAL THERAPY INITIAL EVALUATION ADULT - GAIT DEVIATIONS NOTED, PT EVAL
Min decrease in heidy & guarded gait secondary to abdomen pain/decreased heidy/decreased step length/decreased weight-shifting ability

## 2023-06-25 NOTE — PROGRESS NOTE ADULT - ATTENDING COMMENTS
OOB, passing gas and had BM. Removal og NGT per Gyn. Continue to encourage mobility. Nontoxic and VSS. Temps down. OOB, passing gas and had BM. Removal og NGT per Gyn. Continue to encourage mobility. Nontoxic and VSS. Temps down. Anticoagulation per Gyn.

## 2023-06-25 NOTE — PROGRESS NOTE ADULT - ASSESSMENT
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      Neuro: Tylenol/Oxy q4 ATC   Lung: PE: h/o b/l PE  on eliquis, albutetol PRN, sat 100% RA   Card:  tachycardic- EKG  sinus tach    GI: NPO, NG (-)  zofran/reglan PRN, simethicone, pantoprazole bid, s/p dulcolax suppository ().    : s/p valencia, voiding   ID: Zosyn (-)// s/p 1x Vanc , Ancef x 1, periop   Heme: 11.1> (2uPRBC+1178 cell saver intraop)>13.0>11.2>> 6.9>1uPRBC>7.9>>7.4>1uPRBC >7.3>7.4     s/p venofer 300mg -   VTE: Heparin drip, SCDs recommended      [ ] F/u BCx  - NGxTD (p)   [ ] If hypotensive, give mayra + vanc   [ ] f/u BCx   NG at 12h (p)   - Appreciate SICU care, GYN to continue to follow

## 2023-06-25 NOTE — PROGRESS NOTE ADULT - SUBJECTIVE AND OBJECTIVE BOX
INTERVAL/OVERNIGHT EVENTS:    SUBJECTIVE: Patient seen and examined bedside.    apixaban 5 milliGRAM(s) Oral <User Schedule>  piperacillin/tazobactam IVPB.. 3.375 Gram(s) IV Intermittent every 8 hours      Vital Signs Last 24 Hrs  T(C): 37.3 (25 Jun 2023 10:33), Max: 38.3 (24 Jun 2023 20:45)  T(F): 99.1 (25 Jun 2023 10:33), Max: 100.9 (24 Jun 2023 20:45)  HR: 78 (25 Jun 2023 13:00) (76 - 112)  BP: 140/76 (25 Jun 2023 13:00) (133/65 - 162/99)  BP(mean): 101 (25 Jun 2023 13:00) (92 - 125)  RR: 18 (25 Jun 2023 13:00) (15 - 20)  SpO2: 100% (25 Jun 2023 13:00) (94% - 100%)    Parameters below as of 25 Jun 2023 13:00  Patient On (Oxygen Delivery Method): room air      I&O's Detail    24 Jun 2023 07:01  -  25 Jun 2023 07:00  --------------------------------------------------------  IN:    Heparin: 418 mL    IV PiggyBack: 100 mL    IV PiggyBack: 683 mL    Lactated Ringers: 2300 mL  Total IN: 3501 mL    OUT:    Indwelling Catheter - Urethral (mL): 790 mL    Nasogastric/Oral tube (mL): 475 mL    Voided (mL): 700 mL  Total OUT: 1965 mL    Total NET: 1536 mL      25 Jun 2023 07:01  -  25 Jun 2023 13:07  --------------------------------------------------------  IN:    Enteral Tube Flush: 40 mL    Heparin: 84 mL    IV PiggyBack: 25 mL    Lactated Ringers: 600 mL  Total IN: 749 mL    OUT:    Nasogastric/Oral tube (mL): 50 mL    Voided (mL): 400 mL  Total OUT: 450 mL    Total NET: 299 mL          General: NAD, resting comfortably in bed  C/V: NSR  Pulm: Nonlabored breathing, no respiratory distress  Abd: soft, NT/ND.  Extrem: WWP, no edema, SCDs in place        LABS:                        7.2    14.95 )-----------( 207      ( 25 Jun 2023 05:53 )             22.9     06-25    138  |  103  |  5<L>  ----------------------------<  97  3.6   |  26  |  0.56    Ca    7.9<L>      25 Jun 2023 05:53  Phos  1.3     06-25  Mg     1.9     06-25      PT/INR - ( 25 Jun 2023 05:55 )   PT: 13.1 sec;   INR: 1.10          PTT - ( 25 Jun 2023 05:55 )  PTT:65.8 sec  Urinalysis Basic - ( 25 Jun 2023 05:53 )    Color: x / Appearance: x / SG: x / pH: x  Gluc: 97 mg/dL / Ketone: x  / Bili: x / Urobili: x   Blood: x / Protein: x / Nitrite: x   Leuk Esterase: x / RBC: x / WBC x   Sq Epi: x / Non Sq Epi: x / Bacteria: x        RADIOLOGY & ADDITIONAL STUDIES:   INTERVAL/OVERNIGHT EVENTS: 6/24: Kphos repleted, thyroid fxn normal, procal 0.5 (0.9), Hep GTT started f/u ptt @5pm.  Nausea: Given Reglan. NGT flushed. temp 100.7, ofirmev given. Galan removed TOV @5pm f/u TOV @1am, 5p PTT 40, hep@20 repeat @ MN, ON: temp 100.9, ofirmev, PTT 57, hep@21, pTOV, 57, hep@21, AM ptt 67    SUBJECTIVE: Patient seen and examined bedside. Pt reports feeling well this morning with no acute complaints. Denies chest pain, SOB, or palpitations. States that abdominal pain has improved from yesterday and is now passing flatus and had a BM overnight. Denies nausea or vomiting. Reports throat irritation for NGT.    apixaban 5 milliGRAM(s) Oral <User Schedule>  piperacillin/tazobactam IVPB.. 3.375 Gram(s) IV Intermittent every 8 hours      Vital Signs Last 24 Hrs  T(C): 37.3 (25 Jun 2023 10:33), Max: 38.3 (24 Jun 2023 20:45)  T(F): 99.1 (25 Jun 2023 10:33), Max: 100.9 (24 Jun 2023 20:45)  HR: 78 (25 Jun 2023 13:00) (76 - 112)  BP: 140/76 (25 Jun 2023 13:00) (133/65 - 162/99)  BP(mean): 101 (25 Jun 2023 13:00) (92 - 125)  RR: 18 (25 Jun 2023 13:00) (15 - 20)  SpO2: 100% (25 Jun 2023 13:00) (94% - 100%)    Parameters below as of 25 Jun 2023 13:00  Patient On (Oxygen Delivery Method): room air      I&O's Detail    24 Jun 2023 07:01  -  25 Jun 2023 07:00  --------------------------------------------------------  IN:    Heparin: 418 mL    IV PiggyBack: 100 mL    IV PiggyBack: 683 mL    Lactated Ringers: 2300 mL  Total IN: 3501 mL    OUT:    Indwelling Catheter - Urethral (mL): 790 mL    Nasogastric/Oral tube (mL): 475 mL    Voided (mL): 700 mL  Total OUT: 1965 mL    Total NET: 1536 mL      25 Jun 2023 07:01  -  25 Jun 2023 13:07  --------------------------------------------------------  IN:    Enteral Tube Flush: 40 mL    Heparin: 84 mL    IV PiggyBack: 25 mL    Lactated Ringers: 600 mL  Total IN: 749 mL    OUT:    Nasogastric/Oral tube (mL): 50 mL    Voided (mL): 400 mL  Total OUT: 450 mL    Total NET: 299 mL          General: NAD, resting comfortably in bed  Neuro: AAOx3  HEENT: NGT in place with minimal output, MMM  C/V: NSR  Pulm: Nonlabored breathing, no respiratory distress, lungs CTAB  Abd: soft, mildly distended, mildly tender, incisions CDI, no rebound, no guarding  Extrem: WWP, no edema, SCDs in place        LABS:                        7.2    14.95 )-----------( 207      ( 25 Jun 2023 05:53 )             22.9     06-25    138  |  103  |  5<L>  ----------------------------<  97  3.6   |  26  |  0.56    Ca    7.9<L>      25 Jun 2023 05:53  Phos  1.3     06-25  Mg     1.9     06-25      PT/INR - ( 25 Jun 2023 05:55 )   PT: 13.1 sec;   INR: 1.10          PTT - ( 25 Jun 2023 05:55 )  PTT:65.8 sec  Urinalysis Basic - ( 25 Jun 2023 05:53 )    Color: x / Appearance: x / SG: x / pH: x  Gluc: 97 mg/dL / Ketone: x  / Bili: x / Urobili: x   Blood: x / Protein: x / Nitrite: x   Leuk Esterase: x / RBC: x / WBC x   Sq Epi: x / Non Sq Epi: x / Bacteria: x        RADIOLOGY & ADDITIONAL STUDIES:

## 2023-06-25 NOTE — PROGRESS NOTE ADULT - ATTENDING COMMENTS
Plan for transfer of care back to GYN   Pt improving with likely dx for acute ileus-no improved.   Will follow closely

## 2023-06-25 NOTE — PROGRESS NOTE ADULT - ASSESSMENT
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    Neuro: Oxycodone prn  Dilaudid prn Zofran Tylenol  CV: persistent sinus tachycardia 's. never hypotensive. suspect related to sepsis given concurrent fever.   Pulm: Hx PE: holding eliquis, no resp distress on nasal canula. Chest CT negative for PE. Cont Heparin   GI: NGT LIWS. NPO, PPI QD, LR@100. Ileus: Cont Dulcolax suppository, now with ROBF  : voids   ID: f/u ID consult sepsis unclear origin, UA negative, Chest CT no specific consolidation, ?intraabdominal infx? on Zosyn (-), : bld cx NGTD// Ancef x 1, periop  Endo: ISS   Heme: high EBL s/p PRBC and cellsaver intraop, Hgb 6.9 with sinus tachycardia last night, s/p 1U PRBC on . Anemia: cont venofer 300mg x3d  PPx: SCDs, Restart AC with Heparin gtt   Lines: PIV   Wounds: Phannenstiel incision  PT/OT: encourage OOB to chair PT ordered.   Dispo: SDU

## 2023-06-25 NOTE — PROGRESS NOTE ADULT - ASSESSMENT
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 Course c/b severe sepsis and ileus, suspect GI/GYN source.  Overall improving on empiric zosyn.      - cont zosyn 3.375g IV q8h over 4h  - f/u BCx  - if patient decompensate, then broaden to mayra 1g IV q8h     Team 1 will follow you.  Case d/w primary team.    Laverne Fontanez MD, MS  Infectious Disease attending  work cell [phone]   For any questions during evening/weekend/holiday, please page ID on call

## 2023-06-25 NOTE — PHYSICAL THERAPY INITIAL EVALUATION ADULT - ADDITIONAL COMMENTS
As per pt, PTA she was completely independent with all functional mobility, ADLs, and IADLs with no AD and is a community ambulator. Has a shower tub.

## 2023-06-25 NOTE — PHYSICAL THERAPY INITIAL EVALUATION ADULT - PERTINENT HX OF CURRENT PROBLEM, REHAB EVAL
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 Pt is now s/p  abdominal myomectomy. Post-op persistent sinus tachycardia, transferred to SICU  night for further eval.

## 2023-06-25 NOTE — PHYSICAL THERAPY INITIAL EVALUATION ADULT - LIVES WITH, PROFILE
11 y/o son, elevator access building with 3 KEV[address] however also ramp entrance/children 13 y/o son, elevator access building with 3 KEV[address] however also ramp entrance. Has a lot of family support./children

## 2023-06-25 NOTE — CHART NOTE - NSCHARTNOTEFT_GEN_A_CORE
Pt seen at bedside. NG tube was clamped for 2.5hrs, reconnected to wall suction with no output. Pt is uncomfortable from NG tube but denies nausea/vomiting. NG tube pulled without difficulty. Pt to remain NPO and LR 100cc/hr. Will consider advancing to sips/chips if continues to feel well through this evening. D/w Dr. Telles

## 2023-06-25 NOTE — PROGRESS NOTE ADULT - SUBJECTIVE AND OBJECTIVE BOX
Pt seen and examined at bedside, sitting up in chair. Pt states mild abdominal pain. Pt ambulating, passing flatus, urinating adequately. NGT in place, states that feeling it in her throat is making her nauseous, denies vomiting  Pt denies fever, chills, chest pain, SOB, nausea, vomiting, lightheadedness, dizziness.      T(F): 99.1 (06-25-23 @ 05:10), Max: 100.9 (06-24-23 @ 20:45)  HR: 89 (06-25-23 @ 10:00) (76 - 112)  BP: 141/85 (06-25-23 @ 10:00) (133/65 - 162/99)  RR: 18 (06-25-23 @ 10:00) (15 - 20)  SpO2: 99% (06-25-23 @ 10:00) (94% - 100%)    I&O's Summary    24 Jun 2023 07:01  -  25 Jun 2023 07:00  --------------------------------------------------------  IN: 3501 mL / OUT: 1965 mL / NET: 1536 mL    25 Jun 2023 07:01  -  25 Jun 2023 11:01  --------------------------------------------------------  IN: 549 mL / OUT: 450 mL / NET: 99 mL        MEDICATIONS  (STANDING):  bisacodyl Suppository 10 milliGRAM(s) Rectal daily  chlorhexidine 2% Cloths 1 Application(s) Topical <User Schedule>  dextrose 5%. 1000 milliLiter(s) (50 mL/Hr) IV Continuous <Continuous>  dextrose 50% Injectable 25 Gram(s) IV Push once  glucagon  Injectable 1 milliGRAM(s) IntraMuscular once  heparin  Infusion 1900 Unit(s)/Hr (21 mL/Hr) IV Continuous <Continuous>  insulin lispro (ADMELOG) corrective regimen sliding scale   SubCutaneous Before meals and at bedtime  lactated ringers. 1000 milliLiter(s) (100 mL/Hr) IV Continuous <Continuous>  pantoprazole  Injectable 40 milliGRAM(s) IV Push daily  piperacillin/tazobactam IVPB.. 3.375 Gram(s) IV Intermittent every 8 hours    MEDICATIONS  (PRN):  acetaminophen     Tablet .. 1000 milliGRAM(s) Oral every 6 hours PRN Mild Pain (1 - 3)  albuterol    90 MICROgram(s) HFA Inhaler 2 Puff(s) Inhalation every 6 hours PRN Shortness of Breath and/or Wheezing  benzocaine/menthol Lozenge 1 Lozenge Oral every 4 hours PRN Sore Throat  dextrose Oral Gel 15 Gram(s) Oral once PRN Blood Glucose LESS THAN 70 milliGRAM(s)/deciliter  HYDROmorphone  Injectable 0.2 milliGRAM(s) IV Push every 3 hours PRN Moderate Pain (4 - 6)  HYDROmorphone  Injectable 0.5 milliGRAM(s) IV Push every 3 hours PRN Severe Pain (7 - 10)  ondansetron Injectable 8 milliGRAM(s) IV Push every 6 hours PRN Nausea and/or Vomiting  oxyCODONE    IR 5 milliGRAM(s) Oral every 4 hours PRN Moderate Pain (4 - 6)      Physical Exam:  Constitutional: NAD  Pulmonary: no increased work of breathing, CTA b/l  Cardiovascular: Regular rate and rhythm   Abdomen: incision site clean, dry, intact. Soft, mildly tender, non distended, no guarding, no rebound, minimal bowel sounds  Extremities: no lower extremity edema or calf tenderness. SCDs in place     LABS:                        7.2    14.95 )-----------( 207      ( 25 Jun 2023 05:53 )             22.9     06-25    138  |  103  |  5<L>  ----------------------------<  97  3.6   |  26  |  0.56    Ca    7.9<L>      25 Jun 2023 05:53  Phos  1.3     06-25  Mg     1.9     06-25      PT/INR - ( 25 Jun 2023 05:55 )   PT: 13.1 sec;   INR: 1.10          PTT - ( 25 Jun 2023 05:55 )  PTT:65.8 sec  Urinalysis Basic - ( 25 Jun 2023 05:53 )    Color: x / Appearance: x / SG: x / pH: x  Gluc: 97 mg/dL / Ketone: x  / Bili: x / Urobili: x   Blood: x / Protein: x / Nitrite: x   Leuk Esterase: x / RBC: x / WBC x   Sq Epi: x / Non Sq Epi: x / Bacteria: x

## 2023-06-25 NOTE — PROGRESS NOTE ADULT - SUBJECTIVE AND OBJECTIVE BOX
INFECTIOUS DISEASES CONSULT FOLLOW-UP NOTE    INTERVAL HPI/OVERNIGHT EVENTS:  febrile to 100.9   patient feeling better, denied vomiting, has small spit  abdominal pain also improving, passing gas and had BM    ROS:   Constitutional, eyes, ENT, cardiovascular, respiratory, gastrointestinal, genitourinary, integumentary, neurological, psychiatric and heme/lymph are otherwise negative other than noted above       ANTIBIOTICS/RELEVANT:    MEDICATIONS  (STANDING):  apixaban 5 milliGRAM(s) Oral <User Schedule>  bisacodyl Suppository 10 milliGRAM(s) Rectal daily  chlorhexidine 2% Cloths 1 Application(s) Topical <User Schedule>  lactated ringers. 1000 milliLiter(s) (100 mL/Hr) IV Continuous <Continuous>  pantoprazole  Injectable 40 milliGRAM(s) IV Push daily  piperacillin/tazobactam IVPB.. 3.375 Gram(s) IV Intermittent every 8 hours    MEDICATIONS  (PRN):  acetaminophen     Tablet .. 1000 milliGRAM(s) Oral every 6 hours PRN Mild Pain (1 - 3)  albuterol    90 MICROgram(s) HFA Inhaler 2 Puff(s) Inhalation every 6 hours PRN Shortness of Breath and/or Wheezing  benzocaine/menthol Lozenge 1 Lozenge Oral every 4 hours PRN Sore Throat  HYDROmorphone  Injectable 0.5 milliGRAM(s) IV Push every 3 hours PRN Severe Pain (7 - 10)  HYDROmorphone  Injectable 0.2 milliGRAM(s) IV Push every 3 hours PRN Moderate Pain (4 - 6)  ondansetron Injectable 8 milliGRAM(s) IV Push every 6 hours PRN Nausea and/or Vomiting  oxyCODONE    IR 5 milliGRAM(s) Oral every 4 hours PRN Moderate Pain (4 - 6)        Vital Signs Last 24 Hrs  T(C): 37.3 (25 Jun 2023 10:33), Max: 38.3 (24 Jun 2023 20:45)  T(F): 99.1 (25 Jun 2023 10:33), Max: 100.9 (24 Jun 2023 20:45)  HR: 78 (25 Jun 2023 13:00) (76 - 112)  BP: 140/76 (25 Jun 2023 13:00) (133/65 - 162/99)  BP(mean): 101 (25 Jun 2023 13:00) (92 - 125)  RR: 18 (25 Jun 2023 13:00) (15 - 20)  SpO2: 100% (25 Jun 2023 13:00) (94% - 100%)    Parameters below as of 25 Jun 2023 13:00  Patient On (Oxygen Delivery Method): room air        06-24-23 @ 07:01  -  06-25-23 @ 07:00  --------------------------------------------------------  IN: 3501 mL / OUT: 1965 mL / NET: 1536 mL    06-25-23 @ 07:01  -  06-25-23 @ 13:26  --------------------------------------------------------  IN: 749 mL / OUT: 450 mL / NET: 299 mL      PHYSICAL EXAM:  Constitutional: alert, NAD  Eyes: the sclera and conjunctiva were normal.   ENT: the ears and nose were normal in appearance. NG tube   Neck: the appearance of the neck was normal and the neck was supple.   Pulmonary: no respiratory distress and lungs were clear to auscultation bilaterally.   Heart: heart rate was normal and rhythm regular, normal S1 and S2  Vascular:. there was no peripheral edema  Abdomen: normal bowel sounds, soft, mildly ttp       LABS:                        7.2    14.95 )-----------( 207      ( 25 Jun 2023 05:53 )             22.9     06-25    138  |  103  |  5<L>  ----------------------------<  97  3.6   |  26  |  0.56    Ca    7.9<L>      25 Jun 2023 05:53  Phos  1.3     06-25  Mg     1.9     06-25      PT/INR - ( 25 Jun 2023 05:55 )   PT: 13.1 sec;   INR: 1.10          PTT - ( 25 Jun 2023 05:55 )  PTT:65.8 sec  Urinalysis Basic - ( 25 Jun 2023 05:53 )    Color: x / Appearance: x / SG: x / pH: x  Gluc: 97 mg/dL / Ketone: x  / Bili: x / Urobili: x   Blood: x / Protein: x / Nitrite: x   Leuk Esterase: x / RBC: x / WBC x   Sq Epi: x / Non Sq Epi: x / Bacteria: x        MICROBIOLOGY:      RADIOLOGY & ADDITIONAL STUDIES:  Reviewed

## 2023-06-26 LAB
ANION GAP SERPL CALC-SCNC: 8 MMOL/L — SIGNIFICANT CHANGE UP (ref 5–17)
ANISOCYTOSIS BLD QL: SLIGHT — SIGNIFICANT CHANGE UP
BASOPHILS # BLD AUTO: 0 K/UL — SIGNIFICANT CHANGE UP (ref 0–0.2)
BASOPHILS NFR BLD AUTO: 0 % — SIGNIFICANT CHANGE UP (ref 0–2)
BLD GP AB SCN SERPL QL: NEGATIVE — SIGNIFICANT CHANGE UP
BUN SERPL-MCNC: 5 MG/DL — LOW (ref 7–23)
CALCIUM SERPL-MCNC: 8 MG/DL — LOW (ref 8.4–10.5)
CHLORIDE SERPL-SCNC: 99 MMOL/L — SIGNIFICANT CHANGE UP (ref 96–108)
CO2 SERPL-SCNC: 26 MMOL/L — SIGNIFICANT CHANGE UP (ref 22–31)
CREAT SERPL-MCNC: 0.56 MG/DL — SIGNIFICANT CHANGE UP (ref 0.5–1.3)
CULTURE RESULTS: SIGNIFICANT CHANGE UP
CULTURE RESULTS: SIGNIFICANT CHANGE UP
EGFR: 124 ML/MIN/1.73M2 — SIGNIFICANT CHANGE UP
EOSINOPHIL # BLD AUTO: 0 K/UL — SIGNIFICANT CHANGE UP (ref 0–0.5)
EOSINOPHIL NFR BLD AUTO: 0 % — SIGNIFICANT CHANGE UP (ref 0–6)
GIANT PLATELETS BLD QL SMEAR: PRESENT — SIGNIFICANT CHANGE UP
GLUCOSE SERPL-MCNC: 89 MG/DL — SIGNIFICANT CHANGE UP (ref 70–99)
HCT VFR BLD CALC: 22.2 % — LOW (ref 34.5–45)
HGB BLD-MCNC: 7 G/DL — CRITICAL LOW (ref 11.5–15.5)
HYPOCHROMIA BLD QL: SIGNIFICANT CHANGE UP
LYMPHOCYTES # BLD AUTO: 1.85 K/UL — SIGNIFICANT CHANGE UP (ref 1–3.3)
LYMPHOCYTES # BLD AUTO: 15 % — SIGNIFICANT CHANGE UP (ref 13–44)
MAGNESIUM SERPL-MCNC: 1.8 MG/DL — SIGNIFICANT CHANGE UP (ref 1.6–2.6)
MANUAL SMEAR VERIFICATION: SIGNIFICANT CHANGE UP
MCHC RBC-ENTMCNC: 30.6 PG — SIGNIFICANT CHANGE UP (ref 27–34)
MCHC RBC-ENTMCNC: 31.5 GM/DL — LOW (ref 32–36)
MCV RBC AUTO: 96.9 FL — SIGNIFICANT CHANGE UP (ref 80–100)
METAMYELOCYTES # FLD: 0.9 % — HIGH (ref 0–0)
MICROCYTES BLD QL: SLIGHT — SIGNIFICANT CHANGE UP
MONOCYTES # BLD AUTO: 1.1 K/UL — HIGH (ref 0–0.9)
MONOCYTES NFR BLD AUTO: 8.9 % — SIGNIFICANT CHANGE UP (ref 2–14)
NEUTROPHILS # BLD AUTO: 9.16 K/UL — HIGH (ref 1.8–7.4)
NEUTROPHILS NFR BLD AUTO: 74.3 % — SIGNIFICANT CHANGE UP (ref 43–77)
OVALOCYTES BLD QL SMEAR: SLIGHT — SIGNIFICANT CHANGE UP
PHOSPHATE SERPL-MCNC: 1.5 MG/DL — LOW (ref 2.5–4.5)
PLAT MORPH BLD: NORMAL — SIGNIFICANT CHANGE UP
PLATELET # BLD AUTO: 233 K/UL — SIGNIFICANT CHANGE UP (ref 150–400)
POIKILOCYTOSIS BLD QL AUTO: SLIGHT — SIGNIFICANT CHANGE UP
POLYCHROMASIA BLD QL SMEAR: SLIGHT — SIGNIFICANT CHANGE UP
POTASSIUM SERPL-MCNC: 3.7 MMOL/L — SIGNIFICANT CHANGE UP (ref 3.5–5.3)
POTASSIUM SERPL-SCNC: 3.7 MMOL/L — SIGNIFICANT CHANGE UP (ref 3.5–5.3)
RBC # BLD: 2.29 M/UL — LOW (ref 3.8–5.2)
RBC # FLD: 18.2 % — HIGH (ref 10.3–14.5)
RBC BLD AUTO: ABNORMAL
RH IG SCN BLD-IMP: POSITIVE — SIGNIFICANT CHANGE UP
SODIUM SERPL-SCNC: 133 MMOL/L — LOW (ref 135–145)
SPECIMEN SOURCE: SIGNIFICANT CHANGE UP
SPECIMEN SOURCE: SIGNIFICANT CHANGE UP
VARIANT LYMPHS # BLD: 0.9 % — SIGNIFICANT CHANGE UP (ref 0–6)
WBC # BLD: 12.33 K/UL — HIGH (ref 3.8–10.5)
WBC # FLD AUTO: 12.33 K/UL — HIGH (ref 3.8–10.5)

## 2023-06-26 PROCEDURE — 99232 SBSQ HOSP IP/OBS MODERATE 35: CPT

## 2023-06-26 RX ORDER — MAGNESIUM SULFATE 500 MG/ML
2 VIAL (ML) INJECTION ONCE
Refills: 0 | Status: COMPLETED | OUTPATIENT
Start: 2023-06-26 | End: 2023-06-26

## 2023-06-26 RX ORDER — POTASSIUM PHOSPHATE, MONOBASIC POTASSIUM PHOSPHATE, DIBASIC 236; 224 MG/ML; MG/ML
30 INJECTION, SOLUTION INTRAVENOUS ONCE
Refills: 0 | Status: DISCONTINUED | OUTPATIENT
Start: 2023-06-26 | End: 2023-06-26

## 2023-06-26 RX ORDER — IRON SUCROSE 20 MG/ML
200 INJECTION, SOLUTION INTRAVENOUS EVERY 24 HOURS
Refills: 0 | Status: DISCONTINUED | OUTPATIENT
Start: 2023-06-26 | End: 2023-06-26

## 2023-06-26 RX ORDER — IRON SUCROSE 20 MG/ML
300 INJECTION, SOLUTION INTRAVENOUS EVERY 24 HOURS
Refills: 0 | Status: DISCONTINUED | OUTPATIENT
Start: 2023-06-26 | End: 2023-06-26

## 2023-06-26 RX ORDER — POTASSIUM CHLORIDE 20 MEQ
20 PACKET (EA) ORAL
Refills: 0 | Status: COMPLETED | OUTPATIENT
Start: 2023-06-26 | End: 2023-06-26

## 2023-06-26 RX ORDER — POLYETHYLENE GLYCOL 3350 17 G/17G
17 POWDER, FOR SOLUTION ORAL AT BEDTIME
Refills: 0 | Status: DISCONTINUED | OUTPATIENT
Start: 2023-06-26 | End: 2023-06-27

## 2023-06-26 RX ADMIN — ONDANSETRON 8 MILLIGRAM(S): 8 TABLET, FILM COATED ORAL at 19:38

## 2023-06-26 RX ADMIN — HYDROMORPHONE HYDROCHLORIDE 0.5 MILLIGRAM(S): 2 INJECTION INTRAMUSCULAR; INTRAVENOUS; SUBCUTANEOUS at 01:55

## 2023-06-26 RX ADMIN — SODIUM CHLORIDE 100 MILLILITER(S): 9 INJECTION, SOLUTION INTRAVENOUS at 22:39

## 2023-06-26 RX ADMIN — Medication 20 MILLIEQUIVALENT(S): at 10:05

## 2023-06-26 RX ADMIN — SODIUM CHLORIDE 100 MILLILITER(S): 9 INJECTION, SOLUTION INTRAVENOUS at 04:44

## 2023-06-26 RX ADMIN — Medication 10 MILLIGRAM(S): at 12:30

## 2023-06-26 RX ADMIN — HYDROMORPHONE HYDROCHLORIDE 0.5 MILLIGRAM(S): 2 INJECTION INTRAMUSCULAR; INTRAVENOUS; SUBCUTANEOUS at 14:11

## 2023-06-26 RX ADMIN — PIPERACILLIN AND TAZOBACTAM 25 GRAM(S): 4; .5 INJECTION, POWDER, LYOPHILIZED, FOR SOLUTION INTRAVENOUS at 22:36

## 2023-06-26 RX ADMIN — ONDANSETRON 8 MILLIGRAM(S): 8 TABLET, FILM COATED ORAL at 12:30

## 2023-06-26 RX ADMIN — Medication 20 MILLIEQUIVALENT(S): at 07:36

## 2023-06-26 RX ADMIN — Medication 85 MILLIMOLE(S): at 12:06

## 2023-06-26 RX ADMIN — PANTOPRAZOLE SODIUM 40 MILLIGRAM(S): 20 TABLET, DELAYED RELEASE ORAL at 12:30

## 2023-06-26 RX ADMIN — HYDROMORPHONE HYDROCHLORIDE 0.5 MILLIGRAM(S): 2 INJECTION INTRAMUSCULAR; INTRAVENOUS; SUBCUTANEOUS at 02:15

## 2023-06-26 RX ADMIN — POLYETHYLENE GLYCOL 3350 17 GRAM(S): 17 POWDER, FOR SOLUTION ORAL at 22:36

## 2023-06-26 RX ADMIN — APIXABAN 5 MILLIGRAM(S): 2.5 TABLET, FILM COATED ORAL at 12:30

## 2023-06-26 RX ADMIN — HYDROMORPHONE HYDROCHLORIDE 0.5 MILLIGRAM(S): 2 INJECTION INTRAMUSCULAR; INTRAVENOUS; SUBCUTANEOUS at 09:01

## 2023-06-26 RX ADMIN — HYDROMORPHONE HYDROCHLORIDE 0.5 MILLIGRAM(S): 2 INJECTION INTRAMUSCULAR; INTRAVENOUS; SUBCUTANEOUS at 14:25

## 2023-06-26 RX ADMIN — PIPERACILLIN AND TAZOBACTAM 25 GRAM(S): 4; .5 INJECTION, POWDER, LYOPHILIZED, FOR SOLUTION INTRAVENOUS at 05:00

## 2023-06-26 RX ADMIN — HYDROMORPHONE HYDROCHLORIDE 0.2 MILLIGRAM(S): 2 INJECTION INTRAMUSCULAR; INTRAVENOUS; SUBCUTANEOUS at 21:30

## 2023-06-26 RX ADMIN — Medication 25 GRAM(S): at 07:35

## 2023-06-26 RX ADMIN — HYDROMORPHONE HYDROCHLORIDE 0.5 MILLIGRAM(S): 2 INJECTION INTRAMUSCULAR; INTRAVENOUS; SUBCUTANEOUS at 09:20

## 2023-06-26 RX ADMIN — HYDROMORPHONE HYDROCHLORIDE 0.2 MILLIGRAM(S): 2 INJECTION INTRAMUSCULAR; INTRAVENOUS; SUBCUTANEOUS at 20:51

## 2023-06-26 RX ADMIN — PIPERACILLIN AND TAZOBACTAM 25 GRAM(S): 4; .5 INJECTION, POWDER, LYOPHILIZED, FOR SOLUTION INTRAVENOUS at 14:11

## 2023-06-26 NOTE — PROVIDER CONTACT NOTE (CRITICAL VALUE NOTIFICATION) - SITUATION
Hemoglobin 7.0, MD Omar BARAJAS made aware, no interventions, for now awaiting to call me back, will continue monitor. Hemoglobin 7.0, MD Omar BARAJAS made aware, no interventions for now awaiting to call me back, will continue monitor.

## 2023-06-26 NOTE — PROGRESS NOTE ADULT - ATTENDING COMMENTS
#Fever, leukocytosis, severe sepsis    Overall improving, now patient tolerating clear fluid without n/v.  Had 101 fever.  WBC downtrending now to 12.  Likely due to GI/GYN infection in setting of surgery and ileus, now improving.  Cont zosyn.  Will determine the duration pending clinical course.     Team 1 will follow you.  Case d/w primary team.    Laverne Fontanez MD, MS  Infectious Disease attending  work cell [phone]   For any questions during evening/weekend/holiday, please page ID on call

## 2023-06-26 NOTE — PROGRESS NOTE ADULT - SUBJECTIVE AND OBJECTIVE BOX
Pt seen and examined at bedside. Pt states mild abdominal pain. Pt is ambulating, tolerating sips/chips, passing flatus, and urinating adequately.   Pt denies fever, chills, chest pain, SOB, nausea, vomiting, lightheadedness, dizziness.      T(F): 99.7 (06-26-23 @ 04:10), Max: 99.9 (06-25-23 @ 22:10)  HR: 80 (06-26-23 @ 06:00) (76 - 98)  BP: 146/75 (06-26-23 @ 03:38) (134/72 - 154/91)  RR: 18 (06-26-23 @ 03:38) (16 - 18)  SpO2: 97% (06-26-23 @ 06:00) (95% - 100%)  Wt(kg): --  I&O's Summary    24 Jun 2023 07:01  -  25 Jun 2023 07:00  --------------------------------------------------------  IN: 3501 mL / OUT: 1965 mL / NET: 1536 mL    25 Jun 2023 07:01  -  26 Jun 2023 06:57  --------------------------------------------------------  IN: 2749 mL / OUT: 750 mL / NET: 1999 mL        MEDICATIONS  (STANDING):  apixaban 5 milliGRAM(s) Oral every 12 hours  bisacodyl Suppository 10 milliGRAM(s) Rectal daily  lactated ringers. 1000 milliLiter(s) (100 mL/Hr) IV Continuous <Continuous>  pantoprazole  Injectable 40 milliGRAM(s) IV Push daily  piperacillin/tazobactam IVPB.. 3.375 Gram(s) IV Intermittent every 8 hours    MEDICATIONS  (PRN):  albuterol    90 MICROgram(s) HFA Inhaler 2 Puff(s) Inhalation every 6 hours PRN Shortness of Breath and/or Wheezing  benzocaine/menthol Lozenge 1 Lozenge Oral every 4 hours PRN Sore Throat  HYDROmorphone  Injectable 0.2 milliGRAM(s) IV Push every 3 hours PRN Moderate Pain (4 - 6)  HYDROmorphone  Injectable 0.5 milliGRAM(s) IV Push every 3 hours PRN Severe Pain (7 - 10)  ondansetron Injectable 8 milliGRAM(s) IV Push every 6 hours PRN Nausea and/or Vomiting      Physical Exam:  Constitutional: NAD  Pulmonary: no increased work of breathing  Cardiovascular: Regular rate and rhythm   Abdomen: incision site clean, dry, intact. Soft, mildly tender, [] distended, no guarding, no rebound, [] bowel sounds  Extremities: no lower extremity edema or calf tenderness. SCDs in place     LABS:                        7.0    12.33 )-----------( 233      ( 26 Jun 2023 05:30 )             22.2     06-26    133<L>  |  99  |  5<L>  ----------------------------<  89  3.7   |  26  |  0.56    Ca    8.0<L>      26 Jun 2023 05:30  Phos  1.5     06-26  Mg     1.8     06-26      PT/INR - ( 25 Jun 2023 05:55 )   PT: 13.1 sec;   INR: 1.10          PTT - ( 25 Jun 2023 05:55 )  PTT:65.8 sec  Urinalysis Basic - ( 26 Jun 2023 05:30 )    Color: x / Appearance: x / SG: x / pH: x  Gluc: 89 mg/dL / Ketone: x  / Bili: x / Urobili: x   Blood: x / Protein: x / Nitrite: x   Leuk Esterase: x / RBC: x / WBC x   Sq Epi: x / Non Sq Epi: x / Bacteria: x        RADIOLOGY & ADDITIONAL TESTS:

## 2023-06-26 NOTE — PROGRESS NOTE ADULT - SUBJECTIVE AND OBJECTIVE BOX
INFECTIOUS DISEASES CONSULT FOLLOW-UP NOTE    INTERVAL HPI/OVERNIGHT EVENTS: Fever overnight to 101 but continues to feel better. She still has abd pain that is improving but denies chills, night sweats, N/V. She has an appetite and is tolerating clear liquid diet.      ROS:   otherwise negative other than noted above       ANTIBIOTICS/RELEVANT:    MEDICATIONS  (STANDING):  apixaban 5 milliGRAM(s) Oral every 12 hours  bisacodyl Suppository 10 milliGRAM(s) Rectal daily  lactated ringers. 1000 milliLiter(s) (100 mL/Hr) IV Continuous <Continuous>  pantoprazole  Injectable 40 milliGRAM(s) IV Push daily  piperacillin/tazobactam IVPB.. 3.375 Gram(s) IV Intermittent every 8 hours    MEDICATIONS  (PRN):  albuterol    90 MICROgram(s) HFA Inhaler 2 Puff(s) Inhalation every 6 hours PRN Shortness of Breath and/or Wheezing  benzocaine/menthol Lozenge 1 Lozenge Oral every 4 hours PRN Sore Throat  HYDROmorphone  Injectable 0.2 milliGRAM(s) IV Push every 3 hours PRN Moderate Pain (4 - 6)  HYDROmorphone  Injectable 0.5 milliGRAM(s) IV Push every 3 hours PRN Severe Pain (7 - 10)  ondansetron Injectable 8 milliGRAM(s) IV Push every 6 hours PRN Nausea and/or Vomiting        Vital Signs Last 24 Hrs  T(C): 37.1 (26 Jun 2023 13:30), Max: 38.3 (26 Jun 2023 08:30)  T(F): 98.8 (26 Jun 2023 13:30), Max: 101 (26 Jun 2023 08:30)  HR: 82 (26 Jun 2023 12:28) (76 - 90)  BP: 136/74 (26 Jun 2023 12:28) (136/74 - 153/84)  BP(mean): 98 (26 Jun 2023 12:28) (98 - 110)  RR: 18 (26 Jun 2023 12:28) (17 - 18)  SpO2: 98% (26 Jun 2023 12:28) (96% - 99%)    Parameters below as of 26 Jun 2023 12:28  Patient On (Oxygen Delivery Method): room air        06-25-23 @ 07:01  -  06-26-23 @ 07:00  --------------------------------------------------------  IN: 2749 mL / OUT: 750 mL / NET: 1999 mL    06-26-23 @ 07:01  -  06-26-23 @ 15:49  --------------------------------------------------------  IN: 875 mL / OUT: 700 mL / NET: 175 mL      PHYSICAL EXAM:  Constitutional: young F in bed awake, comfortable appearing  Eyes: no scleral icterus or conjunctival pallor  ENT: MMM  Neck: the appearance of the neck was normal and the neck was supple.   Pulmonary: no increased work of breathing, CTA  Heart: tachycardic, regular, no murmur  Vascular: no SHREYA LEDEZMA  Abdomen: soft, tenderness to palpation in RUQ and RLQ improved from previous exam, no rebound or guarding  Neurological: AOx3  Skin: no rash, warm            LABS:                        7.0    12.33 )-----------( 233      ( 26 Jun 2023 05:30 )             22.2     06-26    133<L>  |  99  |  5<L>  ----------------------------<  89  3.7   |  26  |  0.56    Ca    8.0<L>      26 Jun 2023 05:30  Phos  1.5     06-26  Mg     1.8     06-26      PT/INR - ( 25 Jun 2023 05:55 )   PT: 13.1 sec;   INR: 1.10          PTT - ( 25 Jun 2023 05:55 )  PTT:65.8 sec  Urinalysis Basic - ( 26 Jun 2023 05:30 )    Color: x / Appearance: x / SG: x / pH: x  Gluc: 89 mg/dL / Ketone: x  / Bili: x / Urobili: x   Blood: x / Protein: x / Nitrite: x   Leuk Esterase: x / RBC: x / WBC x   Sq Epi: x / Non Sq Epi: x / Bacteria: x        MICROBIOLOGY:      RADIOLOGY & ADDITIONAL STUDIES:  Reviewed

## 2023-06-26 NOTE — PROGRESS NOTE ADULT - ASSESSMENT
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      Neuro: Dilaudid 0.2/0.5 prn - consider transitioning to PO medications today  Lung: PE: h/o b/l PE  on eliquis, albutetol PRN, sat 100% RA   Card:  tachycardic- EKG  sinus tach, resolving. HR 75-90 overnight   GI: NPO with sips/chips, NG (-)  zofran/reglan PRN, simethicone, pantoprazole bid, s/p dulcolax suppository ().    : s/p valencia, voiding   ID: Zosyn (-)// s/p 1x Vanc , Ancef x 1, periop. F/u ID recs   Heme: 11.1>(2uPRBC+1178 cell saver intraop)>13.0>11.2>>6.9>1uPRBC>7.9>>7.4>1uPRBC>7.3>7.4>7.2>7.0 (today) s/p venofer 300mg -   VTE: s/p Heparin drip -, SCDs in place, restarted Eliquis     [ ] F/u BCx  - NGx4d (p)  [ ] F/u BCx  - NGx2d (p)  [ ] If hypotensive, give mayra + vanc   [ ] F/u AM labs

## 2023-06-26 NOTE — PROGRESS NOTE ADULT - SUBJECTIVE AND OBJECTIVE BOX
Interval Events: Reviewed  Patient seen and examined at bedside.    Patient is a 32y old  Female who presents with a chief complaint of abdominal myomectomy (26 Jun 2023 06:54)  she is breathing OK and walking with no difficulty    PAST MEDICAL & SURGICAL HISTORY:  Asthma      Fibroids      Anemia      Pulmonary embolism  January 2023      DVT (deep venous thrombosis)      No significant past surgical history          MEDICATIONS:  Pulmonary:  albuterol    90 MICROgram(s) HFA Inhaler 2 Puff(s) Inhalation every 6 hours PRN    Antimicrobials:  piperacillin/tazobactam IVPB.. 3.375 Gram(s) IV Intermittent every 8 hours    Anticoagulants:  apixaban 5 milliGRAM(s) Oral every 12 hours    Cardiac:      Allergies    No Known Allergies    Intolerances        Vital Signs Last 24 Hrs  T(C): 37.8 (26 Jun 2023 18:11), Max: 38.3 (26 Jun 2023 08:30)  T(F): 100.1 (26 Jun 2023 18:11), Max: 101 (26 Jun 2023 08:30)  HR: 84 (26 Jun 2023 16:50) (76 - 86)  BP: 138/77 (26 Jun 2023 16:50) (136/74 - 153/84)  BP(mean): 100 (26 Jun 2023 16:50) (98 - 110)  RR: 17 (26 Jun 2023 16:50) (17 - 18)  SpO2: 95% (26 Jun 2023 16:50) (95% - 99%)    Parameters below as of 26 Jun 2023 16:50  Patient On (Oxygen Delivery Method): room air        06-25 @ 07:01 - 06-26 @ 07:00  --------------------------------------------------------  IN: 2749 mL / OUT: 750 mL / NET: 1999 mL    06-26 @ 07:01 - 06-26 @ 20:38  --------------------------------------------------------  IN: 1375 mL / OUT: 1300 mL / NET: 75 mL          Review of Systems:   •	General: negative  •	Skin/Breast: negative  •	Ophthalmologic: negative  •	ENMT: negative  •	Respiratory and Thorax: negative  •	Cardiovascular: negative  •	Gastrointestinal: negative  •	Genitourinary: negative  •	Musculoskeletal: negative  •	Neurological: negative  •	Psychiatric: negative  •	Hematology/Lymphatics: negative  •	Endocrine: negative  •	Allergic/Immunologic: negative    Physical Exam:   • Constitutional:	refer to the dietion /Nutritionist note  • Eyes:	EOMI; PERRL; no drainage or redness  • ENMT:	No oral lesions; no gross abnormalities  • Neck	No bruits; no thyromegaly or nodules  • Breasts:	not examined  • Back:	No deformity or limitation of movement  • Respiratory:	Breath Sounds equal & clear to percussion & auscultation, no accessory muscle use  • Cardiovascular:	Regular rate & rhythm, normal S1, S2; no murmurs, gallops or rubs; no S3, S4  • Gastrointestinal:	Soft, non-tender, no hepatosplenomegaly, normal bowel sounds  • Genitourinary:	not examined  • Rectal: not examined  • Extremities:	No cyanosis, clubbing or edema  • Vascular:	Equal and normal pulses (carotid, femoral, dorsalis pedis)  • Neurologica:l	not examined  • Skin:	No lesions; no rash  • Lymph Nodes:	No lymphadedenopathy  • Musculoskeletal:	No joint pain, swelling or deformity; no limitation of movement        LABS:      CBC Full  -  ( 26 Jun 2023 05:30 )  WBC Count : 12.33 K/uL  RBC Count : 2.29 M/uL  Hemoglobin : 7.0 g/dL  Hematocrit : 22.2 %  Platelet Count - Automated : 233 K/uL  Mean Cell Volume : 96.9 fl  Mean Cell Hemoglobin : 30.6 pg  Mean Cell Hemoglobin Concentration : 31.5 gm/dL  Auto Neutrophil # : 9.16 K/uL  Auto Lymphocyte # : 1.85 K/uL  Auto Monocyte # : 1.10 K/uL  Auto Eosinophil # : 0.00 K/uL  Auto Basophil # : 0.00 K/uL  Auto Neutrophil % : 74.3 %  Auto Lymphocyte % : 15.0 %  Auto Monocyte % : 8.9 %  Auto Eosinophil % : 0.0 %  Auto Basophil % : 0.0 %    06-26    133<L>  |  99  |  5<L>  ----------------------------<  89  3.7   |  26  |  0.56    Ca    8.0<L>      26 Jun 2023 05:30  Phos  1.5     06-26  Mg     1.8     06-26      PT/INR - ( 25 Jun 2023 05:55 )   PT: 13.1 sec;   INR: 1.10          PTT - ( 25 Jun 2023 05:55 )  PTT:65.8 sec      Urinalysis Basic - ( 26 Jun 2023 05:30 )    Color: x / Appearance: x / SG: x / pH: x  Gluc: 89 mg/dL / Ketone: x  / Bili: x / Urobili: x   Blood: x / Protein: x / Nitrite: x   Leuk Esterase: x / RBC: x / WBC x   Sq Epi: x / Non Sq Epi: x / Bacteria: x                  RADIOLOGY & ADDITIONAL STUDIES (The following images were personally reviewed):

## 2023-06-26 NOTE — PROGRESS NOTE ADULT - ASSESSMENT
Ms. Kemp is a 31 yo F with PMH of Asthma, DVT/PE, uterine fibroids, anemia 2/2 menorrhagia who presented for uterine myomectomy. Pulmonary consulted for history of PE.     #Mild intermittent Asthma   #Hx of DVT/PE     Data Reviewed:   Outpatient Pulmonologist: Dr. Haro   TTE 6/15/23: normal BiV function, PASP 21   Bedside TTE 6/21: RV dilation, likely preserved function, some septal flattening     Presenting for planned myomectomy, elevated EBL in OR, pulmonary consulted for history of PE and AC recs. Her PE from January was believed to be multifactorial and partially provoked from iliac stasis due to compression from her fibroids and nuvaring placing her in a somewhat prothrombotic state. Post op, she remains tachycardic with adequate urine output. Still in significant pain, and has severely distended stomach on ultrasound assessment.    Course complicated by rapid response and new sepsis, for which she has been started on zosyn. Recieved additional unit of PRBC overnight with appropriate response. No clot on CTA. CT abdomen with free fluid/air, post op changes. CT Chest also notable for bibasilar consolidations - with the presence of air bronchograms, favor developing pneumonia over atelectasis although may be multifactorial.

## 2023-06-26 NOTE — PROGRESS NOTE ADULT - ATTENDING COMMENTS
Doing well and will consider discharge planning for the next day or so  Will advance to clears today   Encourage ambulation and po pain meds.

## 2023-06-26 NOTE — PROGRESS NOTE ADULT - ASSESSMENT
31 y/o F PMH PE and DVT 2/2 mass effect of fibroid uterus on iliac veins, PSHx myomectomy for symptoms of menorrhagia, dysmenorrhea, presenting for myomectomy. Her course is c/b post op ileus and severe sepsis with unclear source for which ID was consulted. Imaging shows fluid and gas collection in uterus, no clear collection for drainage. She is clinically improving on zosyn though she had a fever this morning. Duration of abx tbd pending clinical course.    Recommendations:  - c/w zosyn 3.375g IV Q8H    Team 1 will continue to follow

## 2023-06-27 ENCOUNTER — TRANSCRIPTION ENCOUNTER (OUTPATIENT)
Age: 33
End: 2023-06-27

## 2023-06-27 VITALS — TEMPERATURE: 100 F

## 2023-06-27 LAB
ANION GAP SERPL CALC-SCNC: 9 MMOL/L — SIGNIFICANT CHANGE UP (ref 5–17)
BASOPHILS # BLD AUTO: 0.04 K/UL — SIGNIFICANT CHANGE UP (ref 0–0.2)
BASOPHILS NFR BLD AUTO: 0.4 % — SIGNIFICANT CHANGE UP (ref 0–2)
BUN SERPL-MCNC: 4 MG/DL — LOW (ref 7–23)
CALCIUM SERPL-MCNC: 8.3 MG/DL — LOW (ref 8.4–10.5)
CHLORIDE SERPL-SCNC: 98 MMOL/L — SIGNIFICANT CHANGE UP (ref 96–108)
CO2 SERPL-SCNC: 26 MMOL/L — SIGNIFICANT CHANGE UP (ref 22–31)
CREAT SERPL-MCNC: 0.53 MG/DL — SIGNIFICANT CHANGE UP (ref 0.5–1.3)
EGFR: 126 ML/MIN/1.73M2 — SIGNIFICANT CHANGE UP
EOSINOPHIL # BLD AUTO: 0.09 K/UL — SIGNIFICANT CHANGE UP (ref 0–0.5)
EOSINOPHIL NFR BLD AUTO: 0.9 % — SIGNIFICANT CHANGE UP (ref 0–6)
GLUCOSE SERPL-MCNC: 101 MG/DL — HIGH (ref 70–99)
HCT VFR BLD CALC: 23.7 % — LOW (ref 34.5–45)
HGB BLD-MCNC: 7.4 G/DL — LOW (ref 11.5–15.5)
IMM GRANULOCYTES NFR BLD AUTO: 6.1 % — HIGH (ref 0–0.9)
LYMPHOCYTES # BLD AUTO: 1.81 K/UL — SIGNIFICANT CHANGE UP (ref 1–3.3)
LYMPHOCYTES # BLD AUTO: 17.7 % — SIGNIFICANT CHANGE UP (ref 13–44)
MAGNESIUM SERPL-MCNC: 1.9 MG/DL — SIGNIFICANT CHANGE UP (ref 1.6–2.6)
MCHC RBC-ENTMCNC: 30.2 PG — SIGNIFICANT CHANGE UP (ref 27–34)
MCHC RBC-ENTMCNC: 31.2 GM/DL — LOW (ref 32–36)
MCV RBC AUTO: 96.7 FL — SIGNIFICANT CHANGE UP (ref 80–100)
MONOCYTES # BLD AUTO: 0.92 K/UL — HIGH (ref 0–0.9)
MONOCYTES NFR BLD AUTO: 9 % — SIGNIFICANT CHANGE UP (ref 2–14)
NEUTROPHILS # BLD AUTO: 6.72 K/UL — SIGNIFICANT CHANGE UP (ref 1.8–7.4)
NEUTROPHILS NFR BLD AUTO: 65.9 % — SIGNIFICANT CHANGE UP (ref 43–77)
NRBC # BLD: 0 /100 WBCS — SIGNIFICANT CHANGE UP (ref 0–0)
PHOSPHATE SERPL-MCNC: 1.5 MG/DL — LOW (ref 2.5–4.5)
PLATELET # BLD AUTO: 313 K/UL — SIGNIFICANT CHANGE UP (ref 150–400)
POTASSIUM SERPL-MCNC: 3.5 MMOL/L — SIGNIFICANT CHANGE UP (ref 3.5–5.3)
POTASSIUM SERPL-SCNC: 3.5 MMOL/L — SIGNIFICANT CHANGE UP (ref 3.5–5.3)
RBC # BLD: 2.45 M/UL — LOW (ref 3.8–5.2)
RBC # FLD: 18.2 % — HIGH (ref 10.3–14.5)
SODIUM SERPL-SCNC: 133 MMOL/L — LOW (ref 135–145)
WBC # BLD: 10.2 K/UL — SIGNIFICANT CHANGE UP (ref 3.8–10.5)
WBC # FLD AUTO: 10.2 K/UL — SIGNIFICANT CHANGE UP (ref 3.8–10.5)

## 2023-06-27 PROCEDURE — 82570 ASSAY OF URINE CREATININE: CPT

## 2023-06-27 PROCEDURE — 85018 HEMOGLOBIN: CPT

## 2023-06-27 PROCEDURE — 84145 PROCALCITONIN (PCT): CPT

## 2023-06-27 PROCEDURE — 85384 FIBRINOGEN ACTIVITY: CPT

## 2023-06-27 PROCEDURE — 85730 THROMBOPLASTIN TIME PARTIAL: CPT

## 2023-06-27 PROCEDURE — 99232 SBSQ HOSP IP/OBS MODERATE 35: CPT | Mod: GC

## 2023-06-27 PROCEDURE — 82962 GLUCOSE BLOOD TEST: CPT

## 2023-06-27 PROCEDURE — 83036 HEMOGLOBIN GLYCOSYLATED A1C: CPT

## 2023-06-27 PROCEDURE — P9016: CPT

## 2023-06-27 PROCEDURE — 84540 ASSAY OF URINE/UREA-N: CPT

## 2023-06-27 PROCEDURE — 84132 ASSAY OF SERUM POTASSIUM: CPT

## 2023-06-27 PROCEDURE — 81001 URINALYSIS AUTO W/SCOPE: CPT

## 2023-06-27 PROCEDURE — C1765: CPT

## 2023-06-27 PROCEDURE — 84480 ASSAY TRIIODOTHYRONINE (T3): CPT

## 2023-06-27 PROCEDURE — 80048 BASIC METABOLIC PNL TOTAL CA: CPT

## 2023-06-27 PROCEDURE — 86850 RBC ANTIBODY SCREEN: CPT

## 2023-06-27 PROCEDURE — 84156 ASSAY OF PROTEIN URINE: CPT

## 2023-06-27 PROCEDURE — 76705 ECHO EXAM OF ABDOMEN: CPT

## 2023-06-27 PROCEDURE — 86901 BLOOD TYPING SEROLOGIC RH(D): CPT

## 2023-06-27 PROCEDURE — 36430 TRANSFUSION BLD/BLD COMPNT: CPT

## 2023-06-27 PROCEDURE — 84295 ASSAY OF SERUM SODIUM: CPT

## 2023-06-27 PROCEDURE — 36415 COLL VENOUS BLD VENIPUNCTURE: CPT

## 2023-06-27 PROCEDURE — 84436 ASSAY OF TOTAL THYROXINE: CPT

## 2023-06-27 PROCEDURE — 88305 TISSUE EXAM BY PATHOLOGIST: CPT

## 2023-06-27 PROCEDURE — 84133 ASSAY OF URINE POTASSIUM: CPT

## 2023-06-27 PROCEDURE — 85027 COMPLETE CBC AUTOMATED: CPT

## 2023-06-27 PROCEDURE — 74177 CT ABD & PELVIS W/CONTRAST: CPT

## 2023-06-27 PROCEDURE — 71045 X-RAY EXAM CHEST 1 VIEW: CPT

## 2023-06-27 PROCEDURE — 85610 PROTHROMBIN TIME: CPT

## 2023-06-27 PROCEDURE — 80053 COMPREHEN METABOLIC PANEL: CPT

## 2023-06-27 PROCEDURE — 84439 ASSAY OF FREE THYROXINE: CPT

## 2023-06-27 PROCEDURE — 84100 ASSAY OF PHOSPHORUS: CPT

## 2023-06-27 PROCEDURE — 71275 CT ANGIOGRAPHY CHEST: CPT

## 2023-06-27 PROCEDURE — 83735 ASSAY OF MAGNESIUM: CPT

## 2023-06-27 PROCEDURE — 86923 COMPATIBILITY TEST ELECTRIC: CPT

## 2023-06-27 PROCEDURE — 82330 ASSAY OF CALCIUM: CPT

## 2023-06-27 PROCEDURE — 93970 EXTREMITY STUDY: CPT

## 2023-06-27 PROCEDURE — 82947 ASSAY GLUCOSE BLOOD QUANT: CPT

## 2023-06-27 PROCEDURE — 83935 ASSAY OF URINE OSMOLALITY: CPT

## 2023-06-27 PROCEDURE — 87040 BLOOD CULTURE FOR BACTERIA: CPT

## 2023-06-27 PROCEDURE — 74174 CTA ABD&PLVS W/CONTRAST: CPT

## 2023-06-27 PROCEDURE — 84481 FREE ASSAY (FT-3): CPT

## 2023-06-27 PROCEDURE — 80076 HEPATIC FUNCTION PANEL: CPT

## 2023-06-27 PROCEDURE — 86900 BLOOD TYPING SEROLOGIC ABO: CPT

## 2023-06-27 PROCEDURE — 85025 COMPLETE CBC W/AUTO DIFF WBC: CPT

## 2023-06-27 PROCEDURE — 84300 ASSAY OF URINE SODIUM: CPT

## 2023-06-27 PROCEDURE — 97161 PT EVAL LOW COMPLEX 20 MIN: CPT

## 2023-06-27 PROCEDURE — 74018 RADEX ABDOMEN 1 VIEW: CPT

## 2023-06-27 PROCEDURE — C1889: CPT

## 2023-06-27 PROCEDURE — 84443 ASSAY THYROID STIM HORMONE: CPT

## 2023-06-27 PROCEDURE — 99232 SBSQ HOSP IP/OBS MODERATE 35: CPT

## 2023-06-27 PROCEDURE — 83605 ASSAY OF LACTIC ACID: CPT

## 2023-06-27 RX ORDER — KETOROLAC TROMETHAMINE 30 MG/ML
30 SYRINGE (ML) INJECTION EVERY 6 HOURS
Refills: 0 | Status: DISCONTINUED | OUTPATIENT
Start: 2023-06-27 | End: 2023-06-27

## 2023-06-27 RX ORDER — HYDROMORPHONE HYDROCHLORIDE 2 MG/ML
1 INJECTION INTRAMUSCULAR; INTRAVENOUS; SUBCUTANEOUS
Qty: 32 | Refills: 0
Start: 2023-06-27 | End: 2023-07-04

## 2023-06-27 RX ORDER — CEFPODOXIME PROXETIL 100 MG
400 TABLET ORAL EVERY 12 HOURS
Refills: 0 | Status: DISCONTINUED | OUTPATIENT
Start: 2023-06-27 | End: 2023-06-27

## 2023-06-27 RX ORDER — NIFEDIPINE 30 MG
1 TABLET, EXTENDED RELEASE 24 HR ORAL
Qty: 30 | Refills: 1
Start: 2023-06-27 | End: 2023-08-25

## 2023-06-27 RX ORDER — CEFPODOXIME PROXETIL 100 MG
2 TABLET ORAL
Qty: 20 | Refills: 0
Start: 2023-06-27 | End: 2023-07-01

## 2023-06-27 RX ORDER — KETOROLAC TROMETHAMINE 30 MG/ML
1 SYRINGE (ML) INJECTION
Qty: 32 | Refills: 0
Start: 2023-06-27 | End: 2023-07-04

## 2023-06-27 RX ORDER — ACETAMINOPHEN 500 MG
975 TABLET ORAL EVERY 6 HOURS
Refills: 0 | Status: DISCONTINUED | OUTPATIENT
Start: 2023-06-27 | End: 2023-06-27

## 2023-06-27 RX ORDER — METRONIDAZOLE 500 MG
1 TABLET ORAL
Qty: 15 | Refills: 0
Start: 2023-06-27 | End: 2023-07-01

## 2023-06-27 RX ORDER — METRONIDAZOLE 500 MG
500 TABLET ORAL EVERY 8 HOURS
Refills: 0 | Status: DISCONTINUED | OUTPATIENT
Start: 2023-06-27 | End: 2023-06-27

## 2023-06-27 RX ADMIN — Medication 10 MILLIGRAM(S): at 13:56

## 2023-06-27 RX ADMIN — Medication 975 MILLIGRAM(S): at 13:55

## 2023-06-27 RX ADMIN — Medication 30 MILLIGRAM(S): at 10:15

## 2023-06-27 RX ADMIN — HYDROMORPHONE HYDROCHLORIDE 0.5 MILLIGRAM(S): 2 INJECTION INTRAMUSCULAR; INTRAVENOUS; SUBCUTANEOUS at 02:50

## 2023-06-27 RX ADMIN — Medication 30 MILLIGRAM(S): at 16:20

## 2023-06-27 RX ADMIN — Medication 400 MILLIGRAM(S): at 13:56

## 2023-06-27 RX ADMIN — PANTOPRAZOLE SODIUM 40 MILLIGRAM(S): 20 TABLET, DELAYED RELEASE ORAL at 13:56

## 2023-06-27 RX ADMIN — ONDANSETRON 8 MILLIGRAM(S): 8 TABLET, FILM COATED ORAL at 01:55

## 2023-06-27 RX ADMIN — Medication 500 MILLIGRAM(S): at 13:56

## 2023-06-27 RX ADMIN — PIPERACILLIN AND TAZOBACTAM 25 GRAM(S): 4; .5 INJECTION, POWDER, LYOPHILIZED, FOR SOLUTION INTRAVENOUS at 07:47

## 2023-06-27 RX ADMIN — Medication 30 MILLIGRAM(S): at 10:00

## 2023-06-27 RX ADMIN — APIXABAN 5 MILLIGRAM(S): 2.5 TABLET, FILM COATED ORAL at 00:12

## 2023-06-27 RX ADMIN — APIXABAN 5 MILLIGRAM(S): 2.5 TABLET, FILM COATED ORAL at 13:56

## 2023-06-27 RX ADMIN — Medication 975 MILLIGRAM(S): at 14:55

## 2023-06-27 RX ADMIN — HYDROMORPHONE HYDROCHLORIDE 0.5 MILLIGRAM(S): 2 INJECTION INTRAMUSCULAR; INTRAVENOUS; SUBCUTANEOUS at 01:54

## 2023-06-27 NOTE — PROGRESS NOTE ADULT - PROBLEM SELECTOR PLAN 1
The patient is well-known to me.  The patient was escalated pretty operatively.  The the last echocardiogram revealed improvement in the right ventricular function and pulmonary pressure.  Patient is stable postoperatively and starting full anticoagulation.  Hemoglobin is stable and no evidence of bleeding.  His sepsis patient most likely related to her abdominal condition.  The baseline oxygen saturation is normal on room air and the patient is ambulating with no difficulty..  Patient has no dyspnea on exertion.  She is ambulating with no difficulty.  Continue anticoagulation.  The infectious process is improving on antibiotic
The patient is well-known to me.  The patient was escalated pretty operatively.  The the last echocardiogram revealed improvement in the right ventricular function and pulmonary pressure.  Patient is stable postoperatively and starting full anticoagulation.  Hemoglobin is stable and no evidence of bleeding.  His sepsis patient most likely related to her abdominal condition.  The baseline oxygen saturation is normal on room air and the patient is ambulating with no difficulty.

## 2023-06-27 NOTE — PROGRESS NOTE ADULT - SUBJECTIVE AND OBJECTIVE BOX
Pt seen and examined at bedside. Pt states mild abdominal pain w/ ambulation mostly incisional. Pt ambulating, tolerating diet, + flatus, urinating adequately.   Pt denies fever, chills, chest pain, SOB, nausea, vomiting, lightheadedness, dizziness.      T(F): 99.3 (06-27-23 @ 04:54), Max: 101 (06-26-23 @ 08:30)  HR: 72 (06-27-23 @ 04:15) (72 - 84)  BP: 160/84 (06-27-23 @ 04:15) (136/74 - 160/84)  RR: 18 (06-27-23 @ 04:15) (17 - 18)  SpO2: 96% (06-27-23 @ 04:15) (95% - 100%)  Wt(kg): --  I&O's Summary    25 Jun 2023 07:01  -  26 Jun 2023 07:00  --------------------------------------------------------  IN: 2749 mL / OUT: 750 mL / NET: 1999 mL    26 Jun 2023 07:01  -  27 Jun 2023 06:59  --------------------------------------------------------  IN: 2175 mL / OUT: 1900 mL / NET: 275 mL        MEDICATIONS  (STANDING):  apixaban 5 milliGRAM(s) Oral every 12 hours  bisacodyl Suppository 10 milliGRAM(s) Rectal daily  lactated ringers. 1000 milliLiter(s) (100 mL/Hr) IV Continuous <Continuous>  pantoprazole  Injectable 40 milliGRAM(s) IV Push daily  piperacillin/tazobactam IVPB.. 3.375 Gram(s) IV Intermittent every 8 hours  polyethylene glycol 3350 17 Gram(s) Oral at bedtime    MEDICATIONS  (PRN):  albuterol    90 MICROgram(s) HFA Inhaler 2 Puff(s) Inhalation every 6 hours PRN Shortness of Breath and/or Wheezing  benzocaine/menthol Lozenge 1 Lozenge Oral every 4 hours PRN Sore Throat  HYDROmorphone  Injectable 0.5 milliGRAM(s) IV Push every 3 hours PRN Severe Pain (7 - 10)  HYDROmorphone  Injectable 0.2 milliGRAM(s) IV Push every 3 hours PRN Moderate Pain (4 - 6)  ondansetron Injectable 8 milliGRAM(s) IV Push every 6 hours PRN Nausea and/or Vomiting      Physical Exam:  Constitutional: NAD  Pulmonary: no inc wob   Abdomen: incision site clean, dry, intact. Soft, mildly tender, non distended, no guarding, no rebound, [+] bowel sounds  Extremities: no lower extremity edema or calve tenderness. SCDs in place     LABS:                        7.0    12.33 )-----------( 233      ( 26 Jun 2023 05:30 )             22.2     06-26    133<L>  |  99  |  5<L>  ----------------------------<  89  3.7   |  26  |  0.56    Ca    8.0<L>      26 Jun 2023 05:30  Phos  1.5     06-26  Mg     1.8     06-26        Urinalysis Basic - ( 26 Jun 2023 05:30 )    Color: x / Appearance: x / SG: x / pH: x  Gluc: 89 mg/dL / Ketone: x  / Bili: x / Urobili: x   Blood: x / Protein: x / Nitrite: x   Leuk Esterase: x / RBC: x / WBC x   Sq Epi: x / Non Sq Epi: x / Bacteria: x        RADIOLOGY & ADDITIONAL TESTS:

## 2023-06-27 NOTE — CHART NOTE - NSCHARTNOTEFT_GEN_A_CORE
Admitting Diagnosis:   Patient is a 32y old  Female who presents with a chief complaint of Postoperative monitoring.  (27 Jun 2023 09:38)      PAST MEDICAL & SURGICAL HISTORY:  Asthma      Fibroids      Anemia      Pulmonary embolism  January 2023      DVT (deep venous thrombosis)      No significant past surgical history          Current Nutrition Order:  Low Fiber     PO Intake: Good (%) [   ]  Fair (50-75%) [  X ] Poor (<25%) [   ]    GI Issues: No N/V, +flatus. -BM    Pain: Pain well controlled at this time     Skin Integrity: Sixto 20, no edema   Suprapubic surgical wound    Labs:   06-27    133<L>  |  98  |  4<L>  ----------------------------<  101<H>  3.5   |  26  |  0.53    Ca    8.3<L>      27 Jun 2023 11:38  Phos  1.5     06-27  Mg     1.9     06-27      CAPILLARY BLOOD GLUCOSE          Medications:  MEDICATIONS  (STANDING):  acetaminophen     Tablet .. 975 milliGRAM(s) Oral every 6 hours  apixaban 5 milliGRAM(s) Oral every 12 hours  bisacodyl Suppository 10 milliGRAM(s) Rectal daily  cefpodoxime 400 milliGRAM(s) Oral every 12 hours  ketorolac   Injectable 30 milliGRAM(s) IV Push every 6 hours  metroNIDAZOLE    Tablet 500 milliGRAM(s) Oral every 8 hours  pantoprazole  Injectable 40 milliGRAM(s) IV Push daily  polyethylene glycol 3350 17 Gram(s) Oral at bedtime    MEDICATIONS  (PRN):  albuterol    90 MICROgram(s) HFA Inhaler 2 Puff(s) Inhalation every 6 hours PRN Shortness of Breath and/or Wheezing  benzocaine/menthol Lozenge 1 Lozenge Oral every 4 hours PRN Sore Throat  HYDROmorphone  Injectable 0.2 milliGRAM(s) IV Push every 3 hours PRN Moderate Pain (4 - 6)  HYDROmorphone  Injectable 0.5 milliGRAM(s) IV Push every 3 hours PRN Severe Pain (7 - 10)  ondansetron Injectable 8 milliGRAM(s) IV Push every 6 hours PRN Nausea and/or Vomiting      Weight: 109.7kg   Weight Change: No new wts recorded since admit     Nutrition Focused Physical Exam: Completed [   ]  Not Pertinent [ X  ]    Estimated energy needs:   Height for BMI (FEET)	5 Feet  Height for BMI (INCHES)	10 Inch(s)  Height for BMI (CENTIMETERS)	177.8 Centimeter(s)  Weight for BMI (lbs)	241.8 lb  Weight for BMI (kg)	109.7 kg  Body Mass Index	34.7  Ideal body weight used for calculations as pt >120% of IBW (161% IBW). Needs estimated for age and adjusted for post-op/wound healing    Estimated Energy Needs Weight (lbs)	150 lb  Estimated Energy Needs Weight (kg)	68 kg  Estimated Energy Needs From (yifan/kg)	27  Estimated Energy Needs To (yifan/kg)	32  Estimated Energy Needs Calculated From (yifan/kg)	1836  Estimated Energy Needs Calculated To (yifan/kg)	2176    Estimated Protein Needs Weight (lbs)	150 lb  Estimated Protein Needs Weight (kg)	68 kg  Estimated Protein Needs From (g/kg)	1  Estimated Protein Needs To (g/kg)	1.2  Estimated Protein Needs Calculated From (g/kg)	68  Estimated Protein Needs Calculated To (g/kg)	81.6    Estimated Fluid Needs Weight (lbs)	150 lb  Estimated Fluid Needs Weight (kg)	68 kg  Estimated Fluid Needs From (ml/kg)	30  Estimated Fluid Needs To (ml/kg)	35  Estimated Fluid Needs Calculated From (ml/kg)	2040  Estimated Fluid Needs Calculated To (ml/kg)	2380    Subjective: 31 yo POD7 w/ PMH b/l DVT/PE thought to be secondary to mass effect of fibroid uterus on iliac veins, s/p abdominal myomectomy,18 wks size uterus, 50 fibroids, entered cavity. S/p SICU admission 6/21-6/25 for sepsis. Postoperative course also complicated by ileus which has now resolved ICU admission now stepped down to [facility] for monitoring. Pt seen in room, awake, alert, very pleasant, OOB in chair. Reported feeling well today, looking forward to going home. Appetite improved. No N/V, +flatus, -BM. Tmax 100.1F- on cefpodoxime and flagyl. Pain well managed. WBC trending down, Phos 1.5 (L), H/H 7.0/23.7%. Diet ed provided. Will continue to follow per RD protocol.     Previous Nutrition Diagnosis:  Increased Nutrient Needs RT increased metabolic demand for nutrient intake AEB post-op/wound healing/sepsis    Active [ X  ]  Resolved [   ]    If resolved, new PES:     Goal: Pt will consistently meet % of EER w/good tolerance     Recommendations:  1. Continue with current diet and encourage PO intake  2. Monitor lytes and replete prn.   3. Pain and bowel regimens per team   4. Reinforce diet ed   5. Recommend probiotics     Education: Fiber progression diet ed provided; encouraged probiotic rich foods while on antibiotics     Risk Level: High [   ] Moderate [  X ] Low [   ]

## 2023-06-27 NOTE — DISCHARGE NOTE NURSING/CASE MANAGEMENT/SOCIAL WORK - PATIENT PORTAL LINK FT
You can access the FollowMyHealth Patient Portal offered by [facility] by registering at the following website: [email or web address]. By joining [employer]’s FollowMyHealth portal, you will also be able to view your health information using other applications (apps) compatible with our system.

## 2023-06-27 NOTE — DISCHARGE NOTE NURSING/CASE MANAGEMENT/SOCIAL WORK - NSDCPEFALRISK_GEN_ALL_CORE
For information on Fall & Injury Prevention, visit: [email or web address] OR  [email or web address] OR  https://www.cdc.gov/steadi/patient.html

## 2023-06-27 NOTE — PROGRESS NOTE ADULT - ATTENDING SUPERVISION STATEMENT
Resident
Fellow
Resident
Fellow
Fellow

## 2023-06-27 NOTE — CHART NOTE - NSCHARTNOTESELECT_GEN_ALL_CORE
Event Note
Pulmonary/Event Note
Rapid Response
Event Note
Nutrition Follow Up/Nutrition Services
POC/Event Note
Rapid Response/Event Note
Sepsis Note/Event Note
Work Note/Event Note

## 2023-06-27 NOTE — DISCHARGE NOTE PROVIDER - CARE PROVIDER_API CALL
Robert Telles  Obstetrics and Gynecology  [address] [place] [address]  Phone: [phone]  Fax: [phone]  Follow Up Time:

## 2023-06-27 NOTE — PROGRESS NOTE ADULT - ATTENDING COMMENTS
Patient seen and examined with house-staff during bedside rounds.  Resident note read, including vitals, physical findings, laboratory data, and radiological reports.   Revisions included below.  Direct personal management at bed side and extensive interpretation of the data.  Plan was outlined and discussed in details with the housestaff.  Decision making of high complexity  Action taken for acute disease activity to reflect the level of care provided:  - medication reconciliation  - review laboratory data No

## 2023-06-27 NOTE — DISCHARGE NOTE PROVIDER - HOSPITAL COURSE
Pt admitted s/p abdominal myomectomy,18 wks size uterus, 50 fibroids, enetered cavity. Intraop pt received 2u pRBC, 1178cc cell saver. POD 0 pt became tachycardic to 135BPM-> EKG sinus tach w/ low uop. Pt received 2000cc of bolus ON w/ adequate uop response. PT maintained tachycardia. PT then moved to [facility] from PACU POD1. POD1 Pt was heplock, changed tylenol to ofirmev   Abd x ray unremarkable. 12pm Hb 8.7, valencia removed, labetalol 200 TID started for hypertension and was advanced to reg diet. ON pt had rapid response called for syncopal episode when getting out of bed and was febrile and transferred to the SICU for postop management. CTA C/A/P were unremarkable. PT was started on zosyn. 6/22 : RUQ US to r/o rosales wnl, b/l DVT study wnl. PT continued to be tachycardic and febrile. Pt then HAd dark brown/black emesis ON wand NG tube was placed. and pt received additional 1uPRBC. 6/23 pt had CT A/P done showing nonspecific findings. ID consulted for continued fevers w/ negative BCx. 6/24 Thyroid studies done and were wnl and pt was started on heparin ggt and valencia was removed. 6/25 pt had bowel movement and NG tube was removed. 6/26 pt diet advanced to sips and chips and then to CLD after tolerating liquids. 6/27 pt tolerated LFD, pt meeting postoperative milestones. PT AF and having BM. Clinically and hemodynamically stable for d/c.    Pt admitted s/p abdominal myomectomy,18 wks size uterus, 50 fibroids, enetered cavity. Intraop pt received 2u pRBC, 1178cc cell saver. POD 0 pt became tachycardic to 135BPM-> EKG sinus tach w/ low uop. Pt received 2000cc of bolus ON w/ adequate uop response. PT maintained tachycardia. PT then moved to TELE from PACU POD1. POD1 Pt was heplock, changed tylenol to ofirmev   Abd x ray unremarkable. 12pm Hb 8.7, valencia removed, labetalol 200 TID started for hypertension and was advanced to reg diet. ON pt had rapid response called for syncopal episode when getting out of bed and was febrile and transferred to the SICU for postop management. CTA C/A/P were unremarkable. PT was started on zosyn. 6/22 : RUQ US to r/o rosales wnl, b/l DVT study wnl. PT continued to be tachycardic and febrile. Pt then HAd dark brown/black emesis ON wand NG tube was placed. and pt received additional 1uPRBC. 6/23 pt had CT A/P done showing nonspecific findings. ID consulted for continued fevers w/ negative BCx. 6/24 Thyroid studies done and were wnl and pt was started on heparin ggt and valencia was removed. 6/25 pt had bowel movement and NG tube was removed and pt stepped down to TELE. 6/26 pt diet advanced to sips and chips and then to CLD after tolerating liquids. 6/27 pt tolerated LFD, pt meeting postoperative milestones. PT AF and having BM. Clinically and hemodynamically stable for d/c.    Pt admitted s/p abdominal myomectomy,18 wks size uterus, 50 fibroids, enetered cavity. Intraop pt received 2u pRBC, 1178cc cell saver. POD 0 pt became tachycardic to 135BPM-> EKG sinus tach w/ low uop. Pt received 2000cc of bolus ON w/ adequate uop response. PT maintained tachycardia. PT then moved to TELE from PACU POD1. POD1 Pt was heplock, changed tylenol to ofirmev   Abd x ray unremarkable. 12pm Hb 8.7, valencia removed, labetalol 200 TID started for hypertension and was advanced to reg diet. ON pt had rapid response called for syncopal episode when getting out of bed and was febrile and transferred to the SICU for postop management. CTA C/A/P were unremarkable. PT was started on zosyn. 6/22 : RUQ US to r/o rosales wnl, b/l DVT study wnl. PT continued to be tachycardic and febrile. Pt then HAd dark brown/black emesis ON wand NG tube was placed. and pt received additional 1uPRBC. 6/23 pt had CT A/P done showing nonspecific findings. ID consulted for continued fevers w/ negative BCx. 6/24 Thyroid studies done and were wnl and pt was started on heparin ggt and valencia was removed. 6/25 pt had bowel movement and NG tube was removed and pt stepped down to TELE. 6/26 pt diet advanced to sips and chips and then to CLD after tolerating liquids. 6/27 pt tolerated LFD, pt meeting postoperative milestones. PT AF and having BM. D/C home on cefpodoxime and flagyl until 7/1. Clinically and hemodynamically stable for d/c.

## 2023-06-27 NOTE — PROGRESS NOTE ADULT - ASSESSMENT
31 yo POD7 w/ PMH b/l DVT/PE thought to be secondary to mass effect of fibroid uterus on iliac veins, s/p abdominal myomectomy,18 wks size uterus, 50 fibroids, enetered cavity. S/p SICU admission 6/21-6/25 for sepsis. Postoperative course also complicated by ileus which has now resolved ICU admission now stepped down to TELE for monitoring    Neuro: Dilaudid 0.2/0.5prn  CV: persistent sinus tachycardia 's (resolving)  Pulm: Hx PE/DVT on Eliquis, hx asthma - albuterol PRN, RA, cepacol  GI: CLD, LR@100, Ileus - resolving, s/p NGT 6/22-6/25, protonix QD, zofran PRN, miralax, Dulcolax suppository, +/-  : voiding  ID: sepsis w/ unclear origin, Zosyn (06/21-), Tlast 100.9 6/24 20:45, s/p ancef x1 periop  Endo: ISS   Heme: 11.1>EBL 2L>(2U PRBC intra-op + 1178 cell saver)>13.0>11.2>6.9>(1U PRBC)>7.9>7.1>(1U PRBC)>7.3>7.4>7.2>7.0, s/p venofer 300mg x3d (6/21-6/23)  DVT PPx: restarted Eliquis 6/25, s/p heparin drip (6/24-6/25)

## 2023-06-27 NOTE — PROGRESS NOTE ADULT - SUBJECTIVE AND OBJECTIVE BOX
Interval Events: Reviewed  Patient seen and examined at bedside.    Patient is a 32y old  Female who presents with a chief complaint of Postoperative monitoring.  (27 Jun 2023 09:38)  she is better    PAST MEDICAL & SURGICAL HISTORY:  Asthma      Fibroids      Anemia      Pulmonary embolism  January 2023      DVT (deep venous thrombosis)      No significant past surgical history          MEDICATIONS:  Pulmonary:  albuterol    90 MICROgram(s) HFA Inhaler 2 Puff(s) Inhalation every 6 hours PRN    Antimicrobials:  cefpodoxime 400 milliGRAM(s) Oral every 12 hours  metroNIDAZOLE    Tablet 500 milliGRAM(s) Oral every 8 hours    Anticoagulants:  apixaban 5 milliGRAM(s) Oral every 12 hours    Cardiac:      Allergies    No Known Allergies    Intolerances        Vital Signs Last 24 Hrs  T(C): 37.7 (27 Jun 2023 18:00), Max: 37.8 (27 Jun 2023 09:00)  T(F): 99.8 (27 Jun 2023 18:00), Max: 100.1 (27 Jun 2023 09:00)  HR: 78 (27 Jun 2023 13:51) (72 - 84)  BP: 131/75 (27 Jun 2023 13:51) (131/75 - 160/84)  BP(mean): 97 (27 Jun 2023 13:51) (92 - 115)  RR: 18 (27 Jun 2023 13:51) (17 - 18)  SpO2: 99% (27 Jun 2023 13:51) (96% - 99%)    Parameters below as of 27 Jun 2023 13:51  Patient On (Oxygen Delivery Method): room air        06-26 @ 07:01 - 06-27 @ 07:00  --------------------------------------------------------  IN: 2175 mL / OUT: 1900 mL / NET: 275 mL    06-27 @ 07:01 - 06-27 @ 22:32  --------------------------------------------------------  IN: 240 mL / OUT: 400 mL / NET: -160 mL          Review of Systems:   •	General: negative  •	Skin/Breast: negative  •	Ophthalmologic: negative  •	ENMT: negative  •	Respiratory and Thorax: negative  •	Cardiovascular: negative  •	Gastrointestinal: negative  •	Genitourinary: negative  •	Musculoskeletal: negative  •	Neurological: negative  •	Psychiatric: negative  •	Hematology/Lymphatics: negative  •	Endocrine: negative  •	Allergic/Immunologic: negative    Physical Exam:   • Constitutional:	refer to the dietion /Nutritionist note  • Eyes:	EOMI; PERRL; no drainage or redness  • ENMT:	No oral lesions; no gross abnormalities  • Neck	No bruits; no thyromegaly or nodules  • Breasts:	not examined  • Back:	No deformity or limitation of movement  • Respiratory:	Breath Sounds equal & clear to percussion & auscultation, no accessory muscle use  • Cardiovascular:	Regular rate & rhythm, normal S1, S2; no murmurs, gallops or rubs; no S3, S4  • Gastrointestinal:	Soft, non-tender, no hepatosplenomegaly, normal bowel sounds  • Genitourinary:	not examined  • Rectal: not examined  • Extremities:	No cyanosis, clubbing or edema  • Vascular:	Equal and normal pulses (carotid, femoral, dorsalis pedis)  • Neurologica:l	not examined  • Skin:	No lesions; no rash  • Lymph Nodes:	No lymphadedenopathy  • Musculoskeletal:	No joint pain, swelling or deformity; no limitation of movement        LABS:      CBC Full  -  ( 27 Jun 2023 11:38 )  WBC Count : 10.20 K/uL  RBC Count : 2.45 M/uL  Hemoglobin : 7.4 g/dL  Hematocrit : 23.7 %  Platelet Count - Automated : 313 K/uL  Mean Cell Volume : 96.7 fl  Mean Cell Hemoglobin : 30.2 pg  Mean Cell Hemoglobin Concentration : 31.2 gm/dL  Auto Neutrophil # : 6.72 K/uL  Auto Lymphocyte # : 1.81 K/uL  Auto Monocyte # : 0.92 K/uL  Auto Eosinophil # : 0.09 K/uL  Auto Basophil # : 0.04 K/uL  Auto Neutrophil % : 65.9 %  Auto Lymphocyte % : 17.7 %  Auto Monocyte % : 9.0 %  Auto Eosinophil % : 0.9 %  Auto Basophil % : 0.4 %    06-27    133<L>  |  98  |  4<L>  ----------------------------<  101<H>  3.5   |  26  |  0.53    Ca    8.3<L>      27 Jun 2023 11:38  Phos  1.5     06-27  Mg     1.9     06-27            Urinalysis Basic - ( 27 Jun 2023 11:38 )    Color: x / Appearance: x / SG: x / pH: x  Gluc: 101 mg/dL / Ketone: x  / Bili: x / Urobili: x   Blood: x / Protein: x / Nitrite: x   Leuk Esterase: x / RBC: x / WBC x   Sq Epi: x / Non Sq Epi: x / Bacteria: x                  RADIOLOGY & ADDITIONAL STUDIES (The following images were personally reviewed):

## 2023-06-27 NOTE — DISCHARGE NOTE PROVIDER - NSDCMRMEDTOKEN_GEN_ALL_CORE_FT
Albuterol (Eqv-ProAir HFA) 90 mcg/inh inhalation aerosol: 2 inhaled prn  Eliquis 5 mg oral tablet: 1 orally 2 times a day  HYDROmorphone 2 mg oral tablet: 1 tab(s) orally every 6 hours as needed for  severe pain MDD: 6 mg in a 4 hour period  ketorolac 10 mg oral tablet: 1 tab(s) orally every 6 hours   Albuterol (Eqv-ProAir HFA) 90 mcg/inh inhalation aerosol: 2 inhaled prn  cefpodoxime 200 mg oral tablet: 2 tab(s) orally every 12 hours  Eliquis 5 mg oral tablet: 1 orally 2 times a day  HYDROmorphone 2 mg oral tablet: 1 tab(s) orally every 6 hours as needed for  severe pain MDD: 6 mg in a 4 hour period  ketorolac 10 mg oral tablet: 1 tab(s) orally every 6 hours  metroNIDAZOLE 500 mg oral tablet: 1 tab(s) orally every 8 hours   Albuterol (Eqv-ProAir HFA) 90 mcg/inh inhalation aerosol: 2 inhaled prn  cefpodoxime 200 mg oral tablet: 2 tab(s) orally every 12 hours  Eliquis 5 mg oral tablet: 1 orally 2 times a day  HYDROmorphone 2 mg oral tablet: 1 tab(s) orally every 6 hours as needed for  severe pain MDD: 6 mg in a 4 hour period  ketorolac 10 mg oral tablet: 1 tab(s) orally every 6 hours  metroNIDAZOLE 500 mg oral tablet: 1 tab(s) orally every 8 hours  NIFEdipine 30 mg oral tablet, extended release: 1 tab(s) orally once a day

## 2023-06-27 NOTE — PROGRESS NOTE ADULT - SUBJECTIVE AND OBJECTIVE BOX
INFECTIOUS DISEASES CONSULT FOLLOW-UP NOTE    INTERVAL HPI/OVERNIGHT EVENTS:No events overnight. Has mild L sided lumbar pain with out weakness, numbness, tingling. She has not had any chills, N/V but still w improving abd pain. Her appetite is improving.      ROS:   otherwise negative other than noted above       ANTIBIOTICS/RELEVANT:    MEDICATIONS  (STANDING):  acetaminophen     Tablet .. 975 milliGRAM(s) Oral every 6 hours  apixaban 5 milliGRAM(s) Oral every 12 hours  bisacodyl Suppository 10 milliGRAM(s) Rectal daily  cefpodoxime 400 milliGRAM(s) Oral every 12 hours  ketorolac   Injectable 30 milliGRAM(s) IV Push every 6 hours  metroNIDAZOLE    Tablet 500 milliGRAM(s) Oral every 8 hours  pantoprazole  Injectable 40 milliGRAM(s) IV Push daily  polyethylene glycol 3350 17 Gram(s) Oral at bedtime    MEDICATIONS  (PRN):  albuterol    90 MICROgram(s) HFA Inhaler 2 Puff(s) Inhalation every 6 hours PRN Shortness of Breath and/or Wheezing  benzocaine/menthol Lozenge 1 Lozenge Oral every 4 hours PRN Sore Throat  HYDROmorphone  Injectable 0.2 milliGRAM(s) IV Push every 3 hours PRN Moderate Pain (4 - 6)  HYDROmorphone  Injectable 0.5 milliGRAM(s) IV Push every 3 hours PRN Severe Pain (7 - 10)  ondansetron Injectable 8 milliGRAM(s) IV Push every 6 hours PRN Nausea and/or Vomiting        Vital Signs Last 24 Hrs  T(C): 37.7 (27 Jun 2023 18:00), Max: 37.8 (27 Jun 2023 09:00)  T(F): 99.8 (27 Jun 2023 18:00), Max: 100.1 (27 Jun 2023 09:00)  HR: 78 (27 Jun 2023 13:51) (72 - 84)  BP: 131/75 (27 Jun 2023 13:51) (131/75 - 160/84)  BP(mean): 97 (27 Jun 2023 13:51) (92 - 115)  RR: 18 (27 Jun 2023 13:51) (17 - 18)  SpO2: 99% (27 Jun 2023 13:51) (96% - 100%)    Parameters below as of 27 Jun 2023 13:51  Patient On (Oxygen Delivery Method): room air        06-26-23 @ 07:01  -  06-27-23 @ 07:00  --------------------------------------------------------  IN: 2175 mL / OUT: 1900 mL / NET: 275 mL    06-27-23 @ 07:01  -  06-27-23 @ 18:35  --------------------------------------------------------  IN: 240 mL / OUT: 400 mL / NET: -160 mL      PHYSICAL EXAM:  Constitutional: young F sitting in chair, comfortable appearing  Eyes: no scleral icterus or conjunctival pallor  ENT: MMM  Neck: the appearance of the neck was normal and the neck was supple.   Pulmonary: no increased work of breathing, CTA  Heart: tachycardic, regular, no murmur  Vascular: no BRISA, WWP  Abdomen: soft, tenderness to palpation LLQ   Neurological: AOx3  Back: no tenderness to palpation over lumbar spine or para lumbar regions  Skin: no rash, warm        LABS:                        7.4    10.20 )-----------( 313      ( 27 Jun 2023 11:38 )             23.7     06-27    133<L>  |  98  |  4<L>  ----------------------------<  101<H>  3.5   |  26  |  0.53    Ca    8.3<L>      27 Jun 2023 11:38  Phos  1.5     06-27  Mg     1.9     06-27        Urinalysis Basic - ( 27 Jun 2023 11:38 )    Color: x / Appearance: x / SG: x / pH: x  Gluc: 101 mg/dL / Ketone: x  / Bili: x / Urobili: x   Blood: x / Protein: x / Nitrite: x   Leuk Esterase: x / RBC: x / WBC x   Sq Epi: x / Non Sq Epi: x / Bacteria: x        MICROBIOLOGY:      RADIOLOGY & ADDITIONAL STUDIES:  Reviewed

## 2023-06-27 NOTE — PROGRESS NOTE ADULT - ATTENDING COMMENTS
#Fever, leukocytosis, severe sepsis    Overall improving, Tolerating PO diet.  Had 100.1 temp overnight Stop zosyn, switch to cefpodoxime 400mg PO q12h plus flagyl 500mg PO q8h.  Make sure patient tolerate PO abx.  Recommend total 10 day course of empiric abx (6/22 - 7/1).    Team 1 will follow you.  Case d/w primary team.    Laverne Fontanez MD, MS  Infectious Disease attending  work cell [phone]   For any questions during evening/weekend/holiday, please page ID on call.

## 2023-06-27 NOTE — PROGRESS NOTE ADULT - PROVIDER SPECIALTY LIST ADULT
Infectious Disease
Infectious Disease
SICU
Infectious Disease
Pulmonology
SICU
GYN
Infectious Disease
Pulmonology
GYN
Pulmonology

## 2023-06-27 NOTE — DISCHARGE NOTE PROVIDER - NSDCCPCAREPLAN_GEN_ALL_CORE_FT
PRINCIPAL DISCHARGE DIAGNOSIS  Diagnosis: Postoperative state  Assessment and Plan of Treatment:

## 2023-06-27 NOTE — DISCHARGE NOTE PROVIDER - NSDCFUSCHEDAPPT_GEN_ALL_CORE_FT
Rosie Haro  [facility] [address] [place] [address] [address]  Scheduled Appointment: 06/29/2023    [place] [id number]  Scheduled Appointment: 06/29/2023

## 2023-06-27 NOTE — PROGRESS NOTE ADULT - ASSESSMENT
31 y/o F PMH PE and DVT 2/2 mass effect of fibroid uterus on iliac veins, PSHx myomectomy for symptoms of menorrhagia, dysmenorrhea, presenting for myomectomy. Her course is c/b post op ileus and severe sepsis with unclear source for which ID was consulted. Imaging shows fluid and gas collection in uterus, no clear collection for drainage. She is improving and now tolerating PO.    Recommendations:  - stop zosyn   - start cefpodoxime 400mg PO Q12H and flagyl 500mg PO Q8H through 7/1    Team 1 will continue to follow

## 2023-06-27 NOTE — PROGRESS NOTE ADULT - REASON FOR ADMISSION
Abdominal myomectomy
Fibroids
abdominal myomectomy
s/p abdominal myomectomy
s/p abdominal myomectomy
post op monitoring
s/p abdominal myomectomy

## 2023-06-29 LAB
CULTURE RESULTS: SIGNIFICANT CHANGE UP
SPECIMEN SOURCE: SIGNIFICANT CHANGE UP

## 2023-06-30 LAB — SURGICAL PATHOLOGY STUDY: SIGNIFICANT CHANGE UP

## 2023-07-06 DIAGNOSIS — Z86.711 PERSONAL HISTORY OF PULMONARY EMBOLISM: ICD-10-CM

## 2023-07-06 DIAGNOSIS — N94.6 DYSMENORRHEA, UNSPECIFIED: ICD-10-CM

## 2023-07-06 DIAGNOSIS — R65.20 SEVERE SEPSIS WITHOUT SEPTIC SHOCK: ICD-10-CM

## 2023-07-06 DIAGNOSIS — Z86.718 PERSONAL HISTORY OF OTHER VENOUS THROMBOSIS AND EMBOLISM: ICD-10-CM

## 2023-07-06 DIAGNOSIS — T81.44XA SEPSIS FOLLOWING A PROCEDURE, INITIAL ENCOUNTER: ICD-10-CM

## 2023-07-06 DIAGNOSIS — E86.1 HYPOVOLEMIA: ICD-10-CM

## 2023-07-06 DIAGNOSIS — J45.909 UNSPECIFIED ASTHMA, UNCOMPLICATED: ICD-10-CM

## 2023-07-06 DIAGNOSIS — E87.20 ACIDOSIS, UNSPECIFIED: ICD-10-CM
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
## 2023-07-06 DIAGNOSIS — E87.70 FLUID OVERLOAD, UNSPECIFIED: ICD-10-CM

## 2023-07-06 DIAGNOSIS — Z79.01 LONG TERM (CURRENT) USE OF ANTICOAGULANTS: ICD-10-CM

## 2023-07-06 DIAGNOSIS — J45.20 MILD INTERMITTENT ASTHMA, UNCOMPLICATED: ICD-10-CM

## 2023-07-06 DIAGNOSIS — D62 ACUTE POSTHEMORRHAGIC ANEMIA: ICD-10-CM

## 2023-07-06 DIAGNOSIS — D25.0 SUBMUCOUS LEIOMYOMA OF UTERUS: ICD-10-CM

## 2023-07-06 DIAGNOSIS — D25.2 SUBSEROSAL LEIOMYOMA OF UTERUS: ICD-10-CM

## 2023-07-06 DIAGNOSIS — K91.89 OTHER POSTPROCEDURAL COMPLICATIONS AND DISORDERS OF DIGESTIVE SYSTEM: ICD-10-CM

## 2023-07-06 DIAGNOSIS — N71.9 INFLAMMATORY DISEASE OF UTERUS, UNSPECIFIED: ICD-10-CM

## 2023-07-06 DIAGNOSIS — A41.9 SEPSIS, UNSPECIFIED ORGANISM: ICD-10-CM

## 2023-07-06 DIAGNOSIS — K56.7 ILEUS, UNSPECIFIED: ICD-10-CM

## 2023-07-06 DIAGNOSIS — J96.00 ACUTE RESPIRATORY FAILURE, UNSPECIFIED WHETHER WITH HYPOXIA OR HYPERCAPNIA: ICD-10-CM

## 2023-07-06 DIAGNOSIS — N92.0 EXCESSIVE AND FREQUENT MENSTRUATION WITH REGULAR CYCLE: ICD-10-CM

## 2023-07-06 DIAGNOSIS — D25.1 INTRAMURAL LEIOMYOMA OF UTERUS: ICD-10-CM
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
## 2023-07-06 DIAGNOSIS — D25.9 LEIOMYOMA OF UTERUS, UNSPECIFIED: ICD-10-CM

## 2023-09-29 PROBLEM — I26.99 OTHER PULMONARY EMBOLISM WITHOUT ACUTE COR PULMONALE: Chronic | Status: ACTIVE | Noted: 2023-06-19

## 2023-09-29 PROBLEM — I82.409 ACUTE EMBOLISM AND THROMBOSIS OF UNSPECIFIED DEEP VEINS OF UNSPECIFIED LOWER EXTREMITY: Chronic | Status: ACTIVE | Noted: 2023-06-20

## 2023-10-12 ENCOUNTER — RX RENEWAL (OUTPATIENT)
Age: 33
End: 2023-10-12

## 2023-10-24 NOTE — ED PROVIDER NOTE - CONDITION AT DISCHARGE:
Patient states the rash is not getting much better since ED gave her Prednisone.  She is taking Zyrtec and was given course of Augmentin and Triamcinolone cream by UC.    ED thought rash was drug allergy, started as hives starting 10/15/23.  Patient given appointment tomorrow with Dr. Tamayo.   Satisfactory

## 2023-11-10 RX ORDER — APIXABAN 5 MG/1
5 TABLET, FILM COATED ORAL
Qty: 60 | Refills: 5 | Status: ACTIVE | COMMUNITY
Start: 2023-02-07 | End: 1900-01-01

## 2023-11-29 ENCOUNTER — APPOINTMENT (OUTPATIENT)
Dept: PULMONOLOGY | Facility: CLINIC | Age: 33
End: 2023-11-29

## 2023-12-12 ENCOUNTER — APPOINTMENT (OUTPATIENT)
Dept: PULMONOLOGY | Facility: CLINIC | Age: 33
End: 2023-12-12

## 2023-12-20 ENCOUNTER — APPOINTMENT (OUTPATIENT)
Dept: PULMONOLOGY | Facility: CLINIC | Age: 33
End: 2023-12-20
Payer: COMMERCIAL

## 2023-12-20 VITALS
OXYGEN SATURATION: 96 % | WEIGHT: 235 LBS | HEART RATE: 96 BPM | DIASTOLIC BLOOD PRESSURE: 81 MMHG | SYSTOLIC BLOOD PRESSURE: 129 MMHG | HEIGHT: 70 IN | BODY MASS INDEX: 33.64 KG/M2

## 2023-12-20 PROCEDURE — 99214 OFFICE O/P EST MOD 30 MIN: CPT | Mod: 25

## 2023-12-20 PROCEDURE — 94618 PULMONARY STRESS TESTING: CPT

## 2023-12-20 NOTE — PHYSICAL EXAM
[No Acute Distress] : no acute distress [Normal Oropharynx] : normal oropharynx [Normal Appearance] : normal appearance [No Neck Mass] : no neck mass [Normal Rate/Rhythm] : normal rate/rhythm [Normal S1, S2] : normal s1, s2 [No Murmurs] : no murmurs [No Resp Distress] : no resp distress [Clear to Auscultation Bilaterally] : clear to auscultation bilaterally [No Abnormalities] : no abnormalities [Benign] : benign [Normal Gait] : normal gait [No Edema] : no edema [Normal Color/ Pigmentation] : normal color/ pigmentation [No Focal Deficits] : no focal deficits

## 2023-12-20 NOTE — HISTORY OF PRESENT ILLNESS
[TextBox_4] : 32 yo F with mild asthma, large bilateral PE Jan 2023, bulky fibroid disesae with mass affect on external illiac veins, b/l DVT who comes in for follow up. Her PE wsa large, with RV strain, PASP 52 during admission. She has been on elliquis since discharge tolerating well with no side effects. IN June 2023, she underwent myomectomy and removal of uterine fibroids (15 removed) which she tolerated it well.   Currently denies LE edema and SOB. She feels well with no bleeding with her current AC. Has questions on how long she should have anticoagulation and hesistant to stop it. She also had a nuviring placed months before her PE occured which she no longer has.   Asthma well controlled, no inhlaer use in years  6mwt done today, improved to 457 m with on dyspnea (prior 365m) [ESS] : 0

## 2023-12-20 NOTE — ASSESSMENT
[FreeTextEntry1] : 32 yo F with mild asthma, PE/DVT jan 2023 thought to be secondary to large fibroid burden and extrinsic compression of bilateral illac veins. She is now s/p myomectomy of fibroids in June 2023 with improvement and currently tolerating elliquis 5 mg BID for AC. Presents for follow up. last echo with PASP 21 (normalized). Last Venous doppler negative for any LE dvt.   # Large bilateral lPE, likely provoked - PE bilateral JAn 2023, on elliquis. Bilateral LE clot thought to be due to illiac compression from large fibroids - s/p myometomy Jun 2023 of fibroids - if the PE was indeed provoked by her fibroid burden and extrinsic disease, she would not need life long AC PLAN: - repeat echo jan 2024 already ordered to assess PASP - 6MWT improved to 465 m from 365m  - V/Q scan to evaluate for disease burden of current clots - LE dopplers to evaluate if there is any component of external compression now that fiboids removed - based on these tests, will decide on if she can stop AC if she feels comfortable  # asthma, mild - prn albuterol. not needing it. no recent exacerbations

## 2023-12-20 NOTE — END OF VISIT
[] : Fellow [FreeTextEntry3] : Discussed the case in detail with the patient.  The patient is concerned about getting off the anticoagulation.  The postoperative course was reviewed.  The venous Doppler prior to the surgery in February revealed external compression of bilateral iliacs.  Will repeat the venous Doppler and VQ scan to rule out any underlying chronic chronic thromboembolic disease.  The 6-minute walk test is normal.  Will discuss after the test.  There is no clinical evidence neither failure therapy no bleeding complication [>50% of the face to face encounter time was spent on counseling and/or coordination of care for ___] : Greater than 50% of the face to face encounter time was spent on counseling and/or coordination of care for [unfilled]

## 2024-01-01 NOTE — DIETITIAN INITIAL EVALUATION ADULT - ENTER FROM (CAL/KG)
"Progress Note -    Baby Boy Louis (Kristen) 16 hours male MRN: [id number]  Unit/Bed#: (N) Encounter: [id number]      Assessment: Gestational Age: 39w6d male No issues with baby. Mom undecided about discharge day    Plan: normal  care.    Subjective     16 hours old live  .   Stable, no events noted overnight.   Feedings (last 2 days)       Date/Time    24 09    Comment rows:    OBSERV: hat removed at 24          Output: Unmeasured Urine Occurrence: 1    Objective   Vitals:   Temperature: 100 °F (37.8 °C) (NBN RN made aware)  Pulse: 136  Respirations: 42  Height: 20\" (50.8 cm) (Filed from Delivery Summary)  Weight: 3770 g (8 lb 5 oz)   Pct Wt Change: 0 %    Physical Exam:   General Appearance:  Alert, active, no distress  Head:  Normocephalic, AFOF                             Eyes:  Conjunctiva clear, +RR  Ears:  Normally placed, no anomalies  Nose: nares patent                           Mouth:  Palate intact  Respiratory:  No grunting, flaring, retractions, breath sounds clear and equal    Cardiovascular:  Regular rate and rhythm. No murmur. Adequate perfusion/capillary refill. Femoral pulse present  Abdomen:   Soft, non-distended, no masses, bowel sounds present, no HSM  Genitourinary:  Normal male, testes descended, anus patent  Spine:  No hair ronak, dimples  Musculoskeletal:  Normal hips, clavicles intact  Skin/Hair/Nails:   Skin warm, dry, and intact, no rashes               Neurologic:   Normal tone and reflexes    Labs: No pertinent labs in last 24 hours.    Bilirubin:               " 25

## 2024-01-10 ENCOUNTER — OUTPATIENT (OUTPATIENT)
Dept: OUTPATIENT SERVICES | Facility: HOSPITAL | Age: 34
LOS: 1 days | End: 2024-01-10
Payer: COMMERCIAL

## 2024-01-10 DIAGNOSIS — I26.99 OTHER PULMONARY EMBOLISM WITHOUT ACUTE COR PULMONALE: ICD-10-CM

## 2024-01-10 PROCEDURE — 93306 TTE W/DOPPLER COMPLETE: CPT

## 2024-01-10 PROCEDURE — 93306 TTE W/DOPPLER COMPLETE: CPT | Mod: 26

## 2024-01-10 NOTE — PATIENT PROFILE ADULT - FUNCTIONAL ASSESSMENT - DAILY ACTIVITY 2.
Please I can refer her to a Spine ortho to establish to continue to monitor. Does she have any symptoms (back pain etc)?   
3 = A little assistance

## 2024-01-11 ENCOUNTER — NON-APPOINTMENT (OUTPATIENT)
Age: 34
End: 2024-01-11

## 2024-01-23 ENCOUNTER — APPOINTMENT (OUTPATIENT)
Dept: VASCULAR SURGERY | Facility: CLINIC | Age: 34
End: 2024-01-23
Payer: COMMERCIAL

## 2024-01-23 VITALS
WEIGHT: 235 LBS | HEIGHT: 70 IN | DIASTOLIC BLOOD PRESSURE: 80 MMHG | BODY MASS INDEX: 33.64 KG/M2 | SYSTOLIC BLOOD PRESSURE: 122 MMHG | HEART RATE: 64 BPM

## 2024-01-23 DIAGNOSIS — I87.1 COMPRESSION OF VEIN: ICD-10-CM

## 2024-01-23 PROCEDURE — 93971 EXTREMITY STUDY: CPT

## 2024-01-23 PROCEDURE — 99213 OFFICE O/P EST LOW 20 MIN: CPT

## 2024-01-24 NOTE — HISTORY OF PRESENT ILLNESS
[FreeTextEntry1] : 33yoF recently admitted to [facility] for worsening chest pain/cough, noted to have a large b/l PE w/RV strain, taken for pulmonary thrombectomy w/Dr. John and later for LLE venogram/cavagram, mechanical thrombectomy of the L iliac avi/CFA w/Yoel ClotTriever device w/Dr. Pearson.  Upon further work-up, pt was noted to have multiple bulky fibroids of the uterus w/mass effect upon the EIVs/IVC, possibly the cause of her DVT/PE; pt now s/p myomectomy w/Dr. Robert Telles.  Pt returning today for f/u evaluation of her LE DVT as Dr. Haro is recommending that DOAC be discontinued but first wants to establish if any plaque burden remains.

## 2024-01-24 NOTE — ADDENDUM
[FreeTextEntry1] : This note was written by Reggie Clement, acting as a scribe for Dr. Huong Rainey.  I, Dr. Huong Rainey, have read and attest that all the information, medical decision-making, and discharge instructions within are true and accurate.  I, Dr. Huong Rainey, personally performed the evaluation and management (E/M) services for this established patient who presents today with (an) existing condition(s).  That E/M includes conducting the examination, assessing all conditions, and (re)establishing/reinforcing a plan of care.  Today, my ACP, Reggie Clement, was here to observe my evaluation and management services for this condition to be followed going forward.

## 2024-01-24 NOTE — PHYSICAL EXAM
[Normal Thyroid] : the thyroid was normal [Normal Breath Sounds] : Normal breath sounds [Respiratory Effort] : normal respiratory effort [Normal Heart Sounds] : normal heart sounds [Normal Rate and Rhythm] : normal rate and rhythm [2+] : left 2+ [Ankle Swelling (On Exam)] : present [Ankle Swelling Bilaterally] : bilaterally  [Ankle Swelling On The Right] : mild [No Rash or Lesion] : No rash or lesion [Alert] : alert [Calm] : calm [JVD] : no jugular venous distention  [Varicose Veins Of Lower Extremities] : not present [] : not present [Abdomen Masses] : No abdominal masses [Abdomen Tenderness] : ~T ~M No abdominal tenderness [Purpura] : no purpura  [Petechiae] : no petechiae [Skin Ulcer] : no ulcer [Skin Induration] : no induration [de-identified] : Healthy, NAD [de-identified] : NC/AT, anicteric [de-identified] : FROM throughot, strength 5/5x4, no palpable cords in LEs

## 2024-01-24 NOTE — ASSESSMENT
[FreeTextEntry1] : 33yoF recently admitted to [facility] for worsening chest pain/cough, noted to have a large b/l PE w/RV strain, taken for pulmonary thrombectomy w/Dr. John and later for LLE venogram/cavagram, mechanical thrombectomy of the L iliac avi/CFA w/Yoel ClotTriever device w/Dr. Pearson.  Upon further work-up, pt was noted to have multiple bulky fibroids of the uterus w/mass effect upon the EIVs/IVC, possibly the cause of her DVT/PE; pt now s/p myomectomy w/Dr. Robert Telles.  Pt returning today for f/u evaluation of her LE DVT as Dr. Haro is recommending that DOAC be discontinued.  Normal exam noted today, and venous duplex of LLE reveals no residual LLE thrombus noted.  If there is no extrinsic compression on the iliac vein, DOAC could be discontinued w/minimal risk of recurrent thrombosis, but will first evaluate pt w/CTV chest/abdomen/pelvis, TBD 01/26 at [facility] dept. of radiology.

## 2024-01-26 ENCOUNTER — APPOINTMENT (OUTPATIENT)
Dept: CT IMAGING | Facility: HOSPITAL | Age: 34
End: 2024-01-26

## 2024-01-28 ENCOUNTER — APPOINTMENT (OUTPATIENT)
Dept: CT IMAGING | Facility: HOSPITAL | Age: 34
End: 2024-01-28

## 2024-06-02 NOTE — PRE-ANESTHESIA EVALUATION ADULT - NSATTENDATTESTRD_GEN_ALL_CORE
hide
The patient has been re-examined and I agree with the above assessment or I updated with my findings.

## 2024-08-15 ENCOUNTER — APPOINTMENT (OUTPATIENT)
Dept: PULMONOLOGY | Facility: CLINIC | Age: 34
End: 2024-08-15
Payer: COMMERCIAL

## 2024-08-15 VITALS
DIASTOLIC BLOOD PRESSURE: 86 MMHG | TEMPERATURE: 98.1 F | SYSTOLIC BLOOD PRESSURE: 135 MMHG | HEART RATE: 69 BPM | WEIGHT: 230 LBS | OXYGEN SATURATION: 100 % | BODY MASS INDEX: 32.93 KG/M2 | HEIGHT: 70 IN | RESPIRATION RATE: 12 BRPM

## 2024-08-15 DIAGNOSIS — J45.909 UNSPECIFIED ASTHMA, UNCOMPLICATED: ICD-10-CM

## 2024-08-15 DIAGNOSIS — I26.99 OTHER PULMONARY EMBOLISM W/OUT ACUTE COR PULMONALE: ICD-10-CM

## 2024-08-15 DIAGNOSIS — I82.4Y2 ACUTE EMBOLISM AND THROMBOSIS OF UNSPECIFIED DEEP VEINS OF LEFT PROXIMAL LOWER EXTREMITY: ICD-10-CM

## 2024-08-15 PROCEDURE — G2211 COMPLEX E/M VISIT ADD ON: CPT

## 2024-08-15 PROCEDURE — 99214 OFFICE O/P EST MOD 30 MIN: CPT

## 2024-08-15 NOTE — END OF VISIT
[] : Fellow [Time Spent: ___ minutes] : I have spent [unfilled] minutes of time on the encounter. [FreeTextEntry3] : Pt seen with SARTHAK Cronin and agree on the above plan of care. The patient clinically stable.  Discussed in details with the patient that she can be off the anticoagulation but she is concerned about recurrence is asking if she can stay with her.  With follow-up with vascular surgery and VQ scan.  Will discuss later discontinuation or further assurance with the patient will continue patient on prophylaxis dose rather than full dose anticoagulation

## 2024-08-15 NOTE — HISTORY OF PRESENT ILLNESS
[TextBox_4] : 33 yo F with mild asthma, large bilateral PE Jan 2023, bulky fibroid disease with mass affect on external illiac veins, b/l DVT who comes in for follow up. Her PE was large, with RV strain, PASP 52 during admission. She has been on elliquis since discharge tolerating well with no side effects. In June 2023, she underwent myomectomy and removal of uterine fibroids (15 removed) which she tolerated it well.   She has been off the eliquis for months.  She does not have heavy periods.  Denies leg swelling, chest pain, coughing, SOB.  She feels sometimes needs to take deep breath.  She used an albuterol inhaler 3 days ago due to chest tightness.    Asthma well controlled ACT 24/25.   [ESS] : 0

## 2024-08-15 NOTE — ASSESSMENT
[FreeTextEntry1] : 34 yo F with mild asthma, PE/DVT jan 2023 thought to be secondary to large fibroid burden and extrinsic compression of bilateral illac veins. She is now s/p myomectomy of fibroids in June 2023 with improvement and currently tolerating elliquis 5 mg BID for AC. Presents for follow up. last echo with PASP 21 (normalized). Last Venous doppler negative for any LE dvt.   # Large bilateral lPE, likely provoked - PE bilateral Jan 2023, on elliquis. Bilateral LE clot thought to be due to illiac compression from large fibroids - s/p myometomy Jun 2023 of fibroids - Pt currently off Eliquis for the last month.  Will follow with VQ scan and discuss if we start her on 2.5 Eliquis.  Plan Follow with VQ scan  Follow up 3 months  # asthma, mild - prn albuterol. not needing it. no recent exacerbations.  ACT 24/25

## 2024-08-15 NOTE — HISTORY OF PRESENT ILLNESS
[TextBox_4] : 35 yo F with mild asthma, large bilateral PE Jan 2023, bulky fibroid disease with mass affect on external illiac veins, b/l DVT who comes in for follow up. Her PE was large, with RV strain, PASP 52 during admission. She has been on elliquis since discharge tolerating well with no side effects. In June 2023, she underwent myomectomy and removal of uterine fibroids (15 removed) which she tolerated it well.   She has been off the eliquis for months.  She does not have heavy periods.  Denies leg swelling, chest pain, coughing, SOB.  She feels sometimes needs to take deep breath.  She used an albuterol inhaler 3 days ago due to chest tightness.    Asthma well controlled ACT 24/25.   [ESS] : 0

## 2024-08-22 ENCOUNTER — APPOINTMENT (OUTPATIENT)
Dept: NUCLEAR MEDICINE | Facility: HOSPITAL | Age: 34
End: 2024-08-22

## 2024-08-22 NOTE — ED ADULT TRIAGE NOTE - MEANS OF ARRIVAL
ambulatory DVT ppx: Lovenox: preferred in malignancy  Diet: Salt and cholesterol restricted, Halal, 1.5L fluid restriction  Code: Full Code  Dispo: awaiting IR liver bx on 8/20 DVT ppx: Lovenox: preferred in malignancy  Diet: Regular, Halal, 1.5L fluid restriction  Code: Full Code currently
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
## 2024-08-27 ENCOUNTER — APPOINTMENT (OUTPATIENT)
Dept: VASCULAR SURGERY | Facility: CLINIC | Age: 34
End: 2024-08-27
Payer: COMMERCIAL

## 2024-08-27 VITALS
BODY MASS INDEX: 32.93 KG/M2 | WEIGHT: 230 LBS | DIASTOLIC BLOOD PRESSURE: 84 MMHG | SYSTOLIC BLOOD PRESSURE: 125 MMHG | HEIGHT: 70 IN | HEART RATE: 64 BPM

## 2024-08-27 DIAGNOSIS — I87.1 COMPRESSION OF VEIN: ICD-10-CM

## 2024-08-27 PROCEDURE — 93979 VASCULAR STUDY: CPT

## 2024-08-27 PROCEDURE — 99213 OFFICE O/P EST LOW 20 MIN: CPT

## 2024-08-28 NOTE — HISTORY OF PRESENT ILLNESS
[FreeTextEntry1] : 34yoF previously admitted to [facility] for worsening chest pain/cough, noted to have a large b/l PE w/RV strain, taken for pulmonary thrombectomy w/Dr. John and later for LLE venogram/cavagram, mechanical thrombectomy of the L iliac avi/CFA w/Yoel ClotTriever device w/Dr. Pearson.  Upon further work-up, pt was noted to have multiple bulky fibroids of the uterus w/mass effect upon the EIVs/IVC, possibly the cause of her DVT/PE; pt now s/p myomectomy w/Dr. Robert Telles.  Pt last seen by Dr. Haro 1mo prior who d/c'd her Eliquis, referred her back in for evaluation of the venous system for any clot burden; no residual thrombus was noted in the LLE on the last duplex study.

## 2024-08-28 NOTE — PHYSICAL EXAM
[Normal Thyroid] : the thyroid was normal [Normal Breath Sounds] : Normal breath sounds [Respiratory Effort] : normal respiratory effort [Normal Heart Sounds] : normal heart sounds [Normal Rate and Rhythm] : normal rate and rhythm [2+] : left 2+ [Ankle Swelling (On Exam)] : present [Ankle Swelling Bilaterally] : bilaterally  [Ankle Swelling On The Right] : mild [No Rash or Lesion] : No rash or lesion [Alert] : alert [Calm] : calm [JVD] : no jugular venous distention  [Varicose Veins Of Lower Extremities] : not present [] : not present [Abdomen Masses] : No abdominal masses [Abdomen Tenderness] : ~T ~M No abdominal tenderness [Purpura] : no purpura  [Petechiae] : no petechiae [Skin Ulcer] : no ulcer [Skin Induration] : no induration [de-identified] : Healthy, NAD [de-identified] : NC/AT, anicteric [de-identified] : FROM throughot, strength 5/5x4, no palpable cords in LEs

## 2024-08-28 NOTE — HISTORY OF PRESENT ILLNESS
[FreeTextEntry1] : 34yoF previously admitted to [facility] for worsening chest pain/cough, noted to have a large b/l PE w/RV strain, taken for pulmonary thrombectomy w/Dr. John and later for LLE venogram/cavagram, mechanical thrombectomy of the L iliac avi/CFA w/Yoel ClotTriever device w/Dr. Pearson.  Upon further work-up, pt was noted to have multiple bulky fibroids of the uterus w/mass effect upon the EIVs/IVC, possibly the cause of her DVT/PE; pt now s/p myomectomy w/Dr. Robert Telles.  Pt last seen by Dr. Haro 1mo prior who d/c'd her Eliquis, referred her back in for evaluation of the venous system for any clot burden; no residual thrombus was noted in the LLE on the last duplex study.

## 2024-08-28 NOTE — ASSESSMENT
[FreeTextEntry1] : 34yoF previously admitted to [facility] for worsening chest pain/cough, noted to have a large b/l PE w/RV strain, taken for pulmonary thrombectomy w/Dr. John and later for LLE venogram/cavagram, mechanical thrombectomy of the L iliac avi/CFA w/Yoel ClotTriever device w/Dr. Pearson.  Upon further work-up, pt was noted to have multiple bulky fibroids of the uterus w/mass effect upon the EIVs/IVC, possibly the cause of her DVT/PE; pt now s/p myomectomy w/Dr. Robert Telles.  Pt last seen by Dr. Haro 1mo prior who d/c'd her Eliquis, referred her back in for evaluation of the venous system for any clot burden; no residual thrombus was noted in the LLE on the last duplex study.  Normal exam noted today, and venous duplex of LLE reveals no residual LLE thrombus noted, patent IVC/b/l CIV/EIV.  Recommend she remain off DOAC at this time and continue to f/u w/Dr. Haro PRN.  She will RTO for recurrent symptoms.

## 2024-08-28 NOTE — PHYSICAL EXAM
[Normal Thyroid] : the thyroid was normal [Normal Breath Sounds] : Normal breath sounds [Respiratory Effort] : normal respiratory effort [Normal Heart Sounds] : normal heart sounds [Normal Rate and Rhythm] : normal rate and rhythm [2+] : left 2+ [Ankle Swelling (On Exam)] : present [Ankle Swelling Bilaterally] : bilaterally  [Ankle Swelling On The Right] : mild [No Rash or Lesion] : No rash or lesion [Alert] : alert [Calm] : calm [JVD] : no jugular venous distention  [Varicose Veins Of Lower Extremities] : not present [] : not present [Abdomen Masses] : No abdominal masses [Abdomen Tenderness] : ~T ~M No abdominal tenderness [Purpura] : no purpura  [Petechiae] : no petechiae [Skin Ulcer] : no ulcer [Skin Induration] : no induration [de-identified] : Healthy, NAD [de-identified] : NC/AT, anicteric [de-identified] : FROM throughot, strength 5/5x4, no palpable cords in LEs

## 2024-08-28 NOTE — PHYSICAL EXAM
[Normal Thyroid] : the thyroid was normal [Normal Breath Sounds] : Normal breath sounds [Respiratory Effort] : normal respiratory effort [Normal Heart Sounds] : normal heart sounds [Normal Rate and Rhythm] : normal rate and rhythm [2+] : left 2+ [Ankle Swelling (On Exam)] : present [Ankle Swelling Bilaterally] : bilaterally  [Ankle Swelling On The Right] : mild [No Rash or Lesion] : No rash or lesion [Alert] : alert [Calm] : calm [JVD] : no jugular venous distention  [Varicose Veins Of Lower Extremities] : not present [] : not present [Abdomen Masses] : No abdominal masses [Abdomen Tenderness] : ~T ~M No abdominal tenderness [Purpura] : no purpura  [Petechiae] : no petechiae [Skin Ulcer] : no ulcer [Skin Induration] : no induration [de-identified] : Healthy, NAD [de-identified] : NC/AT, anicteric [de-identified] : FROM throughot, strength 5/5x4, no palpable cords in LEs

## 2024-08-28 NOTE — ASSESSMENT
[FreeTextEntry1] : 34yoF previously admitted to [facility] for worsening chest pain/cough, noted to have a large b/l PE w/RV strain, taken for pulmonary thrombectomy w/Dr. John and later for LLE venogram/cavagram, mechanical thrombectomy of the L iliac avi/CFA w/Yoel ClotTriever device w/Dr. Pearson.  Upon further work-up, pt was noted to have multiple bulky fibroids of the uterus w/mass effect upon the EIVs/IVC, possibly the cause of her DVT/PE; pt now s/p myomectomy w/Dr. Robert Telles.  Pt last seen by Dr. Haro 1mo prior who d/c'd her Eliquis, referred her back in for evaluation of the venous system for any clot burden; no residual thrombus was noted in the LLE on the last duplex study.  Normal exam noted today, and venous duplex of LLE reveals no residual LLE thrombus noted, patent IVC/b/l CIV/EIV.  Recommend she remain off DOAC at this time and continue to f/u w/Dr. Haro PRN.  She will RTO for recurrent symptoms.

## 2024-08-28 NOTE — ASSESSMENT
[FreeTextEntry1] : 34yoF previously admitted to [facility] for worsening chest pain/cough, noted to have a large b/l PE w/RV strain, taken for pulmonary thrombectomy w/Dr. John and later for LLE venogram/cavagram, mechanical thrombectomy of the L iliac avi/CFA w/Yoel ClotTriever device w/Dr. Pearson.  Upon further work-up, pt was noted to have multiple bulky fibroids of the uterus w/mass effect upon the EIVs/IVC, possibly the cause of her DVT/PE; pt now s/p myomectomy w/Dr. Robert Telles.  Pt last seen by Dr. Haro 1mo prior who d/c'd her Eliquis, referred her back in for evaluation of the venous system for any clot burden; no residual thrombus was noted in the LLE on the last duplex study.  Normal exam noted today, and venous duplex of LLE reveals no residual LLE thrombus noted, patent IVC/b/l CIV/EIV.  Recommend she remain off DOAC at this time and continue to f/u w/Dr. Haro PRN.  She will RTO for recurrent symptoms.

## 2024-08-28 NOTE — PROCEDURE
[FreeTextEntry1] : Venous duplex of LLE reveals no residual LLE thrombus noted, patent IVC/b/l CIV/EIV.

## 2024-08-28 NOTE — HISTORY OF PRESENT ILLNESS
[FreeTextEntry1] : 34yoF previously admitted to [facility] for worsening chest pain/cough, noted to have a large b/l PE w/RV strain, taken for pulmonary thrombectomy w/Dr. John and later for LLE venogram/cavagram, mechanical thrombectomy of the L iliac avi/CFA w/Yoel ClotTriever device w/Dr. Pearson.  Upon further work-up, pt was noted to have multiple bulky fibroids of the uterus w/mass effect upon the EIVs/IVC, possibly the cause of her DVT/PE; pt now s/p myomectomy w/Dr. Robert Telles.  Pt last seen by Dr. Haro 1mo prior who d/c'd her Eliquis, referred her back in for evaluation of the venous system for any clot burden; no residual thrombus was noted in the LLE on the last duplex study.

## 2024-09-04 ENCOUNTER — TRANSCRIPTION ENCOUNTER (OUTPATIENT)
Age: 34
End: 2024-09-04

## 2024-11-14 ENCOUNTER — APPOINTMENT (OUTPATIENT)
Dept: PULMONOLOGY | Facility: CLINIC | Age: 34
End: 2024-11-14
Payer: COMMERCIAL

## 2024-11-14 VITALS
WEIGHT: 270 LBS | OXYGEN SATURATION: 100 % | DIASTOLIC BLOOD PRESSURE: 86 MMHG | BODY MASS INDEX: 38.65 KG/M2 | TEMPERATURE: 98 F | RESPIRATION RATE: 16 BRPM | HEART RATE: 71 BPM | HEIGHT: 70 IN | SYSTOLIC BLOOD PRESSURE: 136 MMHG

## 2024-11-14 DIAGNOSIS — J45.909 UNSPECIFIED ASTHMA, UNCOMPLICATED: ICD-10-CM

## 2024-11-14 DIAGNOSIS — I26.99 OTHER PULMONARY EMBOLISM W/OUT ACUTE COR PULMONALE: ICD-10-CM

## 2024-11-14 DIAGNOSIS — I82.4Y2 ACUTE EMBOLISM AND THROMBOSIS OF UNSPECIFIED DEEP VEINS OF LEFT PROXIMAL LOWER EXTREMITY: ICD-10-CM

## 2024-11-14 PROCEDURE — 99214 OFFICE O/P EST MOD 30 MIN: CPT

## 2024-11-14 PROCEDURE — G2211 COMPLEX E/M VISIT ADD ON: CPT

## 2025-01-31 NOTE — CONSULT NOTE ADULT - ASSESSMENT
Alpha thalassemia minor   31 yo female with PMHx of asthma presents to ED i/s/o worsening chest pain and cough. CT PE showing thrombus with large burden and RV strain. ICU consulted for furtehr evaluation i/s/o cardiac involvement and worsening symptoms.     Cardiology  #RV strain  Pt w/ large PE involving all lobar and multiple segmental branches bilaterally w/ RV strain and cardiomegaly. TTE showing ___. Troponin 0.10, Pro-BNP 1395.     Pulmonary   #PE  CTA PE protocol indicating large thrombus burden involving all lobar and multiple segmental branches bilaterally w/ probable evolving infarcts in lateral basal segment of LLL and mild pulmonary A dilation w/ RV strain and cardiomegaly. TTE Pending. Heparin gtt running @ 19 mL/hr. Etiology likely Unprovoked PE in nature -- risk factors:Nuvaring contraceptive use and obesity. Denies recent travel and family hx.       Heme/Onc  On full AC i/s/o PE   - f/u aPTT q6hr   - type and screen      Dispo: MICU    Discussed with ICU fellow and Attending    31 yo female with PMHx of asthma presents to ED i/s/o worsening chest pain and cough. CT PE showing thrombus with large burden and RV strain. ICU consulted for furtehr evaluation i/s/o cardiac involvement and worsening symptoms.     Cardiology  #RV strain  Pt w/ large PE involving all lobar and multiple segmental branches bilaterally w/ RV strain and cardiomegaly. TTE showing dilated RA w/ RV strain. Troponin 0.10, Pro-BNP 1395.   - f/u repeat TTE in AM     Pulmonary   #PE  CTA PE protocol indicating large thrombus burden involving all lobar and multiple segmental branches bilaterally w/ probable evolving infarcts in lateral basal segment of LLL and mild pulmonary A dilation w/ RV strain and cardiomegaly. TTE Pending. Heparin gtt running @ 19 mL/hr. Etiology likely Unprovoked PE in nature -- risk factors:Nuvaring contraceptive use and obesity. Denies recent travel and family hx.       Heme/Onc  On full AC i/s/o PE   - f/u aPTT q6hr   - type and screen      Dispo: MICU    Discussed with ICU fellow and Attending

## 2025-08-08 NOTE — CONSULT NOTE ADULT - CONSULT REASON
Pt will be covered for PT with . Nothing needs to be done until her appt 9-22-25. Her daughter wanted to let you know this.  
RV failure
Pulmonary embolism
L LE edema
fibroid uterus
PERT
Large PE
LLE DVT
request for IVC filter placement

## (undated) DEVICE — DRSG DERMABOND PRINEO 60CM

## (undated) DEVICE — ELCTR ZOLL DEFIBRILLATOR PAD NO REPLACEMENT

## (undated) DEVICE — VENODYNE/SCD SLEEVE CALF MEDIUM

## (undated) DEVICE — TUBING BRAT 2 SUCTION ASSEMBLY TWIST LOCK

## (undated) DEVICE — RESERVOIR XTRA B BLD COLLECT

## (undated) DEVICE — DRSG TAPE MICROFOAM 3"

## (undated) DEVICE — VALVE CONTROL COPILOT BLEEDBACK

## (undated) DEVICE — PACK HYBRID LHH

## (undated) DEVICE — TUBING XTRA AAL ASPIRATION AND ANTICOAGULATION LINE 1/4"

## (undated) DEVICE — PACK EXPLORATORY LAPAROTOMY

## (undated) DEVICE — DRAIN PENROSE .5" X 18" LATEX

## (undated) DEVICE — SUT VICRYL 2-0 27" CT-1

## (undated) DEVICE — FOLEY TRAY 16FR 5CC LF UMETER CLOSED

## (undated) DEVICE — SYS BLOOD RETURN FLOWSAVER 40 MICRON 60CC

## (undated) DEVICE — LAP PAD 18 X 18"

## (undated) DEVICE — GLV 7 PROTEXIS (WHITE)

## (undated) DEVICE — SUT VICRYL 0 27" UR-6

## (undated) DEVICE — SUT PLAIN GUT 2-0 27" CT-1

## (undated) DEVICE — DRAPE ULTRASOUND TRANSDUCER COVER

## (undated) DEVICE — ELCTR BOVIE PENCIL SMOKE EVACUATION

## (undated) DEVICE — VASCULAR DILATOR KIT 8,12,16,20, 24FR

## (undated) DEVICE — TUBING EXTENSION HI PRESSURE FLEX 48"

## (undated) DEVICE — STAPLER SKIN INSORB

## (undated) DEVICE — SET BOWL XTRA 225

## (undated) DEVICE — WARMING BLANKET UPPER ADULT

## (undated) DEVICE — SUT VICRYL 0 27" CTX

## (undated) DEVICE — SUT VICRYL 0 18" CT-1 UNDYED (POP-OFF)

## (undated) DEVICE — GLV 6.5 PROTEXIS (WHITE)

## (undated) DEVICE — SUT VICRYL 2-0 27" SH

## (undated) DEVICE — SUT VICRYL 3-0 27" SH

## (undated) DEVICE — DRSG COMBINE 5X9"

## (undated) DEVICE — SUT VICRYL 2-0 27" CT-1 UNDYED

## (undated) DEVICE — POSITIONER FOAM EGG CRATE ULNAR 2PCS (PINK)

## (undated) DEVICE — SYR ASEPTO

## (undated) DEVICE — DVC TORQ PERIF 0.035

## (undated) DEVICE — ELCTR COLORADO 3CM

## (undated) DEVICE — DRSG TELFA 3 X 8

## (undated) DEVICE — WARMING BLANKET LOWER ADULT

## (undated) DEVICE — DRAPE TOWEL BLUE 17" X 24"